# Patient Record
Sex: FEMALE | Race: WHITE | NOT HISPANIC OR LATINO | Employment: PART TIME | URBAN - METROPOLITAN AREA
[De-identification: names, ages, dates, MRNs, and addresses within clinical notes are randomized per-mention and may not be internally consistent; named-entity substitution may affect disease eponyms.]

---

## 2017-01-03 ENCOUNTER — TELEPHONE (OUTPATIENT)
Dept: INTERNAL MEDICINE | Facility: CLINIC | Age: 62
End: 2017-01-03

## 2017-01-12 DIAGNOSIS — E78.5 HYPERLIPIDEMIA: ICD-10-CM

## 2017-01-12 RX ORDER — ROSUVASTATIN CALCIUM 40 MG/1
TABLET, COATED ORAL
Qty: 30 TABLET | Refills: 3 | Status: SHIPPED | OUTPATIENT
Start: 2017-01-12 | End: 2017-02-20 | Stop reason: SDUPTHER

## 2017-01-20 ENCOUNTER — OFFICE VISIT (OUTPATIENT)
Dept: ORTHOPEDIC SURGERY | Facility: CLINIC | Age: 62
End: 2017-01-20

## 2017-01-20 VITALS — TEMPERATURE: 98 F | HEIGHT: 66 IN | BODY MASS INDEX: 24.27 KG/M2 | WEIGHT: 151 LBS

## 2017-01-20 DIAGNOSIS — M51.24 HERNIATED THORACIC DISC WITHOUT MYELOPATHY: Primary | ICD-10-CM

## 2017-01-20 PROCEDURE — 99214 OFFICE O/P EST MOD 30 MIN: CPT | Performed by: ORTHOPAEDIC SURGERY

## 2017-01-20 RX ORDER — CHOLECALCIFEROL (VITAMIN D3) 125 MCG
CAPSULE ORAL
COMMUNITY
End: 2017-02-20 | Stop reason: SDUPTHER

## 2017-01-20 RX ORDER — HYDROCODONE BITARTRATE AND ACETAMINOPHEN 5; 325 MG/1; MG/1
TABLET ORAL
Qty: 40 TABLET | Refills: 0 | Status: SHIPPED | OUTPATIENT
Start: 2017-01-20 | End: 2017-03-10 | Stop reason: HOSPADM

## 2017-01-20 NOTE — MR AVS SNAPSHOT
Ivy Chairez   1/20/2017 7:45 AM   Office Visit    Dept Phone:  [phone]   Encounter #:  [id number]    Provider:  Clyde Callejas MD   Department:  [facility]                Your Full Care Plan              Your Updated Medication List          This list is accurate as of: 1/20/17  8:50 AM.  Always use your most recent med list.                alendronate 70 MG tablet   Commonly known as:  FOSAMAX   Take 1 tablet by mouth every 30 (thirty) days.       aspirin 81 MG tablet       diphenhydrAMINE 25 MG tablet   Commonly known as:  SOMINEX       hydrochlorothiazide 12.5 MG capsule   Commonly known as:  MICROZIDE       HYDROcodone-acetaminophen 5-325 MG per tablet   Commonly known as:  NORCO   1 po q 6 hr PRN severe pain       levothyroxine 75 MCG tablet   Commonly known as:  SYNTHROID, LEVOTHROID   TAKE 1 TABLET BY MOUTH EVERY DAY       meclizine 12.5 MG tablet   Commonly known as:  ANTIVERT       NITROGLYCERIN SL       PREMARIN 0.625 MG/GM vaginal cream   Generic drug:  conjugated estrogens       RANEXA 500 MG 12 hr tablet   Generic drug:  ranolazine   TAKE 1 TABLET BY MOUTH TWICE DAILY       rosuvastatin 40 MG tablet   Commonly known as:  CRESTOR   TAKE 1 TABLET BY MOUTH EVERY DAY       venlafaxine 75 MG tablet   Commonly known as:  EFFEXOR   TAKE 1 TABLET BY MOUTH TWICE DAILY       Vitamin A & D 86376-195 UNITS capsule       vitamin B-12 1000 MCG tablet   Commonly known as:  CYANOCOBALAMIN       * Vitamin D3 2000 UNITS tablet       * Vitamin D3 2000 UNITS tablet       * Notice:  This list has 2 medication(s) that are the same as other medications prescribed for you. Read the directions carefully, and ask your doctor or other care provider to review them with you.            Instructions     None    Patient Instructions History      Upcoming Appointments     Visit Type Date Time Department    FOLLOW UP 1/20/2017  7:45 AM [facility]  "[facility]    OFFICE VISIT 3/15/2017  3:00 PM [facility]      AudioBetat Signup     Our records indicate that you have an active [facility] [email or web address] account.    You can view your After Visit Summary by going to MediaCore and logging in with your [email or web address] username and password.  If you don't have a [email or web address] username and password but a parent or guardian has access to your record, the parent or guardian should login with their own [email or web address] username and password and access your record to view the After Visit Summary.    If you have questions, you can email [email or web address] or call [phone] to talk to our [email or web address] staff.  Remember, [email or web address] is NOT to be used for urgent needs.  For medical emergencies, dial 911.               Other Info from Your Visit           Your Appointments     Mar 15, 2017  3:00 PM EDT   Office Visit with Murtaza Berrios Jr., MD   [facility] INTERNAL MEDICINE (--)    [address] [place] [id number] [phone]           Arrive 15 minutes prior to appointment.              Allergies     Metronidazole  Diarrhea, Nausea And Vomiting    Amoxicillin  Swelling      Reason for Visit     Thoracic Spine - Follow-up, Pain           Vital Signs     Temperature Height Weight Body Mass Index Smoking Status       98 °F (36.7 °C) (Temporal Artery ) 66\" (167.6 cm) 151 lb (68.5 kg) 24.37 kg/m2 Never Smoker         "

## 2017-01-20 NOTE — PROGRESS NOTES
She complains of mid thoracic pain is her primary pain complaint had some temporary relief with epidurals but nothing lasting.  Also had a recent episode of neck pain radiating into the left arm after increased [place] activity.  A balance difficulties bowel or bladder complaints.  The pain is severe and limits her ability to enjoy activities including the anticipated adverse effect on her ability to finish which she enjoys immensely.  She is asking about surgery.  She saw her cardiologist in the air was determined that the pain was not cardiac in nature, and she states he told her that she would be a reasonable candidate for surgery if that should come about.  Exam she seems to have enlarged breast although I would not say dramatically so, but I'm concerned that this could be a contributing factor to sagittal imbalance and pain.  If her dealing with more than one side of pain i.e. cervical and thoracic that could be a common factor which if treated could help both.  Otherwise I think we're dealing with 2 issues.  Her neurologic exam is normal in the upper extremities for motor sensory and reflex testing.  The neck is nontender.  She remains with tenderness in the midthoracic region to palpation percussion at but lower extremity reflexes and strength are good.  Recommended MRI scan of the cervical spine for further evaluation if.  If there is nothing dramatically contributory then we may recommend a T4 to 10 fusion with instrumentation for herniated disc and disc degeneration and the sequela of fracture.  I did refill her hydrocodone which she takes 1 per day.  Answers for HPI/ROS submitted by the patient on 1/16/2017   Back pain  Chronicity: chronic  Onset: more than 1 month ago  Frequency: daily  Progression since onset: rapidly worsening  Pain location: thoracic spine  Pain quality: aching, stabbing  Pain - numeric: 10/10  Pain is: worse during the day  Aggravated by: position, sitting, standing  Stiffness is  present: all day  abdominal pain: No  bladder incontinence: No  bowel incontinence: No  chest pain: No  dysuria: No  fever: No  headaches: No  leg pain: No  numbness: No  paresis: No  paresthesias: Yes  pelvic pain: No  perianal numbness: No  tingling: Yes  weakness: Yes  weight loss: No  Risk factors: history of osteoporosis

## 2017-01-20 NOTE — LETTER
January 20, 2017     Murtaza Berrios Jr., MD  [address]  [address] [address]    Patient: Ivy Chairez   YOB: 1955   Date of Visit: 1/20/2017       Dear Dr. Agustina MD:    Thank you for referring Ivy Chairez to me for evaluation. Below are the relevant portions of my assessment and plan of care.    If you have questions, please do not hesitate to call me. I look forward to following Ivy along with you.         Sincerely,        Clyde Callejas MD        CC: No Recipients  Clyde Callejas MD  1/23/2017  7:25 AM  Signed  She complains of mid thoracic pain is her primary pain complaint had some temporary relief with epidurals but nothing lasting.  Also had a recent episode of neck pain radiating into the left arm after increased [place] activity.  A balance difficulties bowel or bladder complaints.  The pain is severe and limits her ability to enjoy activities including the anticipated adverse effect on her ability to finish which she enjoys immensely.  She is asking about surgery.  She saw her cardiologist in the air was determined that the pain was not cardiac in nature, and she states he told her that she would be a reasonable candidate for surgery if that should come about.  Exam she seems to have enlarged breast although I would not say dramatically so, but I'm concerned that this could be a contributing factor to sagittal imbalance and pain.  If her dealing with more than one side of pain i.e. cervical and thoracic that could be a common factor which if treated could help both.  Otherwise I think we're dealing with 2 issues.  Her neurologic exam is normal in the upper extremities for motor sensory and reflex testing.  The neck is nontender.  She remains with tenderness in the midthoracic region to palpation percussion at but lower extremity reflexes and strength are good.  Recommended MRI scan of the cervical spine for further evaluation if.  If there is nothing  dramatically contributory then we may recommend a T4 to 10 fusion with instrumentation for herniated disc and disc degeneration and the sequela of fracture.  I did refill her hydrocodone which she takes 1 per day.  Answers for HPI/ROS submitted by the patient on 1/16/2017   Back pain  Chronicity: chronic  Onset: more than 1 month ago  Frequency: daily  Progression since onset: rapidly worsening  Pain location: thoracic spine  Pain quality: aching, stabbing  Pain - numeric: 10/10  Pain is: worse during the day  Aggravated by: position, sitting, standing  Stiffness is present: all day  abdominal pain: No  bladder incontinence: No  bowel incontinence: No  chest pain: No  dysuria: No  fever: No  headaches: No  leg pain: No  numbness: No  paresis: No  paresthesias: Yes  pelvic pain: No  perianal numbness: No  tingling: Yes  weakness: Yes  weight loss: No  Risk factors: history of osteoporosis

## 2017-01-23 DIAGNOSIS — M54.2 NECK PAIN: Primary | ICD-10-CM

## 2017-01-24 ENCOUNTER — OFFICE VISIT (OUTPATIENT)
Dept: ORTHOPEDIC SURGERY | Facility: CLINIC | Age: 62
End: 2017-01-24

## 2017-01-24 DIAGNOSIS — M54.2 NECK PAIN: ICD-10-CM

## 2017-01-24 PROCEDURE — 72141 MRI NECK SPINE W/O DYE: CPT | Performed by: ORTHOPAEDIC SURGERY

## 2017-01-24 NOTE — MR AVS SNAPSHOT
[facility]  [phone]                    Ivy Chairez   1/24/2017 2:00 PM   Office Visit    Dept Phone:  [phone]   Encounter #:  [id number]    Provider:  LEENA BARRETT   Department:  [facility]                Your Full Care Plan              Your Updated Medication List          This list is accurate as of: 1/24/17  2:25 PM.  Always use your most recent med list.                alendronate 70 MG tablet   Commonly known as:  FOSAMAX   Take 1 tablet by mouth every 30 (thirty) days.       aspirin 81 MG tablet       diphenhydrAMINE 25 MG tablet   Commonly known as:  SOMINEX       hydrochlorothiazide 12.5 MG capsule   Commonly known as:  MICROZIDE       HYDROcodone-acetaminophen 5-325 MG per tablet   Commonly known as:  NORCO   1 po q 6 hr PRN severe pain       levothyroxine 75 MCG tablet   Commonly known as:  SYNTHROID, LEVOTHROID   TAKE 1 TABLET BY MOUTH EVERY DAY       meclizine 12.5 MG tablet   Commonly known as:  ANTIVERT       NITROGLYCERIN SL       PREMARIN 0.625 MG/GM vaginal cream   Generic drug:  conjugated estrogens       RANEXA 500 MG 12 hr tablet   Generic drug:  ranolazine   TAKE 1 TABLET BY MOUTH TWICE DAILY       rosuvastatin 40 MG tablet   Commonly known as:  CRESTOR   TAKE 1 TABLET BY MOUTH EVERY DAY       venlafaxine 75 MG tablet   Commonly known as:  EFFEXOR   TAKE 1 TABLET BY MOUTH TWICE DAILY       Vitamin A & D 62530-055 UNITS capsule       vitamin B-12 1000 MCG tablet   Commonly known as:  CYANOCOBALAMIN       * Vitamin D3 2000 UNITS tablet       * Vitamin D3 2000 UNITS tablet       * Notice:  This list has 2 medication(s) that are the same as other medications prescribed for you. Read the directions carefully, and ask your doctor or other care provider to review them with you.            We Performed the Following     MRI Cervical Spine Without Contrast       You Were  Diagnosed With        Codes Comments    Neck pain     ICD-10-CM: M54.2  ICD-9-CM: 723.1       Instructions     None    Patient Instructions History      Upcoming Appointments     Visit Type Date Time Department    MRI 1/24/2017  2:00 PM [facility]    [facility]      Hooptap Signup     Our records indicate that you have an active [facility] Hooptap account.    You can view your After Visit Summary by going to myGreek and logging in with your Hooptap username and password.  If you don't have a Hooptap username and password but a parent or guardian has access to your record, the parent or guardian should login with their own Hooptap username and password and access your record to view the After Visit Summary.    If you have questions, you can email [email or web address] or call [phone] to talk to our Hooptap staff.  Remember, Hooptap is NOT to be used for urgent needs.  For medical emergencies, dial 911.               Other Info from Your Visit           Your Appointments     Mar 15, 2017  3:00 PM EDT   Office Visit with Murtaza Berrios Jr., MD   [facility] INTERNAL MEDICINE (--)    [address] [place] [id number] [phone]           Arrive 15 minutes prior to appointment.              Allergies     Metronidazole  Diarrhea, Nausea And Vomiting    Amoxicillin  Swelling      Vital Signs     Smoking Status                   Never Smoker           Problems and Diagnoses Noted     Neck pain

## 2017-01-27 ENCOUNTER — TELEPHONE (OUTPATIENT)
Dept: ORTHOPEDIC SURGERY | Facility: CLINIC | Age: 62
End: 2017-01-27

## 2017-02-02 ENCOUNTER — PREP FOR SURGERY (OUTPATIENT)
Dept: ORTHOPEDIC SURGERY | Facility: CLINIC | Age: 62
End: 2017-02-02

## 2017-02-02 DIAGNOSIS — M51.24 HERNIATED THORACIC DISC WITHOUT MYELOPATHY: Primary | ICD-10-CM

## 2017-02-02 RX ORDER — VANCOMYCIN HYDROCHLORIDE 1 G/200ML
15 INJECTION, SOLUTION INTRAVENOUS ONCE
Status: CANCELLED | OUTPATIENT
Start: 2017-02-02 | End: 2017-02-02

## 2017-02-20 ENCOUNTER — APPOINTMENT (OUTPATIENT)
Dept: PREADMISSION TESTING | Facility: HOSPITAL | Age: 62
End: 2017-02-20

## 2017-02-20 VITALS
OXYGEN SATURATION: 100 % | HEART RATE: 72 BPM | RESPIRATION RATE: 18 BRPM | DIASTOLIC BLOOD PRESSURE: 84 MMHG | SYSTOLIC BLOOD PRESSURE: 146 MMHG | WEIGHT: 155 LBS | HEIGHT: 66 IN | TEMPERATURE: 97.2 F | BODY MASS INDEX: 24.91 KG/M2

## 2017-02-20 LAB
ANION GAP SERPL CALCULATED.3IONS-SCNC: 13.4 MMOL/L
BUN BLD-MCNC: 11 MG/DL (ref 8–23)
BUN/CREAT SERPL: 11.7 (ref 7–25)
CALCIUM SPEC-SCNC: 9.1 MG/DL (ref 8.6–10.5)
CHLORIDE SERPL-SCNC: 101 MMOL/L (ref 98–107)
CO2 SERPL-SCNC: 24.6 MMOL/L (ref 22–29)
CREAT BLD-MCNC: 0.94 MG/DL (ref 0.57–1)
DEPRECATED RDW RBC AUTO: 43 FL (ref 37–54)
ERYTHROCYTE [DISTWIDTH] IN BLOOD BY AUTOMATED COUNT: 12.5 % (ref 11.7–13)
GFR SERPL CREATININE-BSD FRML MDRD: 60 ML/MIN/1.73
GLUCOSE BLD-MCNC: 103 MG/DL (ref 65–99)
HCT VFR BLD AUTO: 39.3 % (ref 35.6–45.5)
HGB BLD-MCNC: 13.4 G/DL (ref 11.9–15.5)
MCH RBC QN AUTO: 32.1 PG (ref 26.9–32)
MCHC RBC AUTO-ENTMCNC: 34.1 G/DL (ref 32.4–36.3)
MCV RBC AUTO: 94.2 FL (ref 80.5–98.2)
PLATELET # BLD AUTO: 290 10*3/MM3 (ref 140–500)
PMV BLD AUTO: 9.3 FL (ref 6–12)
POTASSIUM BLD-SCNC: 4.1 MMOL/L (ref 3.5–5.2)
RBC # BLD AUTO: 4.17 10*6/MM3 (ref 3.9–5.2)
SODIUM BLD-SCNC: 139 MMOL/L (ref 136–145)
WBC NRBC COR # BLD: 5.89 10*3/MM3 (ref 4.5–10.7)

## 2017-02-20 PROCEDURE — 93005 ELECTROCARDIOGRAM TRACING: CPT | Performed by: ORTHOPAEDIC SURGERY

## 2017-02-20 PROCEDURE — 36415 COLL VENOUS BLD VENIPUNCTURE: CPT

## 2017-02-20 PROCEDURE — 85027 COMPLETE CBC AUTOMATED: CPT | Performed by: ORTHOPAEDIC SURGERY

## 2017-02-20 PROCEDURE — 80048 BASIC METABOLIC PNL TOTAL CA: CPT | Performed by: ORTHOPAEDIC SURGERY

## 2017-02-20 PROCEDURE — 93010 ELECTROCARDIOGRAM REPORT: CPT | Performed by: INTERNAL MEDICINE

## 2017-02-20 RX ORDER — LEVOTHYROXINE SODIUM 0.07 MG/1
75 TABLET ORAL DAILY
COMMUNITY
End: 2017-07-11 | Stop reason: SDUPTHER

## 2017-02-20 RX ORDER — VENLAFAXINE 75 MG/1
75 TABLET ORAL
COMMUNITY
End: 2018-11-14 | Stop reason: SDUPTHER

## 2017-02-20 RX ORDER — ROSUVASTATIN CALCIUM 40 MG/1
40 TABLET, COATED ORAL NIGHTLY
COMMUNITY
End: 2020-01-07 | Stop reason: SDUPTHER

## 2017-02-20 NOTE — DISCHARGE INSTRUCTIONS
Take the following medications the morning of surgery with a small sip of water.  NONE    ARRIVE  AM      General Instructions:  • Do not eat or drink after midnight: includes water, mints, or gum. You may brush your teeth.  • Do not smoke, chew tobacco, or drink alcohol.  • Bring medications in original bottles, any inhalers and if applicable your C-PAP/ BI-PAP machine.  • Bring any papers given to you in the doctor’s office.  • Wear clean comfortable clothes and socks.  • Do not wear contact lenses or make-up.  Bring a case for your glasses if applicable.   • Bring crutches or walker if applicable.  • Leave all other valuables and jewelry at home.    If you were given a blood bank ID arm band remember to bring it with you the day of surgery.    Preventing a Surgical Site Infection:  Shower on the morning of surgery using a fresh bar of anti-bacterial soap (such as Dial) and clean washcloth.  Dry with a clean towel and dress in clean clothing.  For 2 to 3 days before surgery, avoid shaving with a razor near where you will have surgery because the razor can irritate skin and make it easier to develop an infection  Ask your surgeon if you will be receiving antibiotics prior to surgery  Make sure you, your family, and all healthcare providers clean their hands with soap and water or an alcohol based hand  before caring for you or your wound  If at all possible, quit smoking as many days before surgery as you can.    Day of surgery:  Upon arrival, a Pre-op nurse and Anesthesiologist will review your health history, obtain vital signs, and answer questions you may have.  The only belongings needed at this time will be your home medications and if applicable your C-PAP/BI-PAP machine.  If you are staying overnight your family can leave the rest of your belongings in the car and bring them to your room later.  A Pre-op nurse will start an IV and you may receive medication in preparation for surgery,  including something to help you relax.  Your family will be able to see you in the Pre-op area.  While you are in surgery your family should notify the waiting room  if they leave the waiting room area and provide a contact phone number.    Please be aware that surgery does come with discomfort.  We want to make every effort to control your discomfort so please discuss any uncontrolled symptoms with your nurse.   Your doctor will most likely have prescribed pain medications.      If you are going home after surgery you will receive individualized written care instructions before being discharged.  A responsible adult must drive you to and from the hospital on the day of your surgery and stay with you for 24 hours.    If you are staying overnight following surgery, you will be transported to your hospital room following the recovery period.  [facility] has all private rooms.    If you have any questions please call Pre-Admission Testing at [phone].  Deductibles and co-payments are collected on the day of service. Please be prepared to pay the required co-pay, deductible or deposit on the day of service as defined by your plan.

## 2017-02-22 ENCOUNTER — TELEPHONE (OUTPATIENT)
Dept: ORTHOPEDIC SURGERY | Facility: CLINIC | Age: 62
End: 2017-02-22

## 2017-02-22 NOTE — TELEPHONE ENCOUNTER
----- Message from Ivy Chairez sent at 2/21/2017  8:21 PM EST -----  Regarding: Non-Urgent Medical Question      Contact: [phone]    My chart message:    My HR Department is requesting a note that states how long of a leave I will need after surgery. Could you please supply this.

## 2017-02-24 RX ORDER — ALENDRONATE SODIUM 70 MG/1
TABLET ORAL
Qty: 4 TABLET | Refills: 2 | Status: ON HOLD | OUTPATIENT
Start: 2017-02-24 | End: 2017-03-06 | Stop reason: SDUPTHER

## 2017-03-06 ENCOUNTER — APPOINTMENT (OUTPATIENT)
Dept: GENERAL RADIOLOGY | Facility: HOSPITAL | Age: 62
End: 2017-03-06

## 2017-03-06 ENCOUNTER — ANESTHESIA EVENT (OUTPATIENT)
Dept: PERIOP | Facility: HOSPITAL | Age: 62
End: 2017-03-06

## 2017-03-06 ENCOUNTER — HOSPITAL ENCOUNTER (INPATIENT)
Facility: HOSPITAL | Age: 62
LOS: 4 days | Discharge: HOME | End: 2017-03-10
Attending: ORTHOPAEDIC SURGERY | Admitting: ORTHOPAEDIC SURGERY

## 2017-03-06 ENCOUNTER — ANESTHESIA (OUTPATIENT)
Dept: PERIOP | Facility: HOSPITAL | Age: 62
End: 2017-03-06

## 2017-03-06 DIAGNOSIS — R26.2 DIFFICULTY WALKING: Primary | ICD-10-CM

## 2017-03-06 DIAGNOSIS — J98.11 ATELECTASIS OF BOTH LUNGS: ICD-10-CM

## 2017-03-06 DIAGNOSIS — M51.24 HERNIATED THORACIC DISC WITHOUT MYELOPATHY: ICD-10-CM

## 2017-03-06 PROBLEM — R06.00 DYSPNEA: Status: ACTIVE | Noted: 2017-03-06

## 2017-03-06 PROBLEM — Z86.711 HISTORY OF PULMONARY EMBOLISM: Status: ACTIVE | Noted: 2017-03-06

## 2017-03-06 LAB
ABO GROUP BLD: NORMAL
BLD GP AB SCN SERPL QL: NEGATIVE
CK MB SERPL-CCNC: 62.97 NG/ML
GLUCOSE BLDC GLUCOMTR-MCNC: 142 MG/DL (ref 70–130)
RH BLD: NEGATIVE
TROPONIN T SERPL-MCNC: <0.01 NG/ML (ref 0–0.03)

## 2017-03-06 PROCEDURE — 25010000002 HYDROMORPHONE PER 4 MG: Performed by: NURSE ANESTHETIST, CERTIFIED REGISTERED

## 2017-03-06 PROCEDURE — 82550 ASSAY OF CK (CPK): CPT | Performed by: INTERNAL MEDICINE

## 2017-03-06 PROCEDURE — C1713 ANCHOR/SCREW BN/BN,TIS/BN: HCPCS | Performed by: ORTHOPAEDIC SURGERY

## 2017-03-06 PROCEDURE — S0260 H&P FOR SURGERY: HCPCS | Performed by: ORTHOPAEDIC SURGERY

## 2017-03-06 PROCEDURE — 82553 CREATINE MB FRACTION: CPT | Performed by: INTERNAL MEDICINE

## 2017-03-06 PROCEDURE — 25010000002 PHENYLEPHRINE PER 1 ML: Performed by: NURSE ANESTHETIST, CERTIFIED REGISTERED

## 2017-03-06 PROCEDURE — 07DR3ZZ EXTRACTION OF ILIAC BONE MARROW, PERCUTANEOUS APPROACH: ICD-10-PCS | Performed by: ORTHOPAEDIC SURGERY

## 2017-03-06 PROCEDURE — 86901 BLOOD TYPING SEROLOGIC RH(D): CPT | Performed by: ORTHOPAEDIC SURGERY

## 2017-03-06 PROCEDURE — 25010000002 VANCOMYCIN PER 500 MG: Performed by: ORTHOPAEDIC SURGERY

## 2017-03-06 PROCEDURE — 25010000002 ONDANSETRON PER 1 MG: Performed by: ORTHOPAEDIC SURGERY

## 2017-03-06 PROCEDURE — 86850 RBC ANTIBODY SCREEN: CPT | Performed by: ORTHOPAEDIC SURGERY

## 2017-03-06 PROCEDURE — 71010 HC CHEST PA OR AP: CPT

## 2017-03-06 PROCEDURE — 20930 SP BONE ALGRFT MORSEL ADD-ON: CPT | Performed by: ORTHOPAEDIC SURGERY

## 2017-03-06 PROCEDURE — 25010000002 METHOCARBAMOL 1000 MG/10ML SOLUTION 10 ML VIAL: Performed by: ORTHOPAEDIC SURGERY

## 2017-03-06 PROCEDURE — 25010000002 DEXAMETHASONE PER 1 MG: Performed by: NURSE ANESTHETIST, CERTIFIED REGISTERED

## 2017-03-06 PROCEDURE — 0RG73Z1: ICD-10-PCS | Performed by: ORTHOPAEDIC SURGERY

## 2017-03-06 PROCEDURE — 25010000002 PROPOFOL 10 MG/ML EMULSION: Performed by: NURSE ANESTHETIST, CERTIFIED REGISTERED

## 2017-03-06 PROCEDURE — 22614 ARTHRD PST TQ 1NTRSPC EA ADD: CPT | Performed by: ORTHOPAEDIC SURGERY

## 2017-03-06 PROCEDURE — 25010000002 ONDANSETRON PER 1 MG: Performed by: NURSE ANESTHETIST, CERTIFIED REGISTERED

## 2017-03-06 PROCEDURE — 86900 BLOOD TYPING SEROLOGIC ABO: CPT | Performed by: ORTHOPAEDIC SURGERY

## 2017-03-06 PROCEDURE — 25010000002 FENTANYL CITRATE (PF) 100 MCG/2ML SOLUTION: Performed by: NURSE ANESTHETIST, CERTIFIED REGISTERED

## 2017-03-06 PROCEDURE — 20936 SP BONE AGRFT LOCAL ADD-ON: CPT | Performed by: ORTHOPAEDIC SURGERY

## 2017-03-06 PROCEDURE — 76000 FLUOROSCOPY <1 HR PHYS/QHP: CPT

## 2017-03-06 PROCEDURE — 93010 ELECTROCARDIOGRAM REPORT: CPT | Performed by: INTERNAL MEDICINE

## 2017-03-06 PROCEDURE — 82962 GLUCOSE BLOOD TEST: CPT

## 2017-03-06 PROCEDURE — 25010000002 NEOSTIGMINE 10 MG/10ML SOLUTION: Performed by: NURSE ANESTHETIST, CERTIFIED REGISTERED

## 2017-03-06 PROCEDURE — 22610 ARTHRD PST TQ 1NTRSPC THRC: CPT | Performed by: ORTHOPAEDIC SURGERY

## 2017-03-06 PROCEDURE — 84484 ASSAY OF TROPONIN QUANT: CPT | Performed by: INTERNAL MEDICINE

## 2017-03-06 PROCEDURE — P9041 ALBUMIN (HUMAN),5%, 50ML: HCPCS | Performed by: NURSE ANESTHETIST, CERTIFIED REGISTERED

## 2017-03-06 PROCEDURE — 22842 INSERT SPINE FIXATION DEVICE: CPT | Performed by: ORTHOPAEDIC SURGERY

## 2017-03-06 PROCEDURE — 25010000002 MIDAZOLAM PER 1 MG: Performed by: ANESTHESIOLOGY

## 2017-03-06 PROCEDURE — 72070 X-RAY EXAM THORAC SPINE 2VWS: CPT

## 2017-03-06 PROCEDURE — 93005 ELECTROCARDIOGRAM TRACING: CPT | Performed by: HOSPITALIST

## 2017-03-06 PROCEDURE — 38220 DX BONE MARROW ASPIRATIONS: CPT | Performed by: ORTHOPAEDIC SURGERY

## 2017-03-06 PROCEDURE — 86920 COMPATIBILITY TEST SPIN: CPT

## 2017-03-06 PROCEDURE — 25010000002 ALBUMIN HUMAN 5% PER 50 ML: Performed by: NURSE ANESTHETIST, CERTIFIED REGISTERED

## 2017-03-06 PROCEDURE — 25810000003 POTASSIUM CHLORIDE PER 2 MEQ: Performed by: ORTHOPAEDIC SURGERY

## 2017-03-06 PROCEDURE — 25010000002 METHOCARBAMOL 1000 MG/10ML SOLUTION: Performed by: ORTHOPAEDIC SURGERY

## 2017-03-06 DEVICE — SCRW EXPEDIUM PA TI 6X35MM: Type: IMPLANTABLE DEVICE | Site: SPINE THORACIC | Status: FUNCTIONAL

## 2017-03-06 DEVICE — SCRW EXPEDIUM PA TI 6X40MM: Type: IMPLANTABLE DEVICE | Site: SPINE THORACIC | Status: FUNCTIONAL

## 2017-03-06 DEVICE — ROD STR SPINE EXPEDIUM HEX/END TI 5.5X480MM: Type: IMPLANTABLE DEVICE | Site: SPINE THORACIC | Status: FUNCTIONAL

## 2017-03-06 DEVICE — STRIP 7800310 MASTERGRAFT STRIP 10CM
Type: IMPLANTABLE DEVICE | Site: SPINE THORACIC | Status: FUNCTIONAL
Brand: MASTERGRAFT® STRIP

## 2017-03-06 DEVICE — SCRW INNR EXPEDIUM: Type: IMPLANTABLE DEVICE | Site: SPINE THORACIC | Status: FUNCTIONAL

## 2017-03-06 RX ORDER — METHOCARBAMOL 100 MG/ML
1000 INJECTION, SOLUTION INTRAMUSCULAR; INTRAVENOUS ONCE
Status: DISCONTINUED | OUTPATIENT
Start: 2017-03-06 | End: 2017-03-06 | Stop reason: HOSPADM

## 2017-03-06 RX ORDER — PROMETHAZINE HYDROCHLORIDE 25 MG/1
25 SUPPOSITORY RECTAL ONCE AS NEEDED
Status: DISCONTINUED | OUTPATIENT
Start: 2017-03-06 | End: 2017-03-06

## 2017-03-06 RX ORDER — DEXAMETHASONE SODIUM PHOSPHATE 10 MG/ML
INJECTION INTRAMUSCULAR; INTRAVENOUS AS NEEDED
Status: DISCONTINUED | OUTPATIENT
Start: 2017-03-06 | End: 2017-03-06 | Stop reason: SURG

## 2017-03-06 RX ORDER — SODIUM CHLORIDE 0.9 % (FLUSH) 0.9 %
1-10 SYRINGE (ML) INJECTION AS NEEDED
Status: DISCONTINUED | OUTPATIENT
Start: 2017-03-06 | End: 2017-03-06 | Stop reason: HOSPADM

## 2017-03-06 RX ORDER — TEMAZEPAM 15 MG/1
15 CAPSULE ORAL NIGHTLY PRN
Status: DISCONTINUED | OUTPATIENT
Start: 2017-03-06 | End: 2017-03-10 | Stop reason: HOSPADM

## 2017-03-06 RX ORDER — PROMETHAZINE HYDROCHLORIDE 25 MG/1
12.5 TABLET ORAL ONCE AS NEEDED
Status: DISCONTINUED | OUTPATIENT
Start: 2017-03-06 | End: 2017-03-06

## 2017-03-06 RX ORDER — IPRATROPIUM BROMIDE AND ALBUTEROL SULFATE 2.5; .5 MG/3ML; MG/3ML
3 SOLUTION RESPIRATORY (INHALATION) EVERY 6 HOURS PRN
Status: DISCONTINUED | OUTPATIENT
Start: 2017-03-06 | End: 2017-03-10 | Stop reason: HOSPADM

## 2017-03-06 RX ORDER — LABETALOL HYDROCHLORIDE 5 MG/ML
5 INJECTION, SOLUTION INTRAVENOUS
Status: DISCONTINUED | OUTPATIENT
Start: 2017-03-06 | End: 2017-03-06

## 2017-03-06 RX ORDER — PROPOFOL 10 MG/ML
VIAL (ML) INTRAVENOUS AS NEEDED
Status: DISCONTINUED | OUTPATIENT
Start: 2017-03-06 | End: 2017-03-06 | Stop reason: SURG

## 2017-03-06 RX ORDER — MIDAZOLAM HYDROCHLORIDE 1 MG/ML
2 INJECTION INTRAMUSCULAR; INTRAVENOUS
Status: DISCONTINUED | OUTPATIENT
Start: 2017-03-06 | End: 2017-03-06 | Stop reason: HOSPADM

## 2017-03-06 RX ORDER — ATORVASTATIN CALCIUM 80 MG/1
80 TABLET, FILM COATED ORAL DAILY
Status: DISCONTINUED | OUTPATIENT
Start: 2017-03-06 | End: 2017-03-10 | Stop reason: HOSPADM

## 2017-03-06 RX ORDER — GLYCOPYRROLATE 0.2 MG/ML
INJECTION INTRAMUSCULAR; INTRAVENOUS AS NEEDED
Status: DISCONTINUED | OUTPATIENT
Start: 2017-03-06 | End: 2017-03-06 | Stop reason: SURG

## 2017-03-06 RX ORDER — ONDANSETRON 4 MG/1
4 TABLET, ORALLY DISINTEGRATING ORAL EVERY 6 HOURS PRN
Status: DISCONTINUED | OUTPATIENT
Start: 2017-03-06 | End: 2017-03-07

## 2017-03-06 RX ORDER — SODIUM CHLORIDE AND POTASSIUM CHLORIDE 150; 450 MG/100ML; MG/100ML
100 INJECTION, SOLUTION INTRAVENOUS CONTINUOUS
Status: DISCONTINUED | OUTPATIENT
Start: 2017-03-06 | End: 2017-03-07

## 2017-03-06 RX ORDER — CYCLOBENZAPRINE HCL 10 MG
TABLET ORAL
Status: COMPLETED
Start: 2017-03-06 | End: 2017-03-06

## 2017-03-06 RX ORDER — HYDRALAZINE HYDROCHLORIDE 20 MG/ML
5 INJECTION INTRAMUSCULAR; INTRAVENOUS
Status: DISCONTINUED | OUTPATIENT
Start: 2017-03-06 | End: 2017-03-06

## 2017-03-06 RX ORDER — SODIUM CHLORIDE, SODIUM LACTATE, POTASSIUM CHLORIDE, CALCIUM CHLORIDE 600; 310; 30; 20 MG/100ML; MG/100ML; MG/100ML; MG/100ML
9 INJECTION, SOLUTION INTRAVENOUS CONTINUOUS
Status: DISCONTINUED | OUTPATIENT
Start: 2017-03-06 | End: 2017-03-10 | Stop reason: HOSPADM

## 2017-03-06 RX ORDER — PROMETHAZINE HYDROCHLORIDE 25 MG/ML
12.5 INJECTION, SOLUTION INTRAMUSCULAR; INTRAVENOUS ONCE AS NEEDED
Status: DISCONTINUED | OUTPATIENT
Start: 2017-03-06 | End: 2017-03-06

## 2017-03-06 RX ORDER — NEOSTIGMINE METHYLSULFATE 1 MG/ML
INJECTION, SOLUTION INTRAVENOUS AS NEEDED
Status: DISCONTINUED | OUTPATIENT
Start: 2017-03-06 | End: 2017-03-06 | Stop reason: SURG

## 2017-03-06 RX ORDER — ROCURONIUM BROMIDE 10 MG/ML
INJECTION, SOLUTION INTRAVENOUS AS NEEDED
Status: DISCONTINUED | OUTPATIENT
Start: 2017-03-06 | End: 2017-03-06 | Stop reason: SURG

## 2017-03-06 RX ORDER — PROMETHAZINE HYDROCHLORIDE 25 MG/1
25 TABLET ORAL ONCE AS NEEDED
Status: DISCONTINUED | OUTPATIENT
Start: 2017-03-06 | End: 2017-03-06

## 2017-03-06 RX ORDER — ACETAMINOPHEN 160 MG
2000 TABLET,DISINTEGRATING ORAL DAILY
Status: DISCONTINUED | OUTPATIENT
Start: 2017-03-06 | End: 2017-03-07 | Stop reason: CLARIF

## 2017-03-06 RX ORDER — FLUMAZENIL 0.1 MG/ML
0.2 INJECTION INTRAVENOUS AS NEEDED
Status: DISCONTINUED | OUTPATIENT
Start: 2017-03-06 | End: 2017-03-06

## 2017-03-06 RX ORDER — VANCOMYCIN HYDROCHLORIDE 1 G/200ML
15 INJECTION, SOLUTION INTRAVENOUS ONCE
Status: COMPLETED | OUTPATIENT
Start: 2017-03-06 | End: 2017-03-06

## 2017-03-06 RX ORDER — HYDROMORPHONE HCL IN 0.9% NACL 10 MG/50ML
PATIENT CONTROLLED ANALGESIA SYRINGE INTRAVENOUS CONTINUOUS
Status: DISCONTINUED | OUTPATIENT
Start: 2017-03-06 | End: 2017-03-08

## 2017-03-06 RX ORDER — NALOXONE HCL 0.4 MG/ML
0.2 VIAL (ML) INJECTION AS NEEDED
Status: DISCONTINUED | OUTPATIENT
Start: 2017-03-06 | End: 2017-03-06

## 2017-03-06 RX ORDER — POLYETHYLENE GLYCOL 3350 17 G/17G
17 POWDER, FOR SOLUTION ORAL DAILY PRN
Status: DISCONTINUED | OUTPATIENT
Start: 2017-03-06 | End: 2017-03-10 | Stop reason: HOSPADM

## 2017-03-06 RX ORDER — ONDANSETRON 2 MG/ML
INJECTION INTRAMUSCULAR; INTRAVENOUS AS NEEDED
Status: DISCONTINUED | OUTPATIENT
Start: 2017-03-06 | End: 2017-03-06 | Stop reason: SURG

## 2017-03-06 RX ORDER — MIDAZOLAM HYDROCHLORIDE 1 MG/ML
1 INJECTION INTRAMUSCULAR; INTRAVENOUS
Status: DISCONTINUED | OUTPATIENT
Start: 2017-03-06 | End: 2017-03-06 | Stop reason: HOSPADM

## 2017-03-06 RX ORDER — SENNA AND DOCUSATE SODIUM 50; 8.6 MG/1; MG/1
1 TABLET, FILM COATED ORAL 2 TIMES DAILY
Status: DISCONTINUED | OUTPATIENT
Start: 2017-03-06 | End: 2017-03-10 | Stop reason: HOSPADM

## 2017-03-06 RX ORDER — SODIUM CHLORIDE 9 MG/ML
INJECTION, SOLUTION INTRAVENOUS CONTINUOUS PRN
Status: DISCONTINUED | OUTPATIENT
Start: 2017-03-06 | End: 2017-03-06 | Stop reason: SURG

## 2017-03-06 RX ORDER — MECLIZINE HCL 12.5 MG/1
12.5 TABLET ORAL DAILY
Status: DISCONTINUED | OUTPATIENT
Start: 2017-03-06 | End: 2017-03-06

## 2017-03-06 RX ORDER — FENTANYL CITRATE 50 UG/ML
50 INJECTION, SOLUTION INTRAMUSCULAR; INTRAVENOUS
Status: DISCONTINUED | OUTPATIENT
Start: 2017-03-06 | End: 2017-03-06

## 2017-03-06 RX ORDER — FENTANYL CITRATE 50 UG/ML
INJECTION, SOLUTION INTRAMUSCULAR; INTRAVENOUS AS NEEDED
Status: DISCONTINUED | OUTPATIENT
Start: 2017-03-06 | End: 2017-03-06 | Stop reason: SURG

## 2017-03-06 RX ORDER — HYDROCHLOROTHIAZIDE 12.5 MG/1
12.5 CAPSULE, GELATIN COATED ORAL DAILY PRN
Status: DISCONTINUED | OUTPATIENT
Start: 2017-03-06 | End: 2017-03-06

## 2017-03-06 RX ORDER — ONDANSETRON 2 MG/ML
4 INJECTION INTRAMUSCULAR; INTRAVENOUS EVERY 6 HOURS PRN
Status: DISCONTINUED | OUTPATIENT
Start: 2017-03-06 | End: 2017-03-07

## 2017-03-06 RX ORDER — CYCLOBENZAPRINE HCL 10 MG
10 TABLET ORAL 3 TIMES DAILY PRN
Status: DISCONTINUED | OUTPATIENT
Start: 2017-03-06 | End: 2017-03-10 | Stop reason: HOSPADM

## 2017-03-06 RX ORDER — LEVOTHYROXINE SODIUM 0.07 MG/1
75 TABLET ORAL EVERY MORNING
Status: DISCONTINUED | OUTPATIENT
Start: 2017-03-07 | End: 2017-03-10 | Stop reason: HOSPADM

## 2017-03-06 RX ORDER — HYDROMORPHONE HCL 110MG/55ML
PATIENT CONTROLLED ANALGESIA SYRINGE INTRAVENOUS AS NEEDED
Status: DISCONTINUED | OUTPATIENT
Start: 2017-03-06 | End: 2017-03-06 | Stop reason: SURG

## 2017-03-06 RX ORDER — OXYCODONE HYDROCHLORIDE AND ACETAMINOPHEN 5; 325 MG/1; MG/1
2 TABLET ORAL EVERY 4 HOURS PRN
Status: DISCONTINUED | OUTPATIENT
Start: 2017-03-06 | End: 2017-03-10 | Stop reason: HOSPADM

## 2017-03-06 RX ORDER — HYDROCODONE BITARTRATE AND ACETAMINOPHEN 7.5; 325 MG/1; MG/1
1 TABLET ORAL ONCE AS NEEDED
Status: DISCONTINUED | OUTPATIENT
Start: 2017-03-06 | End: 2017-03-06

## 2017-03-06 RX ORDER — DIPHENHYDRAMINE HYDROCHLORIDE 50 MG/ML
12.5 INJECTION INTRAMUSCULAR; INTRAVENOUS
Status: DISCONTINUED | OUTPATIENT
Start: 2017-03-06 | End: 2017-03-06

## 2017-03-06 RX ORDER — LIDOCAINE HYDROCHLORIDE 20 MG/ML
INJECTION, SOLUTION INFILTRATION; PERINEURAL AS NEEDED
Status: DISCONTINUED | OUTPATIENT
Start: 2017-03-06 | End: 2017-03-06 | Stop reason: SURG

## 2017-03-06 RX ORDER — HYDROMORPHONE HYDROCHLORIDE 1 MG/ML
0.5 INJECTION, SOLUTION INTRAMUSCULAR; INTRAVENOUS; SUBCUTANEOUS
Status: DISCONTINUED | OUTPATIENT
Start: 2017-03-06 | End: 2017-03-06

## 2017-03-06 RX ORDER — ALENDRONATE SODIUM 70 MG/1
70 TABLET ORAL
COMMUNITY
End: 2018-04-27 | Stop reason: SINTOL

## 2017-03-06 RX ORDER — SODIUM CHLORIDE 0.9 % (FLUSH) 0.9 %
1-10 SYRINGE (ML) INJECTION AS NEEDED
Status: DISCONTINUED | OUTPATIENT
Start: 2017-03-06 | End: 2017-03-10 | Stop reason: HOSPADM

## 2017-03-06 RX ORDER — VENLAFAXINE 75 MG/1
75 TABLET ORAL DAILY
Status: DISCONTINUED | OUTPATIENT
Start: 2017-03-06 | End: 2017-03-10 | Stop reason: HOSPADM

## 2017-03-06 RX ORDER — VANCOMYCIN HYDROCHLORIDE 1 G/200ML
15 INJECTION, SOLUTION INTRAVENOUS EVERY 12 HOURS
Status: COMPLETED | OUTPATIENT
Start: 2017-03-06 | End: 2017-03-06

## 2017-03-06 RX ORDER — ONDANSETRON 2 MG/ML
4 INJECTION INTRAMUSCULAR; INTRAVENOUS ONCE AS NEEDED
Status: DISCONTINUED | OUTPATIENT
Start: 2017-03-06 | End: 2017-03-06

## 2017-03-06 RX ORDER — OXYCODONE AND ACETAMINOPHEN 7.5; 325 MG/1; MG/1
1 TABLET ORAL ONCE AS NEEDED
Status: DISCONTINUED | OUTPATIENT
Start: 2017-03-06 | End: 2017-03-06

## 2017-03-06 RX ORDER — SENNA AND DOCUSATE SODIUM 50; 8.6 MG/1; MG/1
2 TABLET, FILM COATED ORAL NIGHTLY
Status: DISCONTINUED | OUTPATIENT
Start: 2017-03-06 | End: 2017-03-10 | Stop reason: HOSPADM

## 2017-03-06 RX ORDER — OXYCODONE HYDROCHLORIDE AND ACETAMINOPHEN 5; 325 MG/1; MG/1
TABLET ORAL
Status: COMPLETED
Start: 2017-03-06 | End: 2017-03-06

## 2017-03-06 RX ORDER — BISACODYL 10 MG
10 SUPPOSITORY, RECTAL RECTAL DAILY PRN
Status: DISCONTINUED | OUTPATIENT
Start: 2017-03-06 | End: 2017-03-10 | Stop reason: HOSPADM

## 2017-03-06 RX ORDER — ONDANSETRON 4 MG/1
4 TABLET, FILM COATED ORAL EVERY 6 HOURS PRN
Status: DISCONTINUED | OUTPATIENT
Start: 2017-03-06 | End: 2017-03-07

## 2017-03-06 RX ORDER — ALBUMIN, HUMAN INJ 5% 5 %
SOLUTION INTRAVENOUS CONTINUOUS PRN
Status: DISCONTINUED | OUTPATIENT
Start: 2017-03-06 | End: 2017-03-06 | Stop reason: SURG

## 2017-03-06 RX ORDER — FENTANYL CITRATE 50 UG/ML
50 INJECTION, SOLUTION INTRAMUSCULAR; INTRAVENOUS
Status: DISCONTINUED | OUTPATIENT
Start: 2017-03-06 | End: 2017-03-06 | Stop reason: HOSPADM

## 2017-03-06 RX ORDER — CHOLECALCIFEROL (VITAMIN D3) 125 MCG
1000 CAPSULE ORAL DAILY
Status: DISCONTINUED | OUTPATIENT
Start: 2017-03-06 | End: 2017-03-10 | Stop reason: HOSPADM

## 2017-03-06 RX ORDER — FAMOTIDINE 10 MG/ML
20 INJECTION, SOLUTION INTRAVENOUS ONCE
Status: COMPLETED | OUTPATIENT
Start: 2017-03-06 | End: 2017-03-06

## 2017-03-06 RX ORDER — NITROGLYCERIN 0.4 MG/1
0.4 TABLET SUBLINGUAL AS NEEDED
Status: DISCONTINUED | OUTPATIENT
Start: 2017-03-06 | End: 2017-03-10 | Stop reason: HOSPADM

## 2017-03-06 RX ORDER — NALOXONE HCL 0.4 MG/ML
0.1 VIAL (ML) INJECTION
Status: DISCONTINUED | OUTPATIENT
Start: 2017-03-06 | End: 2017-03-10 | Stop reason: HOSPADM

## 2017-03-06 RX ORDER — HYDROMORPHONE HCL IN 0.9% NACL 10 MG/50ML
PATIENT CONTROLLED ANALGESIA SYRINGE INTRAVENOUS
Status: COMPLETED
Start: 2017-03-06 | End: 2017-03-06

## 2017-03-06 RX ADMIN — FENTANYL CITRATE 50 MCG: 50 INJECTION INTRAMUSCULAR; INTRAVENOUS at 11:28

## 2017-03-06 RX ADMIN — HYDROMORPHONE HYDROCHLORIDE 0.5 MG: 1 INJECTION, SOLUTION INTRAMUSCULAR; INTRAVENOUS; SUBCUTANEOUS at 12:47

## 2017-03-06 RX ADMIN — HYDROMORPHONE HYDROCHLORIDE 0.5 MG: 1 INJECTION, SOLUTION INTRAMUSCULAR; INTRAVENOUS; SUBCUTANEOUS at 11:48

## 2017-03-06 RX ADMIN — HYDROMORPHONE HYDROCHLORIDE 0.5 MG: 1 INJECTION, SOLUTION INTRAMUSCULAR; INTRAVENOUS; SUBCUTANEOUS at 12:17

## 2017-03-06 RX ADMIN — PHENYLEPHRINE HYDROCHLORIDE 100 MCG: 10 INJECTION INTRAVENOUS at 09:52

## 2017-03-06 RX ADMIN — NEOSTIGMINE METHYLSULFATE 4 MG: 1 INJECTION INTRAVENOUS at 10:14

## 2017-03-06 RX ADMIN — ROCURONIUM BROMIDE 10 MG: 10 INJECTION INTRAVENOUS at 09:03

## 2017-03-06 RX ADMIN — EPHEDRINE SULFATE 10 MG: 50 INJECTION INTRAMUSCULAR; INTRAVENOUS; SUBCUTANEOUS at 08:36

## 2017-03-06 RX ADMIN — VANCOMYCIN HYDROCHLORIDE 1000 MG: 1 INJECTION, SOLUTION INTRAVENOUS at 21:05

## 2017-03-06 RX ADMIN — MIDAZOLAM 2 MG: 1 INJECTION INTRAMUSCULAR; INTRAVENOUS at 07:09

## 2017-03-06 RX ADMIN — GLYCOPYRROLATE 0.6 MG: 0.2 INJECTION INTRAMUSCULAR; INTRAVENOUS at 10:14

## 2017-03-06 RX ADMIN — Medication 2000 UNITS: at 17:27

## 2017-03-06 RX ADMIN — ALBUMIN (HUMAN): 0.05 SOLUTION INTRAVENOUS at 09:48

## 2017-03-06 RX ADMIN — PHENYLEPHRINE HYDROCHLORIDE 100 MCG: 10 INJECTION INTRAVENOUS at 09:53

## 2017-03-06 RX ADMIN — HYDROMORPHONE HYDROCHLORIDE 0.4 MG: 2 INJECTION, SOLUTION INTRAMUSCULAR; INTRAVENOUS; SUBCUTANEOUS at 09:41

## 2017-03-06 RX ADMIN — CYCLOBENZAPRINE HYDROCHLORIDE 10 MG: 10 TABLET, FILM COATED ORAL at 12:31

## 2017-03-06 RX ADMIN — SODIUM CHLORIDE, POTASSIUM CHLORIDE, SODIUM LACTATE AND CALCIUM CHLORIDE 9 ML/HR: 600; 310; 30; 20 INJECTION, SOLUTION INTRAVENOUS at 07:08

## 2017-03-06 RX ADMIN — FAMOTIDINE 20 MG: 10 INJECTION, SOLUTION INTRAVENOUS at 07:09

## 2017-03-06 RX ADMIN — DEXAMETHASONE SODIUM PHOSPHATE 10 MG: 10 INJECTION INTRAMUSCULAR; INTRAVENOUS at 07:43

## 2017-03-06 RX ADMIN — METHOCARBAMOL 1000 MG: 100 INJECTION INTRAMUSCULAR; INTRAVENOUS at 11:41

## 2017-03-06 RX ADMIN — FENTANYL CITRATE 100 MCG: 50 INJECTION INTRAMUSCULAR; INTRAVENOUS at 07:33

## 2017-03-06 RX ADMIN — LIDOCAINE HYDROCHLORIDE 60 MG: 20 INJECTION, SOLUTION INFILTRATION; PERINEURAL at 07:36

## 2017-03-06 RX ADMIN — FENTANYL CITRATE 50 MCG: 50 INJECTION INTRAMUSCULAR; INTRAVENOUS at 10:45

## 2017-03-06 RX ADMIN — HYDROMORPHONE HYDROCHLORIDE 0.4 MG: 2 INJECTION, SOLUTION INTRAMUSCULAR; INTRAVENOUS; SUBCUTANEOUS at 10:20

## 2017-03-06 RX ADMIN — PROPOFOL 150 MG: 10 INJECTION, EMULSION INTRAVENOUS at 07:36

## 2017-03-06 RX ADMIN — MECLIZINE HCL 12.5 MG: 12.5 TABLET ORAL at 17:28

## 2017-03-06 RX ADMIN — FENTANYL CITRATE 50 MCG: 50 INJECTION INTRAMUSCULAR; INTRAVENOUS at 10:50

## 2017-03-06 RX ADMIN — SODIUM CHLORIDE, POTASSIUM CHLORIDE, SODIUM LACTATE AND CALCIUM CHLORIDE: 600; 310; 30; 20 INJECTION, SOLUTION INTRAVENOUS at 09:44

## 2017-03-06 RX ADMIN — ATORVASTATIN CALCIUM 80 MG: 80 TABLET, FILM COATED ORAL at 16:07

## 2017-03-06 RX ADMIN — FENTANYL CITRATE 50 MCG: 50 INJECTION INTRAMUSCULAR; INTRAVENOUS at 08:10

## 2017-03-06 RX ADMIN — SODIUM CHLORIDE: 9 INJECTION, SOLUTION INTRAVENOUS at 08:03

## 2017-03-06 RX ADMIN — Medication: at 10:59

## 2017-03-06 RX ADMIN — EPHEDRINE SULFATE 10 MG: 50 INJECTION INTRAMUSCULAR; INTRAVENOUS; SUBCUTANEOUS at 08:21

## 2017-03-06 RX ADMIN — VENLAFAXINE HYDROCHLORIDE 75 MG: 75 TABLET ORAL at 16:07

## 2017-03-06 RX ADMIN — PHENYLEPHRINE HYDROCHLORIDE 100 MCG: 10 INJECTION INTRAVENOUS at 09:21

## 2017-03-06 RX ADMIN — ONDANSETRON 4 MG: 2 INJECTION INTRAMUSCULAR; INTRAVENOUS at 21:21

## 2017-03-06 RX ADMIN — PHENYLEPHRINE HYDROCHLORIDE 100 MCG: 10 INJECTION INTRAVENOUS at 08:36

## 2017-03-06 RX ADMIN — OXYCODONE HYDROCHLORIDE AND ACETAMINOPHEN 2 TABLET: 5; 325 TABLET ORAL at 12:31

## 2017-03-06 RX ADMIN — FENTANYL CITRATE 50 MCG: 50 INJECTION INTRAMUSCULAR; INTRAVENOUS at 11:02

## 2017-03-06 RX ADMIN — Medication 1000 MCG: at 16:07

## 2017-03-06 RX ADMIN — POTASSIUM CHLORIDE AND SODIUM CHLORIDE 100 ML/HR: 450; 150 INJECTION, SOLUTION INTRAVENOUS at 14:47

## 2017-03-06 RX ADMIN — PHENYLEPHRINE HYDROCHLORIDE 100 MCG: 10 INJECTION INTRAVENOUS at 09:54

## 2017-03-06 RX ADMIN — ONDANSETRON 4 MG: 2 INJECTION INTRAMUSCULAR; INTRAVENOUS at 10:11

## 2017-03-06 RX ADMIN — ROCURONIUM BROMIDE 10 MG: 10 INJECTION INTRAVENOUS at 09:41

## 2017-03-06 RX ADMIN — FENTANYL CITRATE 50 MCG: 50 INJECTION INTRAMUSCULAR; INTRAVENOUS at 11:39

## 2017-03-06 RX ADMIN — ROCURONIUM BROMIDE 50 MG: 10 INJECTION INTRAVENOUS at 07:36

## 2017-03-06 RX ADMIN — HYDROMORPHONE HYDROCHLORIDE 0.5 MG: 1 INJECTION, SOLUTION INTRAMUSCULAR; INTRAVENOUS; SUBCUTANEOUS at 10:56

## 2017-03-06 RX ADMIN — VANCOMYCIN HYDROCHLORIDE 1000 MG: 1 INJECTION, SOLUTION INTRAVENOUS at 07:12

## 2017-03-06 RX ADMIN — EPHEDRINE SULFATE 10 MG: 50 INJECTION INTRAMUSCULAR; INTRAVENOUS; SUBCUTANEOUS at 08:15

## 2017-03-06 NOTE — PLAN OF CARE
Problem: Patient Care Overview (Adult)  Goal: Plan of Care Review  Outcome: Ongoing (interventions implemented as appropriate)    03/06/17 0700   Coping/Psychosocial Response Interventions   Plan Of Care Reviewed With patient   Patient Care Overview   Progress progress toward functional goals as expected       Goal: Adult Individualization and Mutuality  Outcome: Ongoing (interventions implemented as appropriate)    03/06/17 0700   Mutuality/Individual Preferences   What Anxieties, Fears or Concerns Do You Have About Your Health or Care? pt denies   Individualization   Patient Specific Preferences pt goes by Ivy       Goal: Discharge Needs Assessment  Outcome: Ongoing (interventions implemented as appropriate)    03/06/17 0700   Discharge Needs Assessment   Concerns To Be Addressed denies needs/concerns at this time   Living Environment   Transportation Available car;family or friend will provide         Problem: Perioperative Period (Adult)  Goal: Signs and Symptoms of Listed Potential Problems Will be Absent or Manageable (Perioperative Period)  Outcome: Ongoing (interventions implemented as appropriate)    03/06/17 0700   Perioperative Period   Problems Assessed (Perioperative Period) pain;infection   Problems Present (Perioperative Period) pain

## 2017-03-06 NOTE — ANESTHESIA PROCEDURE NOTES
Airway  Urgency: elective    Date/Time: 3/6/2017 7:41 AM  Airway not difficult    General Information and Staff    Patient location during procedure: OR  Anesthesiologist: EWELINA THAYER  CRNA: JESSICA RUCKER    Indications and Patient Condition  Indications for airway management: airway protection    Preoxygenated: yes  Mask difficulty assessment: 2 - vent by mask + OA or adjuvant +/- NMBA    Final Airway Details  Final airway type: endotracheal airway      Successful airway: ETT and reinforced tube  Cuffed: yes   Successful intubation technique: direct laryngoscopy  Facilitating devices/methods: Bougie and cricoid pressure  Endotracheal tube insertion site: oral  Blade: [place]  Blade size: #2  ETT size: 7.0 mm  Cormack-Lehane Classification: grade IIb - view of arytenoids or posterior of glottis only  Placement verified by: chest auscultation and capnometry   Measured from: teeth  ETT to teeth (cm): 22  Number of attempts at approach: 1    Additional Comments  Atraumatic. No dental damage noted.

## 2017-03-06 NOTE — H&P
Patient Care Team:  Murtaza Berrios Jr., MD as PCP - General  Murtaza Berrios Jr., MD as PCP - Family Medicine    Chief complaint thoracic back pain    Subjective     History of Present Illness  Orthopedic Surgery      []Hide copied text  []Hover for attribution information  She complains of mid thoracic pain is her primary pain complaint had some temporary relief with epidurals but nothing lasting. Also had a recent episode of neck pain radiating into the left arm after increased [place] activity. A balance difficulties bowel or bladder complaints. The pain is severe and limits her ability to enjoy activities including the anticipated adverse effect on her ability to finish which she enjoys immensely. She is asking about surgery. She saw her cardiologist in the air was determined that the pain was not cardiac in nature, and she states he told her that she would be a reasonable candidate for surgery if that should come about. Exam she seems to have enlarged breast although I would not say dramatically so, but I'm concerned that this could be a contributing factor to sagittal imbalance and pain. If her dealing with more than one side of pain i.e. cervical and thoracic that could be a common factor which if treated could help both. Otherwise I think we're dealing with 2 issues. Her neurologic exam is normal in the upper extremities for motor sensory and reflex testing. The neck is nontender. She remains with tenderness in the midthoracic region to palpation percussion at but lower extremity reflexes and strength are good. Recommended MRI scan of the cervical spine for further evaluation if. If there is nothing dramatically contributory then we may recommend a T4 to 10 fusion with instrumentation for herniated disc and disc degeneration and the sequela of fracture. I did refill her hydrocodone which she takes 1 per day.  Answers for HPI/ROS submitted by the patient on 1/16/2017   Back pain  Chronicity:  chronic  Onset: more than 1 month ago  Frequency: daily  Progression since onset: rapidly worsening  Pain location: thoracic spine  Pain quality: aching, stabbing  Pain - numeric: 10/10  Pain is: worse during the day  Aggravated by: position, sitting, standing  Stiffness is present: all day  abdominal pain: No  bladder incontinence: No  bowel incontinence: No  chest pain: No  dysuria: No  fever: No  headaches: No  leg pain: No  numbness: No  paresis: No  paresthesias: Yes  pelvic pain: No  perianal numbness: No  tingling: Yes  weakness: Yes  weight loss: No  Risk factors: history of osteoporosis            Review of Systems     Past Medical History   Diagnosis Date   • Broken toe 03/01/2017     right 4th toe   • Common migraine without aura      Neurologist Dr Govea   • Coronary artery disease    • History of chest pain    • History of CHF (congestive heart failure)    • History of pulmonary embolism    • History of stroke    • Migraine      eval and management by neurologist   • Orthostatic hypotension    • Pleural effusion      POSTOPERATIVE, REQUIRING THORACENTESIS.   • Polyp of sigmoid colon      REMOVED   • Thoracic degenerative disc disease      Received epidural 12/14 [facility] DR Landin   • Thoracic spine pain    • Thyroid disease    • Weakness of right side of body      ARM & FACE     Past Surgical History   Procedure Laterality Date   • Breast augmentation     • Coronary artery bypass graft  04/2009     cabg x 3: LIMA to LAD, vein grafts to diagonal and OM1   • Coronary angioplasty with stent placement       NON-GRAFTED RIGHT CORONARY ARTERY STENT.   • Hysterectomy     • Knee surgery Right    • Total thyroidectomy     • Colonoscopy  06/2004     Dr. Salas   • Augmentation mammaplasty     • Epidural block injection       Family History   Problem Relation Age of Onset   • No Known Problems Mother    • No Known Problems Father      Social History   Substance Use Topics   • Smoking status: Never  Smoker   • Smokeless tobacco: Never Used   • Alcohol use No     Prescriptions Prior to Admission   Medication Sig Dispense Refill Last Dose   • alendronate (FOSAMAX) 70 MG tablet Take 70 mg by mouth Every 7 (Seven) Days. sundays   2/26/2017   • Cholecalciferol (VITAMIN D3) 2000 UNITS tablet Take 2,000 Units by mouth Daily.   3/5/2017 at 1800   • conjugated estrogens (PREMARIN) 0.625 MG/GM vaginal cream Insert 1.5 g into the vagina 3 (Three) Times a Week.   3/3/2017   • diphenhydrAMINE (SOMINEX) 25 MG tablet Take 25 mg by mouth Every Night.   3/5/2017 at 2100   • HYDROcodone-acetaminophen (NORCO) 5-325 MG per tablet 1 po q 6 hr PRN severe pain (Patient taking differently: Take 1 tablet by mouth Every 6 (Six) Hours As Needed for moderate pain (4-6).) 40 tablet 0 3/5/2017 at 1800   • levothyroxine (SYNTHROID, LEVOTHROID) 75 MCG tablet Take 75 mcg by mouth Daily.   3/5/2017 at 0800   • meclizine (ANTIVERT) 12.5 MG tablet Take 12.5 mg by mouth daily. For 7 days as directed    3/5/2017 at 2100   • rosuvastatin (CRESTOR) 40 MG tablet Take 40 mg by mouth Daily.   3/5/2017 at 2100   • venlafaxine (EFFEXOR) 75 MG tablet Take 75 mg by mouth Daily.   3/5/2017 at 1800   • vitamin B-12 (CYANOCOBALAMIN) 1000 MCG tablet Take 1,000 mcg by mouth Daily.   3/5/2017 at 0800   • aspirin 81 MG tablet Take 81 mg by mouth Daily.   2/27/2017   • hydrochlorothiazide (MICROZIDE) 12.5 MG capsule Take 12.5 mg by mouth Daily As Needed.   More than a month at Unknown time   • NITROGLYCERIN SL Place 1 tablet under the tongue As Needed. For chest pain   More than a month at Unknown time     Allergies:  Metronidazole and Amoxicillin    Objective      Vital Signs  Temp:  [98.2 °F (36.8 °C)] 98.2 °F (36.8 °C)  Heart Rate:  [73] 73  Resp:  [18] 18  BP: (133)/(75) 133/75    Physical Exam    Results Review:   I reviewed the patient's new clinical results.      Assessment/Plan     Active Problems:    Herniated thoracic disc without  myelopathy      Assessment & Plan  Thoracic disc degeneration/  Plan:  T4 to T10 fusion with instrumentation    I discussed the patients findings and my recommendations with patient    Clyde Callejas MD  03/06/17  7:01 AM    Time:

## 2017-03-06 NOTE — ANESTHESIA PREPROCEDURE EVALUATION
Anesthesia Evaluation     Patient summary reviewed and Nursing notes reviewed   NPO Status: > 8 hours   Airway   Mallampati: III  Neck ROM: full  possible difficult intubation  Dental      Comment: Bridge upper    Pulmonary     breath sounds clear to auscultation  Cardiovascular     Rhythm: regular    (+) CAD, CABG, dysrhythmias, PVD,       Neuro/Psych  (+) CVA, headaches, syncope, numbness, psychiatric history,    GI/Hepatic/Renal/Endo    (+)  GERD, hypothyroidism,     Musculoskeletal     Abdominal    Substance History      OB/GYN          Other   (+) arthritis                                 Anesthesia Plan    ASA 3     general     intravenous induction   Anesthetic plan and risks discussed with patient.

## 2017-03-06 NOTE — PLAN OF CARE
Problem: Patient Care Overview (Adult)  Goal: Plan of Care Review  Outcome: Ongoing (interventions implemented as appropriate)    03/06/17 1501   Coping/Psychosocial Response Interventions   Plan Of Care Reviewed With patient   Patient Care Overview   Progress no change   Outcome Evaluation   Outcome Summary/Follow up Plan Pt is on a PCA pump VSS. Continue to monitor VS       Goal: Adult Individualization and Mutuality  Outcome: Ongoing (interventions implemented as appropriate)    03/06/17 1501   Individualization   Patient Specific Goals provide pain meds as needed   Patient Specific Interventions monitor VS       Goal: Discharge Needs Assessment  Outcome: Ongoing (interventions implemented as appropriate)    03/06/17 1501   Discharge Needs Assessment   Concerns To Be Addressed no discharge needs identified         Problem: Perioperative Period (Adult)  Goal: Signs and Symptoms of Listed Potential Problems Will be Absent or Manageable (Perioperative Period)  Outcome: Ongoing (interventions implemented as appropriate)    03/06/17 1501   Perioperative Period   Problems Assessed (Perioperative Period) all   Problems Present (Perioperative Period) pain         Problem: Skin Integrity Impairment, Risk/Actual (Adult)  Goal: Identify Related Risk Factors and Signs and Symptoms  Outcome: Ongoing (interventions implemented as appropriate)    03/06/17 1501   Skin Integrity Impairment, Risk/Actual   Skin Integrity Impairment, Risk/Actual: Related Risk Factors surgery/procedure       Goal: Skin Integrity/Wound Healing  Outcome: Ongoing (interventions implemented as appropriate)    Problem: Infection, Risk/Actual (Adult)  Goal: Identify Related Risk Factors and Signs and Symptoms  Outcome: Outcome(s) achieved Date Met:  03/06/17 03/06/17 1501   Infection, Risk/Actual   Infection, Risk/Actual: Related Risk Factors surgery/procedure   Signs and Symptoms (Infection, Risk/Actual) heart rate increase;pain       Goal: Infection  Prevention/Resolution  Outcome: Ongoing (interventions implemented as appropriate)    03/06/17 1501   Infection, Risk/Actual (Adult)   Infection Prevention/Resolution achieves outcome         Problem: Fall Risk (Adult)  Goal: Identify Related Risk Factors and Signs and Symptoms  Outcome: Ongoing (interventions implemented as appropriate)    03/06/17 1501   Fall Risk   Fall Risk: Related Risk Factors culprit medication(s);gait/mobility problems   Fall Risk: Signs and Symptoms presence of risk factors       Goal: Absence of Falls  Outcome: Ongoing (interventions implemented as appropriate)

## 2017-03-06 NOTE — NURSING NOTE
Spoke to Dr. Jonelle Braun about PT's Stroke and PE history. Requested an anticoagulant. She will communicate concerns to Dr. Callejas. If he wants to prescribe something, she will call back with the order.

## 2017-03-06 NOTE — ANESTHESIA POSTPROCEDURE EVALUATION
Patient: Ivy Chairez    Procedure Summary     Date Anesthesia Start Anesthesia Stop Room / Location    03/06/17 0729 [id number]  [facility] / [facility]       Procedure Diagnosis Surgeon Provider    T4-T10 Fusion with instrumentation (N/A Spine Thoracic) Herniated thoracic disc without myelopathy  (Herniated thoracic disc without myelopathy [M51.24]) MD Sai Amaya MD          Anesthesia Type: general  Last vitals  /63 (03/06/17 1155)    Temp      Pulse 97 (03/06/17 1155)   Resp 16 (03/06/17 1155)    SpO2 100 % (03/06/17 1155)      Post Anesthesia Care and Evaluation    Patient location during evaluation: PACU  Patient participation: complete - patient participated  Level of consciousness: awake and alert  Pain score: 0  Pain management: adequate  Airway patency: patent  Anesthetic complications: No anesthetic complications    Cardiovascular status: acceptable  Respiratory status: acceptable  Hydration status: acceptable

## 2017-03-06 NOTE — OP NOTE
Operative note    Pre-op diagnosis: Thoracic disc degeneration, thoracic herniated disc    Postoperative diagnosis: Same    Procedure: T 4 to T10 bilateral posterior lateral fusion with [employer] expedient segmental instrumentation with local bone graft, right iliac marrow aspiration, and Mastergraft bone graft substitute.    Surgeon: Clyde Callejas Jr, M.D.    Asst.: Omaira Chou    EBL: 200 cc    Anesthetic: Gen.    Condition: Satisfactory    Description of procedure: Patient was anesthetized and positioned prone.  Bony prominences were well padded.  The back was prepped and draped in sterile fashion.  Incision was made down to thoracodorsal fascia.  The muscle was stripped subperiosteally to the tips of transverse processes.  Curettes and rongeurs removed adherent soft tissue.  Packs were placed for hemostasis.  The graft was decorticated.  A Steffee probe was inserted at the intersection of the anatomic landmarks.  A flexible probe was inserted to check the integrity of the pedicle.  Contoured rods were later added, nuts were tightened and torqued.  Biplanar image intensification showed appropriate screw position and anatomic level and satisfactory posture.    Bone marrow was obtained from the right iliac crest.  The marrow was applied to the master graft sponges.  Laminectomy bone, and bone from decortication of the graft bed was cleaned at the back table throughout the case and available for bone graft.  The morcellized bone was placed in the decorticated lateral gutter bilaterally.  Master graft sponges were then placed.  Hemostasis was assured.  The wound was then closed with running and interrupted #1 Vicryl for the dorsal fascia, 2-0 Vicryl subcutaneously, then 4-0 Monocryl and Dermabond for the skin.  A sterile dressing was applied.  The patient is about to be recovered.

## 2017-03-07 ENCOUNTER — APPOINTMENT (OUTPATIENT)
Dept: CT IMAGING | Facility: HOSPITAL | Age: 62
End: 2017-03-07
Attending: HOSPITALIST

## 2017-03-07 LAB
ANION GAP SERPL CALCULATED.3IONS-SCNC: 10.7 MMOL/L
BUN BLD-MCNC: 9 MG/DL (ref 8–23)
BUN/CREAT SERPL: 11.4 (ref 7–25)
CALCIUM SPEC-SCNC: 8 MG/DL (ref 8.6–10.5)
CHLORIDE SERPL-SCNC: 97 MMOL/L (ref 98–107)
CK SERPL-CCNC: 2437 U/L (ref 20–180)
CO2 SERPL-SCNC: 23.3 MMOL/L (ref 22–29)
CREAT BLD-MCNC: 0.79 MG/DL (ref 0.57–1)
DEPRECATED RDW RBC AUTO: 43 FL (ref 37–54)
ERYTHROCYTE [DISTWIDTH] IN BLOOD BY AUTOMATED COUNT: 12.6 % (ref 11.7–13)
GFR SERPL CREATININE-BSD FRML MDRD: 74 ML/MIN/1.73
GLUCOSE BLD-MCNC: 141 MG/DL (ref 65–99)
HCT VFR BLD AUTO: 30.3 % (ref 35.6–45.5)
HGB BLD-MCNC: 10.2 G/DL (ref 11.9–15.5)
MCH RBC QN AUTO: 31.4 PG (ref 26.9–32)
MCHC RBC AUTO-ENTMCNC: 33.7 G/DL (ref 32.4–36.3)
MCV RBC AUTO: 93.2 FL (ref 80.5–98.2)
PLATELET # BLD AUTO: 239 10*3/MM3 (ref 140–500)
PMV BLD AUTO: 8.9 FL (ref 6–12)
POTASSIUM BLD-SCNC: 5.4 MMOL/L (ref 3.5–5.2)
RBC # BLD AUTO: 3.25 10*6/MM3 (ref 3.9–5.2)
SODIUM BLD-SCNC: 131 MMOL/L (ref 136–145)
TROPONIN T SERPL-MCNC: <0.01 NG/ML (ref 0–0.03)
WBC NRBC COR # BLD: 10.86 10*3/MM3 (ref 4.5–10.7)

## 2017-03-07 PROCEDURE — 85027 COMPLETE CBC AUTOMATED: CPT | Performed by: HOSPITALIST

## 2017-03-07 PROCEDURE — 86900 BLOOD TYPING SEROLOGIC ABO: CPT

## 2017-03-07 PROCEDURE — 25010000002 ONDANSETRON PER 1 MG: Performed by: ORTHOPAEDIC SURGERY

## 2017-03-07 PROCEDURE — 25010000002 PROMETHAZINE PER 50 MG: Performed by: ORTHOPAEDIC SURGERY

## 2017-03-07 PROCEDURE — 80048 BASIC METABOLIC PNL TOTAL CA: CPT | Performed by: HOSPITALIST

## 2017-03-07 PROCEDURE — 97161 PT EVAL LOW COMPLEX 20 MIN: CPT

## 2017-03-07 PROCEDURE — 97110 THERAPEUTIC EXERCISES: CPT

## 2017-03-07 PROCEDURE — 86901 BLOOD TYPING SEROLOGIC RH(D): CPT

## 2017-03-07 PROCEDURE — 71275 CT ANGIOGRAPHY CHEST: CPT

## 2017-03-07 PROCEDURE — 99024 POSTOP FOLLOW-UP VISIT: CPT | Performed by: ORTHOPAEDIC SURGERY

## 2017-03-07 PROCEDURE — 0 IOPAMIDOL PER 1 ML: Performed by: ORTHOPAEDIC SURGERY

## 2017-03-07 PROCEDURE — 84484 ASSAY OF TROPONIN QUANT: CPT | Performed by: INTERNAL MEDICINE

## 2017-03-07 PROCEDURE — 25810000003 POTASSIUM CHLORIDE PER 2 MEQ: Performed by: ORTHOPAEDIC SURGERY

## 2017-03-07 RX ORDER — SODIUM CHLORIDE 9 MG/ML
100 INJECTION, SOLUTION INTRAVENOUS CONTINUOUS
Status: DISCONTINUED | OUTPATIENT
Start: 2017-03-07 | End: 2017-03-08

## 2017-03-07 RX ORDER — PROMETHAZINE HYDROCHLORIDE 25 MG/ML
12.5 INJECTION, SOLUTION INTRAMUSCULAR; INTRAVENOUS EVERY 6 HOURS PRN
Status: DISCONTINUED | OUTPATIENT
Start: 2017-03-07 | End: 2017-03-08

## 2017-03-07 RX ORDER — MELATONIN
2000 DAILY
Status: DISCONTINUED | OUTPATIENT
Start: 2017-03-08 | End: 2017-03-10 | Stop reason: HOSPADM

## 2017-03-07 RX ADMIN — OXYCODONE HYDROCHLORIDE AND ACETAMINOPHEN 2 TABLET: 5; 325 TABLET ORAL at 20:57

## 2017-03-07 RX ADMIN — ONDANSETRON 4 MG: 2 INJECTION INTRAMUSCULAR; INTRAVENOUS at 02:46

## 2017-03-07 RX ADMIN — OXYCODONE HYDROCHLORIDE AND ACETAMINOPHEN 2 TABLET: 5; 325 TABLET ORAL at 09:02

## 2017-03-07 RX ADMIN — IOPAMIDOL 92 ML: 755 INJECTION, SOLUTION INTRAVENOUS at 08:07

## 2017-03-07 RX ADMIN — Medication 1000 MCG: at 09:03

## 2017-03-07 RX ADMIN — SODIUM CHLORIDE 100 ML/HR: 9 INJECTION, SOLUTION INTRAVENOUS at 21:33

## 2017-03-07 RX ADMIN — OXYCODONE HYDROCHLORIDE AND ACETAMINOPHEN 2 TABLET: 5; 325 TABLET ORAL at 14:51

## 2017-03-07 RX ADMIN — PROMETHAZINE HYDROCHLORIDE 12.5 MG: 25 INJECTION INTRAMUSCULAR; INTRAVENOUS at 21:31

## 2017-03-07 RX ADMIN — DOCUSATE SODIUM,SENNOSIDES 1 TABLET: 50; 8.6 TABLET, FILM COATED ORAL at 09:03

## 2017-03-07 RX ADMIN — LEVOTHYROXINE SODIUM 75 MCG: 75 TABLET ORAL at 09:03

## 2017-03-07 RX ADMIN — PROMETHAZINE HYDROCHLORIDE 12.5 MG: 25 INJECTION INTRAMUSCULAR; INTRAVENOUS at 08:48

## 2017-03-07 RX ADMIN — ATORVASTATIN CALCIUM 80 MG: 80 TABLET, FILM COATED ORAL at 09:03

## 2017-03-07 RX ADMIN — PROMETHAZINE HYDROCHLORIDE 12.5 MG: 25 INJECTION INTRAMUSCULAR; INTRAVENOUS at 15:14

## 2017-03-07 RX ADMIN — SODIUM CHLORIDE 100 ML/HR: 9 INJECTION, SOLUTION INTRAVENOUS at 10:34

## 2017-03-07 RX ADMIN — POTASSIUM CHLORIDE AND SODIUM CHLORIDE 100 ML/HR: 450; 150 INJECTION, SOLUTION INTRAVENOUS at 01:40

## 2017-03-07 RX ADMIN — VENLAFAXINE HYDROCHLORIDE 75 MG: 75 TABLET ORAL at 09:03

## 2017-03-07 NOTE — PLAN OF CARE
Problem: Patient Care Overview (Adult)  Goal: Plan of Care Review  Outcome: Ongoing (interventions implemented as appropriate)    03/07/17 0548   Coping/Psychosocial Response Interventions   Plan Of Care Reviewed With patient   Patient Care Overview   Progress no change   Outcome Evaluation   Outcome Summary/Follow up Plan Patient arrived on floor arround 0300 tonight. Patient anxious and RN helped with relaxation and deep breathing excercises at bedside. Subsiquently, patient slept. Continue to monitor patient closely. Patient's pain well controlled on PCA pump. Continue with current plan of care.         Problem: Perioperative Period (Adult)  Goal: Signs and Symptoms of Listed Potential Problems Will be Absent or Manageable (Perioperative Period)  Outcome: Ongoing (interventions implemented as appropriate)    Problem: Skin Integrity Impairment, Risk/Actual (Adult)  Goal: Identify Related Risk Factors and Signs and Symptoms  Outcome: Outcome(s) achieved Date Met:  03/07/17  Goal: Skin Integrity/Wound Healing  Outcome: Ongoing (interventions implemented as appropriate)    Problem: Infection, Risk/Actual (Adult)  Goal: Infection Prevention/Resolution  Outcome: Ongoing (interventions implemented as appropriate)    Problem: Fall Risk (Adult)  Goal: Identify Related Risk Factors and Signs and Symptoms  Outcome: Outcome(s) achieved Date Met:  03/07/17  Goal: Absence of Falls  Outcome: Ongoing (interventions implemented as appropriate)

## 2017-03-07 NOTE — CODE DOCUMENTATION
Pt VS stable.  Has admitted that she may have waaited too long for pain meds.  Trop pending.  Care returned to primary RN.

## 2017-03-07 NOTE — PROGRESS NOTES
Acute Care - Physical Therapy Initial Evaluation  [facility]     Patient Name: Ivy Chairez  : 1955  MRN: [id number]  Today's Date: 3/7/2017   Onset of Illness/Injury or Date of Surgery Date: 17     Referring Physician: Júnior      Admit Date: 3/6/2017     Visit Dx:    ICD-10-CM ICD-9-CM   1. Difficulty walking R26.2 719.7   2. Herniated thoracic disc without myelopathy M51.24 722.11     Patient Active Problem List   Diagnosis   • Triple vessel coronary artery disease   • Anxiety   • Gastroesophageal reflux disease   • Hyperlipidemia   • Hypothyroidism   • Osteoarthritis of knee   • Cerebrovascular accident   • Chronic venous insufficiency   • Carotid artery disease   • Migraine without status migrainosus, not intractable   • Osteoporosis   • Closed wedge compression fracture of T7 vertebra   • History of coronary artery bypass surgery   • Carter-Arreaga syncope   • Thoracic radiculopathy due to degenerative joint disease of spine   • Herniated thoracic disc without myelopathy   • History of pulmonary embolism   • Dyspnea     Past Medical History   Diagnosis Date   • Broken toe 2017     right 4th toe   • Common migraine without aura      Neurologist Dr Govea   • Coronary artery disease    • History of chest pain    • History of CHF (congestive heart failure)    • History of pulmonary embolism    • History of stroke    • Migraine      eval and management by neurologist   • Orthostatic hypotension    • Pleural effusion      POSTOPERATIVE, REQUIRING THORACENTESIS.   • Polyp of sigmoid colon      REMOVED   • Thoracic degenerative disc disease      Received epidural  [facility] DR Landin   • Thoracic spine pain    • Thyroid disease    • Weakness of right side of body      ARM & FACE     Past Surgical History   Procedure Laterality Date   • Breast augmentation     • Coronary artery bypass graft  2009     cabg x 3: LIMA to LAD, vein grafts to diagonal and OM1   • Coronary  angioplasty with stent placement       NON-GRAFTED RIGHT CORONARY ARTERY STENT.   • Hysterectomy     • Knee surgery Right    • Total thyroidectomy     • Colonoscopy  06/2004     Dr. Salas   • Augmentation mammaplasty     • Epidural block injection     • Thoracic decompression posterior fusion with instrumentation N/A 3/6/2017     Procedure: T4-T10 Fusion with instrumentation;  Surgeon: Clyde Callejas MD;  Location: [facility] OR;  Service:           PT ASSESSMENT (last 72 hours)      PT Evaluation       03/07/17 1100 03/06/17 4235    Rehab Evaluation    Document Type evaluation  -EE     Subjective Information agree to therapy;complains of;fatigue;pain;dizziness  -EE     Patient Effort, Rehab Treatment fair  -EE     Symptoms Noted During/After Treatment fatigue;dizziness;increased pain  -EE     Symptoms Noted Comment Pt became dizzy while sitting EOB; educated pt on deep breathing and seated HEP. Pt reported continued dizziness and became anxious to lie down; declined further activity.   -EE     General Information    Onset of Illness/Injury or Date of Surgery Date 03/07/17  -EE     Referring Physician Júnior  -EE     General Observations Adult female, R sidelying in bed in no acute distress.  -EE     Pertinent History Of Current Problem s/p T4-10 fusion  -EE     Precautions/Limitations fall precautions;brace on when up;spinal precautions  -EE     Prior Level of Function independent:;all household mobility;community mobility  -EE     Equipment Currently Used at Home none  -EE none  -AK    Plans/Goals Discussed With patient;agreed upon  -EE     Barriers to Rehab none identified  -EE     Living Environment    Lives With spouse  -EE     Living Arrangements house  -EE     Home Accessibility stairs to enter home  -EE     Number of Stairs to Enter Home 2  -EE     Clinical Impression    Patient/Family Goals Statement Decrease pain, get stronger  -EE     Criteria for Skilled Therapeutic Interventions Met yes;treatment  indicated  -EE     Pathology/Pathophysiology Noted (Describe Specifically for Each System) musculoskeletal  -EE     Impairments Found (describe specific impairments) gait, locomotion, and balance  -EE     Rehab Potential good, to achieve stated therapy goals  -EE     Pain Assessment    Pain Assessment 0-10  -EE     Pain Score 8  -EE     Pain Type Surgical pain  -EE     Pain Location Back  -EE     Pain Orientation Mid  -EE     Pain Intervention(s) Repositioned  -EE     Response to Interventions tolerated  -EE     Cognitive Assessment/Intervention    Current Cognitive/Communication Assessment functional  -EE     Orientation Status oriented x 4  -EE     Follows Commands/Answers Questions 100% of the time  -EE     Personal Safety WNL/WFL  -EE     Personal Safety Interventions fall prevention program maintained;gait belt;nonskid shoes/slippers when out of bed;supervised activity  -EE     ROM (Range of Motion)    General ROM no range of motion deficits identified  -EE     General ROM Detail B LEs grossly WFL; UEs not tested  -EE     MMT (Manual Muscle Testing)    General MMT Assessment other (see comments)  -EE     General MMT Assessment Detail unable to complete formal assessment due to increased pain and dizziness at EOB; B LEs grossly 3 to 3+/5  -EE     Bed Mobility, Assessment/Treatment    Bed Mobility, Assistive Device bed rails  -EE     Bed Mob, Supine to Sit, [place] minimum assist (75% patient effort);moderate assist (50% patient effort);verbal cues required  -EE     Bed Mob, Sit to Supine, [place] moderate assist (50% patient effort);verbal cues required  -EE     Bed Mobility, Impairments strength decreased;pain  -EE     Bed Mobility, Comment vc's for log roll  -EE     Transfer Assessment/Treatment    Transfers, Sit-Stand [place] unable to perform;other (see comments)  -EE     Transfer, Comment Pt reported increased pain and dizziness while sitting EOB. Instructed pt in deep breathing and ankle  pumps; however, pt reported worsening dizziness. Pt returned to bed; further activity deferred.  -EE     Motor Skills/Interventions    Additional Documentation Balance Skills Training (Group)  -EE     Balance Skills Training    Sitting-Level of Assistance Close supervision  -EE     Sitting-Balance Support Right upper extremity supported;Left upper extremity supported  -EE     Therapy Exercises    Bilateral Lower Extremities AROM:;5 reps;ankle pumps/circles  -EE     Positioning and Restraints    Pre-Treatment Position in bed  -EE     Post Treatment Position bed  -EE     In Bed side lying right;call light within reach;encouraged to call for assist;with family/caregiver;SCD pump applied;pillow between legs  -EE       03/06/17 0701 03/06/17 0700    Living Environment    Transportation Available  car;family or friend will provide  -JV    Muscle Tone Assessment    Muscle Tone Assessment --  -JV       03/06/17 0656       General Information    Equipment Currently Used at Home none  -JV     Living Environment    Lives With spouse  -JV     Living Arrangements house  -JV     Home Accessibility no concerns  -JV     Stair Railings at Home none  -JV     Type of Financial/Environmental Concern none  -JV     Transportation Available car;family or friend will provide  -JV       User Key  (r) = Recorded By, (t) = Taken By, (c) = Cosigned By    Initials Name Provider Type    EE Melissa Cullen, NATHAN Physical Therapist    JV Marion Serrano, RN Registered Nurse    LAYO Jay, RN Registered Nurse          Physical Therapy Education     Title: PT OT SLP Therapies (Active)     Topic: Physical Therapy (Active)     Point: Mobility training (Active)    Learning Progress Summary    Learner Readiness Method Response Comment Documented by Status   Patient Acceptance E,TB NR  EE 03/07/17 1120 Active               Point: Home exercise program (Active)    Learning Progress Summary    Learner Readiness Method Response Comment Documented  by Status   Patient Acceptance E,TB NR  EE 03/07/17 1120 Active               Point: Body mechanics (Active)    Learning Progress Summary    Learner Readiness Method Response Comment Documented by Status   Patient Acceptance E,TB NR  EE 03/07/17 1120 Active               Point: Precautions (Active)    Learning Progress Summary    Learner Readiness Method Response Comment Documented by Status   Patient Acceptance E,TB NR  EE 03/07/17 1120 Active                      User Key     Initials Effective Dates Name Provider Type Discipline     12/01/15 -  Melissa Cullen, PT Physical Therapist PT                PT Recommendation and Plan  Anticipated Discharge Disposition: skilled nursing facility  Planned Therapy Interventions: balance training, bed mobility training, gait training, home exercise program, patient/family education, strengthening, stretching, transfer training  PT Frequency: 2 times/day  Plan of Care Review  Plan Of Care Reviewed With: patient  Outcome Summary/Follow up Plan: Pt s/p T4-10 fusion presents with post op pain and weakness limiting mobility, and would benefit from skilled PT to address impairments and assist w/return to PLOF. Pt became dizzy while sitting EOB; unable to attempt transfers or gait due to dizziness. Pending progress with mobility, pt may benefit from sub acute rehab for further strengthening and gait training prior to DC home.           IP PT Goals       03/07/17 1121          Bed Mobility PT LTG    Bed Mobility PT LTG, Date Established 03/07/17  -EE      Bed Mobility PT LTG, Time to Achieve 1 wk  -EE      Bed Mobility PT LTG, Activity Type all bed mobility  -EE      Bed Mobility PT LTG, [place] Level contact guard assist  -EE      Transfer Training PT LTG    Transfer Training PT LTG, Date Established 03/07/17  -EE      Transfer Training PT LTG, Time to Achieve 1 wk  -EE      Transfer Training PT LTG, Activity Type all transfers  -EE      Transfer Training PT LTG, [place]  Level contact guard assist  -EE      Transfer Training PT LTG, Assist Device walker, rolling  -EE      Gait Training PT LTG    Gait Training Goal PT LTG, Date Established 03/07/17  -EE      Gait Training Goal PT LTG, Time to Achieve 1 wk  -EE      Gait Training Goal PT LTG, [place] Level contact guard assist  -EE      Gait Training Goal PT LTG, Assist Device walker, rolling  -EE      Gait Training Goal PT LTG, Distance to Achieve 100  -EE      Patient Education PT LTG    Patient Education PT LTG, Date Established 03/07/17  -EE      Patient Education PT LTG, Time to Achieve 1 wk  -EE      Patient Education PT LTG, Education Type precaution per surgeon;tripp/doff brace  -EE      Patient Education PT LTG, Education Understanding demonstrate adequately;verbalize understanding  -EE        User Key  (r) = Recorded By, (t) = Taken By, (c) = Cosigned By    Initials Name Provider Type    EE Melissa Cullen PT Physical Therapist                Outcome Measures       03/07/17 1100          How much help from another person do you currently need...    Turning from your back to your side while in flat bed without using bedrails? 3  -EE      Moving from lying on back to sitting on the side of a flat bed without bedrails? 2  -EE      Moving to and from a bed to a chair (including a wheelchair)? 2  -EE      Standing up from a chair using your arms (e.g., wheelchair, bedside chair)? 2  -EE      Climbing 3-5 steps with a railing? 2  -EE      To walk in hospital room? 2  -EE      AM-PAC 6 Clicks Score 13  -EE      Functional Assessment    Outcome Measure Options AM-PAC 6 Clicks Basic Mobility (PT)  -EE        User Key  (r) = Recorded By, (t) = Taken By, (c) = Cosigned By    Initials Name Provider Type    EE Melissa Cullen PT Physical Therapist           Time Calculation:         PT Charges       03/07/17 1123          Time Calculation    Start Time 1100  -EE      Stop Time 1115  -EE      Time Calculation (min) 15 min  -EE      PT  Received On 03/07/17  -YI      PT - Next Appointment 03/07/17  -EE      PT Goal Re-Cert Due Date 03/14/17  -EE        User Key  (r) = Recorded By, (t) = Taken By, (c) = Cosigned By    Initials Name Provider Type    YI Cullen PT Physical Therapist          Therapy Charges for Today     Code Description Service Date Service Provider Modifiers Qty    38956386819 HC PT EVAL LOW COMPLEXITY 2 3/7/2017 Melissa Cullen PT GP 1          PT G-Codes  Outcome Measure Options: AM-PAC 6 Clicks Basic Mobility (PT)      Melissa Cullen PT  3/7/2017

## 2017-03-07 NOTE — PROGRESS NOTES
Postop day 1: AVSS awake alert oriented.  Complains of back pain slight sternal pain but no radiating pain.  Moves legs well.  Having some nausea.  Hemoglobin 11.  Doing well we'll leave Awy catheter for now

## 2017-03-07 NOTE — NURSING NOTE
"Approx 2240 pt with c/o \"slight\" chest pain and \"slight\" soa. Pt AA&Ox4, communicating appropriately. Rapid Response called for interventions and assessment. See chart for details. Pt w/o further c/o.   Dr Camarena notified and advises to draw serial enzymes and transfer to tele unit.  [facility] to be [facility]. Report called to unit, will transfer per orders.   "

## 2017-03-07 NOTE — PROGRESS NOTES
Discharge Planning Assessment  [facility]     Patient Name: Ivy Chairez  MRN: [id number]  Today's Date: 3/7/2017    Admit Date: 3/6/2017          Discharge Needs Assessment       03/07/17 1449    Living Environment    Lives With spouse    Living Arrangements house    Discharge Needs Assessment    Concerns To Be Addressed no discharge needs identified;denies needs/concerns at this time    Equipment Currently Used at Home none    Transportation Available car;family or friend will provide            Discharge Plan       03/07/17 1449    Case Management/Social Work Plan    Plan Home with     Additional Comments Confirmed pt's facesheet information as correct.   Spoke with pt and her  for screening of DC plan/needs.   Pt stated that her plan is to return directly back home with assistance from her  as needed.   Pt stated that she is feeling    a lot better since she worked with PT this AM.   Pt stated that she has never used HH and  does not have a walker at home.  Pt did stated that she does have a  raised toilet at home.  Pt stated that she does not anticipate any needs upon D/C .   Informed pt that CCP will follow to assist if any needs does arise.        Final Note    Final Note d        Discharge Placement     No information found                Demographic Summary       03/07/17 1448    Referral Information    Admission Type inpatient    Arrived From home or self-care    Referral Source admission list            Functional Status       03/07/17 1448    Functional Status Current    Ambulation 3-->assistive equipment and person    Transferring 2-->assistive person    Toileting 2-->assistive person    Bathing 2-->assistive person    Dressing 2-->assistive person    Eating 0-->independent    Communication 0-->understands/communicates without difficulty    Swallowing (if score 2 or more for any item, consult Rehab Services) 0-->swallows foods/liquids without difficulty    Functional Status  Prior    Ambulation 0-->independent    Transferring 0-->independent    Toileting 0-->independent    Bathing 0-->independent    Dressing 0-->independent    Eating 0-->independent    Communication 0-->understands/communicates without difficulty    Swallowing 0-->swallows foods/liquids without difficulty            Psychosocial     None            Abuse/Neglect     None            Legal     None            Substance Abuse     None            Patient Forms     None          Vesta Stoddard, DARIN

## 2017-03-07 NOTE — CONSULTS
Name: Ivy Chairez ADMIT: 3/6/2017   : 1955  PCP: Murtaza Berrios Jr., MD    MRN: [id number] LOS: 0 days   AGE/SEX: 61 y.o. female  ROOM: [facility]/         Inpatient Consult to Internal Medicine  Consult performed by: SMITH PINEDO  Consult ordered by: NICK ZHANG  Reason for consult: ASCVD      Date of Admit: 3/6/2017  Date of Consult: 17    Subjective   History of Present Illness  Patient is a 61 y.o. female that presents to [facility] following elective T4-T10 Fusion with instrumentation.  She has been admitted to a orthopedic floor following surgery and we were asked to see and assist with her medical problems, specifically as it pertains to her heart disease.  At the time of my visit she denies any chest pain, nausea, vomiting or diarrhea.  She does complain of more than expected postoperative discomfort.  She has not yet tolerated a diet.  She has been in a normal state of health leading up to surgery.  She does have anxiety.  Takes premarin due to vaginal dryness.  She is aware of the risks a/w its use.        Past Medical History   Diagnosis Date   • Broken toe 2017     right 4th toe   • Common migraine without aura      Neurologist Dr Govea   • Coronary artery disease    • History of chest pain    • History of CHF (congestive heart failure)    • History of pulmonary embolism    • History of stroke    • Migraine      eval and management by neurologist   • Orthostatic hypotension    • Pleural effusion      POSTOPERATIVE, REQUIRING THORACENTESIS.   • Polyp of sigmoid colon      REMOVED   • Thoracic degenerative disc disease      Received epidural  [facility] DR Landin   • Thoracic spine pain    • Thyroid disease    • Weakness of right side of body      ARM & FACE     Past Surgical History   Procedure Laterality Date   • Breast augmentation     • Coronary artery bypass graft  2009     cabg x 3: LIMA to LAD, vein grafts to diagonal and OM1   •  Coronary angioplasty with stent placement       NON-GRAFTED RIGHT CORONARY ARTERY STENT.   • Hysterectomy     • Knee surgery Right    • Total thyroidectomy     • Colonoscopy  06/2004     Dr. Salas   • Augmentation mammaplasty     • Epidural block injection       Family History   Problem Relation Age of Onset   • No Known Problems Mother    • No Known Problems Father      Social History   Substance Use Topics   • Smoking status: Never Smoker   • Smokeless tobacco: Never Used   • Alcohol use No     Prescriptions Prior to Admission   Medication Sig Dispense Refill Last Dose   • alendronate (FOSAMAX) 70 MG tablet Take 70 mg by mouth Every 7 (Seven) Days. sundays   2/26/2017   • Cholecalciferol (VITAMIN D3) 2000 UNITS tablet Take 2,000 Units by mouth Daily.   3/5/2017 at 1800   • conjugated estrogens (PREMARIN) 0.625 MG/GM vaginal cream Insert 1.5 g into the vagina 3 (Three) Times a Week.   3/3/2017   • diphenhydrAMINE (SOMINEX) 25 MG tablet Take 25 mg by mouth Every Night.   3/5/2017 at 2100   • HYDROcodone-acetaminophen (NORCO) 5-325 MG per tablet 1 po q 6 hr PRN severe pain (Patient taking differently: Take 1 tablet by mouth Every 6 (Six) Hours As Needed for moderate pain (4-6).) 40 tablet 0 3/5/2017 at 1800   • levothyroxine (SYNTHROID, LEVOTHROID) 75 MCG tablet Take 75 mcg by mouth Daily.   3/5/2017 at 0800   • meclizine (ANTIVERT) 12.5 MG tablet Take 12.5 mg by mouth daily. For 7 days as directed    3/5/2017 at 2100   • rosuvastatin (CRESTOR) 40 MG tablet Take 40 mg by mouth Daily.   3/5/2017 at 2100   • venlafaxine (EFFEXOR) 75 MG tablet Take 75 mg by mouth Daily.   3/5/2017 at 1800   • vitamin B-12 (CYANOCOBALAMIN) 1000 MCG tablet Take 1,000 mcg by mouth Daily.   3/5/2017 at 0800   • aspirin 81 MG tablet Take 81 mg by mouth Daily.   2/27/2017   • hydrochlorothiazide (MICROZIDE) 12.5 MG capsule Take 12.5 mg by mouth Daily As Needed.   More than a month at Unknown time   • NITROGLYCERIN SL Place 1 tablet under the  tongue As Needed. For chest pain   More than a month at Unknown time     Allergies:  Metronidazole and Amoxicillin    Review of Systems   Constitutional: Negative.    Eyes: Negative.    Respiratory: Positive for cough, choking, shortness of breath and stridor. Negative for chest tightness and wheezing.    Cardiovascular: Negative.  Negative for chest pain, palpitations and leg swelling.   Gastrointestinal: Negative.    Endocrine: Negative.    Genitourinary: Negative.    Musculoskeletal: Positive for back pain (severe, postop).   Skin: Negative.    Neurological: Negative.  Negative for light-headedness.   Hematological: Negative.    Psychiatric/Behavioral: Negative.        Objective      Vital Signs  Temp:  [97 °F (36.1 °C)-98.2 °F (36.8 °C)] 97.8 °F (36.6 °C)  Heart Rate:  [] 113  Resp:  [12-18] 16  BP: (106-133)/(56-77) 122/77  Body mass index is 24.61 kg/(m^2).    Physical Exam   Constitutional: She is oriented to person, place, and time. She appears well-developed and well-nourished. She is cooperative. No distress.   HENT:   Head: Normocephalic and atraumatic.   Eyes: Conjunctivae and EOM are normal. No scleral icterus.   Neck: Normal range of motion. Neck supple. No tracheal deviation present.   Cardiovascular: Regular rhythm.  Tachycardia present.    No murmur heard.  Pulmonary/Chest: Effort normal. No accessory muscle usage. No tachypnea. No respiratory distress (taking very shallow gasping breaths). She has decreased breath sounds. She has no wheezes. She has no rales.   Abdominal: Soft. Bowel sounds are normal. She exhibits no distension and no mass. There is no tenderness.   Musculoskeletal: Normal range of motion. She exhibits no edema or deformity.   Neurological: She is alert and oriented to person, place, and time. No cranial nerve deficit.   Skin: Skin is warm and dry. No rash noted. No erythema.   Psychiatric:   Seems anxious   Nursing note and vitals reviewed.      Results Review:    I  reviewed the patient's new clinical results.      Assessment/Plan     Principal Problem:    Herniated thoracic disc without myelopathy  Active Problems:    Triple vessel coronary artery disease    Anxiety    Osteoporosis    History of coronary artery bypass surgery    History of pulmonary embolism    Dyspnea      Assessment & Plan  - Will obtain CXR and order a neb.  i think most of her dyspnea is due to pain and anxiety.  - Will hold HCTZ.  Anticipate fluctuations in BP due to blood loss, hypovolemia, anesthesia, narcotics etc..   - Will stop PREMARIN for the perioperative setting.  Advised that she not take this given hx of CAD/strokes/PE etc.   - Continue IVFs as ordered.    - SCDs have been ordered for DVT prophylaxis per ortho.  - Patient encouraged to use incentive spirometer as instructed.      Thank you very much for asking [facility] to be involved in this patient's care.  We will follow along with you.      Avel Lal MD  [facility]  03/06/17  7:48 PM

## 2017-03-07 NOTE — PROGRESS NOTES
"    Name: Ivy Chairez ADMIT: 3/6/2017   : 1955  PCP: Murtaza Berrios Jr., MD    MRN: [id number] LOS: 1 days   AGE/SEX: 61 y.o. female  ROOM: [facility]     Subjective   Feels cant catch breath.  Now today w/ chest pain/pressure worse w/ deep breathing.  + nausea and emesis this am.      Objective   Vital Signs  Temp:  [97 °F (36.1 °C)-98.9 °F (37.2 °C)] 98.9 °F (37.2 °C)  Heart Rate:  [] 101  Resp:  [12-22] 22  BP: (106-131)/(56-77) 117/58  SpO2:  [94 %-100 %] 97 %  on  Flow (L/min):  [2-4] 2;   O2 Device: nasal cannula  Body mass index is 24.61 kg/(m^2).    Objective:  General Appearance:  Uncomfortable and in pain.    Vital signs: (most recent): Blood pressure 117/58, pulse 101, temperature 98.9 °F (37.2 °C), temperature source Oral, resp. rate 22, height 66\" (167.6 cm), weight 152 lb 8 oz (69.2 kg), SpO2 97 %.    Lungs:  Normal effort.  Breath sounds clear to auscultation.  There are decreased breath sounds.  No wheezes, rales or rhonchi.    Heart: Tachycardia.  Regular rhythm.    Abdomen: Abdomen is soft and non-distended.  Bowel sounds are normal.   There is no abdominal tenderness.     Extremities: There is no dependent edema.    Neurological: Patient is alert and oriented to person, place and time.    Skin:  Warm and dry.        Results Review:       I reviewed the patient's new clinical results.    Results from last 7 days  Lab Units 17  0620   WBC 10*3/mm3 10.86*   HEMOGLOBIN g/dL 10.2*   PLATELETS 10*3/mm3 239     Estimated Creatinine Clearance: 58.1 mL/min (by C-G formula based on Cr of 0.94).      No results found for: HGBA1C  GLUCOSE   Date/Time Value Ref Range Status   2017 2248 142 (H) 70 - 130 mg/dL Final     PORTABLE CHEST 3/6/2017 AT 7:52 PM  IMPRESSIONS: No evidence of acute disease within the chest.      atorvastatin 80 mg Oral Daily   [START ON 3/8/2017] cholecalciferol 2,000 Units Oral Daily   levothyroxine 75 mcg Oral QAM   sennosides-docusate sodium 1 tablet Oral " BID   sennosides-docusate sodium 2 tablet Oral Nightly   venlafaxine 75 mg Oral Daily   vitamin B-12 1,000 mcg Oral Daily       HYDROmorphone HCl-NaCl     lactated ringers 9 mL/hr Last Rate: 9 mL/hr (03/06/17 0708)   sodium chloride 0.45 % with KCl 20 mEq 100 mL/hr Last Rate: 100 mL/hr (03/07/17 0140)         Assessment/Plan   Assessment:     Active Hospital Problems (** Indicates Principal Problem)    Diagnosis Date Noted   • **Herniated thoracic disc without myelopathy [M51.24] 03/06/2017   • History of pulmonary embolism [Z86.711] 03/06/2017   • Dyspnea [R06.00] 03/06/2017   • Osteoporosis [M81.0] 07/20/2016   • Triple vessel coronary artery disease [I25.10] 12/16/2015   • Anxiety [F41.9] 12/16/2015   • History of coronary artery bypass surgery [Z95.1] 10/25/2012      Resolved Hospital Problems    Diagnosis Date Noted Date Resolved   No resolved problems to display.       Plan:   - POD#1 T4-T10 Fusion with instrumentation  - CXR is negative. I think most of her dyspnea is due to pain and anxiety.  HOWEVER she continues to c/o SOA and pleuritic chest pain and remains tachycardic.  CE/EKG negative.  Given her h/o PE and continued use of Premarin I feel need to do CTA chest to definitively rule out pulmonary embolus.  - Will hold HCTZ.  Normotensive so far this am.  Monitor BP.  - Remain off PREMARIN for the perioperative setting. Advised that she not take this given hx of CAD/strokes/PE etc.   - Continue IVFs as ordered.   - Continue Zofran and Phenergan for nausea.  Could be related to Dilaudid use.  - SCDs have been ordered for DVT prophylaxis per ortho.  - Patient encouraged to use incentive spirometer as instructed.  Will also inform the nurses as currently she does not have spirometer even in her room.        Avel Lal MD  [facility]  03/07/17  7:12 AM

## 2017-03-07 NOTE — PROGRESS NOTES
Acute Care - Physical Therapy Treatment Note  [facility]     Patient Name: Ivy Chairez  : 1955  MRN: [id number]  Today's Date: 3/7/2017  Onset of Illness/Injury or Date of Surgery Date: 17     Referring Physician: Júnior    Admit Date: 3/6/2017    Visit Dx:    ICD-10-CM ICD-9-CM   1. Difficulty walking R26.2 719.7   2. Herniated thoracic disc without myelopathy M51.24 722.11     Patient Active Problem List   Diagnosis   • Triple vessel coronary artery disease   • Anxiety   • Gastroesophageal reflux disease   • Hyperlipidemia   • Hypothyroidism   • Osteoarthritis of knee   • Cerebrovascular accident   • Chronic venous insufficiency   • Carotid artery disease   • Migraine without status migrainosus, not intractable   • Osteoporosis   • Closed wedge compression fracture of T7 vertebra   • History of coronary artery bypass surgery   • Carter-Arreaga syncope   • Thoracic radiculopathy due to degenerative joint disease of spine   • Herniated thoracic disc without myelopathy   • History of pulmonary embolism   • Dyspnea               Adult Rehabilitation Note       17 1600          Rehab Assessment/Intervention    Discipline physical therapy assistant  -RW      Document Type therapy note (daily note)  -RW      Subjective Information agree to therapy;complains of;pain;dyspnea  -RW      Patient Effort, Rehab Treatment fair  -RW      Symptoms Noted During/After Treatment fatigue;shortness of breath;increased pain  -RW      Precautions/Limitations fall precautions;brace on when up;spinal precautions   HOB can't be above 30'  -RW      Recorded by [RW] Ivet Phelan PTA      Vital Signs    Intra SpO2 (%) 100  -RW      O2 Delivery Intra Treatment supplemental O2   2L  -RW      Recorded by [RW] Ivet Phelan PTA      Pain Assessment    Pain Assessment 0-10  -RW      Pain Score 7  -RW      Pain Type Acute pain;Surgical pain  -RW      Pain Location Back  -RW      Pain Orientation Mid  -RW      Pain  Intervention(s) Medication (See MAR);Repositioned;Ambulation/increased activity   PCA  -RW      Response to Interventions not tolerated  -RW      Recorded by [RW] Ivet Phelan PTA      Cognitive Assessment/Intervention    Current Cognitive/Communication Assessment functional  -RW      Orientation Status oriented x 4  -RW      Follows Commands/Answers Questions 100% of the time  -RW      Personal Safety WNL/WFL  -RW      Personal Safety Interventions fall prevention program maintained;gait belt;nonskid shoes/slippers when out of bed  -RW      Recorded by [RW] Ivet Phelan PTA      Bed Mobility, Assessment/Treatment    Bed Mobility, Assistive Device bed rails  -RW      Bed Mobility, Scoot/Bridge, [place] verbal cues required;nonverbal cues required (demo/gesture);minimum assist (75% patient effort)  -RW      Bed Mob, Supine to Sit, [place] verbal cues required;nonverbal cues required (demo/gesture);minimum assist (75% patient effort)  -RW      Bed Mob, Sit to Supine, [place] verbal cues required;nonverbal cues required (demo/gesture);minimum assist (75% patient effort);moderate assist (50% patient effort);2 person assist required  -RW      Bed Mobility, Comment cues for log roll. Attempted to apply brace in sitting. Brace did not fit in sitting. Loosened straps, but per pt she could not breath and wanted to apply in standing.  -RW      Recorded by [RW] Ivet Phelan PTA      Transfer Assessment/Treatment    Transfers, Sit-Stand [place] verbal cues required;nonverbal cues required (demo/gesture);minimum assist (75% patient effort);2 person assist required  -RW      Transfers, Stand-Sit [place] verbal cues required;nonverbal cues required (demo/gesture);minimum assist (75% patient effort);2 person assist required  -RW      Transfers, Sit-Stand-Sit, Assist Device elevated surface;rolling walker  -RW      Transfer, Comment Attempted to apply brace in standing, but per pt, could not  breath w/ brace on.  -RW      Recorded by [RW] Ivet Phelan PTA      Gait Assessment/Treatment    Gait, [place] Level verbal cues required;nonverbal cues required (demo/gesture);minimum assist (75% patient effort);2 person assist required  -RW      Gait, Assistive Device rolling walker  -RW      Gait, Distance (Feet) --   Pt took 4 sidesteps at EOB  -RW      Gait, Safety Issues step length decreased;supplemental O2   2L O2  -RW      Recorded by [RW] Ivet Phelan PTA      Positioning and Restraints    Pre-Treatment Position in bed  -RW      Post Treatment Position bed  -RW      In Bed supine;call light within reach;encouraged to call for assist;exit alarm on;with family/caregiver;SCD pump applied  -RW      Recorded by [RW] Ivet Phelan PTA        User Key  (r) = Recorded By, (t) = Taken By, (c) = Cosigned By    Initials Name Effective Dates    RW Ivet Phelan PTA 04/06/16 -                 IP PT Goals       03/07/17 1121          Bed Mobility PT LTG    Bed Mobility PT LTG, Date Established 03/07/17  -EE      Bed Mobility PT LTG, Time to Achieve 1 wk  -EE      Bed Mobility PT LTG, Activity Type all bed mobility  -EE      Bed Mobility PT LTG, [place] Level contact guard assist  -EE      Transfer Training PT LTG    Transfer Training PT LTG, Date Established 03/07/17  -EE      Transfer Training PT LTG, Time to Achieve 1 wk  -EE      Transfer Training PT LTG, Activity Type all transfers  -EE      Transfer Training PT LTG, [place] Level contact guard assist  -EE      Transfer Training PT LTG, Assist Device walker, rolling  -EE      Gait Training PT LTG    Gait Training Goal PT LTG, Date Established 03/07/17  -EE      Gait Training Goal PT LTG, Time to Achieve 1 wk  -EE      Gait Training Goal PT LTG, [place] Level contact guard assist  -EE      Gait Training Goal PT LTG, Assist Device walker, rolling  -EE      Gait Training Goal PT LTG, Distance to Achieve 100  -EE      Patient Education  PT LTG    Patient Education PT LTG, Date Established 03/07/17  -EE      Patient Education PT LTG, Time to Achieve 1 wk  -EE      Patient Education PT LTG, Education Type precaution per surgeon;tripp/doff brace  -EE      Patient Education PT LTG, Education Understanding demonstrate adequately;verbalize understanding  -EE        User Key  (r) = Recorded By, (t) = Taken By, (c) = Cosigned By    Initials Name Provider Type    EE Melissa Cullen PT Physical Therapist          Physical Therapy Education     Title: PT OT SLP Therapies (Active)     Topic: Physical Therapy (Active)     Point: Mobility training (Active)    Learning Progress Summary    Learner Readiness Method Response Comment Documented by Status   Patient Acceptance E,TB NR  EE 03/07/17 1120 Active               Point: Home exercise program (Active)    Learning Progress Summary    Learner Readiness Method Response Comment Documented by Status   Patient Acceptance E,TB NR  EE 03/07/17 1120 Active               Point: Body mechanics (Active)    Learning Progress Summary    Learner Readiness Method Response Comment Documented by Status   Patient Acceptance E,TB NR  EE 03/07/17 1120 Active               Point: Precautions (Active)    Learning Progress Summary    Learner Readiness Method Response Comment Documented by Status   Patient Acceptance E,TB NR  EE 03/07/17 1120 Active                      User Key     Initials Effective Dates Name Provider Type Discipline    EE 12/01/15 -  Melissa Cullen PT Physical Therapist PT                    PT Recommendation and Plan  Anticipated Discharge Disposition: skilled nursing facility  Planned Therapy Interventions: balance training, bed mobility training, gait training, home exercise program, patient/family education, strengthening, stretching, transfer training  PT Frequency: 2 times/day             Outcome Measures       03/07/17 1100          How much help from another person do you currently need...    Turning from your  back to your side while in flat bed without using bedrails? 3  -EE      Moving from lying on back to sitting on the side of a flat bed without bedrails? 2  -EE      Moving to and from a bed to a chair (including a wheelchair)? 2  -EE      Standing up from a chair using your arms (e.g., wheelchair, bedside chair)? 2  -EE      Climbing 3-5 steps with a railing? 2  -EE      To walk in hospital room? 2  -EE      AM-PAC 6 Clicks Score 13  -EE      Functional Assessment    Outcome Measure Options AM-PAC 6 Clicks Basic Mobility (PT)  -EE        User Key  (r) = Recorded By, (t) = Taken By, (c) = Cosigned By    Initials Name Provider Type    YI Cullen PT Physical Therapist           Time Calculation:         PT Charges       03/07/17 1637 03/07/17 1123       Time Calculation    Start Time 1610  -RW 1100  -EE     Stop Time 1637  -RW 1115  -EE     Time Calculation (min) 27 min  -RW 15 min  -EE     PT Received On 03/07/17  -RW 03/07/17  -EE     PT - Next Appointment 03/08/17  -RW 03/07/17  -EE     PT Goal Re-Cert Due Date  03/14/17  -EE       User Key  (r) = Recorded By, (t) = Taken By, (c) = Cosigned By    Initials Name Provider Type    YI Cullen, PT Physical Therapist    RW Ivet Phelan PTA Physical Therapy Assistant          Therapy Charges for Today     Code Description Service Date Service Provider Modifiers Qty    30575560756 HC PT THER PROC EA 15 MIN 3/7/2017 Ivet Phelan PTA GP 2    82830536660 HC PT THER SUPP EA 15 MIN 3/7/2017 Ivet Phelan PTA GP 2          PT G-Codes  Outcome Measure Options: AM-PAC 6 Clicks Basic Mobility (PT)    Ivet Phelan PTA  3/7/2017

## 2017-03-07 NOTE — PLAN OF CARE
Problem: Patient Care Overview (Adult)  Goal: Plan of Care Review    03/07/17 1121   Coping/Psychosocial Response Interventions   Plan Of Care Reviewed With patient   Outcome Evaluation   Outcome Summary/Follow up Plan Pt s/p T4-10 fusion presents with post op pain and weakness limiting mobility, and would benefit from skilled PT to address impairments and assist w/return to PLOF. Pt became dizzy while sitting EOB; unable to attempt transfers or gait due to dizziness. Pending progress with mobility, pt may benefit from sub acute rehab for further strengthening and gait training prior to DC home.          Problem: Inpatient Physical Therapy  Goal: Bed Mobility Goal LTG- PT    03/07/17 1121   Bed Mobility PT LTG   Bed Mobility PT LTG, Date Established 03/07/17   Bed Mobility PT LTG, Time to Achieve 1 wk   Bed Mobility PT LTG, Activity Type all bed mobility   Bed Mobility PT LTG, [place] Level contact guard assist       Goal: Transfer Training Goal 1 LTG- PT    03/07/17 1121   Transfer Training PT LTG   Transfer Training PT LTG, Date Established 03/07/17   Transfer Training PT LTG, Time to Achieve 1 wk   Transfer Training PT LTG, Activity Type all transfers   Transfer Training PT LTG, [place] Level contact guard assist   Transfer Training PT LTG, Assist Device walker, rolling       Goal: Gait Training Goal LTG- PT    03/07/17 1121   Gait Training PT LTG   Gait Training Goal PT LTG, Date Established 03/07/17   Gait Training Goal PT LTG, Time to Achieve 1 wk   Gait Training Goal PT LTG, [place] Level contact guard assist   Gait Training Goal PT LTG, Assist Device walker, rolling   Gait Training Goal PT LTG, Distance to Achieve 100       Goal: Patient Education Goal LTG- PT    03/07/17 1121   Patient Education PT LTG   Patient Education PT LTG, Date Established 03/07/17   Patient Education PT LTG, Time to Achieve 1 wk   Patient Education PT LTG, Education Type precaution per surgeon;tripp/doff brace    Patient Education PT LTG, Education Understanding demonstrate adequately;verbalize understanding

## 2017-03-07 NOTE — PLAN OF CARE
Problem: Patient Care Overview (Adult)  Goal: Plan of Care Review  Outcome: Ongoing (interventions implemented as appropriate)    03/07/17 2490   Coping/Psychosocial Response Interventions   Plan Of Care Reviewed With patient   Patient Care Overview   Progress no change   Outcome Evaluation   Outcome Summary/Follow up Plan patient c/o of pain/nausea, but seems to be better this evening. patient unable to do much with therapy due to brace not fitting well. left note for md to see what type of brace they want her fitted for        Goal: Adult Individualization and Mutuality  Outcome: Ongoing (interventions implemented as appropriate)  Goal: Discharge Needs Assessment  Outcome: Ongoing (interventions implemented as appropriate)    Problem: Perioperative Period (Adult)  Goal: Signs and Symptoms of Listed Potential Problems Will be Absent or Manageable (Perioperative Period)  Outcome: Ongoing (interventions implemented as appropriate)    Problem: Skin Integrity Impairment, Risk/Actual (Adult)  Goal: Skin Integrity/Wound Healing  Outcome: Ongoing (interventions implemented as appropriate)    Problem: Infection, Risk/Actual (Adult)  Goal: Infection Prevention/Resolution  Outcome: Ongoing (interventions implemented as appropriate)    Problem: Fall Risk (Adult)  Goal: Absence of Falls  Outcome: Ongoing (interventions implemented as appropriate)

## 2017-03-08 PROBLEM — J98.11 ATELECTASIS OF BOTH LUNGS: Status: ACTIVE | Noted: 2017-03-08

## 2017-03-08 LAB
ANION GAP SERPL CALCULATED.3IONS-SCNC: 10.5 MMOL/L
BUN BLD-MCNC: 6 MG/DL (ref 8–23)
BUN/CREAT SERPL: 8.7 (ref 7–25)
CALCIUM SPEC-SCNC: 7.9 MG/DL (ref 8.6–10.5)
CHLORIDE SERPL-SCNC: 103 MMOL/L (ref 98–107)
CO2 SERPL-SCNC: 23.5 MMOL/L (ref 22–29)
CREAT BLD-MCNC: 0.69 MG/DL (ref 0.57–1)
DEPRECATED RDW RBC AUTO: 42.7 FL (ref 37–54)
ERYTHROCYTE [DISTWIDTH] IN BLOOD BY AUTOMATED COUNT: 12.5 % (ref 11.7–13)
GFR SERPL CREATININE-BSD FRML MDRD: 86 ML/MIN/1.73
GLUCOSE BLD-MCNC: 149 MG/DL (ref 65–99)
HCT VFR BLD AUTO: 26.3 % (ref 35.6–45.5)
HGB BLD-MCNC: 8.9 G/DL (ref 11.9–15.5)
MCH RBC QN AUTO: 31.7 PG (ref 26.9–32)
MCHC RBC AUTO-ENTMCNC: 33.8 G/DL (ref 32.4–36.3)
MCV RBC AUTO: 93.6 FL (ref 80.5–98.2)
PLATELET # BLD AUTO: 203 10*3/MM3 (ref 140–500)
PMV BLD AUTO: 9.6 FL (ref 6–12)
POTASSIUM BLD-SCNC: 3.7 MMOL/L (ref 3.5–5.2)
RBC # BLD AUTO: 2.81 10*6/MM3 (ref 3.9–5.2)
SODIUM BLD-SCNC: 137 MMOL/L (ref 136–145)
WBC NRBC COR # BLD: 7.25 10*3/MM3 (ref 4.5–10.7)

## 2017-03-08 PROCEDURE — 97110 THERAPEUTIC EXERCISES: CPT

## 2017-03-08 PROCEDURE — 63710000001 PROMETHAZINE PER 25 MG: Performed by: ORTHOPAEDIC SURGERY

## 2017-03-08 PROCEDURE — 85027 COMPLETE CBC AUTOMATED: CPT | Performed by: HOSPITALIST

## 2017-03-08 PROCEDURE — 99024 POSTOP FOLLOW-UP VISIT: CPT | Performed by: ORTHOPAEDIC SURGERY

## 2017-03-08 PROCEDURE — 80048 BASIC METABOLIC PNL TOTAL CA: CPT | Performed by: HOSPITALIST

## 2017-03-08 RX ORDER — PROMETHAZINE HYDROCHLORIDE 25 MG/1
12.5 TABLET ORAL EVERY 6 HOURS PRN
Status: DISCONTINUED | OUTPATIENT
Start: 2017-03-08 | End: 2017-03-10 | Stop reason: HOSPADM

## 2017-03-08 RX ADMIN — OXYCODONE HYDROCHLORIDE AND ACETAMINOPHEN 2 TABLET: 5; 325 TABLET ORAL at 01:25

## 2017-03-08 RX ADMIN — OXYCODONE HYDROCHLORIDE AND ACETAMINOPHEN 2 TABLET: 5; 325 TABLET ORAL at 10:56

## 2017-03-08 RX ADMIN — OXYCODONE HYDROCHLORIDE AND ACETAMINOPHEN 2 TABLET: 5; 325 TABLET ORAL at 19:41

## 2017-03-08 RX ADMIN — VITAMIN D, TAB 1000IU (100/BT) 2000 UNITS: 25 TAB at 09:35

## 2017-03-08 RX ADMIN — PROMETHAZINE HYDROCHLORIDE 12.5 MG: 25 TABLET ORAL at 07:04

## 2017-03-08 RX ADMIN — CYCLOBENZAPRINE HYDROCHLORIDE 10 MG: 10 TABLET, FILM COATED ORAL at 19:41

## 2017-03-08 RX ADMIN — OXYCODONE HYDROCHLORIDE AND ACETAMINOPHEN 2 TABLET: 5; 325 TABLET ORAL at 15:22

## 2017-03-08 RX ADMIN — VENLAFAXINE HYDROCHLORIDE 75 MG: 75 TABLET ORAL at 09:35

## 2017-03-08 RX ADMIN — PROMETHAZINE HYDROCHLORIDE 12.5 MG: 25 TABLET ORAL at 22:17

## 2017-03-08 RX ADMIN — LEVOTHYROXINE SODIUM 75 MCG: 75 TABLET ORAL at 09:35

## 2017-03-08 RX ADMIN — OXYCODONE HYDROCHLORIDE AND ACETAMINOPHEN 2 TABLET: 5; 325 TABLET ORAL at 05:33

## 2017-03-08 RX ADMIN — ATORVASTATIN CALCIUM 80 MG: 80 TABLET, FILM COATED ORAL at 09:35

## 2017-03-08 RX ADMIN — CYCLOBENZAPRINE HYDROCHLORIDE 10 MG: 10 TABLET, FILM COATED ORAL at 11:04

## 2017-03-08 RX ADMIN — PROMETHAZINE HYDROCHLORIDE 12.5 MG: 25 TABLET ORAL at 15:22

## 2017-03-08 RX ADMIN — Medication 1000 MCG: at 09:35

## 2017-03-08 NOTE — PROGRESS NOTES
Acute Care - Physical Therapy Treatment Note  [facility]     Patient Name: Ivy Chairez  : 1955  MRN: [id number]  Today's Date: 3/8/2017  Onset of Illness/Injury or Date of Surgery Date: 17     Referring Physician: Júnior    Admit Date: 3/6/2017    Visit Dx:    ICD-10-CM ICD-9-CM   1. Difficulty walking R26.2 719.7   2. Herniated thoracic disc without myelopathy M51.24 722.11     Patient Active Problem List   Diagnosis   • Triple vessel coronary artery disease   • Anxiety   • Gastroesophageal reflux disease   • Hyperlipidemia   • Hypothyroidism   • Osteoarthritis of knee   • Cerebrovascular accident   • Chronic venous insufficiency   • Carotid artery disease   • Migraine without status migrainosus, not intractable   • Osteoporosis   • Closed wedge compression fracture of T7 vertebra   • History of coronary artery bypass surgery   • Carter-Arreaga syncope   • Thoracic radiculopathy due to degenerative joint disease of spine   • Herniated thoracic disc without myelopathy   • History of pulmonary embolism   • Dyspnea   • Atelectasis of both lungs               Adult Rehabilitation Note       17 1553 17 1050 17 1600    Rehab Assessment/Intervention    Discipline physical therapist  -EE physical therapist  -EW physical therapy assistant  -RW    Document Type therapy note (daily note)  -EE therapy note (daily note)  -EW therapy note (daily note)  -RW    Subjective Information agree to therapy;complains of;pain  -EE agree to therapy;complains of;pain  -EW agree to therapy;complains of;pain;dyspnea  -RW    Patient Effort, Rehab Treatment good  -EE fair  -EW fair  -RW    Symptoms Noted During/After Treatment  increased pain  -EW fatigue;shortness of breath;increased pain  -RW    Precautions/Limitations fall precautions;brace on when up;other (see comments)   HOB not above 30  -EE fall precautions;brace on when up   HOB not above 30'  -EW fall precautions;brace on when up;spinal  precautions   HOB can't be above 30'  -RW    Recorded by [EE] Melissa Cullen, PT [EW] Diane Valencia, PT [RW] Ivet Phelan PTA    Vital Signs    Intra SpO2 (%)   100  -RW    O2 Delivery Intra Treatment   supplemental O2   2L  -RW    Recorded by   [RW] Ivet Phelan PTA    Pain Assessment    Pain Assessment 0-10  -EE 0-10  -EW 0-10  -RW    Pain Score 8  -EE 8  -EW 7  -RW    Pain Type Acute pain  -EE Surgical pain;Acute pain  -EW Acute pain;Surgical pain  -RW    Pain Location Back  -EE Back  -EW Back  -RW    Pain Orientation   Mid  -RW    Pain Intervention(s) Medication (See MAR);Repositioned  -EE  Medication (See MAR);Repositioned;Ambulation/increased activity   PCA  -RW    Response to Interventions unchanged  -EE  not tolerated  -RW    Recorded by [EE] Melissa Cullen, PT [EW] Diane Valencia, PT [RW] Ivet Phelan PTA    Cognitive Assessment/Intervention    Current Cognitive/Communication Assessment functional  -EE  functional  -RW    Orientation Status oriented x 4  -EE  oriented x 4  -RW    Follows Commands/Answers Questions 100% of the time  -EE  100% of the time  -RW    Personal Safety WNL/WFL  -EE  WNL/WFL  -RW    Personal Safety Interventions fall prevention program maintained;gait belt;nonskid shoes/slippers when out of bed;supervised activity  -EE  fall prevention program maintained;gait belt;nonskid shoes/slippers when out of bed  -RW    Recorded by [EE] Melissa Cullen, PT  [RW] Ivet Phelan PTA    Bed Mobility, Assessment/Treatment    Bed Mobility, Assistive Device bed rails  -EE  bed rails  -RW    Bed Mobility, Scoot/Bridge, [place]   verbal cues required;nonverbal cues required (demo/gesture);minimum assist (75% patient effort)  -RW    Bed Mob, Supine to Sit, [place] contact guard assist;verbal cues required  -EE verbal cues required;minimum assist (75% patient effort);2 person assist required  -EW verbal cues required;nonverbal cues required (demo/gesture);minimum assist (75% patient  effort)  -RW    Bed Mob, Sit to Supine, [place] minimum assist (75% patient effort);verbal cues required  -EE verbal cues required;minimum assist (75% patient effort)  -EW verbal cues required;nonverbal cues required (demo/gesture);minimum assist (75% patient effort);moderate assist (50% patient effort);2 person assist required  -RW    Bed Mobility, Comment hob flat; log roll  -EE  cues for log roll. Attempted to apply brace in sitting. Brace did not fit in sitting. Loosened straps, but per pt she could not breath and wanted to apply in standing.  -RW    Recorded by [EE] Melissa Cullen, PT [EW] Diane Valencia, PT [RW] Ivet Phelan, BHARAT    Transfer Assessment/Treatment    Transfers, Sit-Stand [place] contact guard assist;2 person assist required;verbal cues required  -EE verbal cues required;minimum assist (75% patient effort);2 person assist required  -EW verbal cues required;nonverbal cues required (demo/gesture);minimum assist (75% patient effort);2 person assist required  -RW    Transfers, Stand-Sit [place] contact guard assist;verbal cues required  -EE minimum assist (75% patient effort);2 person assist required  -EW verbal cues required;nonverbal cues required (demo/gesture);minimum assist (75% patient effort);2 person assist required  -RW    Transfers, Sit-Stand-Sit, Assist Device rolling walker  -EE rolling walker  -EW elevated surface;rolling walker  -RW    Transfer, Comment  performed sit <> stand x 3, pt wanted to lay down to take pain meds. brace donned in standing. brace fitting appropriately.   -EW Attempted to apply brace in standing, but per pt, could not breath w/ brace on.  -RW    Recorded by [EE] Melissa Cullen, PT [EW] Diane Valencia, PT [RW] Ivet Phelan, BHARAT    Gait Assessment/Treatment    Gait, [place] Level contact guard assist;verbal cues required  -EE verbal cues required;nonverbal cues required (demo/gesture);minimum assist (75% patient effort);2 person assist  required  -EW verbal cues required;nonverbal cues required (demo/gesture);minimum assist (75% patient effort);2 person assist required  -RW    Gait, Assistive Device rolling walker  -EE rolling walker  -EW rolling walker  -RW    Gait, Distance (Feet) 30  -EE 12  -EW --   Pt took 4 sidesteps at EOB  -RW    Gait, Gait Deviations meryl decreased;decreased heel strike;step length decreased  -EE antalgic;meryl decreased;forward flexed posture;step length decreased;narrow base  -EW     Gait, Safety Issues step length decreased  -EE step length decreased  -EW step length decreased;supplemental O2   2L O2  -RW    Gait, Impairments pain  -EE pain;strength decreased  -EW     Gait, Comment  encouraged further ambulation distance, but refusing to walk any farther 2' pain.  -EW     Recorded by [EE] Melissa Cullen, PT [EW] Diane Valencia, PT [RW] Ivet Phelan PTA    Positioning and Restraints    Pre-Treatment Position in bed  -EE in bed  -EW in bed  -RW    Post Treatment Position bed  -EE bed  -EW bed  -RW    In Bed side lying right;call light within reach;encouraged to call for assist;SCD pump applied;pillow between legs  -EE notified nsg;call light within reach;encouraged to call for assist;exit alarm on;side lying left  -EW supine;call light within reach;encouraged to call for assist;exit alarm on;with family/caregiver;SCD pump applied  -RW    Recorded by [EE] Melissa Cullen, PT [EW] Diane Valencia, PT [RW] Ivet Phelan PTA      User Key  (r) = Recorded By, (t) = Taken By, (c) = Cosigned By    Initials Name Effective Dates    EE Melissa Cullen, PT 12/01/15 -     EW Diane Valencia, PT 12/01/15 -     RW Ivet Phelan PTA 04/06/16 -                 IP PT Goals       03/07/17 1121          Bed Mobility PT LTG    Bed Mobility PT LTG, Date Established 03/07/17  -EE      Bed Mobility PT LTG, Time to Achieve 1 wk  -EE      Bed Mobility PT LTG, Activity Type all bed mobility  -EE      Bed Mobility PT LTG, [place] Level  contact guard assist  -EE      Transfer Training PT LTG    Transfer Training PT LTG, Date Established 03/07/17  -EE      Transfer Training PT LTG, Time to Achieve 1 wk  -EE      Transfer Training PT LTG, Activity Type all transfers  -EE      Transfer Training PT LTG, [place] Level contact guard assist  -EE      Transfer Training PT LTG, Assist Device walker, rolling  -EE      Gait Training PT LTG    Gait Training Goal PT LTG, Date Established 03/07/17  -EE      Gait Training Goal PT LTG, Time to Achieve 1 wk  -EE      Gait Training Goal PT LTG, [place] Level contact guard assist  -EE      Gait Training Goal PT LTG, Assist Device walker, rolling  -EE      Gait Training Goal PT LTG, Distance to Achieve 100  -EE      Patient Education PT LTG    Patient Education PT LTG, Date Established 03/07/17  -EE      Patient Education PT LTG, Time to Achieve 1 wk  -EE      Patient Education PT LTG, Education Type precaution per surgeon;tripp/doff brace  -EE      Patient Education PT LTG, Education Understanding demonstrate adequately;verbalize understanding  -EE        User Key  (r) = Recorded By, (t) = Taken By, (c) = Cosigned By    Initials Name Provider Type    EE eMlissa Cullen, PT Physical Therapist          Physical Therapy Education     Title: PT OT SLP Therapies (Done)     Topic: Physical Therapy (Done)     Point: Mobility training (Done)    Learning Progress Summary    Learner Readiness Method Response Comment Documented by Status   Patient Acceptance DOMINIC POLLOCK,NR  EW 03/08/17 1117 Done    Acceptance E,TB NR  EE 03/07/17 1120 Active               Point: Home exercise program (Done)    Learning Progress Summary    Learner Readiness Method Response Comment Documented by Status   Patient Acceptance DOMINIC POLLOCKNR  EW 03/08/17 1117 Done    Acceptance E,TB NR  EE 03/07/17 1120 Active               Point: Body mechanics (Done)    Learning Progress Summary    Learner Readiness Method Response Comment Documented by Status   Patient  Acceptance EDOMINICNR  EW 03/08/17 1117 Done    Acceptance E,TB NR  EE 03/07/17 1120 Active               Point: Precautions (Done)    Learning Progress Summary    Learner Readiness Method Response Comment Documented by Status   Patient Acceptance DOMINIC POLLOCKNR  EW 03/08/17 1117 Done    Acceptance CHERYL POLLOCK NR  EE 03/07/17 1120 Active                      User Key     Initials Effective Dates Name Provider Type Discipline    EE 12/01/15 -  Melissa Cullen, PT Physical Therapist PT    EW 12/01/15 -  Diane Valencia, PT Physical Therapist PT                    PT Recommendation and Plan  Anticipated Discharge Disposition: skilled nursing facility  Planned Therapy Interventions: balance training, bed mobility training, gait training, home exercise program, patient/family education, strengthening, stretching, transfer training  PT Frequency: 2 times/day  Plan of Care Review  Plan Of Care Reviewed With: patient  Progress: improving  Outcome Summary/Follow up Plan: Pt able to tolerate increased gait distance this afternoon; also demonstrating increased independence with all mobility. Will continue to advance activity as tolerated.          Outcome Measures       03/08/17 1100 03/07/17 1100       How much help from another person do you currently need...    Turning from your back to your side while in flat bed without using bedrails? 3  -EW 3  -EE     Moving from lying on back to sitting on the side of a flat bed without bedrails? 3  -EW 2  -EE     Moving to and from a bed to a chair (including a wheelchair)? 3  -EW 2  -EE     Standing up from a chair using your arms (e.g., wheelchair, bedside chair)? 3  -EW 2  -EE     Climbing 3-5 steps with a railing? 2  -EW 2  -EE     To walk in hospital room? 3  -EW 2  -EE     AM-PAC 6 Clicks Score 17  -EW 13  -EE     Functional Assessment    Outcome Measure Options AM-PAC 6 Clicks Basic Mobility (PT)  -EW AM-PAC 6 Clicks Basic Mobility (PT)  -EE       User Key  (r) = Recorded By, (t) = Taken By,  (c) = Cosigned By    Initials Name Provider Type    EE Melissa Cullen, PT Physical Therapist    EW Diane Valencia, PT Physical Therapist           Time Calculation:         PT Charges       03/08/17 1621 03/08/17 1119       Time Calculation    Start Time 1553  -EE 1050  -EW     Stop Time 1608  -EE 1113  -EW     Time Calculation (min) 15 min  -EE 23 min  -EW     PT Received On 03/08/17  -EE 03/08/17  -EW     PT - Next Appointment 03/09/17  -EE 03/09/17  -EW       User Key  (r) = Recorded By, (t) = Taken By, (c) = Cosigned By    Initials Name Provider Type    EE Melissa Cullen, PT Physical Therapist    EW Diane Valencia, PT Physical Therapist          Therapy Charges for Today     Code Description Service Date Service Provider Modifiers Qty    48560708471 HC PT EVAL LOW COMPLEXITY 2 3/7/2017 Melissa Cullen, PT GP 1    95984958488 HC PT THER PROC EA 15 MIN 3/8/2017 Melissa Cullen, PT GP 1    91944035741 HC PT THER SUPP EA 15 MIN 3/8/2017 Melissa Cullen, PT GP 1          PT G-Codes  Outcome Measure Options: AM-PAC 6 Clicks Basic Mobility (PT)    Melissa Cullen PT  3/8/2017

## 2017-03-08 NOTE — PLAN OF CARE
Problem: Patient Care Overview (Adult)  Goal: Plan of Care Review    03/08/17 1620   Coping/Psychosocial Response Interventions   Plan Of Care Reviewed With patient   Patient Care Overview   Progress improving   Outcome Evaluation   Outcome Summary/Follow up Plan Pt able to tolerate increased gait distance this afternoon; also demonstrating increased independence with all mobility. Will continue to advance activity as tolerated.

## 2017-03-08 NOTE — PLAN OF CARE
Problem: Patient Care Overview (Adult)  Goal: Plan of Care Review  Outcome: Ongoing (interventions implemented as appropriate)    03/08/17 1117   Coping/Psychosocial Response Interventions   Plan Of Care Reviewed With patient   Patient Care Overview   Progress improving   Outcome Evaluation   Outcome Summary/Follow up Plan Pt agreeable to therapy, although requires max encouragement to stand and ambulate. Pt only tolerating short ambulation distance, despite encouragement to walk farther. Pt's brace fitting appropriately today. Kian continue to encourage and progress mobility as able.

## 2017-03-08 NOTE — PROGRESS NOTES
Continued Stay Note  [facility]     Patient Name: Ivy Chairez  MRN: [id number]  Today's Date: 3/8/2017    Admit Date: 3/6/2017          Discharge Plan       03/08/17 1223    Case Management/Social Work Plan    Plan Home with spouse    Additional Comments Met with patient and plan continues to be home with .  She called her  while CCP in the room and he confirmed plan is home.  Patient needs a rolling walker and will order from [facility] for home.              Discharge Codes     None            Diamond Rachel RN

## 2017-03-08 NOTE — PLAN OF CARE
Problem: Patient Care Overview (Adult)  Goal: Plan of Care Review  Outcome: Ongoing (interventions implemented as appropriate)    03/08/17 0323   Coping/Psychosocial Response Interventions   Plan Of Care Reviewed With patient   Patient Care Overview   Progress no change   Outcome Evaluation   Outcome Summary/Follow up Plan Patient slept well. Patient complained of nausia once during the night but it was well controlled with current medication. Continue to promote activity as tollerated. Patient may need new brace to enable activity, please see note placed on chart for MD. Continue to monitor         Problem: Perioperative Period (Adult)  Goal: Signs and Symptoms of Listed Potential Problems Will be Absent or Manageable (Perioperative Period)  Outcome: Ongoing (interventions implemented as appropriate)    Problem: Skin Integrity Impairment, Risk/Actual (Adult)  Goal: Skin Integrity/Wound Healing  Outcome: Ongoing (interventions implemented as appropriate)    Problem: Infection, Risk/Actual (Adult)  Goal: Infection Prevention/Resolution  Outcome: Ongoing (interventions implemented as appropriate)    Problem: Fall Risk (Adult)  Goal: Absence of Falls  Outcome: Ongoing (interventions implemented as appropriate)

## 2017-03-08 NOTE — PROGRESS NOTES
Postop day 2: AVSS awake alert oriented.  Not moving fast and physical therapy.  Moves legs well no leg pain.  Needs to get moving and physical therapy.  Hemoglobin pending.  Plan is discharge home tomorrow or rehabilitation.

## 2017-03-08 NOTE — PROGRESS NOTES
Acute Care - Physical Therapy Treatment Note  [facility]     Patient Name: Ivy Chairez  : 1955  MRN: [id number]  Today's Date: 3/8/2017  Onset of Illness/Injury or Date of Surgery Date: 17     Referring Physician: Júnior    Admit Date: 3/6/2017    Visit Dx:    ICD-10-CM ICD-9-CM   1. Difficulty walking R26.2 719.7   2. Herniated thoracic disc without myelopathy M51.24 722.11     Patient Active Problem List   Diagnosis   • Triple vessel coronary artery disease   • Anxiety   • Gastroesophageal reflux disease   • Hyperlipidemia   • Hypothyroidism   • Osteoarthritis of knee   • Cerebrovascular accident   • Chronic venous insufficiency   • Carotid artery disease   • Migraine without status migrainosus, not intractable   • Osteoporosis   • Closed wedge compression fracture of T7 vertebra   • History of coronary artery bypass surgery   • Carter-Arreaga syncope   • Thoracic radiculopathy due to degenerative joint disease of spine   • Herniated thoracic disc without myelopathy   • History of pulmonary embolism   • Dyspnea   • Atelectasis of both lungs               Adult Rehabilitation Note       17 1050 17 1600       Rehab Assessment/Intervention    Discipline physical therapist  -EW physical therapy assistant  -RW     Document Type therapy note (daily note)  -EW therapy note (daily note)  -RW     Subjective Information agree to therapy;complains of;pain  -EW agree to therapy;complains of;pain;dyspnea  -RW     Patient Effort, Rehab Treatment fair  -EW fair  -RW     Symptoms Noted During/After Treatment increased pain  -EW fatigue;shortness of breath;increased pain  -RW     Precautions/Limitations fall precautions;brace on when up   HOB not above 30'  -EW fall precautions;brace on when up;spinal precautions   HOB can't be above 30'  -RW     Recorded by [EW] Diane Valencia, PT [RW] Ivet Phelan PTA     Vital Signs    Intra SpO2 (%)  100  -RW     O2 Delivery Intra Treatment  supplemental  O2   2L  -RW     Recorded by  [RW] Ivet Phelan PTA     Pain Assessment    Pain Assessment 0-10  -EW 0-10  -RW     Pain Score 8  -EW 7  -RW     Pain Type Surgical pain;Acute pain  -EW Acute pain;Surgical pain  -RW     Pain Location Back  -EW Back  -RW     Pain Orientation  Mid  -RW     Pain Intervention(s)  Medication (See MAR);Repositioned;Ambulation/increased activity   PCA  -RW     Response to Interventions  not tolerated  -RW     Recorded by [EW] Diane Valencia, PT [RW] Ivet Phelan PTA     Cognitive Assessment/Intervention    Current Cognitive/Communication Assessment  functional  -RW     Orientation Status  oriented x 4  -RW     Follows Commands/Answers Questions  100% of the time  -RW     Personal Safety  WNL/WFL  -RW     Personal Safety Interventions  fall prevention program maintained;gait belt;nonskid shoes/slippers when out of bed  -RW     Recorded by  [RW] Ivet Phelan PTA     Bed Mobility, Assessment/Treatment    Bed Mobility, Assistive Device  bed rails  -RW     Bed Mobility, Scoot/Bridge, [place]  verbal cues required;nonverbal cues required (demo/gesture);minimum assist (75% patient effort)  -RW     Bed Mob, Supine to Sit, [place] verbal cues required;minimum assist (75% patient effort);2 person assist required  -EW verbal cues required;nonverbal cues required (demo/gesture);minimum assist (75% patient effort)  -RW     Bed Mob, Sit to Supine, [place] verbal cues required;minimum assist (75% patient effort)  -EW verbal cues required;nonverbal cues required (demo/gesture);minimum assist (75% patient effort);moderate assist (50% patient effort);2 person assist required  -RW     Bed Mobility, Comment  cues for log roll. Attempted to apply brace in sitting. Brace did not fit in sitting. Loosened straps, but per pt she could not breath and wanted to apply in standing.  -RW     Recorded by [EW] Diane Valencia, PT [RW] Ivet Phelan PTA     Transfer Assessment/Treatment     Transfers, Sit-Stand [place] verbal cues required;minimum assist (75% patient effort);2 person assist required  -EW verbal cues required;nonverbal cues required (demo/gesture);minimum assist (75% patient effort);2 person assist required  -RW     Transfers, Stand-Sit [place] minimum assist (75% patient effort);2 person assist required  -EW verbal cues required;nonverbal cues required (demo/gesture);minimum assist (75% patient effort);2 person assist required  -RW     Transfers, Sit-Stand-Sit, Assist Device rolling walker  -EW elevated surface;rolling walker  -RW     Transfer, Comment performed sit <> stand x 3, pt wanted to lay down to take pain meds. brace donned in standing. brace fitting appropriately.   -EW Attempted to apply brace in standing, but per pt, could not breath w/ brace on.  -RW     Recorded by [EW] Diane Valencia, PT [RW] Ivet hPelan, BHARAT     Gait Assessment/Treatment    Gait, [place] Level verbal cues required;nonverbal cues required (demo/gesture);minimum assist (75% patient effort);2 person assist required  -EW verbal cues required;nonverbal cues required (demo/gesture);minimum assist (75% patient effort);2 person assist required  -RW     Gait, Assistive Device rolling walker  -EW rolling walker  -RW     Gait, Distance (Feet) 12  -EW --   Pt took 4 sidesteps at EOB  -RW     Gait, Gait Deviations antalgic;meryl decreased;forward flexed posture;step length decreased;narrow base  -EW      Gait, Safety Issues step length decreased  -EW step length decreased;supplemental O2   2L O2  -RW     Gait, Impairments pain;strength decreased  -EW      Gait, Comment encouraged further ambulation distance, but refusing to walk any farther 2' pain.  -EW      Recorded by [EW] Diane Valencia, PT [RW] Ivet Phelan, BHARAT     Positioning and Restraints    Pre-Treatment Position in bed  -EW in bed  -RW     Post Treatment Position bed  -EW bed  -RW     In Bed notified nsg;call light within  reach;encouraged to call for assist;exit alarm on;side lying left  -EW supine;call light within reach;encouraged to call for assist;exit alarm on;with family/caregiver;SCD pump applied  -RW     Recorded by [EW] Diane Valencia, PT [RW] Ivet Phelan, BHARAT       User Key  (r) = Recorded By, (t) = Taken By, (c) = Cosigned By    Initials Name Effective Dates    EW Diane Valencia, PT 12/01/15 -     RW Ivet Phelan PTA 04/06/16 -                 IP PT Goals       03/07/17 1121          Bed Mobility PT LTG    Bed Mobility PT LTG, Date Established 03/07/17  -EE      Bed Mobility PT LTG, Time to Achieve 1 wk  -EE      Bed Mobility PT LTG, Activity Type all bed mobility  -EE      Bed Mobility PT LTG, [place] Level contact guard assist  -EE      Transfer Training PT LTG    Transfer Training PT LTG, Date Established 03/07/17  -EE      Transfer Training PT LTG, Time to Achieve 1 wk  -EE      Transfer Training PT LTG, Activity Type all transfers  -EE      Transfer Training PT LTG, [place] Level contact guard assist  -EE      Transfer Training PT LTG, Assist Device walker, rolling  -EE      Gait Training PT LTG    Gait Training Goal PT LTG, Date Established 03/07/17  -EE      Gait Training Goal PT LTG, Time to Achieve 1 wk  -EE      Gait Training Goal PT LTG, [place] Level contact guard assist  -EE      Gait Training Goal PT LTG, Assist Device walker, rolling  -EE      Gait Training Goal PT LTG, Distance to Achieve 100  -EE      Patient Education PT LTG    Patient Education PT LTG, Date Established 03/07/17  -EE      Patient Education PT LTG, Time to Achieve 1 wk  -EE      Patient Education PT LTG, Education Type precaution per surgeon;tripp/doff brace  -EE      Patient Education PT LTG, Education Understanding demonstrate adequately;verbalize understanding  -EE        User Key  (r) = Recorded By, (t) = Taken By, (c) = Cosigned By    Initials Name Provider Type    EE Melissa Cullen, PT Physical Therapist           Physical Therapy Education     Title: PT OT SLP Therapies (Done)     Topic: Physical Therapy (Done)     Point: Mobility training (Done)    Learning Progress Summary    Learner Readiness Method Response Comment Documented by Status   Patient Acceptance E,D VU,NR  EW 03/08/17 1117 Done    Acceptance E,TB NR  EE 03/07/17 1120 Active               Point: Home exercise program (Done)    Learning Progress Summary    Learner Readiness Method Response Comment Documented by Status   Patient Acceptance E,D VU,NR  EW 03/08/17 1117 Done    Acceptance E,TB NR  EE 03/07/17 1120 Active               Point: Body mechanics (Done)    Learning Progress Summary    Learner Readiness Method Response Comment Documented by Status   Patient Acceptance E,D VU,NR  EW 03/08/17 1117 Done    Acceptance E,TB NR  EE 03/07/17 1120 Active               Point: Precautions (Done)    Learning Progress Summary    Learner Readiness Method Response Comment Documented by Status   Patient Acceptance E,D VU,NR  EW 03/08/17 1117 Done    Acceptance E,TB NR  EE 03/07/17 1120 Active                      User Key     Initials Effective Dates Name Provider Type Discipline    EE 12/01/15 -  Melissa Cullen, PT Physical Therapist PT    EW 12/01/15 -  Diane Valencia, PT Physical Therapist PT                    PT Recommendation and Plan  Anticipated Discharge Disposition: skilled nursing facility  Planned Therapy Interventions: balance training, bed mobility training, gait training, home exercise program, patient/family education, strengthening, stretching, transfer training  PT Frequency: 2 times/day  Plan of Care Review  Plan Of Care Reviewed With: patient  Progress: improving  Outcome Summary/Follow up Plan: Pt agreeable to therapy, although requires max encouragement to stand and ambulate. Pt only tolerating short ambulation distance, despite encouragement to walk farther. Pt's brace fitting appropriately today. Kian continue to encourage and progress mobility  as able.          Outcome Measures       03/08/17 1100 03/07/17 1100       How much help from another person do you currently need...    Turning from your back to your side while in flat bed without using bedrails? 3  -EW 3  -EE     Moving from lying on back to sitting on the side of a flat bed without bedrails? 3  -EW 2  -EE     Moving to and from a bed to a chair (including a wheelchair)? 3  -EW 2  -EE     Standing up from a chair using your arms (e.g., wheelchair, bedside chair)? 3  -EW 2  -EE     Climbing 3-5 steps with a railing? 2  -EW 2  -EE     To walk in hospital room? 3  -EW 2  -EE     AM-PAC 6 Clicks Score 17  -EW 13  -EE     Functional Assessment    Outcome Measure Options AM-PAC 6 Clicks Basic Mobility (PT)  -EW AM-PAC 6 Clicks Basic Mobility (PT)  -EE       User Key  (r) = Recorded By, (t) = Taken By, (c) = Cosigned By    Initials Name Provider Type    EE Melissa Cullen, PT Physical Therapist    EW Diane Valencia, NATHAN Physical Therapist           Time Calculation:         PT Charges       03/08/17 1119          Time Calculation    Start Time 1050  -EW      Stop Time 1113  -EW      Time Calculation (min) 23 min  -EW      PT Received On 03/08/17  -EW      PT - Next Appointment 03/09/17  -EW        User Key  (r) = Recorded By, (t) = Taken By, (c) = Cosigned By    Initials Name Provider Type    EW Diane Valencia, NATHAN Physical Therapist          Therapy Charges for Today     Code Description Service Date Service Provider Modifiers Qty    84407730948 HC PT THER PROC EA 15 MIN 3/8/2017 Diane Valencia, PT GP 2    42048591680 HC PT THER SUPP EA 15 MIN 3/8/2017 Diane Valencia, PT GP 1          PT G-Codes  Outcome Measure Options: AM-PAC 6 Clicks Basic Mobility (PT)    Diane Valencia PT  3/8/2017

## 2017-03-09 ENCOUNTER — APPOINTMENT (OUTPATIENT)
Dept: GENERAL RADIOLOGY | Facility: HOSPITAL | Age: 62
End: 2017-03-09
Attending: HOSPITALIST

## 2017-03-09 PROCEDURE — 63710000001 PROMETHAZINE PER 25 MG: Performed by: ORTHOPAEDIC SURGERY

## 2017-03-09 PROCEDURE — 99024 POSTOP FOLLOW-UP VISIT: CPT | Performed by: ORTHOPAEDIC SURGERY

## 2017-03-09 PROCEDURE — 74000 HC ABDOMEN KUB: CPT

## 2017-03-09 RX ORDER — OXYCODONE HYDROCHLORIDE AND ACETAMINOPHEN 5; 325 MG/1; MG/1
2 TABLET ORAL EVERY 4 HOURS PRN
Qty: 60 TABLET | Refills: 0
Start: 2017-03-09 | End: 2017-03-16

## 2017-03-09 RX ORDER — PROMETHAZINE HYDROCHLORIDE 12.5 MG/1
12.5 TABLET ORAL EVERY 6 HOURS PRN
Qty: 35 TABLET | Refills: 1 | Status: SHIPPED | OUTPATIENT
Start: 2017-03-09 | End: 2017-03-09

## 2017-03-09 RX ORDER — OXYCODONE HYDROCHLORIDE AND ACETAMINOPHEN 5; 325 MG/1; MG/1
2 TABLET ORAL EVERY 4 HOURS PRN
Qty: 50 TABLET | Refills: 0 | Status: SHIPPED | OUTPATIENT
Start: 2017-03-09 | End: 2017-03-09

## 2017-03-09 RX ORDER — PROMETHAZINE HYDROCHLORIDE 12.5 MG/1
12.5 TABLET ORAL EVERY 6 HOURS PRN
Qty: 35 TABLET | Refills: 1 | Status: SHIPPED | OUTPATIENT
Start: 2017-03-09 | End: 2017-03-24

## 2017-03-09 RX ORDER — CYCLOBENZAPRINE HCL 10 MG
10 TABLET ORAL 3 TIMES DAILY PRN
Qty: 15 TABLET | Refills: 0 | Status: SHIPPED | OUTPATIENT
Start: 2017-03-09 | End: 2017-03-24

## 2017-03-09 RX ORDER — SENNA AND DOCUSATE SODIUM 50; 8.6 MG/1; MG/1
1 TABLET, FILM COATED ORAL 2 TIMES DAILY
Qty: 35 TABLET | Refills: 1 | Status: SHIPPED | OUTPATIENT
Start: 2017-03-09 | End: 2017-04-21

## 2017-03-09 RX ADMIN — OXYCODONE HYDROCHLORIDE AND ACETAMINOPHEN 2 TABLET: 5; 325 TABLET ORAL at 00:36

## 2017-03-09 RX ADMIN — CYCLOBENZAPRINE HYDROCHLORIDE 10 MG: 10 TABLET, FILM COATED ORAL at 18:03

## 2017-03-09 RX ADMIN — PROMETHAZINE HYDROCHLORIDE 12.5 MG: 25 TABLET ORAL at 21:45

## 2017-03-09 RX ADMIN — PROMETHAZINE HYDROCHLORIDE 12.5 MG: 25 TABLET ORAL at 12:24

## 2017-03-09 RX ADMIN — DOCUSATE SODIUM,SENNOSIDES 1 TABLET: 50; 8.6 TABLET, FILM COATED ORAL at 10:06

## 2017-03-09 RX ADMIN — PROMETHAZINE HYDROCHLORIDE 12.5 MG: 25 TABLET ORAL at 06:37

## 2017-03-09 RX ADMIN — VITAMIN D, TAB 1000IU (100/BT) 2000 UNITS: 25 TAB at 10:06

## 2017-03-09 RX ADMIN — DOCUSATE SODIUM -SENNOSIDES 2 TABLET: 50; 8.6 TABLET, COATED ORAL at 20:54

## 2017-03-09 RX ADMIN — OXYCODONE HYDROCHLORIDE AND ACETAMINOPHEN 2 TABLET: 5; 325 TABLET ORAL at 05:59

## 2017-03-09 RX ADMIN — OXYCODONE HYDROCHLORIDE AND ACETAMINOPHEN 2 TABLET: 5; 325 TABLET ORAL at 10:06

## 2017-03-09 RX ADMIN — OXYCODONE HYDROCHLORIDE AND ACETAMINOPHEN 2 TABLET: 5; 325 TABLET ORAL at 15:46

## 2017-03-09 RX ADMIN — OXYCODONE HYDROCHLORIDE AND ACETAMINOPHEN 2 TABLET: 5; 325 TABLET ORAL at 20:54

## 2017-03-09 RX ADMIN — LEVOTHYROXINE SODIUM 75 MCG: 75 TABLET ORAL at 10:05

## 2017-03-09 RX ADMIN — Medication 1000 MCG: at 10:06

## 2017-03-09 RX ADMIN — ATORVASTATIN CALCIUM 80 MG: 80 TABLET, FILM COATED ORAL at 10:06

## 2017-03-09 RX ADMIN — CYCLOBENZAPRINE HYDROCHLORIDE 10 MG: 10 TABLET, FILM COATED ORAL at 03:28

## 2017-03-09 RX ADMIN — VENLAFAXINE HYDROCHLORIDE 75 MG: 75 TABLET ORAL at 10:06

## 2017-03-09 NOTE — SIGNIFICANT NOTE
03/09/17 1102   Rehab Treatment   Discipline physical therapy assistant   Treatment Not Performed patient/family declined treatment  (Pt just back to bed from getting up to bsc. Pt does not want to get up w/ PT prior to d/c. Pt and  do not have questions for PT at this time. RN present.)

## 2017-03-09 NOTE — PROGRESS NOTES
Continued Stay Note  [facility]     Patient Name: Ivy Chairez  MRN: [id number]  Today's Date: 3/9/2017    Admit Date: 3/6/2017          Discharge Plan       03/09/17 1046    Case Management/Social Work Plan    Plan Home with spouse    Additional Comments Met with patient and spouse.  Plan is home today.  Will also need home oxygen.  Called  with [employer] who will see patient and set up oxygen for home.   available to assist patient as needed.     Final Note    Final Note Home with spouse              Discharge Codes       03/09/17 1047    Discharge Codes    Discharge Codes 01  Discharge to home        Expected Discharge Date and Time     Expected Discharge Date Expected Discharge Time    Mar 9, 2017             Diamond Rachel RN

## 2017-03-09 NOTE — PLAN OF CARE
Problem: Patient Care Overview (Adult)  Goal: Plan of Care Review  Outcome: Ongoing (interventions implemented as appropriate)    03/09/17 0505   Coping/Psychosocial Response Interventions   Plan Of Care Reviewed With patient   Patient Care Overview   Progress progress toward functional goals as expected       Goal: Discharge Needs Assessment    03/09/17 0505   Discharge Needs Assessment   Concerns To Be Addressed denies needs/concerns at this time   Readmission Within The Last 30 Days no previous admission in last 30 days   Discharge Disposition still a patient   Living Environment   Transportation Available car;family or friend will provide   Self-Care   Equipment Currently Used at Home none         Problem: Laminectomy/Foraminotomy/Discectomy (Adult)  Intervention: Monitor/Assist with Self Care    03/09/17 0036 03/09/17 0300 03/09/17 0505   Musculoskeletal Interventions   Self-Care Promotion --  --  independence encouraged   Monitor/Assist with Self Care   Ambulation 3-->assistive equipment and person --  --    Transferring 3-->assistive equipment and person --  --    Toileting 3-->assistive equipment and person --  --    Bathing 2-->assistive person --  --    Dressing 2-->assistive person --  --    Eating 0-->independent --  --    Communication 0-->understands/communicates without difficulty --  --    Swallowing (if score 2 or more for any item, consult Rehab Services) 0-->swallows foods/liquids without difficulty --  --    Activity   Activity Type --  activity adjusted per tolerance --    Activity Level of Assistance --  assistance, 1 person --        Intervention: Promote Functional Ability/Mobility    03/09/17 0300 03/09/17 0402   Activity   Activity Type activity adjusted per tolerance --    Safety Interventions   Environmental Safety Modification --  assistive device/personal items within reach;clutter free environment maintained;room organization consistent       Intervention: Promote Pulmonary Hygiene and  Secretion Clearance    03/08/17 1941 03/09/17 0300 03/09/17 0330   Activity   Activity Type --  activity adjusted per tolerance --    Promote Aggressive Pulmonary Hygiene/Secretion Management   Cough And Deep Breathing done independently per patient --  --    Positioning   Head Of Bed (HOB) Position --  --  HOB at 20-30 degrees       Intervention: Monitor/Manage Pain    03/08/17 1941 03/09/17 0330   Safety Interventions   Medication Review/Management medications reviewed --    Manage Acute Burn Pain   Pain Management Interventions --  medicated       Intervention: Prevent/Manage Post-surgical Infection    03/07/17 0305 03/09/17 0505   Safety Interventions   Infection Prevention --  rest/sleep promoted   Prevent/Manage Colorectal Surgical Infection   Fever Reduction/Comfort Measures cool cloth applied --        Intervention: Promote Surgical Site/Incision Healing    03/09/17 0505   Safety Interventions   Bleeding Management dressing monitored       Intervention: Maintain Spinal Alignment    03/09/17 0330   Positioning   Positioning left tilt   Positioning/Transfer Devices pillows;in use       Intervention: Prevent/Manage DVT/VTE Risk    03/09/17 0300   Support Surgical/Anesthesia Recovery   Venous Thromboembolism Prevent/Manage bilateral;sequential compression devices on;compression stockings on       Intervention: Monitor/Manage Swallowing Impairment    03/09/17 0505   Safety Interventions   Aspiration Precautions awake/alert before oral intake   Nutrition Interventions   Swallowing Techniques: Dysphagia appropriate positioning encouraged         Goal: Signs and Symptoms of Listed Potential Problems Will be Absent or Manageable (Laminectomy/Foraminotomy/Discectomy)  Outcome: Ongoing (interventions implemented as appropriate)    03/09/17 0505   Laminectomy/Foraminotomy/Discectomy   Problems Assessed (Laminectomy/Laminotomy/Discectomy) all   Problems Present (Laminectomy/Laminotomy/Discectomy) pain;situational  response

## 2017-03-09 NOTE — PROGRESS NOTES
"    Name: Ivy Chairez ADMIT: 3/6/2017   : 1955  PCP: Murtaza Berrios Jr., MD    MRN: [id number] LOS: 3 days   AGE/SEX: 61 y.o. female  ROOM: [facility]     Subjective   Overall feels much better.  Does complain of shortness of breath without oxygen.    Objective   Vital Signs  Temp:  [97.8 °F (36.6 °C)] 97.8 °F (36.6 °C)  Heart Rate:  [80-99] 80  Resp:  [16-17] 16  BP: ()/(49-79) 97/51  SpO2:  [97 %] 97 %  on  Flow (L/min):  [2] 2;   O2 Device: nasal cannula  Body mass index is 24.61 kg/(m^2).    Objective:  General Appearance:  Not in pain and comfortable.    Vital signs: (most recent): Blood pressure 97/51, pulse 80, temperature 97.8 °F (36.6 °C), temperature source Oral, resp. rate 16, height 66\" (167.6 cm), weight 152 lb 8 oz (69.2 kg), SpO2 97 %.    Lungs:  Normal effort.  Breath sounds clear to auscultation.  There are decreased breath sounds.  No wheezes, rales (bases) or rhonchi.    Heart: Normal rate.  Regular rhythm.    Abdomen: Abdomen is soft and non-distended.  Bowel sounds are normal.   There is no abdominal tenderness.     Extremities: There is no dependent edema.    Neurological: Patient is alert and oriented to person, place and time.    Skin:  Warm and dry.        Results Review:       I reviewed the patient's new clinical results.    Results from last 7 days  Lab Units 17  0607 17  0620   WBC 10*3/mm3 7.25 10.86*   HEMOGLOBIN g/dL 8.9* 10.2*   PLATELETS 10*3/mm3 203 239       Results from last 7 days  Lab Units 17  0607 17  0620   SODIUM mmol/L 137 131*   POTASSIUM mmol/L 3.7 5.4*   CHLORIDE mmol/L 103 97*   TOTAL CO2 mmol/L 23.5 23.3   BUN mg/dL 6* 9   CREATININE mg/dL 0.69 0.79   GLUCOSE mg/dL 149* 141*   Estimated Creatinine Clearance: 79.1 mL/min (by C-G formula based on Cr of 0.69).    Results from last 7 days  Lab Units 17  0607 17  0620   CALCIUM mg/dL 7.9* 8.0*       No results found for: HGBA1C  GLUCOSE   Date/Time Value Ref Range " Status   03/06/2017 2248 142 (H) 70 - 130 mg/dL Final       CT ANGIOGRAM CHEST WITH AND WITHOUT CONTRAST  CONCLUSION:  1. No pulmonary embolus demonstrated.  2. Small pleural effusions with bibasilar airspace disease as discussed  above.  3. Hiatal hernia.        atorvastatin 80 mg Oral Daily   cholecalciferol 2,000 Units Oral Daily   levothyroxine 75 mcg Oral QAM   sennosides-docusate sodium 1 tablet Oral BID   sennosides-docusate sodium 2 tablet Oral Nightly   venlafaxine 75 mg Oral Daily   vitamin B-12 1,000 mcg Oral Daily       lactated ringers 9 mL/hr Last Rate: 9 mL/hr (03/06/17 0708)         Assessment/Plan   Assessment:     Active Hospital Problems (** Indicates Principal Problem)    Diagnosis Date Noted   • **Herniated thoracic disc without myelopathy [M51.24] 03/06/2017   • Atelectasis of both lungs [J98.11] 03/08/2017   • History of pulmonary embolism [Z86.711] 03/06/2017   • Dyspnea [R06.00] 03/06/2017   • Osteoporosis [M81.0] 07/20/2016   • Triple vessel coronary artery disease [I25.10] 12/16/2015   • Anxiety [F41.9] 12/16/2015   • History of coronary artery bypass surgery [Z95.1] 10/25/2012      Resolved Hospital Problems    Diagnosis Date Noted Date Resolved   No resolved problems to display.       Plan:   - POD#3 T4-T10 Fusion with instrumentation  - CTA chest is negative for PE.  Probable atelectasis lower lobes.  Reminded on importance of IS use even after discharge.  - Continue to hold HCTZ post discharge.  Instructed her to resume taking if systolic blood pressure consistently greater than 1:30.  - Remain off PREMARIN for the perioperative setting. Advised that she not take this given hx of CAD/strokes/PE etc.   - Continue Zofran and Phenergan for nausea.    - Patient hypoxic on room air.  Will arrange for home oxygen therapy at night and with exertion.  - Okay with me for discharge.       Avel Lal MD  [facility]  03/09/17  7:32 AM

## 2017-03-09 NOTE — THERAPY DISCHARGE NOTE
Acute Care - Physical Therapy Treatment Note/Discharge  [facility]     Patient Name: Ivy Chairez  : 1955  MRN: [id number]  Today's Date: 3/9/2017  Onset of Illness/Injury or Date of Surgery Date: 17     Referring Physician: Júnior    Admit Date: 3/6/2017    Visit Dx:    ICD-10-CM ICD-9-CM   1. Difficulty walking R26.2 719.7   2. Herniated thoracic disc without myelopathy M51.24 722.11   3. Atelectasis of both lungs J98.11 518.0     Patient Active Problem List   Diagnosis   • Triple vessel coronary artery disease   • Anxiety   • Gastroesophageal reflux disease   • Hyperlipidemia   • Hypothyroidism   • Osteoarthritis of knee   • Cerebrovascular accident   • Chronic venous insufficiency   • Carotid artery disease   • Migraine without status migrainosus, not intractable   • Osteoporosis   • Closed wedge compression fracture of T7 vertebra   • History of coronary artery bypass surgery   • Carter-Arreaga syncope   • Thoracic radiculopathy due to degenerative joint disease of spine   • Herniated thoracic disc without myelopathy   • History of pulmonary embolism   • Dyspnea   • Atelectasis of both lungs       Physical Therapy Education     Title: PT OT SLP Therapies (Resolved)     Topic: Physical Therapy (Resolved)     Point: Mobility training (Resolved)    Learning Progress Summary    Learner Readiness Method Response Comment Documented by Status   Patient Acceptance DOMINIC POLLOCK,NR  EW 17 1117 Done    Acceptance E,TB NR  EE 17 1120 Active               Point: Home exercise program (Resolved)    Learning Progress Summary    Learner Readiness Method Response Comment Documented by Status   Patient Acceptance DOMINIC POLLOCK,NR  EW 17 1117 Done    Acceptance ETB NR  EE 17 1120 Active               Point: Body mechanics (Resolved)    Learning Progress Summary    Learner Readiness Method Response Comment Documented by Status   Patient Acceptance EDOMINIC,NR  EW 17 1117 Done    Acceptance E,TB  NR  EE 03/07/17 1120 Active               Point: Precautions (Resolved)    Learning Progress Summary    Learner Readiness Method Response Comment Documented by Status   Patient Acceptance E,D VU,NR  EW 03/08/17 1117 Done    Acceptance E,TB NR  EE 03/07/17 1120 Active                      User Key     Initials Effective Dates Name Provider Type Discipline    EE 12/01/15 -  Melissa Cullen, PT Physical Therapist PT    EW 12/01/15 -  Diane Valencia, PT Physical Therapist PT                    IP PT Goals       03/09/17 1103 03/07/17 1121       Bed Mobility PT LTG    Bed Mobility PT LTG, Date Established  03/07/17  -EE     Bed Mobility PT LTG, Time to Achieve  1 wk  -EE     Bed Mobility PT LTG, Activity Type all bed mobility  -RW all bed mobility  -EE     Bed Mobility PT LTG, [place] Level contact guard assist;minimum assist (75% patient effort)  -RW contact guard assist  -EE     Bed Mobility PT LTG, Outcome goal not met  -RW      Bed Mobility PT LTG, Reason Goal Not Met discharged from facility  -RW      Transfer Training PT LTG    Transfer Training PT LTG, Date Established  03/07/17  -EE     Transfer Training PT LTG, Time to Achieve  1 wk  -EE     Transfer Training PT LTG, Activity Type all transfers  -RW all transfers  -EE     Transfer Training PT LTG, [place] Level contact guard assist;2 person assist required  -RW contact guard assist  -EE     Transfer Training PT LTG, Assist Device walker, rolling  -RW walker, rolling  -EE     Transfer Training PT LTG, Outcome goal not met  -RW      Transfer Training PT LTG, Reason Goal Not Met discharged from facility  -RW      Gait Training PT LTG    Gait Training Goal PT LTG, Date Established  03/07/17  -EE     Gait Training Goal PT LTG, Time to Achieve  1 wk  -EE     Gait Training Goal PT LTG, [place] Level contact guard assist  -RW contact guard assist  -EE     Gait Training Goal PT LTG, Assist Device walker, rolling  -RW walker, rolling  -EE     Gait Training  Goal PT LTG, Distance to Achieve 30  -  -EE     Gait Training Goal PT LTG, Outcome goal partially met  -RW      Gait Training Goal PT LTG, Reason Goal Not Met discharged from facility  -RW      Patient Education PT LTG    Patient Education PT LTG, Date Established  03/07/17  -EE     Patient Education PT LTG, Time to Achieve  1 wk  -EE     Patient Education PT LTG, Education Type precaution per surgeon;tripp/doff brace  -RW precaution per surgeon;tripp/doff brace  -EE     Patient Education PT LTG, Education Understanding  demonstrate adequately;verbalize understanding  -EE     Patient Education PT LTG Outcome goal met  -RW        User Key  (r) = Recorded By, (t) = Taken By, (c) = Cosigned By    Initials Name Provider Type    EE Melissa Cullen, PT Physical Therapist    RW Ivet Phelan PTA Physical Therapy Assistant              Adult Rehabilitation Note       03/08/17 1553 03/08/17 1050 03/07/17 1600    Rehab Assessment/Intervention    Discipline physical therapist  -EE physical therapist  -EW physical therapy assistant  -RW    Document Type therapy note (daily note)  -EE therapy note (daily note)  -EW therapy note (daily note)  -RW    Subjective Information agree to therapy;complains of;pain  -EE agree to therapy;complains of;pain  -EW agree to therapy;complains of;pain;dyspnea  -RW    Patient Effort, Rehab Treatment good  -EE fair  -EW fair  -RW    Symptoms Noted During/After Treatment  increased pain  -EW fatigue;shortness of breath;increased pain  -RW    Precautions/Limitations fall precautions;brace on when up;other (see comments)   HOB not above 30  -EE fall precautions;brace on when up   HOB not above 30'  -EW fall precautions;brace on when up;spinal precautions   HOB can't be above 30'  -RW    Recorded by [EE] Mleissa Cullen, PT [EW] Diane Valencia, PT [RW] Ivet Phelan, BHARAT    Vital Signs    Intra SpO2 (%)   100  -RW    O2 Delivery Intra Treatment   supplemental O2   2L  -RW    Recorded by   [RW]  Ivet Phelan PTA    Pain Assessment    Pain Assessment 0-10  -EE 0-10  -EW 0-10  -RW    Pain Score 8  -EE 8  -EW 7  -RW    Pain Type Acute pain  -EE Surgical pain;Acute pain  -EW Acute pain;Surgical pain  -RW    Pain Location Back  -EE Back  -EW Back  -RW    Pain Orientation   Mid  -RW    Pain Intervention(s) Medication (See MAR);Repositioned  -EE  Medication (See MAR);Repositioned;Ambulation/increased activity   PCA  -RW    Response to Interventions unchanged  -EE  not tolerated  -RW    Recorded by [EE] Melissa Cullen, PT [EW] Diane Valencia, PT [RW] Ivet Phelan PTA    Cognitive Assessment/Intervention    Current Cognitive/Communication Assessment functional  -EE  functional  -RW    Orientation Status oriented x 4  -EE  oriented x 4  -RW    Follows Commands/Answers Questions 100% of the time  -EE  100% of the time  -RW    Personal Safety WNL/WFL  -EE  WNL/WFL  -RW    Personal Safety Interventions fall prevention program maintained;gait belt;nonskid shoes/slippers when out of bed;supervised activity  -EE  fall prevention program maintained;gait belt;nonskid shoes/slippers when out of bed  -RW    Recorded by [EE] Melissa Cullen, PT  [RW] Ivet Phelan PTA    Bed Mobility, Assessment/Treatment    Bed Mobility, Assistive Device bed rails  -EE  bed rails  -RW    Bed Mobility, Scoot/Bridge, [place]   verbal cues required;nonverbal cues required (demo/gesture);minimum assist (75% patient effort)  -RW    Bed Mob, Supine to Sit, [place] contact guard assist;verbal cues required  -EE verbal cues required;minimum assist (75% patient effort);2 person assist required  -EW verbal cues required;nonverbal cues required (demo/gesture);minimum assist (75% patient effort)  -RW    Bed Mob, Sit to Supine, [place] minimum assist (75% patient effort);verbal cues required  -EE verbal cues required;minimum assist (75% patient effort)  -EW verbal cues required;nonverbal cues required (demo/gesture);minimum assist (75%  patient effort);moderate assist (50% patient effort);2 person assist required  -RW    Bed Mobility, Comment hob flat; log roll  -EE  cues for log roll. Attempted to apply brace in sitting. Brace did not fit in sitting. Loosened straps, but per pt she could not breath and wanted to apply in standing.  -RW    Recorded by [EE] Melissa Cullen, PT [EW] Diane Valencia, PT [RW] Ivet Phelan, PTA    Transfer Assessment/Treatment    Transfers, Sit-Stand [place] contact guard assist;2 person assist required;verbal cues required  -EE verbal cues required;minimum assist (75% patient effort);2 person assist required  -EW verbal cues required;nonverbal cues required (demo/gesture);minimum assist (75% patient effort);2 person assist required  -RW    Transfers, Stand-Sit [place] contact guard assist;verbal cues required  -EE minimum assist (75% patient effort);2 person assist required  -EW verbal cues required;nonverbal cues required (demo/gesture);minimum assist (75% patient effort);2 person assist required  -RW    Transfers, Sit-Stand-Sit, Assist Device rolling walker  -EE rolling walker  -EW elevated surface;rolling walker  -RW    Transfer, Comment  performed sit <> stand x 3, pt wanted to lay down to take pain meds. brace donned in standing. brace fitting appropriately.   -EW Attempted to apply brace in standing, but per pt, could not breath w/ brace on.  -RW    Recorded by [EE] Melissa Cullen, PT [EW] Diane Valencia, PT [RW] Ivet Phelan, PTA    Gait Assessment/Treatment    Gait, [place] Level contact guard assist;verbal cues required  -EE verbal cues required;nonverbal cues required (demo/gesture);minimum assist (75% patient effort);2 person assist required  -EW verbal cues required;nonverbal cues required (demo/gesture);minimum assist (75% patient effort);2 person assist required  -RW    Gait, Assistive Device rolling walker  -EE rolling walker  -EW rolling walker  -RW    Gait, Distance (Feet) 30  -EE 12   -EW --   Pt took 4 sidesteps at EOB  -RW    Gait, Gait Deviations meryl decreased;decreased heel strike;step length decreased  -EE antalgic;meryl decreased;forward flexed posture;step length decreased;narrow base  -EW     Gait, Safety Issues step length decreased  -EE step length decreased  -EW step length decreased;supplemental O2   2L O2  -RW    Gait, Impairments pain  -EE pain;strength decreased  -EW     Gait, Comment  encouraged further ambulation distance, but refusing to walk any farther 2' pain.  -EW     Recorded by [EE] Melissa Cullen, PT [EW] Diane Valencia, PT [RW] vIet Phelan PTA    Positioning and Restraints    Pre-Treatment Position in bed  -EE in bed  -EW in bed  -RW    Post Treatment Position bed  -EE bed  -EW bed  -RW    In Bed side lying right;call light within reach;encouraged to call for assist;SCD pump applied;pillow between legs  -EE notified nsg;call light within reach;encouraged to call for assist;exit alarm on;side lying left  -EW supine;call light within reach;encouraged to call for assist;exit alarm on;with family/caregiver;SCD pump applied  -RW    Recorded by [EE] Melissa Cullen, PT [EW] Diane Valencia, PT [RW] Ivet Phelan PTA      User Key  (r) = Recorded By, (t) = Taken By, (c) = Cosigned By    Initials Name Effective Dates    EE Melissa Cullen, PT 12/01/15 -     EW Diane Valencia, PT 12/01/15 -     RW Ivet Phelan PTA 04/06/16 -           PT Recommendation and Plan  Anticipated Discharge Disposition: home with assist, home with home health  Planned Therapy Interventions: balance training, bed mobility training, gait training, home exercise program, patient/family education, strengthening, stretching, transfer training  PT Frequency: 2 times/day             Outcome Measures       03/08/17 1100 03/07/17 1100       How much help from another person do you currently need...    Turning from your back to your side while in flat bed without using bedrails? 3  -EW 3  -EE      Moving from lying on back to sitting on the side of a flat bed without bedrails? 3  -EW 2  -EE     Moving to and from a bed to a chair (including a wheelchair)? 3  -EW 2  -EE     Standing up from a chair using your arms (e.g., wheelchair, bedside chair)? 3  -EW 2  -EE     Climbing 3-5 steps with a railing? 2  -EW 2  -EE     To walk in hospital room? 3  -EW 2  -EE     AM-PAC 6 Clicks Score 17  -EW 13  -EE     Functional Assessment    Outcome Measure Options AM-PAC 6 Clicks Basic Mobility (PT)  -EW AM-PAC 6 Clicks Basic Mobility (PT)  -EE       User Key  (r) = Recorded By, (t) = Taken By, (c) = Cosigned By    Initials Name Provider Type    EE Melissa Cullen, PT Physical Therapist    EW Diane Valencia, PT Physical Therapist           Time Calculation:           PT G-Codes  Outcome Measure Options: AM-PAC 6 Clicks Basic Mobility (PT)    PT Discharge Summary  Anticipated Discharge Disposition: home with assist, home with home health  Reason for Discharge: Discharge from facility  Outcomes Achieved: Refer to plan of care for updates on goals achieved  Discharge Destination: Home with assist, Home with home health    Ivet Phelan, PTA  3/9/2017

## 2017-03-09 NOTE — PLAN OF CARE
Problem: Inpatient Physical Therapy  Goal: Bed Mobility Goal LTG- PT  Outcome: Unable to achieve outcome(s) by discharge Date Met:  03/09/17 03/09/17 1103   Bed Mobility PT LTG   Bed Mobility PT LTG, Activity Type all bed mobility   Bed Mobility PT LTG, [place] Level contact guard assist;minimum assist (75% patient effort)   Bed Mobility PT LTG, Outcome goal not met   Bed Mobility PT LTG, Reason Goal Not Met discharged from facility       Goal: Transfer Training Goal 1 LTG- PT  Outcome: Unable to achieve outcome(s) by discharge Date Met:  03/09/17 03/09/17 1103   Transfer Training PT LTG   Transfer Training PT LTG, Activity Type all transfers   Transfer Training PT LTG, [place] Level contact guard assist;2 person assist required   Transfer Training PT LTG, Assist Device walker, rolling   Transfer Training PT LTG, Outcome goal not met   Transfer Training PT LTG, Reason Goal Not Met discharged from facility       Goal: Gait Training Goal LTG- PT  Outcome: Unable to achieve outcome(s) by discharge Date Met:  03/09/17 03/09/17 1103   Gait Training PT LTG   Gait Training Goal PT LTG, [place] Level contact guard assist   Gait Training Goal PT LTG, Assist Device walker, rolling   Gait Training Goal PT LTG, Distance to Achieve 30   Gait Training Goal PT LTG, Outcome goal partially met   Gait Training Goal PT LTG, Reason Goal Not Met discharged from facility       Goal: Patient Education Goal LTG- PT  Outcome: Outcome(s) achieved Date Met:  03/09/17 03/09/17 1103   Patient Education PT LTG   Patient Education PT LTG, Education Type precaution per surgeon;tripp/doff brace   Patient Education PT LTG Outcome goal met

## 2017-03-09 NOTE — DISCHARGE SUMMARY
Orthopedic Discharge Summary      Patient: Ivy Chairez  YOB: 1955  Medical Record Number: [id number]    Attending Physician: Clyde Callejas MD  Consulting Physician(s):     Date of Admission: 3/6/2017  5:47 AM  Date of Discharge:     Discharge Diagnosis: T4-T10 Fusion with instrumentation,   Acute Blood Loss Anemia      Presenting Problem/History of Present Illness: Herniated thoracic disc without myelopathy [M51.24]  Herniated thoracic disc without myelopathy [M51.24]        Allergies:   Allergies   Allergen Reactions   • Metronidazole Diarrhea and Nausea And Vomiting   • Amoxicillin Swelling       Discharge Medications   Ivy Chairez   Home Medication Instructions EMILY:[id number]    Printed on:03/09/17 0701   Medication Information                      alendronate (FOSAMAX) 70 MG tablet  Take 70 mg by mouth Every 7 (Seven) Days. sundays             aspirin 81 MG tablet  Take 81 mg by mouth Daily.             Cholecalciferol (VITAMIN D3) 2000 UNITS tablet  Take 2,000 Units by mouth Daily.             conjugated estrogens (PREMARIN) 0.625 MG/GM vaginal cream  Insert 1.5 g into the vagina 3 (Three) Times a Week.             diphenhydrAMINE (SOMINEX) 25 MG tablet  Take 25 mg by mouth Every Night.             hydrochlorothiazide (MICROZIDE) 12.5 MG capsule  Take 12.5 mg by mouth Daily As Needed.             levothyroxine (SYNTHROID, LEVOTHROID) 75 MCG tablet  Take 75 mcg by mouth Daily.             meclizine (ANTIVERT) 12.5 MG tablet  Take 12.5 mg by mouth daily. For 7 days as directed              NITROGLYCERIN SL  Place 1 tablet under the tongue As Needed. For chest pain             oxyCODONE-acetaminophen (PERCOCET) 5-325 MG per tablet  Take 2 tablets by mouth Every 4 (Four) Hours As Needed for moderate pain (4-6) for up to 7 days.             promethazine (PHENERGAN) 12.5 MG tablet  Take 1 tablet by mouth Every 6 (Six) Hours As Needed for Nausea or Vomiting.             rosuvastatin  (CRESTOR) 40 MG tablet  Take 40 mg by mouth Daily.             sennosides-docusate sodium (SENOKOT-S) 8.6-50 MG tablet  Take 1 tablet by mouth 2 (Two) Times a Day.             venlafaxine (EFFEXOR) 75 MG tablet  Take 75 mg by mouth Daily.             vitamin B-12 (CYANOCOBALAMIN) 1000 MCG tablet  Take 1,000 mcg by mouth Daily.                   Past Medical History   Diagnosis Date   • Broken toe 03/01/2017     right 4th toe   • Common migraine without aura      Neurologist Dr Govea   • Coronary artery disease    • History of chest pain    • History of CHF (congestive heart failure)    • History of pulmonary embolism    • History of stroke    • Migraine      eval and management by neurologist   • Orthostatic hypotension    • Pleural effusion      POSTOPERATIVE, REQUIRING THORACENTESIS.   • Polyp of sigmoid colon      REMOVED   • Thoracic degenerative disc disease      Received epidural 12/14 [facility] DR Landin   • Thoracic spine pain    • Thyroid disease    • Weakness of right side of body      ARM & FACE        Past Surgical History   Procedure Laterality Date   • Breast augmentation     • Coronary artery bypass graft  04/2009     cabg x 3: LIMA to LAD, vein grafts to diagonal and OM1   • Coronary angioplasty with stent placement       NON-GRAFTED RIGHT CORONARY ARTERY STENT.   • Hysterectomy     • Knee surgery Right    • Total thyroidectomy     • Colonoscopy  06/2004     Dr. Salas   • Augmentation mammaplasty     • Epidural block injection     • Thoracic decompression posterior fusion with instrumentation N/A 3/6/2017     Procedure: T4-T10 Fusion with instrumentation;  Surgeon: Clyde Callejas MD;  Location: [facility] OR;  Service:         Social History     Occupational History   • Not on file.     Social History Main Topics   • Smoking status: Never Smoker   • Smokeless tobacco: Never Used   • Alcohol use No   • Drug use: No   • Sexual activity: Defer    Social History     Social History  Narrative        Family History   Problem Relation Age of Onset   • No Known Problems Mother    • No Known Problems Father          Physical Exam: 61 y.o. female  General Appearance:    Alert, cooperative, in no acute distress                    Vitals:    03/08/17 1400 03/08/17 1700 03/08/17 2206 03/09/17 0535   BP: 104/50 101/79 (!) 88/62 97/51   BP Location: Left arm Left arm Left arm Right arm   Patient Position: Lying Lying Lying Lying   Pulse: 99  87 80   Resp: 17 17 16   Temp:  97.8 °F (36.6 °C) 97.8 °F (36.6 °C) 97.8 °F (36.6 °C)   TempSrc:  Oral Oral Oral   SpO2: 97%  97% 97%   Weight:       Height:            DIAGNOSTIC TESTS:   Admission on 03/06/2017   Component Date Value Ref Range Status   • Product Code 03/06/2017 B2280N42   Final   • Unit Number 03/06/2017 [id number]   Final   • UNIT  ABO 03/06/2017 O   Final   • UNIT  RH 03/06/2017 NEG   Final   • CROSSMATCH 1 INTERPRETATION 03/06/2017 Compatible   Final   • Dispense Status 03/06/2017 XM   Final   • Blood Type 03/06/2017 ONEG   Final   • Blood Expiration Date 03/06/2017 201703162359   Final   • Blood Type Barcode 03/06/2017 9500   Final   • Product Code 03/06/2017 [id number]   Final   • Unit Number 03/06/2017 [id number]   Final   • UNIT  ABO 03/06/2017 O   Final   • UNIT  RH 03/06/2017 NEG   Final   • CROSSMATCH 1 INTERPRETATION 03/06/2017 Compatible   Final   • Dispense Status 03/06/2017 XM   Final   • Blood Type 03/06/2017 ONEG   Final   • Blood Expiration Date 03/06/2017 201703162359   Final   • Blood Type Barcode 03/06/2017 9500   Final   • ABO Type 03/06/2017 A   Final   • RH type 03/06/2017 Negative   Final   • Antibody Screen 03/06/2017 Negative   Final   • Glucose 03/06/2017 142* 70 - 130 mg/dL Final   • Troponin T 03/06/2017 <0.010  0.000 - 0.030 ng/mL Final   • Troponin T 03/07/2017 <0.010  0.000 - 0.030 ng/mL Final   • Creatine Kinase 03/06/2017 2437* 20 - 180 U/L Final   • CKMB 03/06/2017 62.97* <=5.30 ng/mL Final   • WBC  03/07/2017 10.86* 4.50 - 10.70 10*3/mm3 Final   • RBC 03/07/2017 3.25* 3.90 - 5.20 10*6/mm3 Final   • Hemoglobin 03/07/2017 10.2* 11.9 - 15.5 g/dL Final   • Hematocrit 03/07/2017 30.3* 35.6 - 45.5 % Final   • MCV 03/07/2017 93.2  80.5 - 98.2 fL Final   • MCH 03/07/2017 31.4  26.9 - 32.0 pg Final   • MCHC 03/07/2017 33.7  32.4 - 36.3 g/dL Final   • RDW 03/07/2017 12.6  11.7 - 13.0 % Final   • RDW-SD 03/07/2017 43.0  37.0 - 54.0 fl Final   • MPV 03/07/2017 8.9  6.0 - 12.0 fL Final   • Platelets 03/07/2017 239  140 - 500 10*3/mm3 Final   • Glucose 03/07/2017 141* 65 - 99 mg/dL Final   • BUN 03/07/2017 9  8 - 23 mg/dL Final   • Creatinine 03/07/2017 0.79  0.57 - 1.00 mg/dL Final   • Sodium 03/07/2017 131* 136 - 145 mmol/L Final   • Potassium 03/07/2017 5.4* 3.5 - 5.2 mmol/L Final   • Chloride 03/07/2017 97* 98 - 107 mmol/L Final   • CO2 03/07/2017 23.3  22.0 - 29.0 mmol/L Final   • Calcium 03/07/2017 8.0* 8.6 - 10.5 mg/dL Final   • eGFR Non African Amer 03/07/2017 74  >60 mL/min/1.73 Final   • BUN/Creatinine Ratio 03/07/2017 11.4  7.0 - 25.0 Final   • Anion Gap 03/07/2017 10.7  mmol/L Final   • Glucose 03/08/2017 149* 65 - 99 mg/dL Final   • BUN 03/08/2017 6* 8 - 23 mg/dL Final   • Creatinine 03/08/2017 0.69  0.57 - 1.00 mg/dL Final   • Sodium 03/08/2017 137  136 - 145 mmol/L Final   • Potassium 03/08/2017 3.7  3.5 - 5.2 mmol/L Final   • Chloride 03/08/2017 103  98 - 107 mmol/L Final   • CO2 03/08/2017 23.5  22.0 - 29.0 mmol/L Final   • Calcium 03/08/2017 7.9* 8.6 - 10.5 mg/dL Final   • eGFR Non African Amer 03/08/2017 86  >60 mL/min/1.73 Final   • BUN/Creatinine Ratio 03/08/2017 8.7  7.0 - 25.0 Final   • Anion Gap 03/08/2017 10.5  mmol/L Final   • WBC 03/08/2017 7.25  4.50 - 10.70 10*3/mm3 Final   • RBC 03/08/2017 2.81* 3.90 - 5.20 10*6/mm3 Final   • Hemoglobin 03/08/2017 8.9* 11.9 - 15.5 g/dL Final   • Hematocrit 03/08/2017 26.3* 35.6 - 45.5 % Final   • MCV 03/08/2017 93.6  80.5 - 98.2 fL Final   • MCH 03/08/2017 31.7   26.9 - 32.0 pg Final   • MCHC 03/08/2017 33.8  32.4 - 36.3 g/dL Final   • RDW 03/08/2017 12.5  11.7 - 13.0 % Final   • RDW-SD 03/08/2017 42.7  37.0 - 54.0 fl Final   • MPV 03/08/2017 9.6  6.0 - 12.0 fL Final   • Platelets 03/08/2017 203  140 - 500 10*3/mm3 Final       Xr Spine Thoracic 2 View    Result Date: 3/8/2017  Narrative: THORACIC SPINE 2 VIEWS  HISTORY: Surgery, back pain.  FINDINGS: 22 seconds of fluoroscopy time was utilized. 5 intraoperative spot films were obtained. Multiple transpedicular screws have been placed which, given the patient obliquity, appear to be in satisfactory position. The posterior rods have not yet been placed. Alignment appears to be satisfactory.  This report was finalized on 3/8/2017 7:22 AM by Dr. Figueroa Erickson MD.      Ct Angiogram Chest With & Without Contrast    Result Date: 3/7/2017  Narrative: CT ANGIOGRAM CHEST WITH AND WITHOUT CONTRAST-  CLINICAL: Postop spine surgery, shortness of breath and chest pain, evaluate for pulmonary embolus.  CT scan of the chest with intravenous contrast, pulmonary embolus protocol to include CT angio and 3-dimensional reconstructed images.  COMPARISON: 08/04/2011.  FINDINGS: Thin axial and CT angio reconstructed images fail to demonstrate pulmonary embolus. Caliber of the aorta is within normal limits, no dissection.  Bilateral small pleural effusions. There is consolidation/atelectasis involving a portion of the right lower lobe. There is a much smaller area of consolidation/atelectasis along the left lower lobe costophrenic sulcus. There are blebs demonstrated along the periphery of the right lung. The esophagus is satisfactory in course and caliber. There is a hiatal hernia, it is small to moderate in size. There are multiple pedicle screws and vertical fixation rods extending from T3 to T10. Typical appearing postoperative change involving the surrounding tissues.  Cardiac size within normal limits. No pericardial abnormality. There are  bilateral breast implants in position. No mediastinal or hilar mass/adenopathy. Limited images through the upper abdomen demonstrate a 2 cm left adrenal nodule similar to the previous examination compatible with adenoma.  CONCLUSION: 1. No pulmonary embolus demonstrated. 2. Small pleural effusions with bibasilar airspace disease as discussed above. 3. Hiatal hernia.  The findings of this report called to the patient's nurse at the time of completion, 8:35 AM.  This report was finalized on 3/7/2017 9:39 AM by Dr. Mike Quesada MD.      Xr Chest 1 View    Result Date: 3/6/2017  Narrative: PORTABLE CHEST 3/6/2017 AT 7:52 PM.  CLINICAL HISTORY: Dyspnea. Postop thoracic spine fusion surgery earlier today.  The lungs are somewhat poorly inflated. There is mild atelectasis at the left lung base. No focal infiltrates are identified. The heart is normal in size. There are stigmata of previous coronary artery bypass procedure. Numerous pedicle screws and rods are in place in the thoracic spine.  IMPRESSIONS: No evidence of acute disease within the chest.  This report was finalized on 3/6/2017 8:16 PM by Dr. Abhinav Sands MD.      Fl C Arm During Surgery    Result Date: 3/6/2017  Narrative: This procedure was auto-finalized with no dictation required.      Hospital Course:  61 y.o. female admitted to [facility] to services of Clyde Callejas MD with Herniated thoracic disc without myelopathy [M51.24]  Herniated thoracic disc without myelopathy [M51.24] on 3/6/2017 and underwent T4-T10 Fusion with instrumentation  Per Clyde Callejas MD. Antibiotic and VTE prophylaxis were per [facility]IP protocols.  The patient was admitted to the floor where IV and/or oral pain medications were administered for postoperative pain.  At discharge the incisional pain was tolerable and preop neurologic function was intact.  The dressing was dry and the wound was clean.    Condition on Discharge:  Stable    Discharge Instructions: . Patient may  weight bear as tolerated unless otherwise specified. Continue DANI hose daily (for two weeks) and ice regularly. Patient also instructed on incentive spirometer during hospitalization and encouraged to continue to use at home regularly. Patient may shower on POD #3 if and when all wound drainage has stopped.  Where applicable, the brace should be worn when up and about.  It need not be worn to the bathroom and certainly not in the shower.  A detailed list of instructions specific to the operation was given to the patient at the time of discharge.    Follow up Instructions:  Follow up in the office with Dr. Clyde Callejas Jr. in 2-3 weeks - patient to call the office at [phone] to schedule. Prescriptions were given for pain medication.    Follow-up Appointments  Future Appointments  Date Time Provider Department Center   3/15/2017 3:00 PM MD [facility] [place] None         Discharge Disposition Plan:today to home    Date: 3/9/2017    Clyde Callejas MD       Orthopedic Discharge Summary      Patient: Ivy Chairez  YOB: 1955  Medical Record Number: [id number]    Attending Physician: Clyde Callejas MD  Consulting Physician(s):     Date of Admission: 3/6/2017  5:47 AM  Date of Discharge:     Discharge Diagnosis: T4-T10 Fusion with instrumentation,   Acute Blood Loss Anemia      Presenting Problem/History of Present Illness: Herniated thoracic disc without myelopathy [M51.24]  Herniated thoracic disc without myelopathy [M51.24]        Allergies:   Allergies   Allergen Reactions   • Metronidazole Diarrhea and Nausea And Vomiting   • Amoxicillin Swelling       Discharge Medications   Ivy Chairez   Home Medication Instructions EMILY:[id number]    Printed on:03/09/17 0701   Medication Information                      alendronate (FOSAMAX) 70 MG tablet  Take 70 mg by mouth Every 7 (Seven) Days. sundays             aspirin 81 MG tablet  Take 81 mg by mouth Daily.              Cholecalciferol (VITAMIN D3) 2000 UNITS tablet  Take 2,000 Units by mouth Daily.             conjugated estrogens (PREMARIN) 0.625 MG/GM vaginal cream  Insert 1.5 g into the vagina 3 (Three) Times a Week.             diphenhydrAMINE (SOMINEX) 25 MG tablet  Take 25 mg by mouth Every Night.             hydrochlorothiazide (MICROZIDE) 12.5 MG capsule  Take 12.5 mg by mouth Daily As Needed.             levothyroxine (SYNTHROID, LEVOTHROID) 75 MCG tablet  Take 75 mcg by mouth Daily.             meclizine (ANTIVERT) 12.5 MG tablet  Take 12.5 mg by mouth daily. For 7 days as directed              NITROGLYCERIN SL  Place 1 tablet under the tongue As Needed. For chest pain             oxyCODONE-acetaminophen (PERCOCET) 5-325 MG per tablet  Take 2 tablets by mouth Every 4 (Four) Hours As Needed for moderate pain (4-6) for up to 7 days.             promethazine (PHENERGAN) 12.5 MG tablet  Take 1 tablet by mouth Every 6 (Six) Hours As Needed for Nausea or Vomiting.             rosuvastatin (CRESTOR) 40 MG tablet  Take 40 mg by mouth Daily.             sennosides-docusate sodium (SENOKOT-S) 8.6-50 MG tablet  Take 1 tablet by mouth 2 (Two) Times a Day.             venlafaxine (EFFEXOR) 75 MG tablet  Take 75 mg by mouth Daily.             vitamin B-12 (CYANOCOBALAMIN) 1000 MCG tablet  Take 1,000 mcg by mouth Daily.                   Past Medical History   Diagnosis Date   • Broken toe 03/01/2017     right 4th toe   • Common migraine without aura      Neurologist Dr Govea   • Coronary artery disease    • History of chest pain    • History of CHF (congestive heart failure)    • History of pulmonary embolism    • History of stroke    • Migraine      eval and management by neurologist   • Orthostatic hypotension    • Pleural effusion      POSTOPERATIVE, REQUIRING THORACENTESIS.   • Polyp of sigmoid colon      REMOVED   • Thoracic degenerative disc disease      Received epidural 12/14 [facility] DR Landin   • Thoracic  spine pain    • Thyroid disease    • Weakness of right side of body      ARM & FACE        Past Surgical History   Procedure Laterality Date   • Breast augmentation     • Coronary artery bypass graft  04/2009     cabg x 3: LIMA to LAD, vein grafts to diagonal and OM1   • Coronary angioplasty with stent placement       NON-GRAFTED RIGHT CORONARY ARTERY STENT.   • Hysterectomy     • Knee surgery Right    • Total thyroidectomy     • Colonoscopy  06/2004     Dr. Salas   • Augmentation mammaplasty     • Epidural block injection     • Thoracic decompression posterior fusion with instrumentation N/A 3/6/2017     Procedure: T4-T10 Fusion with instrumentation;  Surgeon: Clyde Callejas MD;  Location: [facility] OR;  Service:         Social History     Occupational History   • Not on file.     Social History Main Topics   • Smoking status: Never Smoker   • Smokeless tobacco: Never Used   • Alcohol use No   • Drug use: No   • Sexual activity: Defer    Social History     Social History Narrative        Family History   Problem Relation Age of Onset   • No Known Problems Mother    • No Known Problems Father          Physical Exam: 61 y.o. female  General Appearance:    Alert, cooperative, in no acute distress                    Vitals:    03/08/17 1400 03/08/17 1700 03/08/17 2206 03/09/17 0535   BP: 104/50 101/79 (!) 88/62 97/51   BP Location: Left arm Left arm Left arm Right arm   Patient Position: Lying Lying Lying Lying   Pulse: 99  87 80   Resp: 17 17  16   Temp:  97.8 °F (36.6 °C) 97.8 °F (36.6 °C) 97.8 °F (36.6 °C)   TempSrc:  Oral Oral Oral   SpO2: 97%  97% 97%   Weight:       Height:            DIAGNOSTIC TESTS:   Admission on 03/06/2017   Component Date Value Ref Range Status   • Product Code 03/06/2017 Q3943U06   Final   • Unit Number 03/06/2017 [id number]   Final   • UNIT  ABO 03/06/2017 O   Final   • UNIT  RH 03/06/2017 NEG   Final   • CROSSMATCH 1 INTERPRETATION 03/06/2017 Compatible   Final   • Dispense  Status 03/06/2017 XM   Final   • Blood Type 03/06/2017 ONEG   Final   • Blood Expiration Date 03/06/2017 201703162359   Final   • Blood Type Barcode 03/06/2017 9500   Final   • Product Code 03/06/2017 [id number]   Final   • Unit Number 03/06/2017 [id number]   Final   • UNIT  ABO 03/06/2017 O   Final   • UNIT  RH 03/06/2017 NEG   Final   • CROSSMATCH 1 INTERPRETATION 03/06/2017 Compatible   Final   • Dispense Status 03/06/2017 XM   Final   • Blood Type 03/06/2017 ONEG   Final   • Blood Expiration Date 03/06/2017 201703162359   Final   • Blood Type Barcode 03/06/2017 9500   Final   • ABO Type 03/06/2017 A   Final   • RH type 03/06/2017 Negative   Final   • Antibody Screen 03/06/2017 Negative   Final   • Glucose 03/06/2017 142* 70 - 130 mg/dL Final   • Troponin T 03/06/2017 <0.010  0.000 - 0.030 ng/mL Final   • Troponin T 03/07/2017 <0.010  0.000 - 0.030 ng/mL Final   • Creatine Kinase 03/06/2017 2437* 20 - 180 U/L Final   • CKMB 03/06/2017 62.97* <=5.30 ng/mL Final   • WBC 03/07/2017 10.86* 4.50 - 10.70 10*3/mm3 Final   • RBC 03/07/2017 3.25* 3.90 - 5.20 10*6/mm3 Final   • Hemoglobin 03/07/2017 10.2* 11.9 - 15.5 g/dL Final   • Hematocrit 03/07/2017 30.3* 35.6 - 45.5 % Final   • MCV 03/07/2017 93.2  80.5 - 98.2 fL Final   • MCH 03/07/2017 31.4  26.9 - 32.0 pg Final   • MCHC 03/07/2017 33.7  32.4 - 36.3 g/dL Final   • RDW 03/07/2017 12.6  11.7 - 13.0 % Final   • RDW-SD 03/07/2017 43.0  37.0 - 54.0 fl Final   • MPV 03/07/2017 8.9  6.0 - 12.0 fL Final   • Platelets 03/07/2017 239  140 - 500 10*3/mm3 Final   • Glucose 03/07/2017 141* 65 - 99 mg/dL Final   • BUN 03/07/2017 9  8 - 23 mg/dL Final   • Creatinine 03/07/2017 0.79  0.57 - 1.00 mg/dL Final   • Sodium 03/07/2017 131* 136 - 145 mmol/L Final   • Potassium 03/07/2017 5.4* 3.5 - 5.2 mmol/L Final   • Chloride 03/07/2017 97* 98 - 107 mmol/L Final   • CO2 03/07/2017 23.3  22.0 - 29.0 mmol/L Final   • Calcium 03/07/2017 8.0* 8.6 - 10.5 mg/dL Final   • eGFR Non African  Amer 03/07/2017 74  >60 mL/min/1.73 Final   • BUN/Creatinine Ratio 03/07/2017 11.4  7.0 - 25.0 Final   • Anion Gap 03/07/2017 10.7  mmol/L Final   • Glucose 03/08/2017 149* 65 - 99 mg/dL Final   • BUN 03/08/2017 6* 8 - 23 mg/dL Final   • Creatinine 03/08/2017 0.69  0.57 - 1.00 mg/dL Final   • Sodium 03/08/2017 137  136 - 145 mmol/L Final   • Potassium 03/08/2017 3.7  3.5 - 5.2 mmol/L Final   • Chloride 03/08/2017 103  98 - 107 mmol/L Final   • CO2 03/08/2017 23.5  22.0 - 29.0 mmol/L Final   • Calcium 03/08/2017 7.9* 8.6 - 10.5 mg/dL Final   • eGFR Non African Amer 03/08/2017 86  >60 mL/min/1.73 Final   • BUN/Creatinine Ratio 03/08/2017 8.7  7.0 - 25.0 Final   • Anion Gap 03/08/2017 10.5  mmol/L Final   • WBC 03/08/2017 7.25  4.50 - 10.70 10*3/mm3 Final   • RBC 03/08/2017 2.81* 3.90 - 5.20 10*6/mm3 Final   • Hemoglobin 03/08/2017 8.9* 11.9 - 15.5 g/dL Final   • Hematocrit 03/08/2017 26.3* 35.6 - 45.5 % Final   • MCV 03/08/2017 93.6  80.5 - 98.2 fL Final   • MCH 03/08/2017 31.7  26.9 - 32.0 pg Final   • MCHC 03/08/2017 33.8  32.4 - 36.3 g/dL Final   • RDW 03/08/2017 12.5  11.7 - 13.0 % Final   • RDW-SD 03/08/2017 42.7  37.0 - 54.0 fl Final   • MPV 03/08/2017 9.6  6.0 - 12.0 fL Final   • Platelets 03/08/2017 203  140 - 500 10*3/mm3 Final       Xr Spine Thoracic 2 View    Result Date: 3/8/2017  Narrative: THORACIC SPINE 2 VIEWS  HISTORY: Surgery, back pain.  FINDINGS: 22 seconds of fluoroscopy time was utilized. 5 intraoperative spot films were obtained. Multiple transpedicular screws have been placed which, given the patient obliquity, appear to be in satisfactory position. The posterior rods have not yet been placed. Alignment appears to be satisfactory.  This report was finalized on 3/8/2017 7:22 AM by Dr. Figueroa Erickson MD.      Ct Angiogram Chest With & Without Contrast    Result Date: 3/7/2017  Narrative: CT ANGIOGRAM CHEST WITH AND WITHOUT CONTRAST-  CLINICAL: Postop spine surgery, shortness of breath and chest  pain, evaluate for pulmonary embolus.  CT scan of the chest with intravenous contrast, pulmonary embolus protocol to include CT angio and 3-dimensional reconstructed images.  COMPARISON: 08/04/2011.  FINDINGS: Thin axial and CT angio reconstructed images fail to demonstrate pulmonary embolus. Caliber of the aorta is within normal limits, no dissection.  Bilateral small pleural effusions. There is consolidation/atelectasis involving a portion of the right lower lobe. There is a much smaller area of consolidation/atelectasis along the left lower lobe costophrenic sulcus. There are blebs demonstrated along the periphery of the right lung. The esophagus is satisfactory in course and caliber. There is a hiatal hernia, it is small to moderate in size. There are multiple pedicle screws and vertical fixation rods extending from T3 to T10. Typical appearing postoperative change involving the surrounding tissues.  Cardiac size within normal limits. No pericardial abnormality. There are bilateral breast implants in position. No mediastinal or hilar mass/adenopathy. Limited images through the upper abdomen demonstrate a 2 cm left adrenal nodule similar to the previous examination compatible with adenoma.  CONCLUSION: 1. No pulmonary embolus demonstrated. 2. Small pleural effusions with bibasilar airspace disease as discussed above. 3. Hiatal hernia.  The findings of this report called to the patient's nurse at the time of completion, 8:35 AM.  This report was finalized on 3/7/2017 9:39 AM by Dr. Mike Quesada MD.      Xr Chest 1 View    Result Date: 3/6/2017  Narrative: PORTABLE CHEST 3/6/2017 AT 7:52 PM.  CLINICAL HISTORY: Dyspnea. Postop thoracic spine fusion surgery earlier today.  The lungs are somewhat poorly inflated. There is mild atelectasis at the left lung base. No focal infiltrates are identified. The heart is normal in size. There are stigmata of previous coronary artery bypass procedure. Numerous pedicle screws  and rods are in place in the thoracic spine.  IMPRESSIONS: No evidence of acute disease within the chest.  This report was finalized on 3/6/2017 8:16 PM by Dr. Abhinav Sands MD.      Fl C Arm During Surgery    Result Date: 3/6/2017  Narrative: This procedure was auto-finalized with no dictation required.      Hospital Course:  61 y.o. female admitted to [facility] to services of Clyde Callejas MD with Herniated thoracic disc without myelopathy [M51.24]  Herniated thoracic disc without myelopathy [M51.24] on 3/6/2017 and underwent T4-T10 Fusion with instrumentation  Per Clyde Callejas MD. Antibiotic and VTE prophylaxis were per [facility] protocols.  The patient was admitted to the floor where IV and/or oral pain medications were administered for postoperative pain.  At discharge the incisional pain was tolerable and preop neurologic function was intact.  The dressing was dry and the wound was clean.    Condition on Discharge:  Stable    Discharge Instructions: . Patient may weight bear as tolerated unless otherwise specified. Continue DANI hose daily (for two weeks) and ice regularly. Patient also instructed on incentive spirometer during hospitalization and encouraged to continue to use at home regularly. Patient may shower on POD #3 if and when all wound drainage has stopped.  Where applicable, the brace should be worn when up and about.  It need not be worn to the bathroom and certainly not in the shower.  A detailed list of instructions specific to the operation was given to the patient at the time of discharge.    Follow up Instructions:  Follow up in the office with Dr. Clyde Callejas Jr. in 2-3 weeks - patient to call the office at [phone] to schedule. Prescriptions were given for pain medication.    Follow-up Appointments  Future Appointments  Date Time Provider Department Center   3/15/2017 3:00 PM MD MICHEL Nelson Jr., [facility] None         Discharge Disposition Plan:today to home    Date:  3/9/2017    Clyde Callejas MD

## 2017-03-09 NOTE — PROGRESS NOTES
Postoperative day 3: AVSS awake alert oriented no new complaints.  Walk yesterday.  No leg pain and moves legs well.  Hemoglobin was 8.9 yesterday.  Doing well plan discharge home today after physical therapy.

## 2017-03-10 VITALS
HEART RATE: 86 BPM | OXYGEN SATURATION: 99 % | SYSTOLIC BLOOD PRESSURE: 105 MMHG | DIASTOLIC BLOOD PRESSURE: 53 MMHG | WEIGHT: 152.5 LBS | RESPIRATION RATE: 18 BRPM | BODY MASS INDEX: 24.51 KG/M2 | HEIGHT: 66 IN | TEMPERATURE: 97.7 F

## 2017-03-10 PROBLEM — K91.89 POSTOPERATIVE ILEUS (HCC): Status: ACTIVE | Noted: 2017-03-10

## 2017-03-10 PROBLEM — K56.7 POSTOPERATIVE ILEUS (HCC): Status: ACTIVE | Noted: 2017-03-10

## 2017-03-10 LAB
ABO + RH BLD: NORMAL
ABO + RH BLD: NORMAL
BH BB BLOOD EXPIRATION DATE: NORMAL
BH BB BLOOD EXPIRATION DATE: NORMAL
BH BB BLOOD TYPE BARCODE: 9500
BH BB BLOOD TYPE BARCODE: 9500
BH BB DISPENSE STATUS: NORMAL
BH BB DISPENSE STATUS: NORMAL
BH BB PRODUCT CODE: NORMAL
BH BB PRODUCT CODE: NORMAL
BH BB UNIT NUMBER: NORMAL
BH BB UNIT NUMBER: NORMAL
CROSSMATCH INTERPRETATION: NORMAL
CROSSMATCH INTERPRETATION: NORMAL
UNIT  ABO: NORMAL
UNIT  ABO: NORMAL
UNIT  RH: NORMAL
UNIT  RH: NORMAL

## 2017-03-10 PROCEDURE — 63710000001 PROMETHAZINE PER 25 MG: Performed by: ORTHOPAEDIC SURGERY

## 2017-03-10 PROCEDURE — 94799 UNLISTED PULMONARY SVC/PX: CPT

## 2017-03-10 RX ORDER — HYDROCHLOROTHIAZIDE 12.5 MG/1
12.5 CAPSULE, GELATIN COATED ORAL DAILY PRN
Start: 2017-03-10 | End: 2017-03-24

## 2017-03-10 RX ADMIN — Medication 1000 MCG: at 09:13

## 2017-03-10 RX ADMIN — CYCLOBENZAPRINE HYDROCHLORIDE 10 MG: 10 TABLET, FILM COATED ORAL at 01:39

## 2017-03-10 RX ADMIN — ATORVASTATIN CALCIUM 80 MG: 80 TABLET, FILM COATED ORAL at 09:14

## 2017-03-10 RX ADMIN — PROMETHAZINE HYDROCHLORIDE 12.5 MG: 25 TABLET ORAL at 09:24

## 2017-03-10 RX ADMIN — DOCUSATE SODIUM,SENNOSIDES 1 TABLET: 50; 8.6 TABLET, FILM COATED ORAL at 09:14

## 2017-03-10 RX ADMIN — CYCLOBENZAPRINE HYDROCHLORIDE 10 MG: 10 TABLET, FILM COATED ORAL at 15:07

## 2017-03-10 RX ADMIN — VENLAFAXINE HYDROCHLORIDE 75 MG: 75 TABLET ORAL at 09:13

## 2017-03-10 RX ADMIN — LEVOTHYROXINE SODIUM 75 MCG: 75 TABLET ORAL at 09:14

## 2017-03-10 RX ADMIN — OXYCODONE HYDROCHLORIDE AND ACETAMINOPHEN 2 TABLET: 5; 325 TABLET ORAL at 01:39

## 2017-03-10 RX ADMIN — VITAMIN D, TAB 1000IU (100/BT) 2000 UNITS: 25 TAB at 09:13

## 2017-03-10 RX ADMIN — OXYCODONE HYDROCHLORIDE AND ACETAMINOPHEN 2 TABLET: 5; 325 TABLET ORAL at 15:07

## 2017-03-10 RX ADMIN — PROMETHAZINE HYDROCHLORIDE 12.5 MG: 25 TABLET ORAL at 15:07

## 2017-03-10 RX ADMIN — OXYCODONE HYDROCHLORIDE AND ACETAMINOPHEN 2 TABLET: 5; 325 TABLET ORAL at 09:23

## 2017-03-10 RX ADMIN — CYCLOBENZAPRINE HYDROCHLORIDE 10 MG: 10 TABLET, FILM COATED ORAL at 09:24

## 2017-03-10 NOTE — PROGRESS NOTES
"    Name: Ivy Chairez ADMIT: 3/6/2017   : 1955  PCP: Murtaza Berrios Jr., MD    MRN: [id number] LOS: 4 days   AGE/SEX: 61 y.o. female  [facility]     Subjective   Nauseated yesterday +/- vomiting.  Feels better this morning.  Had several liquid stools yesterday.    Objective   Vital Signs  Temp:  [98.6 °F (37 °C)-99.4 °F (37.4 °C)] 98.6 °F (37 °C)  Heart Rate:  [88-91] 91  Resp:  [16] 16  BP: (103-115)/(57-66) 103/57  SpO2:  [96 %-97 %] 96 %  on  Flow (L/min):  [2] 2;   O2 Device: nasal cannula  Body mass index is 24.61 kg/(m^2).    Objective:  General Appearance:  Not in pain and comfortable.    Vital signs: (most recent): Blood pressure 103/57, pulse 91, temperature 98.6 °F (37 °C), temperature source Oral, resp. rate 16, height 66\" (167.6 cm), weight 152 lb 8 oz (69.2 kg), SpO2 96 %.    Lungs:  Normal effort.  Breath sounds clear to auscultation.  There are decreased breath sounds.  No wheezes, rales (bases) or rhonchi.    Heart: Normal rate.  Regular rhythm.    Abdomen: Abdomen is soft and non-distended.  Hypoactive bowel sounds.   There is no abdominal tenderness.     Extremities: There is no dependent edema.    Neurological: Patient is alert and oriented to person, place and time.    Skin:  Warm and dry.        Results Review:       I reviewed the patient's new clinical results.    Results from last 7 days  Lab Units 17  0607 17  0620   WBC 10*3/mm3 7.25 10.86*   HEMOGLOBIN g/dL 8.9* 10.2*   PLATELETS 10*3/mm3 203 239       Results from last 7 days  Lab Units 17  0607 17  0620   SODIUM mmol/L 137 131*   POTASSIUM mmol/L 3.7 5.4*   CHLORIDE mmol/L 103 97*   TOTAL CO2 mmol/L 23.5 23.3   BUN mg/dL 6* 9   CREATININE mg/dL 0.69 0.79   GLUCOSE mg/dL 149* 141*   Estimated Creatinine Clearance: 79.1 mL/min (by C-G formula based on Cr of 0.69).    Results from last 7 days  Lab Units 17  0607 17  0620   CALCIUM mg/dL 7.9* 8.0*       KUB  FINDINGS:Portable supine " radiographs of the abdomen were obtained. There  is moderate gaseous distention of colon and small bowel loops. There is  a fairly large amount of retained stool, particularly at the right  colon. I suspect there is some element of postoperative ileus. No supine  radiographic evidence for visceromegaly or pneumoperitoneum is present.    CT ANGIOGRAM CHEST WITH AND WITHOUT CONTRAST  CONCLUSION:  1. No pulmonary embolus demonstrated.  2. Small pleural effusions with bibasilar airspace disease as discussed  above.  3. Hiatal hernia.        atorvastatin 80 mg Oral Daily   cholecalciferol 2,000 Units Oral Daily   levothyroxine 75 mcg Oral QAM   sennosides-docusate sodium 1 tablet Oral BID   sennosides-docusate sodium 2 tablet Oral Nightly   venlafaxine 75 mg Oral Daily   vitamin B-12 1,000 mcg Oral Daily       lactated ringers 9 mL/hr Last Rate: 9 mL/hr (03/06/17 0708)         Assessment/Plan   Assessment:     Active Hospital Problems (** Indicates Principal Problem)    Diagnosis Date Noted   • **Herniated thoracic disc without myelopathy [M51.24] 03/06/2017   • Postoperative ileus [K91.3] 03/10/2017   • Atelectasis of both lungs [J98.11] 03/08/2017   • History of pulmonary embolism [Z86.711] 03/06/2017   • Dyspnea [R06.00] 03/06/2017   • Osteoporosis [M81.0] 07/20/2016   • Triple vessel coronary artery disease [I25.10] 12/16/2015   • Anxiety [F41.9] 12/16/2015   • History of coronary artery bypass surgery [Z95.1] 10/25/2012      Resolved Hospital Problems    Diagnosis Date Noted Date Resolved   No resolved problems to display.       Plan:   - POD#4 T4-T10 Fusion with instrumentation  - Continue to hold HCTZ post discharge.  Instructed her to resume taking if systolic blood pressure consistently greater than 1:30.  - We will see how she does with breakfast and lunch.  If tolerates without difficulty would be okay with discharge.  Otherwise consider GI consult.  - Patient hypoxic on room air.  Have arranged for home  oxygen therapy at night and with exertion.      Avel Lal MD  [facility]  03/10/17  7:26 AM

## 2017-03-10 NOTE — PROGRESS NOTES
Continued Stay Note  [facility]     Patient Name: Ivy Chairez  MRN: [id number]  Today's Date: 3/10/2017    Admit Date: 3/6/2017          Discharge Plan       03/10/17 1415    Case Management/Social Work Plan    Additional Comments Called Janell Stephens with [facility] to notify of discharge.   They will follow for PT and nursing.    Final Note    Final Note Home with [facility].      03/10/17 1413    Case Management/Social Work Plan    Plan Home with spouse              Discharge Codes       03/10/17 1415    Discharge Codes    Discharge Codes CC  Home with [facility] with [facility]        Expected Discharge Date and Time     Expected Discharge Date Expected Discharge Time    Mar 10, 2017             Diamond Rachel RN

## 2017-03-10 NOTE — SIGNIFICANT NOTE
03/10/17 1146   Rehab Treatment   Discipline physical therapist   Treatment Not Performed patient/family declined treatment  (Pt states she is going home later today, wants to rest before DC. )

## 2017-03-10 NOTE — PAYOR COMM NOTE
"Ivy Chairez (61 y.o. Female)     Date of Birth Social Security Number Address Home Phone MRN    1955  [id number] [phone] [id number]    Buddhism Marital Status          Restorationist        Admission Date Admission Type Admitting Provider Attending Provider Department, Room/Bed    3/6/17 Elective Clyde Callejas MD  [facility] [address] [place], P592/1    Discharge Date Discharge Disposition Discharge Destination        3/10/2017 Home or Self Care             Attending Provider: (none)    Allergies:  Metronidazole, Amoxicillin    Isolation:  None   Infection:  None   Code Status:  FULL    Ht:  66\" (167.6 cm)   Wt:  152 lb 8 oz (69.2 kg)    Admission Cmt:  None   Principal Problem:  Herniated thoracic disc without myelopathy [M51.24]                 Active Insurance as of 3/6/2017     Primary Coverage     Payor Plan Insurance Group Employer/Plan Group    ANTHEM BLUE CROSS ANTHEM BLUE CROSS BLUE SHIELD PPO [id number]     Payor Plan Address Payor Plan Phone Number Effective From Effective To    [address] [phone] 1/1/2017     [place] [address]       Subscriber Name Subscriber Birth Date Member ID       IVY CHAIREZ 1955 [id number]                 Emergency Contacts      (Rel.) Home Phone Work Phone Mobile Phone    Jet Chairez (Spouse) [phone] -- --               Discharge Summary      Clyde Callejas MD at 3/9/2017  7:00 AM           Orthopedic Discharge Summary      Patient: Ivy Chairez  YOB: 1955  Medical Record Number: [id number]    Attending Physician: Clyde Callejas MD  Consulting Physician(s):     Date of Admission: 3/6/2017  5:47 AM  Date of Discharge:     Discharge Diagnosis: T4-T10 Fusion with instrumentation,   Acute Blood Loss Anemia      Presenting Problem/History of Present Illness: Herniated thoracic disc without myelopathy [M51.24]  Herniated thoracic disc without " myelopathy [M51.24]        Allergies:   Allergies   Allergen Reactions   • Metronidazole Diarrhea and Nausea And Vomiting   • Amoxicillin Swelling       Discharge Medications   Ivy Chairez   Home Medication Instructions EMILY:[id number]    Printed on:03/09/17 0701   Medication Information                      alendronate (FOSAMAX) 70 MG tablet  Take 70 mg by mouth Every 7 (Seven) Days. sundays             aspirin 81 MG tablet  Take 81 mg by mouth Daily.             Cholecalciferol (VITAMIN D3) 2000 UNITS tablet  Take 2,000 Units by mouth Daily.             conjugated estrogens (PREMARIN) 0.625 MG/GM vaginal cream  Insert 1.5 g into the vagina 3 (Three) Times a Week.             diphenhydrAMINE (SOMINEX) 25 MG tablet  Take 25 mg by mouth Every Night.             hydrochlorothiazide (MICROZIDE) 12.5 MG capsule  Take 12.5 mg by mouth Daily As Needed.             levothyroxine (SYNTHROID, LEVOTHROID) 75 MCG tablet  Take 75 mcg by mouth Daily.             meclizine (ANTIVERT) 12.5 MG tablet  Take 12.5 mg by mouth daily. For 7 days as directed              NITROGLYCERIN SL  Place 1 tablet under the tongue As Needed. For chest pain             oxyCODONE-acetaminophen (PERCOCET) 5-325 MG per tablet  Take 2 tablets by mouth Every 4 (Four) Hours As Needed for moderate pain (4-6) for up to 7 days.             promethazine (PHENERGAN) 12.5 MG tablet  Take 1 tablet by mouth Every 6 (Six) Hours As Needed for Nausea or Vomiting.             rosuvastatin (CRESTOR) 40 MG tablet  Take 40 mg by mouth Daily.             sennosides-docusate sodium (SENOKOT-S) 8.6-50 MG tablet  Take 1 tablet by mouth 2 (Two) Times a Day.             venlafaxine (EFFEXOR) 75 MG tablet  Take 75 mg by mouth Daily.             vitamin B-12 (CYANOCOBALAMIN) 1000 MCG tablet  Take 1,000 mcg by mouth Daily.                   Past Medical History   Diagnosis Date   • Broken toe 03/01/2017     right 4th toe   • Common migraine without aura      Neurologist  Dr Govea   • Coronary artery disease    • History of chest pain    • History of CHF (congestive heart failure)    • History of pulmonary embolism    • History of stroke    • Migraine      eval and management by neurologist   • Orthostatic hypotension    • Pleural effusion      POSTOPERATIVE, REQUIRING THORACENTESIS.   • Polyp of sigmoid colon      REMOVED   • Thoracic degenerative disc disease      Received epidural 12/14 [facility] DR Landin   • Thoracic spine pain    • Thyroid disease    • Weakness of right side of body      ARM & FACE        Past Surgical History   Procedure Laterality Date   • Breast augmentation     • Coronary artery bypass graft  04/2009     cabg x 3: LIMA to LAD, vein grafts to diagonal and OM1   • Coronary angioplasty with stent placement       NON-GRAFTED RIGHT CORONARY ARTERY STENT.   • Hysterectomy     • Knee surgery Right    • Total thyroidectomy     • Colonoscopy  06/2004     Dr. Salas   • Augmentation mammaplasty     • Epidural block injection     • Thoracic decompression posterior fusion with instrumentation N/A 3/6/2017     Procedure: T4-T10 Fusion with instrumentation;  Surgeon: Clyde Callejas MD;  Location: [facility];  Service:         Social History     Occupational History   • Not on file.     Social History Main Topics   • Smoking status: Never Smoker   • Smokeless tobacco: Never Used   • Alcohol use No   • Drug use: No   • Sexual activity: Defer    Social History     Social History Narrative        Family History   Problem Relation Age of Onset   • No Known Problems Mother    • No Known Problems Father          Physical Exam: 61 y.o. female  General Appearance:    Alert, cooperative, in no acute distress                    Vitals:    03/08/17 1400 03/08/17 1700 03/08/17 2206 03/09/17 0535   BP: 104/50 101/79 (!) 88/62 97/51   BP Location: Left arm Left arm Left arm Right arm   Patient Position: Lying Lying Lying Lying   Pulse: 99  87 80   Resp: 17 17 16    Temp:  97.8 °F (36.6 °C) 97.8 °F (36.6 °C) 97.8 °F (36.6 °C)   TempSrc:  Oral Oral Oral   SpO2: 97%  97% 97%   Weight:       Height:            DIAGNOSTIC TESTS:   Admission on 03/06/2017   Component Date Value Ref Range Status   • Product Code 03/06/2017 T3281N39   Final   • Unit Number 03/06/2017 [id number]   Final   • UNIT  ABO 03/06/2017 O   Final   • UNIT  RH 03/06/2017 NEG   Final   • CROSSMATCH 1 INTERPRETATION 03/06/2017 Compatible   Final   • Dispense Status 03/06/2017 XM   Final   • Blood Type 03/06/2017 ONEG   Final   • Blood Expiration Date 03/06/2017 201703162359   Final   • Blood Type Barcode 03/06/2017 9500   Final   • Product Code 03/06/2017 [id number]   Final   • Unit Number 03/06/2017 [id number]   Final   • UNIT  ABO 03/06/2017 O   Final   • UNIT  RH 03/06/2017 NEG   Final   • CROSSMATCH 1 INTERPRETATION 03/06/2017 Compatible   Final   • Dispense Status 03/06/2017 XM   Final   • Blood Type 03/06/2017 ONEG   Final   • Blood Expiration Date 03/06/2017 201703162359   Final   • Blood Type Barcode 03/06/2017 9500   Final   • ABO Type 03/06/2017 A   Final   • RH type 03/06/2017 Negative   Final   • Antibody Screen 03/06/2017 Negative   Final   • Glucose 03/06/2017 142* 70 - 130 mg/dL Final   • Troponin T 03/06/2017 <0.010  0.000 - 0.030 ng/mL Final   • Troponin T 03/07/2017 <0.010  0.000 - 0.030 ng/mL Final   • Creatine Kinase 03/06/2017 2437* 20 - 180 U/L Final   • CKMB 03/06/2017 62.97* <=5.30 ng/mL Final   • WBC 03/07/2017 10.86* 4.50 - 10.70 10*3/mm3 Final   • RBC 03/07/2017 3.25* 3.90 - 5.20 10*6/mm3 Final   • Hemoglobin 03/07/2017 10.2* 11.9 - 15.5 g/dL Final   • Hematocrit 03/07/2017 30.3* 35.6 - 45.5 % Final   • MCV 03/07/2017 93.2  80.5 - 98.2 fL Final   • MCH 03/07/2017 31.4  26.9 - 32.0 pg Final   • MCHC 03/07/2017 33.7  32.4 - 36.3 g/dL Final   • RDW 03/07/2017 12.6  11.7 - 13.0 % Final   • RDW-SD 03/07/2017 43.0  37.0 - 54.0 fl Final   • MPV 03/07/2017 8.9  6.0 - 12.0 fL Final   •  Platelets 03/07/2017 239  140 - 500 10*3/mm3 Final   • Glucose 03/07/2017 141* 65 - 99 mg/dL Final   • BUN 03/07/2017 9  8 - 23 mg/dL Final   • Creatinine 03/07/2017 0.79  0.57 - 1.00 mg/dL Final   • Sodium 03/07/2017 131* 136 - 145 mmol/L Final   • Potassium 03/07/2017 5.4* 3.5 - 5.2 mmol/L Final   • Chloride 03/07/2017 97* 98 - 107 mmol/L Final   • CO2 03/07/2017 23.3  22.0 - 29.0 mmol/L Final   • Calcium 03/07/2017 8.0* 8.6 - 10.5 mg/dL Final   • eGFR Non African Amer 03/07/2017 74  >60 mL/min/1.73 Final   • BUN/Creatinine Ratio 03/07/2017 11.4  7.0 - 25.0 Final   • Anion Gap 03/07/2017 10.7  mmol/L Final   • Glucose 03/08/2017 149* 65 - 99 mg/dL Final   • BUN 03/08/2017 6* 8 - 23 mg/dL Final   • Creatinine 03/08/2017 0.69  0.57 - 1.00 mg/dL Final   • Sodium 03/08/2017 137  136 - 145 mmol/L Final   • Potassium 03/08/2017 3.7  3.5 - 5.2 mmol/L Final   • Chloride 03/08/2017 103  98 - 107 mmol/L Final   • CO2 03/08/2017 23.5  22.0 - 29.0 mmol/L Final   • Calcium 03/08/2017 7.9* 8.6 - 10.5 mg/dL Final   • eGFR Non African Amer 03/08/2017 86  >60 mL/min/1.73 Final   • BUN/Creatinine Ratio 03/08/2017 8.7  7.0 - 25.0 Final   • Anion Gap 03/08/2017 10.5  mmol/L Final   • WBC 03/08/2017 7.25  4.50 - 10.70 10*3/mm3 Final   • RBC 03/08/2017 2.81* 3.90 - 5.20 10*6/mm3 Final   • Hemoglobin 03/08/2017 8.9* 11.9 - 15.5 g/dL Final   • Hematocrit 03/08/2017 26.3* 35.6 - 45.5 % Final   • MCV 03/08/2017 93.6  80.5 - 98.2 fL Final   • MCH 03/08/2017 31.7  26.9 - 32.0 pg Final   • MCHC 03/08/2017 33.8  32.4 - 36.3 g/dL Final   • RDW 03/08/2017 12.5  11.7 - 13.0 % Final   • RDW-SD 03/08/2017 42.7  37.0 - 54.0 fl Final   • MPV 03/08/2017 9.6  6.0 - 12.0 fL Final   • Platelets 03/08/2017 203  140 - 500 10*3/mm3 Final       Xr Spine Thoracic 2 View    Result Date: 3/8/2017  Narrative: THORACIC SPINE 2 VIEWS  HISTORY: Surgery, back pain.  FINDINGS: 22 seconds of fluoroscopy time was utilized. 5 intraoperative spot films were obtained.  Multiple transpedicular screws have been placed which, given the patient obliquity, appear to be in satisfactory position. The posterior rods have not yet been placed. Alignment appears to be satisfactory.  This report was finalized on 3/8/2017 7:22 AM by Dr. Figueroa Erickson MD.      Ct Angiogram Chest With & Without Contrast    Result Date: 3/7/2017  Narrative: CT ANGIOGRAM CHEST WITH AND WITHOUT CONTRAST-  CLINICAL: Postop spine surgery, shortness of breath and chest pain, evaluate for pulmonary embolus.  CT scan of the chest with intravenous contrast, pulmonary embolus protocol to include CT angio and 3-dimensional reconstructed images.  COMPARISON: 08/04/2011.  FINDINGS: Thin axial and CT angio reconstructed images fail to demonstrate pulmonary embolus. Caliber of the aorta is within normal limits, no dissection.  Bilateral small pleural effusions. There is consolidation/atelectasis involving a portion of the right lower lobe. There is a much smaller area of consolidation/atelectasis along the left lower lobe costophrenic sulcus. There are blebs demonstrated along the periphery of the right lung. The esophagus is satisfactory in course and caliber. There is a hiatal hernia, it is small to moderate in size. There are multiple pedicle screws and vertical fixation rods extending from T3 to T10. Typical appearing postoperative change involving the surrounding tissues.  Cardiac size within normal limits. No pericardial abnormality. There are bilateral breast implants in position. No mediastinal or hilar mass/adenopathy. Limited images through the upper abdomen demonstrate a 2 cm left adrenal nodule similar to the previous examination compatible with adenoma.  CONCLUSION: 1. No pulmonary embolus demonstrated. 2. Small pleural effusions with bibasilar airspace disease as discussed above. 3. Hiatal hernia.  The findings of this report called to the patient's nurse at the time of completion, 8:35 AM.  This report was  finalized on 3/7/2017 9:39 AM by Dr. Mike Quesada MD.      Xr Chest 1 View    Result Date: 3/6/2017  Narrative: PORTABLE CHEST 3/6/2017 AT 7:52 PM.  CLINICAL HISTORY: Dyspnea. Postop thoracic spine fusion surgery earlier today.  The lungs are somewhat poorly inflated. There is mild atelectasis at the left lung base. No focal infiltrates are identified. The heart is normal in size. There are stigmata of previous coronary artery bypass procedure. Numerous pedicle screws and rods are in place in the thoracic spine.  IMPRESSIONS: No evidence of acute disease within the chest.  This report was finalized on 3/6/2017 8:16 PM by Dr. Abhinav Sands MD.      Fl C Arm During Surgery    Result Date: 3/6/2017  Narrative: This procedure was auto-finalized with no dictation required.      Hospital Course:  61 y.o. female admitted to [facility] to services of Clyde Callejas MD with Herniated thoracic disc without myelopathy [M51.24]  Herniated thoracic disc without myelopathy [M51.24] on 3/6/2017 and underwent T4-T10 Fusion with instrumentation  Per Clyde Callejas MD. Antibiotic and VTE prophylaxis were per SCIP protocols.  The patient was admitted to the floor where IV and/or oral pain medications were administered for postoperative pain.  At discharge the incisional pain was tolerable and preop neurologic function was intact.  The dressing was dry and the wound was clean.    Condition on Discharge:  Stable    Discharge Instructions: . Patient may weight bear as tolerated unless otherwise specified. Continue DANI hose daily (for two weeks) and ice regularly. Patient also instructed on incentive spirometer during hospitalization and encouraged to continue to use at home regularly. Patient may shower on POD #3 if and when all wound drainage has stopped.  Where applicable, the brace should be worn when up and about.  It need not be worn to the bathroom and certainly not in the shower.  A detailed list of instructions  specific to the operation was given to the patient at the time of discharge.    Follow up Instructions:  Follow up in the office with Dr. Clyde Callejas Jr. in 2-3 weeks - patient to call the office at [phone] to schedule. Prescriptions were given for pain medication.    Follow-up Appointments  Future Appointments  Date Time Provider Department Center   3/15/2017 3:00 PM Murtaza Berrios Jr., MD [facility] None         Discharge Disposition Plan:today to home    Date: 3/9/2017    Clyde Callejas MD       Orthopedic Discharge Summary      Patient: Ivy Chairez  YOB: 1955  Medical Record Number: [id number]    Attending Physician: Clyde Callejas MD  Consulting Physician(s):     Date of Admission: 3/6/2017  5:47 AM  Date of Discharge:     Discharge Diagnosis: T4-T10 Fusion with instrumentation,   Acute Blood Loss Anemia      Presenting Problem/History of Present Illness: Herniated thoracic disc without myelopathy [M51.24]  Herniated thoracic disc without myelopathy [M51.24]        Allergies:   Allergies   Allergen Reactions   • Metronidazole Diarrhea and Nausea And Vomiting   • Amoxicillin Swelling       Discharge Medications   Ivy Chairez   Home Medication Instructions EMILY:[id number]    Printed on:03/09/17 0701   Medication Information                      alendronate (FOSAMAX) 70 MG tablet  Take 70 mg by mouth Every 7 (Seven) Days. sundays             aspirin 81 MG tablet  Take 81 mg by mouth Daily.             Cholecalciferol (VITAMIN D3) 2000 UNITS tablet  Take 2,000 Units by mouth Daily.             conjugated estrogens (PREMARIN) 0.625 MG/GM vaginal cream  Insert 1.5 g into the vagina 3 (Three) Times a Week.             diphenhydrAMINE (SOMINEX) 25 MG tablet  Take 25 mg by mouth Every Night.             hydrochlorothiazide (MICROZIDE) 12.5 MG capsule  Take 12.5 mg by mouth Daily As Needed.             levothyroxine (SYNTHROID, LEVOTHROID) 75 MCG tablet  Take 75 mcg by mouth  Daily.             meclizine (ANTIVERT) 12.5 MG tablet  Take 12.5 mg by mouth daily. For 7 days as directed              NITROGLYCERIN SL  Place 1 tablet under the tongue As Needed. For chest pain             oxyCODONE-acetaminophen (PERCOCET) 5-325 MG per tablet  Take 2 tablets by mouth Every 4 (Four) Hours As Needed for moderate pain (4-6) for up to 7 days.             promethazine (PHENERGAN) 12.5 MG tablet  Take 1 tablet by mouth Every 6 (Six) Hours As Needed for Nausea or Vomiting.             rosuvastatin (CRESTOR) 40 MG tablet  Take 40 mg by mouth Daily.             sennosides-docusate sodium (SENOKOT-S) 8.6-50 MG tablet  Take 1 tablet by mouth 2 (Two) Times a Day.             venlafaxine (EFFEXOR) 75 MG tablet  Take 75 mg by mouth Daily.             vitamin B-12 (CYANOCOBALAMIN) 1000 MCG tablet  Take 1,000 mcg by mouth Daily.                   Past Medical History   Diagnosis Date   • Broken toe 03/01/2017     right 4th toe   • Common migraine without aura      Neurologist Dr Govea   • Coronary artery disease    • History of chest pain    • History of CHF (congestive heart failure)    • History of pulmonary embolism    • History of stroke    • Migraine      eval and management by neurologist   • Orthostatic hypotension    • Pleural effusion      POSTOPERATIVE, REQUIRING THORACENTESIS.   • Polyp of sigmoid colon      REMOVED   • Thoracic degenerative disc disease      Received epidural 12/14 [facility] DR Landin   • Thoracic spine pain    • Thyroid disease    • Weakness of right side of body      ARM & FACE        Past Surgical History   Procedure Laterality Date   • Breast augmentation     • Coronary artery bypass graft  04/2009     cabg x 3: LIMA to LAD, vein grafts to diagonal and OM1   • Coronary angioplasty with stent placement       NON-GRAFTED RIGHT CORONARY ARTERY STENT.   • Hysterectomy     • Knee surgery Right    • Total thyroidectomy     • Colonoscopy  06/2004     Dr. Salas   •  Augmentation mammaplasty     • Epidural block injection     • Thoracic decompression posterior fusion with instrumentation N/A 3/6/2017     Procedure: T4-T10 Fusion with instrumentation;  Surgeon: Clyde Callejas MD;  Location: [facility] OR;  Service:         Social History     Occupational History   • Not on file.     Social History Main Topics   • Smoking status: Never Smoker   • Smokeless tobacco: Never Used   • Alcohol use No   • Drug use: No   • Sexual activity: Defer    Social History     Social History Narrative        Family History   Problem Relation Age of Onset   • No Known Problems Mother    • No Known Problems Father          Physical Exam: 61 y.o. female  General Appearance:    Alert, cooperative, in no acute distress                    Vitals:    03/08/17 1400 03/08/17 1700 03/08/17 2206 03/09/17 0535   BP: 104/50 101/79 (!) 88/62 97/51   BP Location: Left arm Left arm Left arm Right arm   Patient Position: Lying Lying Lying Lying   Pulse: 99  87 80   Resp: 17 17  16   Temp:  97.8 °F (36.6 °C) 97.8 °F (36.6 °C) 97.8 °F (36.6 °C)   TempSrc:  Oral Oral Oral   SpO2: 97%  97% 97%   Weight:       Height:            DIAGNOSTIC TESTS:   Admission on 03/06/2017   Component Date Value Ref Range Status   • Product Code 03/06/2017 [id number]   Final   • Unit Number 03/06/2017 [id number]   Final   • UNIT  ABO 03/06/2017 O   Final   • UNIT  RH 03/06/2017 NEG   Final   • CROSSMATCH 1 INTERPRETATION 03/06/2017 Compatible   Final   • Dispense Status 03/06/2017 XM   Final   • Blood Type 03/06/2017 ONEG   Final   • Blood Expiration Date 03/06/2017 201703162359   Final   • Blood Type Barcode 03/06/2017 9500   Final   • Product Code 03/06/2017 [id number]   Final   • Unit Number 03/06/2017 [id number]   Final   • UNIT  ABO 03/06/2017 O   Final   • UNIT  RH 03/06/2017 NEG   Final   • CROSSMATCH 1 INTERPRETATION 03/06/2017 Compatible   Final   • Dispense Status 03/06/2017 XM   Final   • Blood Type 03/06/2017 ONEG    Final   • Blood Expiration Date 03/06/2017 935408132478   Final   • Blood Type Barcode 03/06/2017 9500   Final   • ABO Type 03/06/2017 A   Final   • RH type 03/06/2017 Negative   Final   • Antibody Screen 03/06/2017 Negative   Final   • Glucose 03/06/2017 142* 70 - 130 mg/dL Final   • Troponin T 03/06/2017 <0.010  0.000 - 0.030 ng/mL Final   • Troponin T 03/07/2017 <0.010  0.000 - 0.030 ng/mL Final   • Creatine Kinase 03/06/2017 2437* 20 - 180 U/L Final   • CKMB 03/06/2017 62.97* <=5.30 ng/mL Final   • WBC 03/07/2017 10.86* 4.50 - 10.70 10*3/mm3 Final   • RBC 03/07/2017 3.25* 3.90 - 5.20 10*6/mm3 Final   • Hemoglobin 03/07/2017 10.2* 11.9 - 15.5 g/dL Final   • Hematocrit 03/07/2017 30.3* 35.6 - 45.5 % Final   • MCV 03/07/2017 93.2  80.5 - 98.2 fL Final   • MCH 03/07/2017 31.4  26.9 - 32.0 pg Final   • MCHC 03/07/2017 33.7  32.4 - 36.3 g/dL Final   • RDW 03/07/2017 12.6  11.7 - 13.0 % Final   • RDW-SD 03/07/2017 43.0  37.0 - 54.0 fl Final   • MPV 03/07/2017 8.9  6.0 - 12.0 fL Final   • Platelets 03/07/2017 239  140 - 500 10*3/mm3 Final   • Glucose 03/07/2017 141* 65 - 99 mg/dL Final   • BUN 03/07/2017 9  8 - 23 mg/dL Final   • Creatinine 03/07/2017 0.79  0.57 - 1.00 mg/dL Final   • Sodium 03/07/2017 131* 136 - 145 mmol/L Final   • Potassium 03/07/2017 5.4* 3.5 - 5.2 mmol/L Final   • Chloride 03/07/2017 97* 98 - 107 mmol/L Final   • CO2 03/07/2017 23.3  22.0 - 29.0 mmol/L Final   • Calcium 03/07/2017 8.0* 8.6 - 10.5 mg/dL Final   • eGFR Non African Amer 03/07/2017 74  >60 mL/min/1.73 Final   • BUN/Creatinine Ratio 03/07/2017 11.4  7.0 - 25.0 Final   • Anion Gap 03/07/2017 10.7  mmol/L Final   • Glucose 03/08/2017 149* 65 - 99 mg/dL Final   • BUN 03/08/2017 6* 8 - 23 mg/dL Final   • Creatinine 03/08/2017 0.69  0.57 - 1.00 mg/dL Final   • Sodium 03/08/2017 137  136 - 145 mmol/L Final   • Potassium 03/08/2017 3.7  3.5 - 5.2 mmol/L Final   • Chloride 03/08/2017 103  98 - 107 mmol/L Final   • CO2 03/08/2017 23.5  22.0 -  29.0 mmol/L Final   • Calcium 03/08/2017 7.9* 8.6 - 10.5 mg/dL Final   • eGFR Non African Amer 03/08/2017 86  >60 mL/min/1.73 Final   • BUN/Creatinine Ratio 03/08/2017 8.7  7.0 - 25.0 Final   • Anion Gap 03/08/2017 10.5  mmol/L Final   • WBC 03/08/2017 7.25  4.50 - 10.70 10*3/mm3 Final   • RBC 03/08/2017 2.81* 3.90 - 5.20 10*6/mm3 Final   • Hemoglobin 03/08/2017 8.9* 11.9 - 15.5 g/dL Final   • Hematocrit 03/08/2017 26.3* 35.6 - 45.5 % Final   • MCV 03/08/2017 93.6  80.5 - 98.2 fL Final   • MCH 03/08/2017 31.7  26.9 - 32.0 pg Final   • MCHC 03/08/2017 33.8  32.4 - 36.3 g/dL Final   • RDW 03/08/2017 12.5  11.7 - 13.0 % Final   • RDW-SD 03/08/2017 42.7  37.0 - 54.0 fl Final   • MPV 03/08/2017 9.6  6.0 - 12.0 fL Final   • Platelets 03/08/2017 203  140 - 500 10*3/mm3 Final       Xr Spine Thoracic 2 View    Result Date: 3/8/2017  Narrative: THORACIC SPINE 2 VIEWS  HISTORY: Surgery, back pain.  FINDINGS: 22 seconds of fluoroscopy time was utilized. 5 intraoperative spot films were obtained. Multiple transpedicular screws have been placed which, given the patient obliquity, appear to be in satisfactory position. The posterior rods have not yet been placed. Alignment appears to be satisfactory.  This report was finalized on 3/8/2017 7:22 AM by Dr. Figueroa Erickson MD.      Ct Angiogram Chest With & Without Contrast    Result Date: 3/7/2017  Narrative: CT ANGIOGRAM CHEST WITH AND WITHOUT CONTRAST-  CLINICAL: Postop spine surgery, shortness of breath and chest pain, evaluate for pulmonary embolus.  CT scan of the chest with intravenous contrast, pulmonary embolus protocol to include CT angio and 3-dimensional reconstructed images.  COMPARISON: 08/04/2011.  FINDINGS: Thin axial and CT angio reconstructed images fail to demonstrate pulmonary embolus. Caliber of the aorta is within normal limits, no dissection.  Bilateral small pleural effusions. There is consolidation/atelectasis involving a portion of the right lower lobe. There  is a much smaller area of consolidation/atelectasis along the left lower lobe costophrenic sulcus. There are blebs demonstrated along the periphery of the right lung. The esophagus is satisfactory in course and caliber. There is a hiatal hernia, it is small to moderate in size. There are multiple pedicle screws and vertical fixation rods extending from T3 to T10. Typical appearing postoperative change involving the surrounding tissues.  Cardiac size within normal limits. No pericardial abnormality. There are bilateral breast implants in position. No mediastinal or hilar mass/adenopathy. Limited images through the upper abdomen demonstrate a 2 cm left adrenal nodule similar to the previous examination compatible with adenoma.  CONCLUSION: 1. No pulmonary embolus demonstrated. 2. Small pleural effusions with bibasilar airspace disease as discussed above. 3. Hiatal hernia.  The findings of this report called to the patient's nurse at the time of completion, 8:35 AM.  This report was finalized on 3/7/2017 9:39 AM by Dr. Mike Quesada MD.      Xr Chest 1 View    Result Date: 3/6/2017  Narrative: PORTABLE CHEST 3/6/2017 AT 7:52 PM.  CLINICAL HISTORY: Dyspnea. Postop thoracic spine fusion surgery earlier today.  The lungs are somewhat poorly inflated. There is mild atelectasis at the left lung base. No focal infiltrates are identified. The heart is normal in size. There are stigmata of previous coronary artery bypass procedure. Numerous pedicle screws and rods are in place in the thoracic spine.  IMPRESSIONS: No evidence of acute disease within the chest.  This report was finalized on 3/6/2017 8:16 PM by Dr. Abhinav Sands MD.      Fl C Arm During Surgery    Result Date: 3/6/2017  Narrative: This procedure was auto-finalized with no dictation required.      Hospital Course:  61 y.o. female admitted to [facility] to services of Clyde Callejas MD with Herniated thoracic disc without myelopathy [M51.24]  Herniated  thoracic disc without myelopathy [M51.24] on 3/6/2017 and underwent T4-T10 Fusion with instrumentation  Per Clyde Callejas MD. Antibiotic and VTE prophylaxis were per SCIP protocols.  The patient was admitted to the floor where IV and/or oral pain medications were administered for postoperative pain.  At discharge the incisional pain was tolerable and preop neurologic function was intact.  The dressing was dry and the wound was clean.    Condition on Discharge:  Stable    Discharge Instructions: . Patient may weight bear as tolerated unless otherwise specified. Continue DANI hose daily (for two weeks) and ice regularly. Patient also instructed on incentive spirometer during hospitalization and encouraged to continue to use at home regularly. Patient may shower on POD #3 if and when all wound drainage has stopped.  Where applicable, the brace should be worn when up and about.  It need not be worn to the bathroom and certainly not in the shower.  A detailed list of instructions specific to the operation was given to the patient at the time of discharge.    Follow up Instructions:  Follow up in the office with Dr. Clyde Callejas Jr. in 2-3 weeks - patient to call the office at [phone] to schedule. Prescriptions were given for pain medication.    Follow-up Appointments  Future Appointments  Date Time Provider Department Center   3/15/2017 3:00 PM MD MICHEL Nelson Jr., [facility] None         Discharge Disposition Plan:today to home    Date: 3/9/2017    Clyde Callejas MD           Electronically signed by Clyde Callejas MD at 3/9/2017  7:01 AM

## 2017-03-14 ENCOUNTER — TELEPHONE (OUTPATIENT)
Dept: INTERNAL MEDICINE | Facility: CLINIC | Age: 62
End: 2017-03-14

## 2017-03-14 PROBLEM — R06.00 DYSPNEA: Status: RESOLVED | Noted: 2017-03-06 | Resolved: 2017-03-14

## 2017-03-14 PROBLEM — J98.11 ATELECTASIS OF BOTH LUNGS: Status: RESOLVED | Noted: 2017-03-08 | Resolved: 2017-03-14

## 2017-03-14 NOTE — TELEPHONE ENCOUNTER
Called Anahi back and told her that Ms. Chairez needs an appointment.  She stated that the patient will call back and schedule herself an appointment.

## 2017-03-14 NOTE — TELEPHONE ENCOUNTER
----- Message from Gina Mendoza MA sent at 3/14/2017  1:33 PM EDT -----  Anahi from [facility] called and wanted to see if she should wean Ms. Chairez off of the oxygen she was sent home with.      She was on 2 1/2 liters and Anahi weaned her to 1 1/2 Liters with a oxygen level of 99%.    Please advise

## 2017-03-16 ENCOUNTER — TELEPHONE (OUTPATIENT)
Dept: INTERNAL MEDICINE | Facility: CLINIC | Age: 62
End: 2017-03-16

## 2017-03-16 NOTE — TELEPHONE ENCOUNTER
MiraLAX 1 capful by mouth daily Senokot-S 1 by mouth daily and by the way she on narcotics for pain ?

## 2017-03-16 NOTE — TELEPHONE ENCOUNTER
----- Message from Gina Mendoza MA sent at 3/16/2017 12:53 PM EDT -----  Contact: Diamond Christian - Dr Berrios's pt - RE: order / Rx      Please advise    ----- Message -----     From: Vianney Nava     Sent: 3/16/2017  12:07 PM       To: NIKKI [facility] calling and would like a return call w/ verbal orders for B/M. Anahi informs pt has not had B/M since last Friday. Anahi would like to know if there is any orders that can be written to help pt w/ B/M. Could you please call Anahi bowens/ further instructions or orders? Please advise. Thanks      Diamond Christian  # [phone]

## 2017-03-16 NOTE — TELEPHONE ENCOUNTER
Called Anahi with [facility].    Patient is taking Oxycodone 5-325.  She was taking 2 every 4 hours.  Anahi at [facility] stated that the patient was going to go to taking them 2 every 6-7 hours.      The patient wanted to know if it would be ok to do an enema and or take a suppository.  As well as the Senokot?      Please advise

## 2017-03-22 ENCOUNTER — OFFICE VISIT (OUTPATIENT)
Dept: ORTHOPEDIC SURGERY | Facility: CLINIC | Age: 62
End: 2017-03-22

## 2017-03-22 DIAGNOSIS — Z98.1 S/P LUMBAR FUSION: Primary | ICD-10-CM

## 2017-03-22 PROCEDURE — 99024 POSTOP FOLLOW-UP VISIT: CPT | Performed by: ORTHOPAEDIC SURGERY

## 2017-03-22 PROCEDURE — 72072 X-RAY EXAM THORAC SPINE 3VWS: CPT | Performed by: ORTHOPAEDIC SURGERY

## 2017-03-22 RX ORDER — OXYCODONE HYDROCHLORIDE AND ACETAMINOPHEN 5; 325 MG/1; MG/1
TABLET ORAL
Qty: 50 TABLET | Refills: 0 | Status: SHIPPED | OUTPATIENT
Start: 2017-03-22 | End: 2017-03-24

## 2017-03-22 NOTE — PROGRESS NOTES
Postoperatively the patient expresses normal incisional pain.  There is little or no radiating pain.  Good strength on exam.  The wound is intact.  2 view x-rays of the lumbar spine obtained to evaluate hardware position and fusion bone show good position thereof and are unchanged from intraoperative images.  Instructions given.  We'll need lumbar x-rays on return visit.  Answers for HPI/ROS submitted by the patient on 3/21/2017   Back pain  Chronicity: chronic  Onset: more than 1 year ago  Frequency: constantly  Progression since onset: waxing and waning  Pain location: thoracic spine  Pain quality: aching, stabbing  Radiates to: does not radiate  Pain - numeric: 6/10  Pain is: worse during the day  Aggravated by: sitting, standing  Stiffness is present: in the morning  abdominal pain: No  bladder incontinence: No  bowel incontinence: No  chest pain: No  dysuria: No  fever: No  headaches: No  leg pain: No  numbness: No  paresis: No  paresthesias: No  pelvic pain: No  perianal numbness: No  tingling: No  weakness: No  weight loss: No  Risk factors: recent trauma

## 2017-03-23 ENCOUNTER — OFFICE VISIT (OUTPATIENT)
Dept: INTERNAL MEDICINE | Facility: CLINIC | Age: 62
End: 2017-03-23

## 2017-03-23 VITALS
DIASTOLIC BLOOD PRESSURE: 78 MMHG | BODY MASS INDEX: 23.63 KG/M2 | HEIGHT: 66 IN | RESPIRATION RATE: 14 BRPM | WEIGHT: 147 LBS | SYSTOLIC BLOOD PRESSURE: 142 MMHG

## 2017-03-23 DIAGNOSIS — M51.24 HERNIATED THORACIC DISC WITHOUT MYELOPATHY: ICD-10-CM

## 2017-03-23 DIAGNOSIS — D62 ANEMIA DUE TO BLOOD LOSS, ACUTE: Primary | ICD-10-CM

## 2017-03-23 LAB
BASOPHILS # BLD AUTO: 0.09 10*3/MM3 (ref 0–0.2)
BASOPHILS NFR BLD AUTO: 1.2 % (ref 0–1.5)
EOSINOPHIL # BLD AUTO: 0.09 10*3/MM3 (ref 0–0.7)
EOSINOPHIL # BLD AUTO: 1.2 % (ref 0.3–6.2)
ERYTHROCYTE [DISTWIDTH] IN BLOOD BY AUTOMATED COUNT: 13.5 % (ref 11.7–13)
HCT VFR BLD AUTO: 37.4 % (ref 35.6–45.5)
HGB BLD-MCNC: 11.8 G/DL (ref 11.9–15.5)
IMM GRANULOCYTES # BLD: 0.03 10*3/MM3 (ref 0–0.03)
IMM GRANULOCYTES NFR BLD: 0.4 % (ref 0–0.5)
LYMPHOCYTES # BLD AUTO: 1.91 10*3/MM3 (ref 0.9–4.8)
LYMPHOCYTES NFR BLD AUTO: 25.6 % (ref 19.6–45.3)
MCH RBC QN AUTO: 30.7 PG (ref 26.9–32)
MCHC RBC AUTO-ENTMCNC: 31.6 G/DL (ref 32.4–36.3)
MCV RBC AUTO: 97.4 FL (ref 80.5–98.2)
MONOCYTES # BLD AUTO: 0.7 10*3/MM3 (ref 0.2–1.2)
MONOCYTES NFR BLD AUTO: 9.4 % (ref 5–12)
NEUTROPHILS # BLD AUTO: 4.63 10*3/MM3 (ref 1.9–8.1)
NEUTROPHILS NFR BLD AUTO: 62.2 % (ref 42.7–76)
PLATELET # BLD AUTO: 916 10*3/MM3 (ref 140–500)
RBC # BLD AUTO: 3.84 10*6/MM3 (ref 3.9–5.2)
WBC # BLD AUTO: 7.45 10*3/MM3 (ref 4.5–10.7)

## 2017-03-23 PROCEDURE — 99213 OFFICE O/P EST LOW 20 MIN: CPT | Performed by: INTERNAL MEDICINE

## 2017-03-24 ENCOUNTER — HOSPITAL ENCOUNTER (OUTPATIENT)
Facility: HOSPITAL | Age: 62
Setting detail: OBSERVATION
Discharge: HOME | End: 2017-03-26
Attending: INTERNAL MEDICINE | Admitting: INTERNAL MEDICINE

## 2017-03-24 ENCOUNTER — OFFICE VISIT (OUTPATIENT)
Dept: INTERNAL MEDICINE | Facility: CLINIC | Age: 62
End: 2017-03-24

## 2017-03-24 VITALS
HEART RATE: 81 BPM | OXYGEN SATURATION: 97 % | DIASTOLIC BLOOD PRESSURE: 80 MMHG | HEIGHT: 66 IN | SYSTOLIC BLOOD PRESSURE: 140 MMHG

## 2017-03-24 DIAGNOSIS — I77.9 BILATERAL CAROTID ARTERY DISEASE (HCC): ICD-10-CM

## 2017-03-24 DIAGNOSIS — I95.1 ORTHOSTATIC HYPOTENSION: Primary | ICD-10-CM

## 2017-03-24 DIAGNOSIS — I25.10 TRIPLE VESSEL CORONARY ARTERY DISEASE: ICD-10-CM

## 2017-03-24 DIAGNOSIS — D75.839 THROMBOCYTOSIS: ICD-10-CM

## 2017-03-24 PROBLEM — K56.7 POSTOPERATIVE ILEUS (HCC): Status: RESOLVED | Noted: 2017-03-10 | Resolved: 2017-03-24

## 2017-03-24 PROBLEM — K91.89 POSTOPERATIVE ILEUS (HCC): Status: RESOLVED | Noted: 2017-03-10 | Resolved: 2017-03-24

## 2017-03-24 PROBLEM — M51.24 HERNIATED THORACIC DISC WITHOUT MYELOPATHY: Status: RESOLVED | Noted: 2017-03-06 | Resolved: 2017-03-24

## 2017-03-24 LAB
ALBUMIN SERPL-MCNC: 3.6 G/DL (ref 3.5–5.2)
ALBUMIN/GLOB SERPL: 1.3 G/DL
ALP SERPL-CCNC: 103 U/L (ref 39–117)
ALT SERPL W P-5'-P-CCNC: 11 U/L (ref 1–33)
ANION GAP SERPL CALCULATED.3IONS-SCNC: 10.4 MMOL/L
AST SERPL-CCNC: 14 U/L (ref 1–32)
BILIRUB SERPL-MCNC: 0.6 MG/DL (ref 0.1–1.2)
BUN BLD-MCNC: 7 MG/DL (ref 8–23)
BUN/CREAT SERPL: 10.1 (ref 7–25)
CALCIUM SPEC-SCNC: 9.2 MG/DL (ref 8.6–10.5)
CHLORIDE SERPL-SCNC: 100 MMOL/L (ref 98–107)
CO2 SERPL-SCNC: 26.6 MMOL/L (ref 22–29)
CREAT BLD-MCNC: 0.69 MG/DL (ref 0.57–1)
GFR SERPL CREATININE-BSD FRML MDRD: 86 ML/MIN/1.73
GLOBULIN UR ELPH-MCNC: 2.8 GM/DL
GLUCOSE BLD-MCNC: 106 MG/DL (ref 65–99)
POTASSIUM BLD-SCNC: 4.6 MMOL/L (ref 3.5–5.2)
PROT SERPL-MCNC: 6.4 G/DL (ref 6–8.5)
SODIUM BLD-SCNC: 137 MMOL/L (ref 136–145)

## 2017-03-24 PROCEDURE — 96361 HYDRATE IV INFUSION ADD-ON: CPT

## 2017-03-24 PROCEDURE — 99220 PR INITIAL OBSERVATION CARE/DAY 70 MINUTES: CPT | Performed by: INTERNAL MEDICINE

## 2017-03-24 PROCEDURE — G0378 HOSPITAL OBSERVATION PER HR: HCPCS

## 2017-03-24 PROCEDURE — 96360 HYDRATION IV INFUSION INIT: CPT

## 2017-03-24 PROCEDURE — 99213 OFFICE O/P EST LOW 20 MIN: CPT | Performed by: INTERNAL MEDICINE

## 2017-03-24 PROCEDURE — 93000 ELECTROCARDIOGRAM COMPLETE: CPT | Performed by: INTERNAL MEDICINE

## 2017-03-24 PROCEDURE — 80053 COMPREHEN METABOLIC PANEL: CPT | Performed by: INTERNAL MEDICINE

## 2017-03-24 PROCEDURE — 63710000001 DIPHENHYDRAMINE PER 50 MG: Performed by: INTERNAL MEDICINE

## 2017-03-24 RX ORDER — OXYCODONE HYDROCHLORIDE AND ACETAMINOPHEN 5; 325 MG/1; MG/1
1 TABLET ORAL EVERY 4 HOURS PRN
COMMUNITY
End: 2017-05-10

## 2017-03-24 RX ORDER — ASPIRIN 325 MG
162 TABLET ORAL DAILY
Status: DISCONTINUED | OUTPATIENT
Start: 2017-03-24 | End: 2017-03-26 | Stop reason: HOSPADM

## 2017-03-24 RX ORDER — HYDROCODONE BITARTRATE AND ACETAMINOPHEN 5; 325 MG/1; MG/1
1 TABLET ORAL EVERY 6 HOURS PRN
Status: DISCONTINUED | OUTPATIENT
Start: 2017-03-24 | End: 2017-03-26

## 2017-03-24 RX ORDER — ACETAMINOPHEN 500 MG
1000 TABLET ORAL EVERY 6 HOURS PRN
Status: DISCONTINUED | OUTPATIENT
Start: 2017-03-24 | End: 2017-03-26 | Stop reason: HOSPADM

## 2017-03-24 RX ORDER — ATORVASTATIN CALCIUM 80 MG/1
80 TABLET, FILM COATED ORAL NIGHTLY
Status: DISCONTINUED | OUTPATIENT
Start: 2017-03-24 | End: 2017-03-26 | Stop reason: HOSPADM

## 2017-03-24 RX ORDER — ACETAMINOPHEN 325 MG/1
650 TABLET ORAL EVERY 6 HOURS PRN
Status: DISCONTINUED | OUTPATIENT
Start: 2017-03-24 | End: 2017-03-24

## 2017-03-24 RX ORDER — LEVOTHYROXINE SODIUM 0.07 MG/1
75 TABLET ORAL
Status: DISCONTINUED | OUTPATIENT
Start: 2017-03-25 | End: 2017-03-26 | Stop reason: HOSPADM

## 2017-03-24 RX ORDER — SODIUM CHLORIDE 9 MG/ML
100 INJECTION, SOLUTION INTRAVENOUS CONTINUOUS
Status: DISCONTINUED | OUTPATIENT
Start: 2017-03-24 | End: 2017-03-26

## 2017-03-24 RX ORDER — ROSUVASTATIN CALCIUM 20 MG/1
40 TABLET, COATED ORAL NIGHTLY
Status: DISCONTINUED | OUTPATIENT
Start: 2017-03-24 | End: 2017-03-24 | Stop reason: CLARIF

## 2017-03-24 RX ORDER — NITROGLYCERIN 0.4 MG/1
0.4 TABLET SUBLINGUAL
Status: DISCONTINUED | OUTPATIENT
Start: 2017-03-24 | End: 2017-03-26 | Stop reason: HOSPADM

## 2017-03-24 RX ORDER — VENLAFAXINE HYDROCHLORIDE 75 MG/1
75 CAPSULE, EXTENDED RELEASE ORAL
Status: DISCONTINUED | OUTPATIENT
Start: 2017-03-25 | End: 2017-03-26 | Stop reason: HOSPADM

## 2017-03-24 RX ORDER — DIPHENHYDRAMINE HCL 25 MG
25 CAPSULE ORAL NIGHTLY PRN
Status: DISCONTINUED | OUTPATIENT
Start: 2017-03-24 | End: 2017-03-26

## 2017-03-24 RX ADMIN — DIPHENHYDRAMINE HYDROCHLORIDE 25 MG: 25 CAPSULE ORAL at 23:21

## 2017-03-24 RX ADMIN — HYDROCODONE BITARTRATE AND ACETAMINOPHEN 1 TABLET: 5; 325 TABLET ORAL at 23:01

## 2017-03-24 RX ADMIN — ASPIRIN 162 MG: 325 TABLET ORAL at 23:21

## 2017-03-24 RX ADMIN — ATORVASTATIN CALCIUM 80 MG: 80 TABLET, FILM COATED ORAL at 23:21

## 2017-03-24 RX ADMIN — SODIUM CHLORIDE 125 ML/HR: 9 INJECTION, SOLUTION INTRAVENOUS at 22:05

## 2017-03-24 RX ADMIN — ACETAMINOPHEN 1000 MG: 500 TABLET ORAL at 19:54

## 2017-03-24 RX ADMIN — SODIUM CHLORIDE 500 ML: 9 INJECTION, SOLUTION INTRAVENOUS at 19:54

## 2017-03-24 NOTE — H&P
PROBLEM LIST:   1.  Thoracic spine degenerative joint and degenerative disk disease.   a)  Status post fusion and instrumentation (03/06/2017).  b)  Postoperative dehydration with orthostatic hypotension.   2.  Coronary artery disease.   a)  Status post coronary artery bypass surgery (2009).   b)  Status post pulmonary embolism postoperatively (2009).   3.  Cerebrovascular disease.   a)  Status post left CVA (2001).   b)  Left carotid artery stenosis (50% to 60% stenosis).   4.  GERD.  5.  Migraine.   6.  Osteoporosis.   7.  Degenerative joint disease.   8.  Hypercholesterolemia.   9.  Status post total thyroidectomy for benign disease (1970s).   10.  Chronic anxiety.     HISTORY OF PRESENT ILLNESS:  This pleasant 61-year-old woman underwent spinal fusion with instrumentation on 03/06/2017 for significant degenerative joint and degenerative disk disease. She was discharged home on 03/08/2017. While in hospital, she had some dyspnea and chest pain, and underwent evaluation which included CT scan of the chest PE protocol. This proved to be negative. Troponin levels and EKG were also normal at that time. Since discharge certainly over the last 10-12 days, she has become increasingly weak with associated dizziness, dyspnea, and rapid pounding heart whenever she would attempt to stand up or walk. She was seen in the office yesterday and evaluated. She was seen in followup today and due to persistence of symptoms, she was admitted for further evaluation and treatment. Her back pain has actually subsided significantly and she has not required any analgesia over the last 48 hours. She denied any syncope. There has been no nausea, vomiting, or headache. She denied any dysarthria, dysphagia, focal paresis, or paresthesias. She has not had any chest pain or dyspnea at rest. There has been no cough or wheezing. She denied any abdominal pain, nausea and vomiting, diarrhea, melenic stool, or rectal bleeding.     PAST SURGICAL  HISTORY:    1.  She underwent coronary artery balloon angioplasty and finally coronary artery bypass surgery in 2009. She has been angina-free since that time.   2.  She underwent total thyroidectomy in the 1970s for benign disease and presently is on levothyroxine 0.075 mg daily.    PAST MEDICAL HISTORY:    1.  She had been on aspirin 81 mg per day as her only cardiac medication. Postoperatively, she did sustained pulmonary embolism and was on Coumadin as well as aspirin until 2 or 3 years ago. She has had no recurrence.   2.  She sustained a left CVA in 2001 with right hemiparesis. Fortunately, she has little in the way of residual from that episode.   3.  In 12/2015, carotid artery Doppler showed a 50% to 60% left internal carotid artery stenosis, but was otherwise unremarkable.   4.  She has a history of migraine, but does not require prophylaxis or even acute treatment.   5.  She has osteoporosis, for which she is on vitamin D 2000 units per day as well as Fosamax weekly.   6.  She has known hypercholesterolemia, treated with Crestor 40 mg daily.   7.  She has chronic anxiety, for which she uses Effexor 75 mg daily.     REVIEW OF SYSTEMS:  Other than noted above is negative. She denied any urinary frequency or dysuria. She has not had any nocturia. She relates that she has been drinking perhaps 4 glasses of fluids per day. Her appetite has only recently started to improve, though it is still diminished from her usual p.o. intake.     PHYSICAL EXAMINATION:  GENERAL APPEARANCE: Showed the patient to be a well-nourished, well-developed, pleasant white female; looking ill, although in no acute distress.   VITAL SIGNS: Blood pressure was 125/75 lying and 94/74 standing. Pulse was 78 and regular lying. Respiratory rate was 18 and unlabored. She was afebrile.   SKIN: Showed it to be warm and dry, without rash, petechiae, or ecchymoses.   HEENT: Examination of the eyes showed the sclerae to be clear and the  conjunctivae to be pink. Examination of the oropharynx showed no buccal, lingual, or pharyngeal lesions.   NECK: Showed no carotid bruits.   LUNGS: Auscultation of the lungs showed them to be clear, without rales, rhonchi, or wheezes.   CARDIAC: Showed a regular rhythm. S1 and S2 were normal. There was no S3, S4, rub, click, or murmur.   ABDOMEN: Showed a soft, flat, abdomen, without masses, organomegaly, tenderness, or bruit.   LOWER EXTREMITIES: Showed no pretibial edema. DP pulses were 2+ bilaterally.   NEUROLOGIC: On mental status testing showed the patient to be alert, cooperative, appropriate, and oriented. Speech was normal, as to articulation and content. Motor exam showed 3/4 hand grasp bilaterally. Cranial nerves were grossly symmetric and intact. Cerebellar exam showed slow but steady gait with use of a walker.     DIAGNOSTIC DATA:  Prior to discharge, hemoglobin was 8.9 and white count 7000, with platelet count 203,000. Yesterday, hemoglobin was 11.8 with hematocrit of 37.4, and white count 7000, with platelet count 916,000. EKG today is normal.     ASSESSMENT:  Orthostatic hypotension, symptomatic and very likely related to intravascular volume depletion.     PLAN:  1.  CBC and CMP now, with repeat in a.m.  2.  IV hydration with normal saline 500 mL bolus, followed by normal saline 125 mL per hour.   3.  Continue her regular medications.        Murtaza Berrios M.D.  HBB:ms  D:   03/24/2017 18:54:30  T:   03/24/2017 19:45:46  Job ID:   [id number]  Document ID:   [id number]  cc:

## 2017-03-24 NOTE — PLAN OF CARE
Problem: Patient Care Overview (Adult)  Goal: Plan of Care Review  Outcome: Ongoing (interventions implemented as appropriate)    03/24/17 9976   Coping/Psychosocial Response Interventions   Plan Of Care Reviewed With patient   Patient Care Overview   Progress no change   Outcome Evaluation   Outcome Summary/Follow up Plan VSS, no acute distress noted, call in to MD,        Goal: Adult Individualization and Mutuality  Outcome: Ongoing (interventions implemented as appropriate)

## 2017-03-24 NOTE — PROGRESS NOTES
Subjective   Ivy Chairez is a 61 y.o. female.     History of Present Illness she is here today for follow-up of positional dyspnea, dizziness, and tachycardia.  It has not certain visit yesterday.  She denies any chest pain.  There has been no abdominal pain, nausea and vomiting, diarrhea, or melanotic stool.  There is been no rectal bleeding.  She denies syncope.  On average she has been drinking 4 glasses of liquids per day since hospital discharge.         Review of Systems   Constitutional: Positive for activity change. Negative for appetite change, chills, diaphoresis, fatigue and fever.   Respiratory: Negative for cough, chest tightness, shortness of breath and wheezing.    Cardiovascular: Positive for palpitations. Negative for chest pain and leg swelling.   Gastrointestinal: Negative for abdominal pain, anal bleeding, blood in stool, constipation, diarrhea, nausea and vomiting.   Genitourinary: Negative for dysuria, flank pain, frequency and hematuria.   Musculoskeletal: Negative for arthralgias, back pain and gait problem.   Neurological: Positive for dizziness and weakness. Negative for syncope, facial asymmetry, speech difficulty, numbness and headaches.   Psychiatric/Behavioral: Negative for confusion.       Objective   Physical Exam   Constitutional: She is oriented to person, place, and time. She appears well-developed and well-nourished. She is active. She appears ill.   Eyes: Conjunctivae are normal.   Cardiovascular: Normal rate, regular rhythm, S1 normal and S2 normal.  Exam reveals no S3 and no S4.    No murmur heard.  Pulmonary/Chest: No tachypnea. No respiratory distress. She has no decreased breath sounds. She has no wheezes. She has no rhonchi. She has no rales.   Abdominal: Soft. Normal appearance and bowel sounds are normal. She exhibits no abdominal bruit and no mass. There is no tenderness.       Vascular Status -  Her exam exhibits no right foot edema. Her exam exhibits no left foot  edema.  Neurological: She is alert and oriented to person, place, and time.   Psychiatric: She has a normal mood and affect. Her speech is normal and behavior is normal. Judgment and thought content normal. Cognition and memory are normal.       Assessment/Plan blood pressures 125/75 lying and 94/74 standing.  EKG is normal.  Hematocrit 37.4 hemoglobin 11.8 white count 7000.  Platelet count 916,000    Assessment #1 orthostatic dizziness-with her associated symptoms and significant increase in her hemoglobin over 2 weeks' time it is likely that she is significantly volume depleted.    Plan #1 admitted for vigorous rehydration.

## 2017-03-25 PROBLEM — E86.0 DEHYDRATION SYNDROME: Status: ACTIVE | Noted: 2017-03-25

## 2017-03-25 PROBLEM — D75.839 THROMBOCYTHEMIA: Status: ACTIVE | Noted: 2017-03-25

## 2017-03-25 LAB
ALBUMIN SERPL-MCNC: 3.4 G/DL (ref 3.5–5.2)
ALBUMIN/GLOB SERPL: 1.4 G/DL
ALP SERPL-CCNC: 96 U/L (ref 39–117)
ALT SERPL W P-5'-P-CCNC: 7 U/L (ref 1–33)
ANION GAP SERPL CALCULATED.3IONS-SCNC: 14.6 MMOL/L
AST SERPL-CCNC: 11 U/L (ref 1–32)
BILIRUB SERPL-MCNC: 0.6 MG/DL (ref 0.1–1.2)
BUN BLD-MCNC: 7 MG/DL (ref 8–23)
BUN/CREAT SERPL: 10.9 (ref 7–25)
CALCIUM SPEC-SCNC: 8.8 MG/DL (ref 8.6–10.5)
CHLORIDE SERPL-SCNC: 105 MMOL/L (ref 98–107)
CO2 SERPL-SCNC: 20.4 MMOL/L (ref 22–29)
CREAT BLD-MCNC: 0.64 MG/DL (ref 0.57–1)
DEPRECATED RDW RBC AUTO: 45.7 FL (ref 37–54)
ERYTHROCYTE [DISTWIDTH] IN BLOOD BY AUTOMATED COUNT: 13.2 % (ref 11.7–13)
GFR SERPL CREATININE-BSD FRML MDRD: 94 ML/MIN/1.73
GLOBULIN UR ELPH-MCNC: 2.5 GM/DL
GLUCOSE BLD-MCNC: 93 MG/DL (ref 65–99)
HCT VFR BLD AUTO: 34.7 % (ref 35.6–45.5)
HGB BLD-MCNC: 11.2 G/DL (ref 11.9–15.5)
MCH RBC QN AUTO: 31.1 PG (ref 26.9–32)
MCHC RBC AUTO-ENTMCNC: 32.3 G/DL (ref 32.4–36.3)
MCV RBC AUTO: 96.4 FL (ref 80.5–98.2)
PLATELET # BLD AUTO: 579 10*3/MM3 (ref 140–500)
PMV BLD AUTO: 8.5 FL (ref 6–12)
POTASSIUM BLD-SCNC: 4.5 MMOL/L (ref 3.5–5.2)
PROT SERPL-MCNC: 5.9 G/DL (ref 6–8.5)
RBC # BLD AUTO: 3.6 10*6/MM3 (ref 3.9–5.2)
SODIUM BLD-SCNC: 140 MMOL/L (ref 136–145)
WBC NRBC COR # BLD: 5.59 10*3/MM3 (ref 4.5–10.7)

## 2017-03-25 PROCEDURE — 96361 HYDRATE IV INFUSION ADD-ON: CPT

## 2017-03-25 PROCEDURE — G0378 HOSPITAL OBSERVATION PER HR: HCPCS

## 2017-03-25 PROCEDURE — 85027 COMPLETE CBC AUTOMATED: CPT | Performed by: INTERNAL MEDICINE

## 2017-03-25 PROCEDURE — 63710000001 DIPHENHYDRAMINE PER 50 MG: Performed by: INTERNAL MEDICINE

## 2017-03-25 PROCEDURE — 80053 COMPREHEN METABOLIC PANEL: CPT | Performed by: INTERNAL MEDICINE

## 2017-03-25 PROCEDURE — 99225 PR SBSQ OBSERVATION CARE/DAY 25 MINUTES: CPT | Performed by: INTERNAL MEDICINE

## 2017-03-25 RX ORDER — MECLIZINE HYDROCHLORIDE 25 MG/1
25 TABLET ORAL DAILY
COMMUNITY
End: 2017-03-31 | Stop reason: SDUPTHER

## 2017-03-25 RX ORDER — ACETAMINOPHEN,DIPHENHYDRAMINE HCL 500; 25 MG/1; MG/1
1 TABLET, FILM COATED ORAL NIGHTLY PRN
COMMUNITY
End: 2018-08-27 | Stop reason: ALTCHOICE

## 2017-03-25 RX ADMIN — HYDROCODONE BITARTRATE AND ACETAMINOPHEN 1 TABLET: 5; 325 TABLET ORAL at 21:50

## 2017-03-25 RX ADMIN — DIPHENHYDRAMINE HYDROCHLORIDE 25 MG: 25 CAPSULE ORAL at 21:50

## 2017-03-25 RX ADMIN — VENLAFAXINE HYDROCHLORIDE 75 MG: 75 CAPSULE, EXTENDED RELEASE ORAL at 08:25

## 2017-03-25 RX ADMIN — HYDROCODONE BITARTRATE AND ACETAMINOPHEN 1 TABLET: 5; 325 TABLET ORAL at 13:24

## 2017-03-25 RX ADMIN — ASPIRIN 162 MG: 325 TABLET ORAL at 08:26

## 2017-03-25 RX ADMIN — HYDROCODONE BITARTRATE AND ACETAMINOPHEN 1 TABLET: 5; 325 TABLET ORAL at 06:04

## 2017-03-25 RX ADMIN — SODIUM CHLORIDE 125 ML/HR: 9 INJECTION, SOLUTION INTRAVENOUS at 07:23

## 2017-03-25 RX ADMIN — SODIUM CHLORIDE 100 ML/HR: 9 INJECTION, SOLUTION INTRAVENOUS at 16:34

## 2017-03-25 RX ADMIN — LEVOTHYROXINE SODIUM 75 MCG: 75 TABLET ORAL at 06:04

## 2017-03-25 NOTE — PROGRESS NOTES
"Problem list #1 dehydration syndrome  #2 is status post thoracic spine fusion and instrumentation #3 coronary artery disease      subjective - \"I feel so much better this morning\".  She was up briefly to brush her teeth this morning and felt much better.  She denies dizziness, pounding heart, shortness of breath.  She denies any chest pain.    Objective - she is afebrile.   Pulse 71  respiratory rate 18  and blood pressure 110/70    Auscultation of the lungs showed them to be clear without rales rhonchi or wheezes.  Cardiac exam shows a regular rhythm.  S1 and S2 are normal.  There is no S3-S4 rub click or murmur.  Examination of lower extremity shows no pretibial edema.  Neurologic exam shows the patient to be alert cooperative appropriate and oriented.    Sodium 137 potassium 4.6 BUN 7 creatinine 0.69 glucose 106 calcium 9.2.  The function tests are normal.  Hematocrit 34.7 hemoglobin 11.2 white count 5000.  Lately count has decreased from 916,000 to  579,000.    Assessment #1 dehydration syndrome-much improved    Plan #1 decrease IV rate 100 mL per hour #2 ambulate freely #3 CBC and BMP in a.m.  If she does well today she will be discharged tomorrow.   "

## 2017-03-25 NOTE — PLAN OF CARE
Problem: Patient Care Overview (Adult)  Goal: Plan of Care Review  Outcome: Ongoing (interventions implemented as appropriate)    03/25/17 1452   Coping/Psychosocial Response Interventions   Plan Of Care Reviewed With patient   Patient Care Overview   Progress improving   Outcome Evaluation   Outcome Summary/Follow up Plan incision intact with steristrips, ivf infusing, voiding without difficulty, no bm, c/o feeling hot and nauseous after returning from bathroom at times, sat up in chair, turning self in bed, no emesis, vss       Goal: Adult Individualization and Mutuality  Outcome: Ongoing (interventions implemented as appropriate)  Goal: Discharge Needs Assessment  Outcome: Ongoing (interventions implemented as appropriate)    Problem: Pain, Acute (Adult)  Goal: Acceptable Pain Control/Comfort Level  Outcome: Ongoing (interventions implemented as appropriate)

## 2017-03-25 NOTE — PLAN OF CARE
Problem: Patient Care Overview (Adult)  Goal: Plan of Care Review  Outcome: Ongoing (interventions implemented as appropriate)    03/25/17 0420   Coping/Psychosocial Response Interventions   Plan Of Care Reviewed With patient   Patient Care Overview   Progress no change   Outcome Evaluation   Outcome Summary/Follow up Plan up to bathroom with assist. main c/o of back pain not relieved with tylenol. md notified and hydrocodone ordered/given. appeared to sleep through night.        Goal: Adult Individualization and Mutuality  Outcome: Ongoing (interventions implemented as appropriate)  Goal: Discharge Needs Assessment  Outcome: Ongoing (interventions implemented as appropriate)    Problem: Pain, Acute (Adult)  Goal: Identify Related Risk Factors and Signs and Symptoms  Outcome: Outcome(s) achieved Date Met:  03/25/17  Goal: Acceptable Pain Control/Comfort Level  Outcome: Ongoing (interventions implemented as appropriate)

## 2017-03-26 VITALS
SYSTOLIC BLOOD PRESSURE: 126 MMHG | WEIGHT: 147.38 LBS | TEMPERATURE: 97 F | RESPIRATION RATE: 18 BRPM | OXYGEN SATURATION: 100 % | BODY MASS INDEX: 23.69 KG/M2 | HEART RATE: 80 BPM | HEIGHT: 66 IN | DIASTOLIC BLOOD PRESSURE: 81 MMHG

## 2017-03-26 PROBLEM — I95.1 ORTHOSTATIC HYPOTENSION: Status: RESOLVED | Noted: 2017-03-24 | Resolved: 2017-03-26

## 2017-03-26 PROBLEM — D75.839 THROMBOCYTHEMIA: Status: RESOLVED | Noted: 2017-03-25 | Resolved: 2017-03-26

## 2017-03-26 PROBLEM — D75.839 THROMBOCYTOSIS: Status: ACTIVE | Noted: 2017-03-26

## 2017-03-26 LAB
ANION GAP SERPL CALCULATED.3IONS-SCNC: 10.2 MMOL/L
BUN BLD-MCNC: 6 MG/DL (ref 8–23)
BUN/CREAT SERPL: 9.1 (ref 7–25)
CALCIUM SPEC-SCNC: 8.6 MG/DL (ref 8.6–10.5)
CHLORIDE SERPL-SCNC: 106 MMOL/L (ref 98–107)
CO2 SERPL-SCNC: 25.8 MMOL/L (ref 22–29)
CREAT BLD-MCNC: 0.66 MG/DL (ref 0.57–1)
DEPRECATED RDW RBC AUTO: 46.3 FL (ref 37–54)
ERYTHROCYTE [DISTWIDTH] IN BLOOD BY AUTOMATED COUNT: 13.3 % (ref 11.7–13)
GFR SERPL CREATININE-BSD FRML MDRD: 91 ML/MIN/1.73
GLUCOSE BLD-MCNC: 94 MG/DL (ref 65–99)
HCT VFR BLD AUTO: 32.7 % (ref 35.6–45.5)
HGB BLD-MCNC: 10.6 G/DL (ref 11.9–15.5)
MCH RBC QN AUTO: 30.8 PG (ref 26.9–32)
MCHC RBC AUTO-ENTMCNC: 32.4 G/DL (ref 32.4–36.3)
MCV RBC AUTO: 95.1 FL (ref 80.5–98.2)
PLATELET # BLD AUTO: 566 10*3/MM3 (ref 140–500)
PMV BLD AUTO: 8 FL (ref 6–12)
POTASSIUM BLD-SCNC: 4.4 MMOL/L (ref 3.5–5.2)
RBC # BLD AUTO: 3.44 10*6/MM3 (ref 3.9–5.2)
SODIUM BLD-SCNC: 142 MMOL/L (ref 136–145)
WBC NRBC COR # BLD: 4.79 10*3/MM3 (ref 4.5–10.7)

## 2017-03-26 PROCEDURE — 80048 BASIC METABOLIC PNL TOTAL CA: CPT | Performed by: INTERNAL MEDICINE

## 2017-03-26 PROCEDURE — 96361 HYDRATE IV INFUSION ADD-ON: CPT

## 2017-03-26 PROCEDURE — 85027 COMPLETE CBC AUTOMATED: CPT | Performed by: INTERNAL MEDICINE

## 2017-03-26 PROCEDURE — 99217 PR OBSERVATION CARE DISCHARGE MANAGEMENT: CPT | Performed by: INTERNAL MEDICINE

## 2017-03-26 PROCEDURE — G0378 HOSPITAL OBSERVATION PER HR: HCPCS

## 2017-03-26 RX ADMIN — VENLAFAXINE HYDROCHLORIDE 75 MG: 75 CAPSULE, EXTENDED RELEASE ORAL at 09:00

## 2017-03-26 RX ADMIN — LEVOTHYROXINE SODIUM 75 MCG: 75 TABLET ORAL at 08:59

## 2017-03-26 RX ADMIN — SODIUM CHLORIDE 100 ML/HR: 9 INJECTION, SOLUTION INTRAVENOUS at 02:57

## 2017-03-26 RX ADMIN — HYDROCODONE BITARTRATE AND ACETAMINOPHEN 1 TABLET: 5; 325 TABLET ORAL at 08:59

## 2017-03-26 RX ADMIN — ASPIRIN 162 MG: 325 TABLET ORAL at 08:59

## 2017-03-26 NOTE — PLAN OF CARE
Problem: Patient Care Overview (Adult)  Goal: Plan of Care Review  Outcome: Ongoing (interventions implemented as appropriate)    03/26/17 0456   Coping/Psychosocial Response Interventions   Plan Of Care Reviewed With patient   Patient Care Overview   Progress improving   Outcome Evaluation   Outcome Summary/Follow up Plan ivf, s/s intact on back, ortho bp taken       Goal: Adult Individualization and Mutuality  Outcome: Ongoing (interventions implemented as appropriate)  Goal: Discharge Needs Assessment  Outcome: Ongoing (interventions implemented as appropriate)    Problem: Pain, Acute (Adult)  Goal: Acceptable Pain Control/Comfort Level  Outcome: Ongoing (interventions implemented as appropriate)

## 2017-03-26 NOTE — PROGRESS NOTES
Problem list #1 dehydration syndrome with orthostatic hypotension    Subjective - she feels much better today compared to 2 days ago.  She is able to ambulate around the nurse's station without dizziness, dyspnea, or pounding heart.  She is tolerating diet and offers no complaints this morning.    Objective -blood pressure 126/81 pulse 80 respiratory rate 18 she is afebrile    Auscultation of the lungs showed them to clear without rales rhonchi or wheezes.  Cardiac exam shows a regular rhythm.  S1 and S2 are normal.  There is no S3-S4 rub click or murmur.  Examination of the lower extremity shows no pretibial edema.    Sodium 142 potassium 4.4 glucose 94 calcium 8.6 BUN 6 creatinine 0.66.  Hematocrit 32.7 hemoglobin 10.6 white count 4000 platelet count 566,000    Assessment #1 dehydration syndrome-much improved and orthostatic hypotension has resolved #2 thrombocytosis -likely reactive related to recent surgery and dehydration    Plan #1 home today #2 resume usual medications #3 she is to be up but not undertake any significant exercise or exertion for at least 2 more days.  She will drink at least 8 glasses of fluids daily.

## 2017-03-26 NOTE — DISCHARGE SUMMARY
DISCHARGE DIAGNOSES:  1.  Thoracic spine degenerative joint and degenerative disk disease.  A) Status post fusion and instrumentation (03/06/2017).  2.  Postoperative dehydration syndrome with orthostatic hypertension.  3.  Coronary artery disease.  A) Status post coronary artery bypass surgery (2009).  B) Status post pulmonary embolism postoperatively (2009).  4.  Cerebrovascular disease.  A) Status post left cerebrovascular accident (2001).  B) Left carotid artery stenosis (50% to 60% stenosis).  5.  GERD.  6.  Migraine.  7.  Osteoporosis.  8.  Degenerative joint disease.  9.  Hypercholesterolemia.  10.  Status post total thyroidectomy for benign disease (1970s).  11.  Chronic anxiety.    HISTORY OF PRESENT ILLNESS: This pleasant 61-year-old woman underwent thoracic spine fusion and instrumentation on 03/06/2017 for significant degenerative joint and degenerative disk disease. She was discharged home on 03/08/2017. While in hospital, she had some dyspnea and chest pain, and underwent evaluation which included CT scan of the chest, which was negative for PE. She also had troponin levels and EKGs which were normal. She was discharged at that time and over the subsequent period of time, she had become increasingly weak with dizziness, dyspnea, and pounding heart whenever she would stand up or attempt to walk. She was seen in the office and evaluated. She was found to have significant orthostatic hypotension and to be quite symptomatic upon standing up. It was felt that she was dehydrated and she was admitted for further evaluation and treatment.    PAST MEDICAL HISTORY: Please see history and physical.    PHYSICAL EXAMINATION:  GENERAL: The patient is a well-nourished, well-developed, pleasant, white female, appearing somewhat ill.  VITAL SIGNS: Blood pressure was 125/75 lying, and 94/74 standing. Pulse was 78 and regular. Respiratory rate was 18 and unlabored. She was afebrile.   SKIN: Warm and dry without rash,  petechia, or ecchymoses.  HEENT: Examination of the eyes showed the sclerae to be clear and the conjunctivae to be pink. Examination of the oropharynx showed no buccal, lingual, or pharyngeal lesions.  NECK: No carotid bruits.  LUNGS: Auscultation of the lungs showed them to be clear without rales, rhonchi, or wheezes.  CARDIAC: Regular rhythm. S1 and S2 are normal. There was no S3, S4, rub, click, or murmur.  ABDOMEN: Soft and flat without masses, organomegaly, tenderness, or bruit.  EXTREMITIES: Examination of the lower extremities showed no pretibial edema. DP pulses were 2+ bilaterally.  NEUROLOGIC: Mental status testing showed the patient was cooperative, appropriate, and oriented, though lethargic. Speech was normal as to articulation and content. Motor exam showed 3/4 hand grasp bilaterally. Cranial nerves are grossly symmetric and intact. Cerebellar exam showed that she had a slow, but steady gait and required use of a walker.    HOSPITAL COURSE: Prior to hospital discharge on 03/08/2017, her hemoglobin was 8.9 with a white count of 7000 and platelet count of 230,000. The day prior to admission, hematocrit was 37.4, hemoglobin 11.8, and white count 7000. Platelet count was 916,000. On admission, the patient was started on IV fluids, given a 500 mL saline bolus, followed by normal saline at 125 mL per hour. Her regular medications were continued. On admission, sodium was 137, potassium 4.6, BUN 7, creatinine 0.69, glucose 106, and calcium 9.2. Repeat CBC showed a hematocrit of 34.7, hemoglobin 11.2, white count 5000 and platelet count 579,000. Over the course of the admission, the patient improved significantly. Her intravenous fluid rate was decreased to 200 mL/h over the last 24 hours of her admission. She was able to walk in the halls without any symptoms, though she did try are relatively easily. Vital signs remained normal. Prior to discharge, hematocrit was 32.7, hemoglobin 10.6, white count 4000 and  platelet count 566,000. Electrolytes showed BUN and creatinine all remained normal.    Thus, at the point of discharge, the patient was much improved. She was instructed to rest over the next 2 days and not to exert herself strenuously. She was not to restart physical therapy for at least 2 days. She will be up and about, but do nothing of strenuous nature.   She will drink at least 8 glasses of fluids per day.    DISCHARGE MEDICATIONS:  1.  Aspirin 81 mg p.o. daily.  2.  Crestor 40 mg p.o. daily.  3.  Levothyroxine 0.05 mg p.o. daily.  4.  Effexor 75 mg p.o. daily.  5.  Nitroglycerin 0.4 mg sublingual p.r.n.  6.  Oxycodone 5 mg/325 mg, 1 p.o. b.i.d. p.r.n. back pain.        Murtaza Berrios M.D.  HBB:[facility]  D:   03/26/2017 10:10:46  T:   03/26/2017 10:53:05  Job ID:   [id number]  Document ID:   [id number]  cc:

## 2017-03-27 ENCOUNTER — TELEPHONE (OUTPATIENT)
Dept: ORTHOPEDIC SURGERY | Facility: CLINIC | Age: 62
End: 2017-03-27

## 2017-03-27 NOTE — TELEPHONE ENCOUNTER
Please see message below from the patient and advise.     Thank You     ----- Message from Ivy Chairez sent at 3/27/2017  9:19 AM EDT -----    Regarding: Non-Urgent Medical Question    Contact: [phone]    I originally came home with oxygen after back surgery. I no longer need it [facility] will pick it up tomorrow. They would like a note of release.

## 2017-03-27 NOTE — TELEPHONE ENCOUNTER
I have responded to patient's email with this information and I have also notified her via telephone.

## 2017-03-29 ENCOUNTER — TELEPHONE (OUTPATIENT)
Dept: ORTHOPEDIC SURGERY | Facility: CLINIC | Age: 62
End: 2017-03-29

## 2017-03-30 DIAGNOSIS — R42 DIZZINESS: Primary | ICD-10-CM

## 2017-03-31 RX ORDER — MECLIZINE HYDROCHLORIDE 25 MG/1
25 TABLET ORAL 3 TIMES DAILY
Qty: 270 TABLET | Refills: 3 | Status: SHIPPED | OUTPATIENT
Start: 2017-03-31 | End: 2018-09-12 | Stop reason: SDUPTHER

## 2017-04-03 RX ORDER — CYCLOBENZAPRINE HCL 10 MG
10 TABLET ORAL NIGHTLY PRN
Qty: 30 TABLET | Refills: 0 | Status: SHIPPED | OUTPATIENT
Start: 2017-04-03 | End: 2018-08-27

## 2017-04-12 ENCOUNTER — OFFICE VISIT (OUTPATIENT)
Dept: INTERNAL MEDICINE | Facility: CLINIC | Age: 62
End: 2017-04-12

## 2017-04-12 VITALS
HEART RATE: 84 BPM | BODY MASS INDEX: 25.04 KG/M2 | DIASTOLIC BLOOD PRESSURE: 74 MMHG | HEIGHT: 66 IN | WEIGHT: 155.8 LBS | SYSTOLIC BLOOD PRESSURE: 124 MMHG

## 2017-04-12 DIAGNOSIS — E03.9 ACQUIRED HYPOTHYROIDISM: ICD-10-CM

## 2017-04-12 DIAGNOSIS — D75.839 THROMBOCYTOSIS: ICD-10-CM

## 2017-04-12 DIAGNOSIS — I25.10 TRIPLE VESSEL CORONARY ARTERY DISEASE: Primary | ICD-10-CM

## 2017-04-12 PROBLEM — E86.0 DEHYDRATION SYNDROME: Status: RESOLVED | Noted: 2017-03-25 | Resolved: 2017-04-12

## 2017-04-12 LAB
ALBUMIN SERPL-MCNC: 3.83 G/DL (ref 3.4–4.6)
ALBUMIN/GLOB SERPL: 1.2 G/DL
ALP SERPL-CCNC: 90 U/L (ref 46–116)
ALT SERPL W P-5'-P-CCNC: 17 U/L (ref 14–59)
ANION GAP SERPL CALCULATED.3IONS-SCNC: 9 MMOL/L
AST SERPL-CCNC: 18 U/L (ref 7–37)
BASOPHILS # BLD AUTO: 0.04 10*3/MM3 (ref 0–0.2)
BASOPHILS NFR BLD AUTO: 0.7 % (ref 0–2)
BILIRUB SERPL-MCNC: 0.8 MG/DL (ref 0.2–1)
BUN BLD-MCNC: 9 MG/DL (ref 6–22)
BUN/CREAT SERPL: 10.8 (ref 7–25)
CALCIUM SPEC-SCNC: 9.5 MG/DL (ref 8.6–10.5)
CHLORIDE SERPL-SCNC: 103 MMOL/L (ref 95–107)
CO2 SERPL-SCNC: 28 MMOL/L (ref 23–32)
CREAT BLD-MCNC: 0.83 MG/DL (ref 0.55–1.02)
DEPRECATED RDW RBC AUTO: 43.4 FL (ref 37–54)
EOSINOPHIL # BLD AUTO: 0.09 10*3/MM3 (ref 0–0.7)
EOSINOPHIL NFR BLD AUTO: 1.5 % (ref 0–5)
ERYTHROCYTE [DISTWIDTH] IN BLOOD BY AUTOMATED COUNT: 12.6 % (ref 11.5–15)
GFR SERPL CREATININE-BSD FRML MDRD: 70 ML/MIN/1.73
GLOBULIN UR ELPH-MCNC: 3.3 GM/DL
GLUCOSE BLD-MCNC: 82 MG/DL (ref 70–100)
HCT VFR BLD AUTO: 40 % (ref 34.1–44.9)
HGB BLD-MCNC: 12.8 G/DL (ref 11.2–15.7)
LYMPHOCYTES # BLD AUTO: 1.75 10*3/MM3 (ref 0.8–7)
LYMPHOCYTES NFR BLD AUTO: 28.8 % (ref 10–60)
MCH RBC QN AUTO: 30.8 PG (ref 26–34)
MCHC RBC AUTO-ENTMCNC: 32 G/DL (ref 31–37)
MCV RBC AUTO: 96.4 FL (ref 80–100)
MONOCYTES # BLD AUTO: 0.66 10*3/MM3 (ref 0–1)
MONOCYTES NFR BLD AUTO: 10.9 % (ref 0–13)
NEUTROPHILS # BLD AUTO: 3.53 10*3/MM3 (ref 1–11)
NEUTROPHILS NFR BLD AUTO: 58.1 % (ref 30–85)
PLATELET # BLD AUTO: 447 10*3/MM3 (ref 150–450)
PMV BLD AUTO: 8.4 FL (ref 6–12)
POTASSIUM BLD-SCNC: 5.5 MMOL/L (ref 3.3–5.3)
PROT SERPL-MCNC: 7.1 G/DL (ref 6.3–8.4)
RBC # BLD AUTO: 4.15 10*6/MM3 (ref 3.93–5.22)
SODIUM BLD-SCNC: 140 MMOL/L (ref 136–145)
TSH SERPL DL<=0.05 MIU/L-ACNC: 0.75 MIU/ML (ref 0.4–4.2)
WBC NRBC COR # BLD: 6.07 10*3/MM3 (ref 5–10)

## 2017-04-12 PROCEDURE — 99213 OFFICE O/P EST LOW 20 MIN: CPT | Performed by: INTERNAL MEDICINE

## 2017-04-12 PROCEDURE — 36415 COLL VENOUS BLD VENIPUNCTURE: CPT | Performed by: INTERNAL MEDICINE

## 2017-04-12 PROCEDURE — 85025 COMPLETE CBC W/AUTO DIFF WBC: CPT | Performed by: INTERNAL MEDICINE

## 2017-04-12 PROCEDURE — 80053 COMPREHEN METABOLIC PANEL: CPT | Performed by: INTERNAL MEDICINE

## 2017-04-12 PROCEDURE — 84443 ASSAY THYROID STIM HORMONE: CPT | Performed by: INTERNAL MEDICINE

## 2017-04-12 NOTE — PROGRESS NOTES
Subjective   Ivy Chairez is a 61 y.o. female.     History of Present Illness she is here today for evaluation of persistent orthostatic dizziness lasting one or 2 minutes.  She also relates rapid heartbeat with mild activity.  She has dyspnea at rest as well as when walking 100 feet.  These are all changes since her surgery for thoracic spine degenerative joint and degenerative disc disease in February.  She is using one half oxycodone twice daily for pain relief.  Symptoms do not occur after its use.  She denies any PND or chest pain.  Her pulse go from the 80s up to above 100 with these episodes.  She denies any nausea or vomiting, abdominal pain, diarrhea, melanotic stool, or rectal bleeding.  There has been no syncope.  She underwent coronary artery bypass surgery in 2009.  Her last Cardiolite stress test was in 2014.        Review of Systems   Constitutional: Negative for activity change, appetite change, fatigue and fever.   Respiratory: Positive for shortness of breath. Negative for cough, chest tightness and wheezing.    Cardiovascular: Positive for palpitations. Negative for chest pain and leg swelling.   Gastrointestinal: Negative for abdominal pain, anal bleeding, blood in stool, constipation, diarrhea, nausea and vomiting.   Genitourinary: Negative for flank pain and hematuria.   Musculoskeletal: Positive for back pain. Negative for arthralgias and gait problem.   Neurological: Positive for dizziness. Negative for syncope, facial asymmetry, speech difficulty, weakness, numbness and headaches.   Psychiatric/Behavioral: Negative for confusion and dysphoric mood. The patient is not nervous/anxious.        Objective   Physical Exam   Constitutional: She is oriented to person, place, and time. Vital signs are normal. She appears well-developed and well-nourished. She is active. She does not appear ill.   Eyes: Conjunctivae are normal.   Cardiovascular: Normal rate, regular rhythm, S1 normal and S2  normal.  Exam reveals no S3 and no S4.    No murmur heard.  Pulmonary/Chest: No tachypnea. No respiratory distress. She has no decreased breath sounds. She has no wheezes. She has no rhonchi. She has no rales.   Abdominal: Soft. Normal appearance and bowel sounds are normal. She exhibits no abdominal bruit and no mass. There is no hepatosplenomegaly. There is no tenderness.       Vascular Status -  Her exam exhibits no right foot edema. Her exam exhibits no left foot edema.  Neurological: She is alert and oriented to person, place, and time. Gait normal.   Psychiatric: She has a normal mood and affect. Her speech is normal and behavior is normal. Judgment and thought content normal. Cognition and memory are normal.       Assessment/Plan blood pressure lying is 140/74.  Blood pressure standing is 128/82.  O2 sat is 98% at rest and 95% post ambulation.  Pulse at rest is 79 and 98 post ambulation.    Assessment #1 orthostatic dizziness and tachycardia-although this has improved it is a persistent problem and also associated with new dyspnea on exertion.  In February CT scan of the chest PE protocol was negative for pulmonary embolism.    Plan #1 no change medication #2 CBC, CMP, TSH today #3 cardiology consult for reassessment of coronary artery disease status.  Routine follow-up with me in one month.

## 2017-04-14 ENCOUNTER — OFFICE VISIT (OUTPATIENT)
Dept: CARDIOLOGY | Facility: CLINIC | Age: 62
End: 2017-04-14

## 2017-04-14 VITALS
WEIGHT: 156 LBS | HEART RATE: 82 BPM | DIASTOLIC BLOOD PRESSURE: 84 MMHG | SYSTOLIC BLOOD PRESSURE: 129 MMHG | HEIGHT: 66 IN | BODY MASS INDEX: 25.07 KG/M2

## 2017-04-14 DIAGNOSIS — E03.9 ACQUIRED HYPOTHYROIDISM: ICD-10-CM

## 2017-04-14 DIAGNOSIS — R55 VASOVAGAL SYNCOPE: ICD-10-CM

## 2017-04-14 DIAGNOSIS — I25.10 CORONARY ARTERIOSCLEROSIS IN NATIVE ARTERY: ICD-10-CM

## 2017-04-14 DIAGNOSIS — I25.10 TRIPLE VESSEL CORONARY ARTERY DISEASE: ICD-10-CM

## 2017-04-14 DIAGNOSIS — R07.2 PRECORDIAL PAIN: ICD-10-CM

## 2017-04-14 DIAGNOSIS — I45.9 STOKES-ADAMS SYNCOPE: ICD-10-CM

## 2017-04-14 DIAGNOSIS — Z86.711 HISTORY OF PULMONARY EMBOLISM: ICD-10-CM

## 2017-04-14 DIAGNOSIS — I10 ESSENTIAL HYPERTENSION: ICD-10-CM

## 2017-04-14 DIAGNOSIS — R55 SYNCOPE AND COLLAPSE: Primary | ICD-10-CM

## 2017-04-14 DIAGNOSIS — I25.10 CAD IN NATIVE ARTERY: ICD-10-CM

## 2017-04-14 DIAGNOSIS — D62 ANEMIA DUE TO BLOOD LOSS, ACUTE: ICD-10-CM

## 2017-04-14 DIAGNOSIS — K21.9 GASTROESOPHAGEAL REFLUX DISEASE WITHOUT ESOPHAGITIS: ICD-10-CM

## 2017-04-14 DIAGNOSIS — E78.5 HYPERLIPIDEMIA, UNSPECIFIED HYPERLIPIDEMIA TYPE: ICD-10-CM

## 2017-04-14 DIAGNOSIS — R06.02 SHORTNESS OF BREATH: ICD-10-CM

## 2017-04-14 DIAGNOSIS — Z95.1 HISTORY OF CORONARY ARTERY BYPASS SURGERY: ICD-10-CM

## 2017-04-14 DIAGNOSIS — I77.9 BILATERAL CAROTID ARTERY DISEASE (HCC): ICD-10-CM

## 2017-04-14 DIAGNOSIS — I20.9 ANGINA PECTORIS (HCC): ICD-10-CM

## 2017-04-14 PROCEDURE — 99214 OFFICE O/P EST MOD 30 MIN: CPT | Performed by: INTERNAL MEDICINE

## 2017-04-14 PROCEDURE — 93000 ELECTROCARDIOGRAM COMPLETE: CPT | Performed by: INTERNAL MEDICINE

## 2017-04-14 NOTE — PROGRESS NOTES
[facility]  Cardiology Office Visit Note        Subjective:     Encounter Date:2017      Patient ID: Ivy Chairez   Age: 61 y.o.  Sex: female  :  1955  MRN: [id number]             Date of Office Visit: 2017  Encounter Provider: Kade Humphrey MD  Place of Service: [facility]     .    Chief Complaint:  History of Present Illness    The following portions of the patient's history were reviewed and updated as appropriate: allergies, current medications, past family history, past medical history, past social history, past surgical history and problem list.    Review of Systems   Constitution: Negative for chills, fever and weight gain.   HENT: Negative for congestion, ear pain, headaches, hearing loss and sore throat.    Eyes: Negative for blurred vision and double vision.   Cardiovascular: Positive for orthopnea. Negative for chest pain, claudication, cyanosis, dyspnea on exertion, irregular heartbeat, leg swelling, near-syncope, palpitations, paroxysmal nocturnal dyspnea and syncope.   Respiratory: Positive for shortness of breath. Negative for cough, hemoptysis, snoring, sputum production and wheezing.    Endocrine: Negative for cold intolerance.   Hematologic/Lymphatic: Negative for adenopathy. Does not bruise/bleed easily.   Skin: Negative for rash.   Musculoskeletal: Negative for back pain and neck pain.   Gastrointestinal: Negative for abdominal pain, change in bowel habit, constipation, diarrhea, nausea and vomiting.   Genitourinary: Negative for dysuria, frequency, hematuria and hesitancy.   Neurological: Positive for dizziness and light-headedness. Negative for disturbances in coordination, excessive daytime sleepiness, focal weakness, loss of balance and numbness.   Psychiatric/Behavioral: Negative for depression and memory loss. The patient is not nervous/anxious.    Allergic/Immunologic: Negative for hives.         ECG  12 Lead  Date/Time: 4/14/2017 1:05 PM  Performed by: BARTOLO ARTIS JR  Authorized by: BARTOLO ARTIS JR   Comparison: compared with previous ECG   Similar to previous ECG  Rhythm: sinus rhythm  Ectopy: infrequent PVCs  BPM: 82  T depression: V1 and V2  Clinical impression: non-specific ECG                       HPI     The patient is a 61-year-old white female status post CABG in 2009, with 3 stents prior to that, proximal LAD and diagonal J 23rd 2009, proximal RCA every 23rd 2009, dyspnea should tobacco abuse, hyperlipidemia, pulmonary embolus, after bypass, who presents for initial cardiac evaluation.  She been followed by [facility] but did not want to take 3 blood thinners at one time so switched to [facility] pantoja.  However she went to see a [facility] cardiologist so switched to me.    Prior to her bypass and stents, she had back pain.  She's not had back pain for the past couple weeks but did have back surgery in early March of this year.    Cardiac review systems: She had heartburn one night in the anterior chest bilaterally that awoke her from sleep.  He last met 45 minutes she believes.  She took antacids without relief.  No associated diaphoresis or shortness of breath and nausea.    She also complains of her heart racing associated shortness of breath when she does anything such as makes the bed.  She also complains of feeling lightheaded with standing.  She saw her primary care physician who found that she was mildly orthostatic with supine blood pressure 140/74, then standing 120/82.    Cardiac risk factors: No family history of early CAD.  No diabetes.  No hypertension.  Positive for hyperlipidemia.  No smoking since 2009          Past Medical History:   Diagnosis Date   • Broken toe 03/01/2017    right 4th toe   • Common migraine without aura     Neurologist Dr Govea   • Coronary artery disease    • History of chest pain    • History of CHF (congestive heart  failure)    • History of pulmonary embolism    • History of stroke    • Migraine     eval and management by neurologist   • Orthostatic hypotension    • Pleural effusion     POSTOPERATIVE, REQUIRING THORACENTESIS.   • Polyp of sigmoid colon     REMOVED   • Thoracic degenerative disc disease     Received epidural 12/14 [facility] DR Landin   • Thoracic spine pain    • Thyroid disease    • Weakness of right side of body     [facility]       Past Surgical History:   Procedure Laterality Date   • AUGMENTATION MAMMAPLASTY     • BACK SURGERY     • BREAST AUGMENTATION     • COLONOSCOPY  06/2004    Dr. Salas   • CORONARY ANGIOPLASTY WITH STENT PLACEMENT      NON-GRAFTED RIGHT CORONARY ARTERY STENT.   • CORONARY ARTERY BYPASS GRAFT  04/2009    cabg x 3: LIMA to LAD, vein grafts to diagonal and OM1   • EPIDURAL BLOCK     • HYSTERECTOMY     • KNEE SURGERY Right    • THORACIC DECOMPRESSION POSTERIOR FUSION WITH INSTRUMENTATION N/A 3/6/2017    Procedure: T4-T10 Fusion with instrumentation;  Surgeon: Clyde Callejas MD;  Location: [facility];  Service:    • TOTAL THYROIDECTOMY         Social History     Social History   • Marital status:      Spouse name: N/A   • Number of children: N/A   • Years of education: N/A     Occupational History   • Not on file.     Social History Main Topics   • Smoking status: Never Smoker   • Smokeless tobacco: Never Used   • Alcohol use Not on file   • Drug use: No   • Sexual activity: Defer     Other Topics Concern   • Not on file     Social History Narrative       Family History   Problem Relation Age of Onset   • No Known Problems Mother    • No Known Problems Father            Scheduled Meds:  Current Outpatient Prescriptions on File Prior to Visit   Medication Sig Dispense Refill   • alendronate (FOSAMAX) 70 MG tablet Take 70 mg by mouth Every 7 (Seven) Days. sundays     • aspirin 81 MG tablet Take 81 mg by mouth Daily.     • Cholecalciferol (VITAMIN D3) 2000 UNITS tablet  "Take 2,000 Units by mouth Daily.     • cyclobenzaprine (FLEXERIL) 10 MG tablet Take 1 tablet by mouth At Night As Needed for Muscle Spasms. 30 tablet 0   • diphenhydrAMINE-acetaminophen (TYLENOL PM)  MG tablet per tablet Take 1 tablet by mouth At Night As Needed for Sleep.     • levothyroxine (SYNTHROID, LEVOTHROID) 75 MCG tablet Take 75 mcg by mouth Daily.     • meclizine (ANTIVERT) 25 MG tablet Take 1 tablet by mouth 3 (Three) Times a Day. 270 tablet 3   • NITROGLYCERIN SL Place  under the tongue As Needed. For chest pain     • oxyCODONE-acetaminophen (PERCOCET) 5-325 MG per tablet Take 1 tablet by mouth Every 4 (Four) Hours As Needed.     • rosuvastatin (CRESTOR) 40 MG tablet Take 40 mg by mouth Daily.     • sennosides-docusate sodium (SENOKOT-S) 8.6-50 MG tablet Take 1 tablet by mouth 2 (Two) Times a Day. 35 tablet 1   • venlafaxine (EFFEXOR) 75 MG tablet Take 75 mg by mouth every night at bedtime.       No current facility-administered medications on file prior to visit.        /84  Pulse 82  Ht 66\" (167.6 cm)  Wt 156 lb (70.8 kg)  BMI 25.18 kg/m2    Objective:     Physical Exam   Constitutional: She is oriented to person, place, and time. She appears well-developed and well-nourished. No distress.   HENT:   Head: Normocephalic and atraumatic.   Right Ear: External ear normal.   Left Ear: External ear normal.   Mouth/Throat: Oropharynx is clear and moist. No oropharyngeal exudate.   Eyes: Conjunctivae and EOM are normal. Pupils are equal, round, and reactive to light. No scleral icterus.   Neck: Normal range of motion. Neck supple. No JVD present. No tracheal deviation present. No thyromegaly present.   Cardiovascular: Normal rate, regular rhythm, S1 normal, S2 normal and intact distal pulses.  PMI is not displaced.  Exam reveals no gallop, no distant heart sounds, no friction rub and no decreased pulses.    Murmur heard.  2/6 systolic murmur at the left sternal border   Pulmonary/Chest: Effort " normal and breath sounds normal. No accessory muscle usage. No respiratory distress. She has no wheezes. She has no rales. She exhibits no tenderness.   Abdominal: Soft. Bowel sounds are normal. She exhibits no distension and no mass. There is no tenderness. There is no rebound and no guarding.   Musculoskeletal: Normal range of motion. She exhibits no edema, tenderness or deformity.   Lymphadenopathy:     She has no cervical adenopathy.   Neurological: She is alert and oriented to person, place, and time. She has normal reflexes. No cranial nerve deficit. Coordination normal.   Skin: Skin is dry. No rash noted. She is not diaphoretic. No erythema. No pallor.   Psychiatric: She has a normal mood and affect.             Lab Review:               Lab Review:         Lab Review     No results found for: CHOL  Lab Results   Component Value Date    HDL 93 (H) 02/04/2016    HDL 84 (H) 03/13/2015     Lab Results   Component Value Date    LDL 51 02/04/2016    LDL 44 03/13/2015     Lab Results   Component Value Date    TRIG 44 02/04/2016    TRIG 57 03/13/2015     No components found for: CHOLHDL  Lab Results   Component Value Date    GLUCOSE 82 04/12/2017    BUN 9 04/12/2017    CREATININE 0.83 04/12/2017    EGFRIFNONA 70 04/12/2017    EGFRIFAFRI 78 02/04/2016    BCR 10.8 04/12/2017    CO2 28.0 04/12/2017    CALCIUM 9.5 04/12/2017    PROTENTOTREF 6.3 02/04/2016    ALBUMIN 3.83 04/12/2017    LABIL2 1.2 04/12/2017    AST 18 04/12/2017    ALT 17 04/12/2017     Lab Results   Component Value Date    GLUCOSE 82 04/12/2017    CALCIUM 9.5 04/12/2017     04/12/2017    K 5.5 (H) 04/12/2017    CO2 28.0 04/12/2017     04/12/2017    BUN 9 04/12/2017    CREATININE 0.83 04/12/2017    EGFRIFAFRI 78 02/04/2016    EGFRIFNONA 70 04/12/2017    BCR 10.8 04/12/2017    ANIONGAP 9.0 04/12/2017     Lab Results   Component Value Date    WBC 6.07 04/12/2017    HGB 12.8 04/12/2017    HCT 40.0 04/12/2017    MCV 96.4 04/12/2017      04/12/2017     Lab Results   Component Value Date    DDIMER <150 09/19/2015     Lab Results   Component Value Date    TSH 0.750 04/12/2017     Lab Results   Component Value Date    CKTOTAL 2437 (H) 03/06/2017     No results found for: DIGOXIN  Lab Results   Component Value Date    CKTOTAL 2437 (H) 03/06/2017    CKMB 62.97 (H) 03/06/2017    TROPONINT <0.010 03/07/2017     No results found for: INR, PROTIME  Estimated Creatinine Clearance: 65.8 mL/min (by C-G formula based on Cr of 0.83).    Assessment:          Diagnosis Plan   1. Syncope and collapse  ECG 12 Lead    Adult Transthoracic Echo Complete    Holter Monitor - 24 Hour    Stress Test With Myocardial Perfusion One Day   2. CAD in native artery  ECG 12 Lead    Adult Transthoracic Echo Complete    Holter Monitor - 24 Hour    Stress Test With Myocardial Perfusion One Day   3. Angina pectoris     4. Bilateral carotid artery disease     5. Coronary arteriosclerosis in native artery     6. Hyperlipidemia, unspecified hyperlipidemia type     7. Essential hypertension     8. Carter-Arreaga syncope     9. Triple vessel coronary artery disease     10. Vasovagal syncope     11. Gastroesophageal reflux disease without esophagitis     12. Acquired hypothyroidism     13. Anemia due to blood loss, acute     14. History of pulmonary embolism     15. History of coronary artery bypass surgery     16. Shortness of breath  Adult Transthoracic Echo Complete    Holter Monitor - 24 Hour    Stress Test With Myocardial Perfusion One Day   17. Precordial pain  Adult Transthoracic Echo Complete    Holter Monitor - 24 Hour    Stress Test With Myocardial Perfusion One Day          Assessment and Plan:    Ivy was seen today for coronary artery disease, loss of consciousness and hyperlipidemia.    Diagnoses and all orders for this visit:    Syncope and collapse  -     ECG 12 Lead  -     Adult Transthoracic Echo Complete  -     Holter Monitor - 24 Hour  -     Stress Test With Myocardial  Perfusion One Day    CAD in native artery  -     ECG 12 Lead  -     Adult Transthoracic Echo Complete  -     Holter Monitor - 24 Hour  -     Stress Test With Myocardial Perfusion One Day    Angina pectoris    Bilateral carotid artery disease    Coronary arteriosclerosis in native artery    Hyperlipidemia, unspecified hyperlipidemia type    Essential hypertension    Carter-Arreaga syncope    Triple vessel coronary artery disease    Vasovagal syncope    Gastroesophageal reflux disease without esophagitis    Acquired hypothyroidism    Anemia due to blood loss, acute    History of pulmonary embolism    History of coronary artery bypass surgery    Shortness of breath  -     Adult Transthoracic Echo Complete  -     Holter Monitor - 24 Hour  -     Stress Test With Myocardial Perfusion One Day    Precordial pain  -     Adult Transthoracic Echo Complete  -     Holter Monitor - 24 Hour  -     Stress Test With Myocardial Perfusion One Day    The patient is a 61-year-old female status post 3 stents that apparently failed in early 2009 followed by bypass surgery, who presents for initial cardiac evaluation.  She is only on an aspirin a day.  She no longer takes a anticoagulant nor Plavix.  I think this is okay for now.    She did have one episode of heartburn that sounded more GI in origin as it awoke her from sleep.  It was also different than her angina prior to bypass which was back pain.  However, given that she's not had a stress test for 3 years, I will have her schedule for exercise Cardiolite stress test.    She has had a slight systolic murmur on exam.  She does complain of recent onset shortness breath for the past couple weeks associated with a racing heart.  I will get an echo with Doppler and Holter monitor to evaluate these symptoms.    She states that she had a carotid duplex in the past 6 months it was okay.  She does have history of stroke which is possibly due to hypertension.  She has residual slight weakness of  her right side.      A total of 25 minutes was spent in the care of this patient, including at least 13 minutes face-to-face with the patient.    I not only counseled the patient today on the significant factors noted in the assessment and plan, but I also recommended that the patient reduce salt and saturated animal fat intake in diet, as well as to perform scheduled exercise on a regular basis.      Plan:                  04/14/2017  6:10 PM  MD Kade Hilton MD  4/14/2017, 6:10 PM    EMR Dragon/Transcription disclaimer:   Much of this encounter note is an electronic transcription/translation of spoken language to printed text. The electronic translation of spoken language may permit erroneous, or at times, nonsensical words or phrases to be inadvertently transcribed; Although I have reviewed the note for such errors, some may still exist.

## 2017-04-21 ENCOUNTER — HOSPITAL ENCOUNTER (OUTPATIENT)
Dept: CARDIOLOGY | Facility: HOSPITAL | Age: 62
Discharge: HOME | End: 2017-04-21
Attending: INTERNAL MEDICINE

## 2017-04-21 ENCOUNTER — HOSPITAL ENCOUNTER (OUTPATIENT)
Dept: CARDIOLOGY | Facility: HOSPITAL | Age: 62
Discharge: HOME | End: 2017-04-21
Attending: INTERNAL MEDICINE | Admitting: INTERNAL MEDICINE

## 2017-04-21 VITALS
HEIGHT: 66 IN | DIASTOLIC BLOOD PRESSURE: 84 MMHG | BODY MASS INDEX: 25.07 KG/M2 | SYSTOLIC BLOOD PRESSURE: 129 MMHG | WEIGHT: 156 LBS

## 2017-04-21 VITALS — RESPIRATION RATE: 16 BRPM | DIASTOLIC BLOOD PRESSURE: 70 MMHG | SYSTOLIC BLOOD PRESSURE: 124 MMHG | HEART RATE: 78 BPM

## 2017-04-21 PROCEDURE — A9500 TC99M SESTAMIBI: HCPCS | Performed by: INTERNAL MEDICINE

## 2017-04-21 PROCEDURE — 93306 TTE W/DOPPLER COMPLETE: CPT

## 2017-04-21 PROCEDURE — 78452 HT MUSCLE IMAGE SPECT MULT: CPT

## 2017-04-21 PROCEDURE — 78452 HT MUSCLE IMAGE SPECT MULT: CPT | Performed by: INTERNAL MEDICINE

## 2017-04-21 PROCEDURE — 93017 CV STRESS TEST TRACING ONLY: CPT

## 2017-04-21 PROCEDURE — 0 TECHNETIUM SESTAMIBI: Performed by: INTERNAL MEDICINE

## 2017-04-21 PROCEDURE — 93018 CV STRESS TEST I&R ONLY: CPT | Performed by: INTERNAL MEDICINE

## 2017-04-21 PROCEDURE — 25010000002 REGADENOSON 0.4 MG/5ML SOLUTION: Performed by: INTERNAL MEDICINE

## 2017-04-21 PROCEDURE — 93016 CV STRESS TEST SUPVJ ONLY: CPT | Performed by: INTERNAL MEDICINE

## 2017-04-21 PROCEDURE — 93306 TTE W/DOPPLER COMPLETE: CPT | Performed by: INTERNAL MEDICINE

## 2017-04-21 PROCEDURE — 93225 XTRNL ECG REC<48 HRS REC: CPT

## 2017-04-21 PROCEDURE — 93226 XTRNL ECG REC<48 HR SCAN A/R: CPT

## 2017-04-21 RX ADMIN — Medication 1 DOSE: at 08:28

## 2017-04-21 RX ADMIN — REGADENOSON 0.4 MG: 0.08 INJECTION, SOLUTION INTRAVENOUS at 10:14

## 2017-04-21 RX ADMIN — Medication 1 DOSE: at 10:14

## 2017-04-22 ENCOUNTER — TELEPHONE (OUTPATIENT)
Dept: CARDIOLOGY | Facility: CLINIC | Age: 62
End: 2017-04-22

## 2017-04-22 LAB
BH CV ECHO MEAS - ACS: 1.8 CM
BH CV ECHO MEAS - AO MAX PG (FULL): 4.1 MMHG
BH CV ECHO MEAS - AO MAX PG: 9.1 MMHG
BH CV ECHO MEAS - AO MEAN PG (FULL): 3 MMHG
BH CV ECHO MEAS - AO MEAN PG: 5 MMHG
BH CV ECHO MEAS - AO ROOT AREA (BSA CORRECTED): 1.6
BH CV ECHO MEAS - AO ROOT AREA: 6.2 CM^2
BH CV ECHO MEAS - AO ROOT DIAM: 2.8 CM
BH CV ECHO MEAS - AO V2 MAX: 151 CM/SEC
BH CV ECHO MEAS - AO V2 MEAN: 101 CM/SEC
BH CV ECHO MEAS - AO V2 VTI: 29.8 CM
BH CV ECHO MEAS - AVA(I,A): 2.4 CM^2
BH CV ECHO MEAS - AVA(I,D): 2.4 CM^2
BH CV ECHO MEAS - AVA(V,A): 2.3 CM^2
BH CV ECHO MEAS - AVA(V,D): 2.3 CM^2
BH CV ECHO MEAS - BSA(HAYCOCK): 1.8 M^2
BH CV ECHO MEAS - BSA: 1.8 M^2
BH CV ECHO MEAS - BZI_BMI: 25.2 KILOGRAMS/M^2
BH CV ECHO MEAS - BZI_METRIC_HEIGHT: 167.6 CM
BH CV ECHO MEAS - BZI_METRIC_WEIGHT: 70.8 KG
BH CV ECHO MEAS - CONTRAST EF 4CH: 73.1 ML/M^2
BH CV ECHO MEAS - EDV(CUBED): 32.8 ML
BH CV ECHO MEAS - EDV(MOD-SP4): 52 ML
BH CV ECHO MEAS - EDV(TEICH): 41 ML
BH CV ECHO MEAS - EF(CUBED): 79.1 %
BH CV ECHO MEAS - EF(MOD-SP4): 73.1 %
BH CV ECHO MEAS - EF(TEICH): 72.7 %
BH CV ECHO MEAS - ESV(CUBED): 6.9 ML
BH CV ECHO MEAS - ESV(MOD-SP4): 14 ML
BH CV ECHO MEAS - ESV(TEICH): 11.2 ML
BH CV ECHO MEAS - FS: 40.6 %
BH CV ECHO MEAS - IVS/LVPW: 1.1
BH CV ECHO MEAS - IVSD: 1 CM
BH CV ECHO MEAS - LA DIMENSION: 3.7 CM
BH CV ECHO MEAS - LA/AO: 1.3
BH CV ECHO MEAS - LAT PEAK E' VEL: 11.2 CM/SEC
BH CV ECHO MEAS - LV DIASTOLIC VOL/BSA (35-75): 28.9 ML/M^2
BH CV ECHO MEAS - LV MASS(C)D: 83.7 GRAMS
BH CV ECHO MEAS - LV MASS(C)DI: 46.5 GRAMS/M^2
BH CV ECHO MEAS - LV MAX PG: 5 MMHG
BH CV ECHO MEAS - LV MEAN PG: 2 MMHG
BH CV ECHO MEAS - LV SYSTOLIC VOL/BSA (12-30): 7.8 ML/M^2
BH CV ECHO MEAS - LV V1 MAX: 112 CM/SEC
BH CV ECHO MEAS - LV V1 MEAN: 71.8 CM/SEC
BH CV ECHO MEAS - LV V1 VTI: 22.5 CM
BH CV ECHO MEAS - LVIDD: 3.2 CM
BH CV ECHO MEAS - LVIDS: 1.9 CM
BH CV ECHO MEAS - LVLD AP4: 7.4 CM
BH CV ECHO MEAS - LVLS AP4: 5.5 CM
BH CV ECHO MEAS - LVOT AREA (M): 3.1 CM^2
BH CV ECHO MEAS - LVOT AREA: 3.1 CM^2
BH CV ECHO MEAS - LVOT DIAM: 2 CM
BH CV ECHO MEAS - LVPWD: 0.9 CM
BH CV ECHO MEAS - MED PEAK E' VEL: 7.2 CM/SEC
BH CV ECHO MEAS - MV A DUR: 0.15 SEC
BH CV ECHO MEAS - MV A MAX VEL: 90.2 CM/SEC
BH CV ECHO MEAS - MV DEC SLOPE: 274 CM/SEC^2
BH CV ECHO MEAS - MV DEC TIME: 0.16 SEC
BH CV ECHO MEAS - MV E MAX VEL: 58.2 CM/SEC
BH CV ECHO MEAS - MV E/A: 0.65
BH CV ECHO MEAS - MV MAX PG: 2.8 MMHG
BH CV ECHO MEAS - MV MEAN PG: 2 MMHG
BH CV ECHO MEAS - MV P1/2T MAX VEL: 74.7 CM/SEC
BH CV ECHO MEAS - MV P1/2T: 79.9 MSEC
BH CV ECHO MEAS - MV V2 MAX: 83.2 CM/SEC
BH CV ECHO MEAS - MV V2 MEAN: 57.5 CM/SEC
BH CV ECHO MEAS - MV V2 VTI: 17.9 CM
BH CV ECHO MEAS - MVA P1/2T LCG: 2.9 CM^2
BH CV ECHO MEAS - MVA(P1/2T): 2.8 CM^2
BH CV ECHO MEAS - MVA(VTI): 3.9 CM^2
BH CV ECHO MEAS - PA MAX PG (FULL): 2.4 MMHG
BH CV ECHO MEAS - PA MAX PG: 4.6 MMHG
BH CV ECHO MEAS - PA V2 MAX: 107 CM/SEC
BH CV ECHO MEAS - PULM A REVS DUR: 0.11 SEC
BH CV ECHO MEAS - PULM A REVS VEL: 36.2 CM/SEC
BH CV ECHO MEAS - PULM DIAS VEL: 31.9 CM/SEC
BH CV ECHO MEAS - PULM S/D: 1.6
BH CV ECHO MEAS - PULM SYS VEL: 52.4 CM/SEC
BH CV ECHO MEAS - PVA(V,A): 2.2 CM^2
BH CV ECHO MEAS - PVA(V,D): 2.2 CM^2
BH CV ECHO MEAS - QP/QS: 0.65
BH CV ECHO MEAS - RAP SYSTOLE: 10 MMHG
BH CV ECHO MEAS - RV MAX PG: 2.2 MMHG
BH CV ECHO MEAS - RV MEAN PG: 1 MMHG
BH CV ECHO MEAS - RV V1 MAX: 74.6 CM/SEC
BH CV ECHO MEAS - RV V1 MEAN: 52 CM/SEC
BH CV ECHO MEAS - RV V1 VTI: 14.7 CM
BH CV ECHO MEAS - RVDD: 2 CM
BH CV ECHO MEAS - RVOT AREA: 3.1 CM^2
BH CV ECHO MEAS - RVOT DIAM: 2 CM
BH CV ECHO MEAS - RVSP: 33.4 MMHG
BH CV ECHO MEAS - SI(AO): 102 ML/M^2
BH CV ECHO MEAS - SI(CUBED): 14.4 ML/M^2
BH CV ECHO MEAS - SI(LVOT): 39.3 ML/M^2
BH CV ECHO MEAS - SI(MOD-SP4): 21.1 ML/M^2
BH CV ECHO MEAS - SI(TEICH): 16.6 ML/M^2
BH CV ECHO MEAS - SV(AO): 183.5 ML
BH CV ECHO MEAS - SV(CUBED): 25.9 ML
BH CV ECHO MEAS - SV(LVOT): 70.7 ML
BH CV ECHO MEAS - SV(MOD-SP4): 38 ML
BH CV ECHO MEAS - SV(RVOT): 46.2 ML
BH CV ECHO MEAS - SV(TEICH): 29.8 ML
BH CV ECHO MEAS - TAPSE (>1.6): 1.5 CM2
BH CV ECHO MEAS - TR MAX VEL: 242 CM/SEC
BH CV STRESS BP STAGE 1: NORMAL
BH CV STRESS COMMENTS STAGE 1: NORMAL
BH CV STRESS DOSE REGADENOSON STAGE 1: 0.4
BH CV STRESS DURATION MIN STAGE 1: 2
BH CV STRESS DURATION SEC STAGE 1: 2
BH CV STRESS HR STAGE 1: 123
BH CV STRESS PROTOCOL 1: NORMAL
BH CV STRESS RECOVERY BP: NORMAL MMHG
BH CV STRESS RECOVERY HR: 95 BPM
BH CV STRESS RECOVERY O2: 95 %
BH CV STRESS STAGE 1: 1
BH CV XLRA - RV BASE: 2.8 CM
BH CV XLRA - RV LENGTH: 5.7 CM
BH CV XLRA - RV MID: 2.2 CM
BH CV XLRA - TDI S': 10.7 CM/SEC
LEFT ATRIUM VOLUME INDEX: 31.4 ML/M2
LV EF 2D ECHO EST: 73 %
LV EF NUC BP: 87 %
MAXIMAL PREDICTED HEART RATE: 158 BPM
PERCENT MAX PREDICTED HR: 77.85 %
STRESS BASELINE BP: NORMAL MMHG
STRESS BASELINE HR: 76 BPM
STRESS O2 SAT REST: 95 %
STRESS PERCENT HR: 92 %
STRESS POST ESTIMATED WORKLOAD: 1.8 METS
STRESS POST EXERCISE DUR MIN: 2 MIN
STRESS POST EXERCISE DUR SEC: 2 SEC
STRESS POST PEAK BP: NORMAL MMHG
STRESS POST PEAK HR: 123 BPM
STRESS TARGET HR: 134 BPM

## 2017-04-23 NOTE — TELEPHONE ENCOUNTER
Echo shows EF flow > 70% and diastolic dysfunction. I think the murmur on exam is a benign  murmur.    Cardiolite stress test is normal with EF > 70%    f/u OV in 6 months.

## 2017-04-27 PROCEDURE — 93227 XTRNL ECG REC<48 HR R&I: CPT | Performed by: INTERNAL MEDICINE

## 2017-05-03 ENCOUNTER — OFFICE VISIT (OUTPATIENT)
Dept: ORTHOPEDIC SURGERY | Facility: CLINIC | Age: 62
End: 2017-05-03

## 2017-05-03 ENCOUNTER — TELEPHONE (OUTPATIENT)
Dept: CARDIOLOGY | Facility: CLINIC | Age: 62
End: 2017-05-03

## 2017-05-03 VITALS — BODY MASS INDEX: 25.07 KG/M2 | HEIGHT: 66 IN | WEIGHT: 156 LBS

## 2017-05-03 DIAGNOSIS — Z98.1 H/O SPINAL FUSION: Primary | ICD-10-CM

## 2017-05-03 PROCEDURE — 72070 X-RAY EXAM THORAC SPINE 2VWS: CPT | Performed by: ORTHOPAEDIC SURGERY

## 2017-05-03 PROCEDURE — 99024 POSTOP FOLLOW-UP VISIT: CPT | Performed by: ORTHOPAEDIC SURGERY

## 2017-05-03 RX ORDER — METOPROLOL SUCCINATE 25 MG/1
25 TABLET, EXTENDED RELEASE ORAL DAILY
Qty: 90 TABLET | Refills: 3 | Status: SHIPPED | OUTPATIENT
Start: 2017-05-03 | End: 2019-05-06

## 2017-05-08 ENCOUNTER — TELEPHONE (OUTPATIENT)
Dept: ORTHOPEDIC SURGERY | Facility: CLINIC | Age: 62
End: 2017-05-08

## 2017-05-09 ENCOUNTER — TELEPHONE (OUTPATIENT)
Dept: ORTHOPEDIC SURGERY | Facility: CLINIC | Age: 62
End: 2017-05-09

## 2017-05-09 RX ORDER — TRAMADOL HYDROCHLORIDE 50 MG/1
50 TABLET ORAL EVERY 8 HOURS PRN
Qty: 30 TABLET | Refills: 0 | Status: SHIPPED | OUTPATIENT
Start: 2017-05-09 | End: 2018-04-27

## 2017-05-10 ENCOUNTER — OFFICE VISIT (OUTPATIENT)
Dept: INTERNAL MEDICINE | Facility: CLINIC | Age: 62
End: 2017-05-10

## 2017-05-10 VITALS
HEART RATE: 66 BPM | HEIGHT: 66 IN | WEIGHT: 156 LBS | BODY MASS INDEX: 25.07 KG/M2 | SYSTOLIC BLOOD PRESSURE: 110 MMHG | DIASTOLIC BLOOD PRESSURE: 66 MMHG

## 2017-05-10 DIAGNOSIS — I10 ESSENTIAL HYPERTENSION: ICD-10-CM

## 2017-05-10 DIAGNOSIS — I25.10 TRIPLE VESSEL CORONARY ARTERY DISEASE: ICD-10-CM

## 2017-05-10 DIAGNOSIS — I25.10 CORONARY ARTERIOSCLEROSIS IN NATIVE ARTERY: Primary | ICD-10-CM

## 2017-05-10 PROBLEM — D62 ANEMIA DUE TO BLOOD LOSS, ACUTE: Status: RESOLVED | Noted: 2017-03-23 | Resolved: 2017-05-10

## 2017-05-10 PROBLEM — D75.839 THROMBOCYTOSIS: Status: RESOLVED | Noted: 2017-03-26 | Resolved: 2017-05-10

## 2017-05-10 PROCEDURE — 99213 OFFICE O/P EST LOW 20 MIN: CPT | Performed by: INTERNAL MEDICINE

## 2017-06-01 ENCOUNTER — TREATMENT (OUTPATIENT)
Dept: PHYSICAL THERAPY | Facility: CLINIC | Age: 62
End: 2017-06-01

## 2017-06-01 DIAGNOSIS — S29.019D THORACIC MYOFASCIAL STRAIN, SUBSEQUENT ENCOUNTER: Primary | ICD-10-CM

## 2017-06-01 PROCEDURE — 97140 MANUAL THERAPY 1/> REGIONS: CPT | Performed by: PHYSICAL THERAPIST

## 2017-06-01 PROCEDURE — 97161 PT EVAL LOW COMPLEX 20 MIN: CPT | Performed by: PHYSICAL THERAPIST

## 2017-06-01 PROCEDURE — 97110 THERAPEUTIC EXERCISES: CPT | Performed by: PHYSICAL THERAPIST

## 2017-06-01 NOTE — PROGRESS NOTES
Physical Therapy Initial Evaluation and Plan of Care    TIME IN 2:15 TIME OUT 3:10    Subjective Evaluation    History of Present Illness  Date of surgery: 3/6/2017  Mechanism of injury: Bent over to  part of a fence - discovered osteoporosis with 2 verterbral fxs.  Job is a  with requirement of lifting 30 lbs - back to work now    Pain  Current pain ratin  At best pain ratin  At worst pain ratin  Location: tops of shooulders  Quality: burning  Relieving factors: rest, heat and medications  Aggravating factors: lifting  Progression: improved    Hand dominance: right    Diagnostic Tests  Abnormal x-ray: healing OK post-op x-ray.    Treatments  Previous treatment: massage, immobilization and medication  Current treatment: medication  Current treatment comments: bone stimulator.     Patient Goals  Patient goals for therapy: decreased pain, independence with ADLs/IADLs and return to sport/leisure activities  Patient goal: STGs x 1 wk  1. Demos compliance and julian for HEP  2. Improved postural awareness  3. Dec tenderness and tone in scap mm for dec pain with ADLs    LTGs x 4 wks  1. Independent with HEP, posturing and joint protection principles for prevention  2. Pain with ADLs < 3/10 and down to 0/10 at rest at times  3. Minimal tenderness and abnormal mm tone           Objective     Postural Observations    Additional Postural Observation Details  Thoracic kyphosis increased - long surgical scar healed nicely    Palpation   Left   Hypertonic in the levator scapulae and upper trapezius.   Tenderness of the levator scapulae and upper trapezius.     Right   Hypertonic in the levator scapulae and upper trapezius. Tenderness of the levator scapulae and upper trapezius.     Active Range of Motion   Cervical/Thoracic Spine   Cervical    Flexion: WFL  Extension: WFL  Left lateral flexion: Neck active lateral bend left: 50%   Right lateral flexion: Neck active lateral bend right: 50%   Left  rotation: WFL  Right rotation: WFL    Thoracic   Flexion: WFL  Extension: WFL  Left lateral flexion: WFL  Right lateral flexion: WFL  Left rotation: WFL  Right rotation: WFL    Additional Active Range of Motion Details  All limited but ~ WFL except cervical SB; all shdr WFL    Strength/Myotome Testing     Left Shoulder     Planes of Motion   Flexion: 4   Adduction: 4     Isolated Muscles   Middle trapezius: 4-   Serratus anterior: 4   Upper trapezius: 5     Right Shoulder     Planes of Motion   Flexion: 4   Adduction: 4     Isolated Muscles   Middle trapezius: 4-   Serratus anterior: 4-   Upper trapezius: 5          Assessment & Plan     Assessment  Impairments: abnormal muscle tone, impaired physical strength and lacks appropriate home exercise program  Assessment details: 61 y.o. F presents ~ 3 mos s/p thoracic fusion with Hx of fxs and osteoporosis.  Good progress so far but with tight and tender upper traps and levator mm causing continued pain.  Moderate postural deficits contributing to sxs.  Prognosis: good    Goals  STGs x 1 wk  1. Demos compliance and julian for HEP  2. Improved postural awareness  3. Dec tenderness and tone in scap mm for dec pain with ADLs    LTGs x 4 wks  1. Independent with HEP, posturing and joint protection principles for prevention  2. Pain with ADLs < 3/10 and down to 0/10 at rest at times  3. Minimal tenderness and abnormal mm tone    Plan  Therapy options: will be seen for skilled physical therapy services  Planned modality interventions: thermotherapy (hydrocollator packs) and ultrasound  Planned therapy interventions: soft tissue mobilization, body mechanics training, functional ROM exercises, home exercise program, stretching and strengthening  Frequency: 1x week  Duration in weeks: 4  Treatment plan discussed with: patient        Manual Therapy:    10     mins  88129;  Therapeutic Exercise:    16     mins  19019;     Neuromuscular Rosibel:    0    mins  50341;    Therapeutic  Activity:     5     mins  70530;     Gait Trainin     mins  77450;     Ultrasound:     0     mins  10493;    Work Hardening           0      mins 88753  Iontophoresis               0   mins 50777    Timed Treatment:   31   mins   Total Treatment:     55   mins    PT SIGNATURE: Laura Horan, NATHAN   DATE TREATMENT INITIATED: 2017    Initial Certification  Certification Period: 2017  I certify that the therapy services are furnished while this patient is under my care.  The services outlined above are required by this patient, and will be reviewed every 90 days.     PHYSICIAN: Clyde Callejas MD      DATE:     Please sign and return via fax to [phone].. Thank you, [facility].

## 2017-06-01 NOTE — PATIENT INSTRUCTIONS
Educated about Dx and exam findings, rationale and POC. Gave handout on HEP with instructions.  Posture, including sleeping.

## 2017-06-08 ENCOUNTER — TREATMENT (OUTPATIENT)
Dept: PHYSICAL THERAPY | Facility: CLINIC | Age: 62
End: 2017-06-08

## 2017-06-08 DIAGNOSIS — S29.019D THORACIC MYOFASCIAL STRAIN, SUBSEQUENT ENCOUNTER: Primary | ICD-10-CM

## 2017-06-08 PROCEDURE — 97110 THERAPEUTIC EXERCISES: CPT | Performed by: PHYSICAL THERAPIST

## 2017-06-08 PROCEDURE — 97140 MANUAL THERAPY 1/> REGIONS: CPT | Performed by: PHYSICAL THERAPIST

## 2017-06-08 NOTE — PROGRESS NOTES
Physical Therapy Daily Progress Note    Time In 1:59  Time Out 2:54    Ivy Chairez reports: better but sore because just got of work    Subjective Evaluation    Pain  Current pain ratin           Objective     Static Posture   General Observations  Flexed.     Palpation   Left   Hypertonic in the levator scapulae and upper trapezius.   Tenderness of the levator scapulae and upper trapezius.     Right   Hypertonic in the levator scapulae and upper trapezius. Tenderness of the levator scapulae and upper trapezius.      See Exercise, Manual, and Modality Logs for complete treatment.       Assessment & Plan     Assessment  Assessment details: Responding favorably to Rx, exercises and manual with report of relief but still with postural cues needed and weakness    Plan  Plan details: Continue in 2 wks - pt to try to self-manage more                     Manual Therapy:    16     mins  51770;  Therapeutic Exercise:    31     mins  15118;     Neuromuscular Rosibel:    0    mins  65965;    Therapeutic Activity:     0     mins  49067;     Gait Trainin     mins  19501;     Ultrasound:     0     mins  57689;    Work Hardening           0      mins 65334  Iontophoresis               0   mins 70749    Timed Treatment:   47   mins   Total Treatment:     55   mins    Laura Horan, NATHAN  Physical Therapist

## 2017-06-12 RX ORDER — LEVOTHYROXINE SODIUM 0.07 MG/1
TABLET ORAL
Qty: 90 TABLET | Refills: 3 | Status: SHIPPED | OUTPATIENT
Start: 2017-06-12 | End: 2018-07-06 | Stop reason: SDUPTHER

## 2017-07-11 ENCOUNTER — OFFICE VISIT (OUTPATIENT)
Dept: ORTHOPEDIC SURGERY | Facility: CLINIC | Age: 62
End: 2017-07-11

## 2017-07-11 ENCOUNTER — TELEPHONE (OUTPATIENT)
Dept: ORTHOPEDIC SURGERY | Facility: CLINIC | Age: 62
End: 2017-07-11

## 2017-07-11 VITALS — WEIGHT: 153 LBS | HEIGHT: 66 IN | BODY MASS INDEX: 24.59 KG/M2 | TEMPERATURE: 97.5 F

## 2017-07-11 DIAGNOSIS — M43.24 FUSION OF SPINE OF THORACIC REGION: Primary | ICD-10-CM

## 2017-07-11 PROCEDURE — 72070 X-RAY EXAM THORAC SPINE 2VWS: CPT | Performed by: ORTHOPAEDIC SURGERY

## 2017-07-11 PROCEDURE — 99213 OFFICE O/P EST LOW 20 MIN: CPT | Performed by: ORTHOPAEDIC SURGERY

## 2017-07-11 NOTE — TELEPHONE ENCOUNTER
Tramadol 50mg, #50, 1 q 8 hrs prn pain to called in to [facility] by josue Tavarez.  JULIA

## 2017-07-11 NOTE — PROGRESS NOTES
She is 4 months out from decompression and fusion with instrumentation and his done well.  She is active and very happy with the result.  No balance difficulties bowel or bladder complaints.  The pain is markedly diminished from preoperatively.  On exam she exhibits good strength and sensation in the lower extremities bilaterally.  Two-view x-rays of the lumbar spine show solid fusion compared to prior films.  Overall doing well I will see her back when necessary she will take tramadol for pain on occasion.

## 2017-08-27 DIAGNOSIS — E78.5 HYPERLIPIDEMIA: ICD-10-CM

## 2017-08-28 RX ORDER — ROSUVASTATIN CALCIUM 40 MG/1
TABLET, COATED ORAL
Qty: 30 TABLET | Refills: 11 | Status: SHIPPED | OUTPATIENT
Start: 2017-08-28 | End: 2017-09-19

## 2017-09-19 ENCOUNTER — OFFICE VISIT (OUTPATIENT)
Dept: INTERNAL MEDICINE | Facility: CLINIC | Age: 62
End: 2017-09-19

## 2017-09-19 VITALS
DIASTOLIC BLOOD PRESSURE: 68 MMHG | WEIGHT: 160 LBS | BODY MASS INDEX: 25.11 KG/M2 | HEART RATE: 66 BPM | SYSTOLIC BLOOD PRESSURE: 118 MMHG | HEIGHT: 67 IN

## 2017-09-19 DIAGNOSIS — K59.1 FUNCTIONAL DIARRHEA: ICD-10-CM

## 2017-09-19 DIAGNOSIS — Z23 FLU VACCINE NEED: ICD-10-CM

## 2017-09-19 DIAGNOSIS — I10 ESSENTIAL HYPERTENSION: Primary | ICD-10-CM

## 2017-09-19 DIAGNOSIS — I25.10 TRIPLE VESSEL CORONARY ARTERY DISEASE: ICD-10-CM

## 2017-09-19 DIAGNOSIS — R21 SKIN RASH: ICD-10-CM

## 2017-09-19 PROCEDURE — G0008 ADMIN INFLUENZA VIRUS VAC: HCPCS | Performed by: INTERNAL MEDICINE

## 2017-09-19 PROCEDURE — 99213 OFFICE O/P EST LOW 20 MIN: CPT | Performed by: INTERNAL MEDICINE

## 2017-09-19 PROCEDURE — 90656 IIV3 VACC NO PRSV 0.5 ML IM: CPT | Performed by: INTERNAL MEDICINE

## 2017-09-19 PROCEDURE — 96372 THER/PROPH/DIAG INJ SC/IM: CPT | Performed by: INTERNAL MEDICINE

## 2017-09-19 RX ORDER — COLESEVELAM 180 1/1
625 TABLET ORAL 3 TIMES DAILY
Qty: 90 TABLET | Refills: 11 | Status: SHIPPED | OUTPATIENT
Start: 2017-09-19 | End: 2018-06-19

## 2017-09-19 NOTE — PROGRESS NOTES
Subjective   Ivy Chairez is a 61 y.o. female.     History of Present Illness she relates a 2-3 week history of a waxing and waning red rash over the bridge of the nose and involving the cheeks below the eyes.  It is nonpruritic.  During the same period of time she has noticed 2 or 3 loose stools after eating supper each evening.  She does not have any other stools and this is never a problem during the morning or at night.  There has been no abdominal pain or rectal bleeding.  She does notice some bloating.  Over the same period of time she has had some swelling in her ankles but no PND or dyspnea on exertion.        Review of Systems   Constitutional: Negative for activity change, appetite change, fatigue and unexpected weight change.   HENT: Negative for trouble swallowing.    Respiratory: Negative for cough, chest tightness, shortness of breath and wheezing.    Cardiovascular: Negative for chest pain, palpitations and leg swelling.   Gastrointestinal: Positive for diarrhea. Negative for abdominal pain, anal bleeding, blood in stool, constipation, nausea and vomiting.   Genitourinary: Negative for dysuria, flank pain and hematuria.   Musculoskeletal: Negative for arthralgias.   Neurological: Negative for dizziness, weakness and headaches.       Objective   Physical Exam   Constitutional: She is oriented to person, place, and time. Vital signs are normal. She appears well-developed and well-nourished. She is active. She does not appear ill.   Eyes: Conjunctivae are normal.   Cardiovascular: Normal rate, regular rhythm, S1 normal and S2 normal.  Exam reveals no S3 and no S4.    No murmur heard.  Pulses:       Dorsalis pedis pulses are 2+ on the right side, and 2+ on the left side.   Pulmonary/Chest: No tachypnea. No respiratory distress. She has no decreased breath sounds. She has no wheezes. She has no rhonchi. She has no rales.   Abdominal: Soft. Normal appearance and bowel sounds are normal. She exhibits no  abdominal bruit and no mass. There is no hepatosplenomegaly. There is no tenderness.       Vascular Status -  Her exam exhibits no right foot edema. Her exam exhibits no left foot edema.  Neurological: She is alert and oriented to person, place, and time.   Skin:        Psychiatric: She has a normal mood and affect. Her speech is normal and behavior is normal. Judgment and thought content normal. Cognition and memory are normal.       Assessment/Plan assessment #1 hypertension-good control #2 coronary artery disease-quiescent #3 skin rash-question significance #4 pedal edema-secondary to chronic venous insufficiency #5 gastrocolic reflex    Plan #1 dermatology consult #2 Welchol  1 by mouth 3 times a day.

## 2017-11-08 RX ORDER — VENLAFAXINE 75 MG/1
TABLET ORAL
Qty: 60 TABLET | Refills: 5 | Status: SHIPPED | OUTPATIENT
Start: 2017-11-08 | End: 2018-03-14

## 2018-02-05 ENCOUNTER — TELEPHONE (OUTPATIENT)
Dept: INTERNAL MEDICINE | Facility: CLINIC | Age: 63
End: 2018-02-05

## 2018-02-05 DIAGNOSIS — I25.119 CORONARY ARTERY DISEASE INVOLVING NATIVE HEART WITH ANGINA PECTORIS, UNSPECIFIED VESSEL OR LESION TYPE (HCC): Primary | ICD-10-CM

## 2018-02-05 DIAGNOSIS — I50.9 CONGESTIVE HEART FAILURE, UNSPECIFIED CONGESTIVE HEART FAILURE CHRONICITY, UNSPECIFIED CONGESTIVE HEART FAILURE TYPE: ICD-10-CM

## 2018-02-05 DIAGNOSIS — Z95.1 HX OF CORONARY ARTERY BYPASS SURGERY: ICD-10-CM

## 2018-02-05 NOTE — TELEPHONE ENCOUNTER
----- Message from Marjorie Dockery sent at 2/5/2018 12:26 PM EST -----  Contact: pt  Pt would like a referral to see Dr Humphrey with [facility].  Fax#[phone]    Pt# [phone]

## 2018-03-14 ENCOUNTER — OFFICE VISIT (OUTPATIENT)
Dept: INTERNAL MEDICINE | Facility: CLINIC | Age: 63
End: 2018-03-14

## 2018-03-14 VITALS
HEIGHT: 67 IN | DIASTOLIC BLOOD PRESSURE: 60 MMHG | HEART RATE: 61 BPM | BODY MASS INDEX: 26.06 KG/M2 | OXYGEN SATURATION: 98 % | SYSTOLIC BLOOD PRESSURE: 102 MMHG | WEIGHT: 166 LBS

## 2018-03-14 DIAGNOSIS — I25.10 TRIPLE VESSEL CORONARY ARTERY DISEASE: ICD-10-CM

## 2018-03-14 DIAGNOSIS — K59.1 FUNCTIONAL DIARRHEA: ICD-10-CM

## 2018-03-14 DIAGNOSIS — E78.5 HYPERLIPIDEMIA, UNSPECIFIED HYPERLIPIDEMIA TYPE: ICD-10-CM

## 2018-03-14 DIAGNOSIS — I63.519 CEREBROVASCULAR ACCIDENT (CVA) DUE TO OCCLUSION OF MIDDLE CEREBRAL ARTERY, UNSPECIFIED BLOOD VESSEL LATERALITY (HCC): Primary | ICD-10-CM

## 2018-03-14 DIAGNOSIS — I10 ESSENTIAL HYPERTENSION: ICD-10-CM

## 2018-03-14 DIAGNOSIS — E03.9 ACQUIRED HYPOTHYROIDISM: ICD-10-CM

## 2018-03-14 LAB — T3FREE SERPL-MCNC: 2.94 PG/ML (ref 2–4.4)

## 2018-03-14 PROCEDURE — 36415 COLL VENOUS BLD VENIPUNCTURE: CPT | Performed by: INTERNAL MEDICINE

## 2018-03-14 PROCEDURE — 99214 OFFICE O/P EST MOD 30 MIN: CPT | Performed by: INTERNAL MEDICINE

## 2018-03-14 PROCEDURE — 84481 FREE ASSAY (FT-3): CPT | Performed by: INTERNAL MEDICINE

## 2018-04-27 ENCOUNTER — OFFICE VISIT (OUTPATIENT)
Dept: RETAIL CLINIC | Facility: CLINIC | Age: 63
End: 2018-04-27

## 2018-04-27 VITALS
HEART RATE: 69 BPM | DIASTOLIC BLOOD PRESSURE: 62 MMHG | SYSTOLIC BLOOD PRESSURE: 104 MMHG | OXYGEN SATURATION: 98 % | TEMPERATURE: 98.7 F | RESPIRATION RATE: 18 BRPM

## 2018-04-27 DIAGNOSIS — J06.9 ACUTE URI: ICD-10-CM

## 2018-04-27 DIAGNOSIS — J40 BRONCHITIS: ICD-10-CM

## 2018-04-27 DIAGNOSIS — J01.10 ACUTE FRONTAL SINUSITIS, RECURRENCE NOT SPECIFIED: Primary | ICD-10-CM

## 2018-04-27 PROCEDURE — 99213 OFFICE O/P EST LOW 20 MIN: CPT | Performed by: NURSE PRACTITIONER

## 2018-04-27 RX ORDER — BENZONATATE 200 MG/1
200 CAPSULE ORAL 3 TIMES DAILY PRN
Qty: 15 CAPSULE | Refills: 0 | Status: SHIPPED | OUTPATIENT
Start: 2018-04-27 | End: 2018-05-02

## 2018-04-27 RX ORDER — DOXYCYCLINE 100 MG/1
100 CAPSULE ORAL 2 TIMES DAILY
Qty: 14 CAPSULE | Refills: 0 | Status: SHIPPED | OUTPATIENT
Start: 2018-04-27 | End: 2018-05-04

## 2018-04-27 RX ORDER — GUAIFENESIN 600 MG/1
600 TABLET, EXTENDED RELEASE ORAL EVERY 12 HOURS SCHEDULED
Qty: 10 TABLET | Refills: 0 | Status: SHIPPED | OUTPATIENT
Start: 2018-04-27 | End: 2018-05-02

## 2018-04-27 RX ORDER — DEXTROMETHORPHAN HYDROBROMIDE AND PROMETHAZINE HYDROCHLORIDE 15; 6.25 MG/5ML; MG/5ML
5 SYRUP ORAL NIGHTLY PRN
Qty: 35 ML | Refills: 0 | Status: SHIPPED | OUTPATIENT
Start: 2018-04-27 | End: 2018-05-04

## 2018-04-27 NOTE — PATIENT INSTRUCTIONS
Acute Bronchitis, Adult  Acute bronchitis is sudden (acute) swelling of the air tubes (bronchi) in the lungs. Acute bronchitis causes these tubes to fill with mucus, which can make it hard to breathe. It can also cause coughing or wheezing.  In adults, acute bronchitis usually goes away within 2 weeks. A cough caused by bronchitis may last up to 3 weeks. Smoking, allergies, and asthma can make the condition worse. Repeated episodes of bronchitis may cause further lung problems, such as chronic obstructive pulmonary disease (COPD).  What are the causes?  This condition can be caused by germs and by substances that irritate the lungs, including:  · Cold and flu viruses. This condition is most often caused by the same virus that causes a cold.  · Bacteria.  · Exposure to tobacco smoke, dust, fumes, and air pollution.  What increases the risk?  This condition is more likely to develop in people who:  · Have close contact with someone with acute bronchitis.  · Are exposed to lung irritants, such as tobacco smoke, dust, fumes, and vapors.  · Have a weak immune system.  · Have a respiratory condition such as asthma.  What are the signs or symptoms?  Symptoms of this condition include:  · A cough.  · Coughing up clear, yellow, or green mucus.  · Wheezing.  · Chest congestion.  · Shortness of breath.  · A fever.  · Body aches.  · Chills.  · A sore throat.  How is this diagnosed?  This condition is usually diagnosed with a physical exam. During the exam, your health care provider may order tests, such as chest X-rays, to rule out other conditions. He or she may also:  · Test a sample of your mucus for bacterial infection.  · Check the level of oxygen in your blood. This is done to check for pneumonia.  · Do a chest X-ray or lung function testing to rule out pneumonia and other conditions.  · Perform blood tests.  Your health care provider will also ask about your symptoms and medical history.  How is this treated?  Most cases  of acute bronchitis clear up over time without treatment. Your health care provider may recommend:  · Drinking more fluids. Drinking more makes your mucus thinner, which may make it easier to breathe.  · Taking a medicine for a fever or cough.  · Taking an antibiotic medicine.  · Using an inhaler to help improve shortness of breath and to control a cough.  · Using a cool mist vaporizer or humidifier to make it easier to breathe.  Follow these instructions at home:  Medicines   · Take over-the-counter and prescription medicines only as told by your health care provider.  · If you were prescribed an antibiotic, take it as told by your health care provider. Do not stop taking the antibiotic even if you start to feel better.  General instructions   · Get plenty of rest.  · Drink enough fluids to keep your urine clear or pale yellow.  · Avoid smoking and secondhand smoke. Exposure to cigarette smoke or irritating chemicals will make bronchitis worse. If you smoke and you need help quitting, ask your health care provider. Quitting smoking will help your lungs heal faster.  · Use an inhaler, cool mist vaporizer, or humidifier as told by your health care provider.  · Keep all follow-up visits as told by your health care provider. This is important.  How is this prevented?  To lower your risk of getting this condition again:  · Wash your hands often with soap and water. If soap and water are not available, use hand .  · Avoid contact with people who have cold symptoms.  · Try not to touch your hands to your mouth, nose, or eyes.  · Make sure to get the flu shot every year.  Contact a health care provider if:  · Your symptoms do not improve in 2 weeks of treatment.  Get help right away if:  · You cough up blood.  · You have chest pain.  · You have severe shortness of breath.  · You become dehydrated.  · You faint or keep feeling like you are going to faint.  · You keep vomiting.  · You have a severe headache.  · Your  "fever or chills gets worse.  This information is not intended to replace advice given to you by your health care provider. Make sure you discuss any questions you have with your health care provider.  Document Released: 01/25/2006 Document Revised: 07/12/2017 Document Reviewed: 06/07/2017  [employer] Interactive Patient Education © 2017 [employer]  Upper Respiratory Infection, Adult  Most upper respiratory infections (URIs) are a viral infection of the air passages leading to the lungs. A URI affects the nose, throat, and upper air passages. The most common type of URI is nasopharyngitis and is typically referred to as \"the common cold.\"  URIs run their course and usually go away on their own. Most of the time, a URI does not require medical attention, but sometimes a bacterial infection in the upper airways can follow a viral infection. This is called a secondary infection. Sinus and middle ear infections are common types of secondary upper respiratory infections.  Bacterial pneumonia can also complicate a URI. A URI can worsen asthma and chronic obstructive pulmonary disease (COPD). Sometimes, these complications can require emergency medical care and may be life threatening.  What are the causes?  Almost all URIs are caused by viruses. A virus is a type of germ and can spread from one person to another.  What increases the risk?  You may be at risk for a URI if:  · You smoke.  · You have chronic heart or lung disease.  · You have a weakened defense (immune) system.  · You are very young or very old.  · You have nasal allergies or asthma.  · You work in crowded or poorly ventilated areas.  · You work in health care facilities or schools.  What are the signs or symptoms?  Symptoms typically develop 2-3 days after you come in contact with a cold virus. Most viral URIs last 7-10 days. However, viral URIs from the influenza virus (flu virus) can last 14-18 days and are typically more severe. Symptoms may " include:  · Runny or stuffy (congested) nose.  · Sneezing.  · Cough.  · Sore throat.  · Headache.  · Fatigue.  · Fever.  · Loss of appetite.  · Pain in your forehead, behind your eyes, and over your cheekbones (sinus pain).  · Muscle aches.  How is this diagnosed?  Your health care provider may diagnose a URI by:  · Physical exam.  · Tests to check that your symptoms are not due to another condition such as:  ¨ Strep throat.  ¨ Sinusitis.  ¨ Pneumonia.  ¨ Asthma.  How is this treated?  A URI goes away on its own with time. It cannot be cured with medicines, but medicines may be prescribed or recommended to relieve symptoms. Medicines may help:  · Reduce your fever.  · Reduce your cough.  · Relieve nasal congestion.  Follow these instructions at home:  · Take medicines only as directed by your health care provider.  · Gargle warm saltwater or take cough drops to comfort your throat as directed by your health care provider.  · Use a warm mist humidifier or inhale steam from a shower to increase air moisture. This may make it easier to breathe.  · Drink enough fluid to keep your urine clear or pale yellow.  · Eat soups and other clear broths and maintain good nutrition.  · Rest as needed.  · Return to work when your temperature has returned to normal or as your health care provider advises. You may need to stay home longer to avoid infecting others. You can also use a face mask and careful hand washing to prevent spread of the virus.  · Increase the usage of your inhaler if you have asthma.  · Do not use any tobacco products, including cigarettes, chewing tobacco, or electronic cigarettes. If you need help quitting, ask your health care provider.  How is this prevented?  The best way to protect yourself from getting a cold is to practice good hygiene.  · Avoid oral or hand contact with people with cold symptoms.  · Wash your hands often if contact occurs.  There is no clear evidence that vitamin C, vitamin E,  echinacea, or exercise reduces the chance of developing a cold. However, it is always recommended to get plenty of rest, exercise, and practice good nutrition.  Contact a health care provider if:  · You are getting worse rather than better.  · Your symptoms are not controlled by medicine.  · You have chills.  · You have worsening shortness of breath.  · You have brown or red mucus.  · You have yellow or brown nasal discharge.  · You have pain in your face, especially when you bend forward.  · You have a fever.  · You have swollen neck glands.  · You have pain while swallowing.  · You have white areas in the back of your throat.  Get help right away if:  · You have severe or persistent:  ¨ Headache.  ¨ Ear pain.  ¨ Sinus pain.  ¨ Chest pain.  · You have chronic lung disease and any of the following:  ¨ Wheezing.  ¨ Prolonged cough.  ¨ Coughing up blood.  ¨ A change in your usual mucus.  · You have a stiff neck.  · You have changes in your:  ¨ Vision.  ¨ Hearing.  ¨ Thinking.  ¨ Mood.  This information is not intended to replace advice given to you by your health care provider. Make sure you discuss any questions you have with your health care provider.  Document Released: 06/13/2002 Document Revised: 08/20/2017 Document Reviewed: 03/25/2015  [employer] Interactive Patient Education © 2017 [employer]  Sinusitis, Adult  Sinusitis is soreness and inflammation of your sinuses. Sinuses are hollow spaces in the bones around your face. Your sinuses are located:  · Around your eyes.  · In the middle of your forehead.  · Behind your nose.  · In your cheekbones.  Your sinuses and nasal passages are lined with a stringy fluid (mucus). Mucus normally drains out of your sinuses. When your nasal tissues become inflamed or swollen, the mucus can become trapped or blocked so air cannot flow through your sinuses. This allows bacteria, viruses, and funguses to grow, which leads to infection.  Sinusitis can develop quickly and last  for 7?10 days (acute) or for more than 12 weeks (chronic). Sinusitis often develops after a cold.  What are the causes?  This condition is caused by anything that creates swelling in the sinuses or stops mucus from draining, including:  · Allergies.  · Asthma.  · Bacterial or viral infection.  · Abnormally shaped bones between the nasal passages.  · Nasal growths that contain mucus (nasal polyps).  · Narrow sinus openings.  · Pollutants, such as chemicals or irritants in the air.  · A foreign object stuck in the nose.  · A fungal infection. This is rare.  What increases the risk?  The following factors may make you more likely to develop this condition:  · Having allergies or asthma.  · Having had a recent cold or respiratory tract infection.  · Having structural deformities or blockages in your nose or sinuses.  · Having a weak immune system.  · Doing a lot of swimming or diving.  · Overusing nasal sprays.  · Smoking.  What are the signs or symptoms?  The main symptoms of this condition are pain and a feeling of pressure around the affected sinuses. Other symptoms include:  · Upper toothache.  · Earache.  · Headache.  · Bad breath.  · Decreased sense of smell and taste.  · A cough that may get worse at night.  · Fatigue.  · Fever.  · Thick drainage from your nose. The drainage is often green and it may contain pus (purulent).  · Stuffy nose or congestion.  · Postnasal drip. This is when extra mucus collects in the throat or back of the nose.  · Swelling and warmth over the affected sinuses.  · Sore throat.  · Sensitivity to light.  How is this diagnosed?  This condition is diagnosed based on symptoms, a medical history, and a physical exam. To find out if your condition is acute or chronic, your health care provider may:  · Look in your nose for signs of nasal polyps.  · Tap over the affected sinus to check for signs of infection.  · View the inside of your sinuses using an imaging device that has a light attached  (endoscope).  If your health care provider suspects that you have chronic sinusitis, you may also:  · Be tested for allergies.  · Have a sample of mucus taken from your nose (nasal culture) and checked for bacteria.  · Have a mucus sample examined to see if your sinusitis is related to an allergy.  If your sinusitis does not respond to treatment and it lasts longer than 8 weeks, you may have an MRI or CT scan to check your sinuses. These scans also help to determine how severe your infection is.  In rare cases, a bone biopsy may be done to rule out more serious types of fungal sinus disease.  How is this treated?  Treatment for sinusitis depends on the cause and whether your condition is chronic or acute. If a virus is causing your sinusitis, your symptoms will go away on their own within 10 days. You may be given medicines to relieve your symptoms, including:  · Topical nasal decongestants. They shrink swollen nasal passages and let mucus drain from your sinuses.  · Antihistamines. These drugs block inflammation that is triggered by allergies. This can help to ease swelling in your nose and sinuses.  · Topical nasal corticosteroids. These are nasal sprays that ease inflammation and swelling in your nose and sinuses.  · Nasal saline washes. These rinses can help to get rid of thick mucus in your nose.  If your condition is caused by bacteria, you will be given an antibiotic medicine. If your condition is caused by a fungus, you will be given an antifungal medicine.  Surgery may be needed to correct underlying conditions, such as narrow nasal passages. Surgery may also be needed to remove polyps.  Follow these instructions at home:  Medicines   · Take, use, or apply over-the-counter and prescription medicines only as told by your health care provider. These may include nasal sprays.  · If you were prescribed an antibiotic medicine, take it as told by your health care provider. Do not stop taking the antibiotic even  if you start to feel better.  Hydrate and Humidify   · Drink enough water to keep your urine clear or pale yellow. Staying hydrated will help to thin your mucus.  · Use a cool mist humidifier to keep the humidity level in your home above 50%.  · Inhale steam for 10-15 minutes, 3-4 times a day or as told by your health care provider. You can do this in the bathroom while a hot shower is running.  · Limit your exposure to cool or dry air.  Rest   · Rest as much as possible.  · Sleep with your head raised (elevated).  · Make sure to get enough sleep each night.  General instructions   · Apply a warm, moist washcloth to your face 3-4 times a day or as told by your health care provider. This will help with discomfort.  · Wash your hands often with soap and water to reduce your exposure to viruses and other germs. If soap and water are not available, use hand .  · Do not smoke. Avoid being around people who are smoking (secondhand smoke).  · Keep all follow-up visits as told by your health care provider. This is important.  Contact a health care provider if:  · You have a fever.  · Your symptoms get worse.  · Your symptoms do not improve within 10 days.  Get help right away if:  · You have a severe headache.  · You have persistent vomiting.  · You have pain or swelling around your face or eyes.  · You have vision problems.  · You develop confusion.  · Your neck is stiff.  · You have trouble breathing.  This information is not intended to replace advice given to you by your health care provider. Make sure you discuss any questions you have with your health care provider.  Document Released: 12/18/2006 Document Revised: 08/13/2017 Document Reviewed: 10/12/2016  [employer] Interactive Patient Education © 2017 [employer]

## 2018-04-27 NOTE — PROGRESS NOTES
Subjective   Patient ID: Ivy Chairez is a 62 y.o. female presents with   Chief Complaint   Patient presents with   • URI       URI    This is a new problem. The current episode started 1 to 4 weeks ago (1w). The problem has been gradually worsening. Maximum temperature: low grade. Associated symptoms include congestion, coughing (yellow), ear pain, headaches, rhinorrhea, sinus pain, sneezing and a sore throat. Pertinent negatives include no wheezing. Treatments tried: ryan seltzer plus. The treatment provided mild relief.       Allergies   Allergen Reactions   • Metronidazole Diarrhea and Nausea And Vomiting   • Amoxicillin Swelling       The following portions of the patient's history were reviewed and updated as appropriate: allergies, current medications, past family history, past medical history, past social history, past surgical history and problem list.      Review of Systems   Constitutional: Positive for chills, diaphoresis, fatigue and fever.   HENT: Positive for congestion, ear pain, postnasal drip, rhinorrhea, sinus pain, sinus pressure, sneezing, sore throat and trouble swallowing.    Respiratory: Positive for cough (yellow) and chest tightness. Negative for shortness of breath and wheezing.    Cardiovascular: Negative.    Gastrointestinal: Negative.    Neurological: Positive for headaches.       Objective     Vitals:    04/27/18 0901   BP: 104/62   Pulse: 69   Resp: 18   Temp: 98.7 °F (37.1 °C)   SpO2: 98%         Physical Exam   Constitutional: She is oriented to person, place, and time. She appears well-developed and well-nourished. She does not appear ill. No distress.   HENT:   Head: Normocephalic.   Right Ear: Hearing, tympanic membrane, external ear and ear canal normal.   Left Ear: Hearing, external ear and ear canal normal. A middle ear effusion is present.   Nose: Mucosal edema, rhinorrhea and sinus tenderness present. Right sinus exhibits frontal sinus tenderness. Left sinus exhibits  frontal sinus tenderness.   Mouth/Throat: Oropharynx is clear and moist and mucous membranes are normal. No tonsillar exudate.   Eyes: Conjunctivae are normal.   Sclera white.   Neck: No tracheal deviation present.   Cardiovascular: Normal rate, regular rhythm, S1 normal, S2 normal and normal heart sounds.    Pulmonary/Chest: Effort normal. No accessory muscle usage. No respiratory distress. She has decreased breath sounds in the right lower field and the left lower field. She has no wheezes. She has no rhonchi. She has no rales.   Abdominal: Soft. Bowel sounds are normal. There is no tenderness.   Lymphadenopathy:     She has no cervical adenopathy.   Neurological: She is alert and oriented to person, place, and time.   Skin: Skin is warm and dry.   Vitals reviewed.        Ivy was seen today for uri.    Diagnoses and all orders for this visit:    Acute frontal sinusitis, recurrence not specified  -     doxycycline (MONODOX) 100 MG capsule; Take 1 capsule by mouth 2 (Two) Times a Day for 7 days.    Acute URI    Bronchitis  -     PredniSONE 5 MG tablet therapy pack dosepak; Take as directed on package  -     promethazine-dextromethorphan (PROMETHAZINE-DM) 6.25-15 MG/5ML syrup; Take 5 mL by mouth At Night As Needed for Cough for up to 7 days.  -     guaiFENesin (MUCINEX) 600 MG 12 hr tablet; Take 1 tablet by mouth Every 12 (Twelve) Hours for 5 days.  -     benzonatate (TESSALON) 200 MG capsule; Take 1 capsule by mouth 3 (Three) Times a Day As Needed for Cough for up to 5 days.        Follow-up with Primary Care Physician in 48-72 hours if these symptoms worsen or fail to improve as anticipated. Patient verbalizes understanding.    Acute Bronchitis, Adult  Acute bronchitis is sudden (acute) swelling of the air tubes (bronchi) in the lungs. Acute bronchitis causes these tubes to fill with mucus, which can make it hard to breathe. It can also cause coughing or wheezing.  In adults, acute bronchitis usually goes away  within 2 weeks. A cough caused by bronchitis may last up to 3 weeks. Smoking, allergies, and asthma can make the condition worse. Repeated episodes of bronchitis may cause further lung problems, such as chronic obstructive pulmonary disease (COPD).  What are the causes?  This condition can be caused by germs and by substances that irritate the lungs, including:  · Cold and flu viruses. This condition is most often caused by the same virus that causes a cold.  · Bacteria.  · Exposure to tobacco smoke, dust, fumes, and air pollution.  What increases the risk?  This condition is more likely to develop in people who:  · Have close contact with someone with acute bronchitis.  · Are exposed to lung irritants, such as tobacco smoke, dust, fumes, and vapors.  · Have a weak immune system.  · Have a respiratory condition such as asthma.  What are the signs or symptoms?  Symptoms of this condition include:  · A cough.  · Coughing up clear, yellow, or green mucus.  · Wheezing.  · Chest congestion.  · Shortness of breath.  · A fever.  · Body aches.  · Chills.  · A sore throat.  How is this diagnosed?  This condition is usually diagnosed with a physical exam. During the exam, your health care provider may order tests, such as chest X-rays, to rule out other conditions. He or she may also:  · Test a sample of your mucus for bacterial infection.  · Check the level of oxygen in your blood. This is done to check for pneumonia.  · Do a chest X-ray or lung function testing to rule out pneumonia and other conditions.  · Perform blood tests.  Your health care provider will also ask about your symptoms and medical history.  How is this treated?  Most cases of acute bronchitis clear up over time without treatment. Your health care provider may recommend:  · Drinking more fluids. Drinking more makes your mucus thinner, which may make it easier to breathe.  · Taking a medicine for a fever or cough.  · Taking an antibiotic medicine.  · Using  an inhaler to help improve shortness of breath and to control a cough.  · Using a cool mist vaporizer or humidifier to make it easier to breathe.  Follow these instructions at home:  Medicines   · Take over-the-counter and prescription medicines only as told by your health care provider.  · If you were prescribed an antibiotic, take it as told by your health care provider. Do not stop taking the antibiotic even if you start to feel better.  General instructions   · Get plenty of rest.  · Drink enough fluids to keep your urine clear or pale yellow.  · Avoid smoking and secondhand smoke. Exposure to cigarette smoke or irritating chemicals will make bronchitis worse. If you smoke and you need help quitting, ask your health care provider. Quitting smoking will help your lungs heal faster.  · Use an inhaler, cool mist vaporizer, or humidifier as told by your health care provider.  · Keep all follow-up visits as told by your health care provider. This is important.  How is this prevented?  To lower your risk of getting this condition again:  · Wash your hands often with soap and water. If soap and water are not available, use hand .  · Avoid contact with people who have cold symptoms.  · Try not to touch your hands to your mouth, nose, or eyes.  · Make sure to get the flu shot every year.  Contact a health care provider if:  · Your symptoms do not improve in 2 weeks of treatment.  Get help right away if:  · You cough up blood.  · You have chest pain.  · You have severe shortness of breath.  · You become dehydrated.  · You faint or keep feeling like you are going to faint.  · You keep vomiting.  · You have a severe headache.  · Your fever or chills gets worse.  This information is not intended to replace advice given to you by your health care provider. Make sure you discuss any questions you have with your health care provider.  Document Released: 01/25/2006 Document Revised: 07/12/2017 Document Reviewed:  "06/07/2017  [employer] Interactive Patient Education © 2017 [employer]  Upper Respiratory Infection, Adult  Most upper respiratory infections (URIs) are a viral infection of the air passages leading to the lungs. A URI affects the nose, throat, and upper air passages. The most common type of URI is nasopharyngitis and is typically referred to as \"the common cold.\"  URIs run their course and usually go away on their own. Most of the time, a URI does not require medical attention, but sometimes a bacterial infection in the upper airways can follow a viral infection. This is called a secondary infection. Sinus and middle ear infections are common types of secondary upper respiratory infections.  Bacterial pneumonia can also complicate a URI. A URI can worsen asthma and chronic obstructive pulmonary disease (COPD). Sometimes, these complications can require emergency medical care and may be life threatening.  What are the causes?  Almost all URIs are caused by viruses. A virus is a type of germ and can spread from one person to another.  What increases the risk?  You may be at risk for a URI if:  · You smoke.  · You have chronic heart or lung disease.  · You have a weakened defense (immune) system.  · You are very young or very old.  · You have nasal allergies or asthma.  · You work in crowded or poorly ventilated areas.  · You work in health care facilities or schools.  What are the signs or symptoms?  Symptoms typically develop 2-3 days after you come in contact with a cold virus. Most viral URIs last 7-10 days. However, viral URIs from the influenza virus (flu virus) can last 14-18 days and are typically more severe. Symptoms may include:  · Runny or stuffy (congested) nose.  · Sneezing.  · Cough.  · Sore throat.  · Headache.  · Fatigue.  · Fever.  · Loss of appetite.  · Pain in your forehead, behind your eyes, and over your cheekbones (sinus pain).  · Muscle aches.  How is this diagnosed?  Your health care provider " may diagnose a URI by:  · Physical exam.  · Tests to check that your symptoms are not due to another condition such as:  ¨ Strep throat.  ¨ Sinusitis.  ¨ Pneumonia.  ¨ Asthma.  How is this treated?  A URI goes away on its own with time. It cannot be cured with medicines, but medicines may be prescribed or recommended to relieve symptoms. Medicines may help:  · Reduce your fever.  · Reduce your cough.  · Relieve nasal congestion.  Follow these instructions at home:  · Take medicines only as directed by your health care provider.  · Gargle warm saltwater or take cough drops to comfort your throat as directed by your health care provider.  · Use a warm mist humidifier or inhale steam from a shower to increase air moisture. This may make it easier to breathe.  · Drink enough fluid to keep your urine clear or pale yellow.  · Eat soups and other clear broths and maintain good nutrition.  · Rest as needed.  · Return to work when your temperature has returned to normal or as your health care provider advises. You may need to stay home longer to avoid infecting others. You can also use a face mask and careful hand washing to prevent spread of the virus.  · Increase the usage of your inhaler if you have asthma.  · Do not use any tobacco products, including cigarettes, chewing tobacco, or electronic cigarettes. If you need help quitting, ask your health care provider.  How is this prevented?  The best way to protect yourself from getting a cold is to practice good hygiene.  · Avoid oral or hand contact with people with cold symptoms.  · Wash your hands often if contact occurs.  There is no clear evidence that vitamin C, vitamin E, echinacea, or exercise reduces the chance of developing a cold. However, it is always recommended to get plenty of rest, exercise, and practice good nutrition.  Contact a health care provider if:  · You are getting worse rather than better.  · Your symptoms are not controlled by medicine.  · You have  chills.  · You have worsening shortness of breath.  · You have brown or red mucus.  · You have yellow or brown nasal discharge.  · You have pain in your face, especially when you bend forward.  · You have a fever.  · You have swollen neck glands.  · You have pain while swallowing.  · You have white areas in the back of your throat.  Get help right away if:  · You have severe or persistent:  ¨ Headache.  ¨ Ear pain.  ¨ Sinus pain.  ¨ Chest pain.  · You have chronic lung disease and any of the following:  ¨ Wheezing.  ¨ Prolonged cough.  ¨ Coughing up blood.  ¨ A change in your usual mucus.  · You have a stiff neck.  · You have changes in your:  ¨ Vision.  ¨ Hearing.  ¨ Thinking.  ¨ Mood.  This information is not intended to replace advice given to you by your health care provider. Make sure you discuss any questions you have with your health care provider.  Document Released: 06/13/2002 Document Revised: 08/20/2017 Document Reviewed: 03/25/2015  [employer] Interactive Patient Education © 2017 [employer]  Sinusitis, Adult  Sinusitis is soreness and inflammation of your sinuses. Sinuses are hollow spaces in the bones around your face. Your sinuses are located:  · Around your eyes.  · In the middle of your forehead.  · Behind your nose.  · In your cheekbones.  Your sinuses and nasal passages are lined with a stringy fluid (mucus). Mucus normally drains out of your sinuses. When your nasal tissues become inflamed or swollen, the mucus can become trapped or blocked so air cannot flow through your sinuses. This allows bacteria, viruses, and funguses to grow, which leads to infection.  Sinusitis can develop quickly and last for 7?10 days (acute) or for more than 12 weeks (chronic). Sinusitis often develops after a cold.  What are the causes?  This condition is caused by anything that creates swelling in the sinuses or stops mucus from draining, including:  · Allergies.  · Asthma.  · Bacterial or viral  infection.  · Abnormally shaped bones between the nasal passages.  · Nasal growths that contain mucus (nasal polyps).  · Narrow sinus openings.  · Pollutants, such as chemicals or irritants in the air.  · A foreign object stuck in the nose.  · A fungal infection. This is rare.  What increases the risk?  The following factors may make you more likely to develop this condition:  · Having allergies or asthma.  · Having had a recent cold or respiratory tract infection.  · Having structural deformities or blockages in your nose or sinuses.  · Having a weak immune system.  · Doing a lot of swimming or diving.  · Overusing nasal sprays.  · Smoking.  What are the signs or symptoms?  The main symptoms of this condition are pain and a feeling of pressure around the affected sinuses. Other symptoms include:  · Upper toothache.  · Earache.  · Headache.  · Bad breath.  · Decreased sense of smell and taste.  · A cough that may get worse at night.  · Fatigue.  · Fever.  · Thick drainage from your nose. The drainage is often green and it may contain pus (purulent).  · Stuffy nose or congestion.  · Postnasal drip. This is when extra mucus collects in the throat or back of the nose.  · Swelling and warmth over the affected sinuses.  · Sore throat.  · Sensitivity to light.  How is this diagnosed?  This condition is diagnosed based on symptoms, a medical history, and a physical exam. To find out if your condition is acute or chronic, your health care provider may:  · Look in your nose for signs of nasal polyps.  · Tap over the affected sinus to check for signs of infection.  · View the inside of your sinuses using an imaging device that has a light attached (endoscope).  If your health care provider suspects that you have chronic sinusitis, you may also:  · Be tested for allergies.  · Have a sample of mucus taken from your nose (nasal culture) and checked for bacteria.  · Have a mucus sample examined to see if your sinusitis is related  to an allergy.  If your sinusitis does not respond to treatment and it lasts longer than 8 weeks, you may have an MRI or CT scan to check your sinuses. These scans also help to determine how severe your infection is.  In rare cases, a bone biopsy may be done to rule out more serious types of fungal sinus disease.  How is this treated?  Treatment for sinusitis depends on the cause and whether your condition is chronic or acute. If a virus is causing your sinusitis, your symptoms will go away on their own within 10 days. You may be given medicines to relieve your symptoms, including:  · Topical nasal decongestants. They shrink swollen nasal passages and let mucus drain from your sinuses.  · Antihistamines. These drugs block inflammation that is triggered by allergies. This can help to ease swelling in your nose and sinuses.  · Topical nasal corticosteroids. These are nasal sprays that ease inflammation and swelling in your nose and sinuses.  · Nasal saline washes. These rinses can help to get rid of thick mucus in your nose.  If your condition is caused by bacteria, you will be given an antibiotic medicine. If your condition is caused by a fungus, you will be given an antifungal medicine.  Surgery may be needed to correct underlying conditions, such as narrow nasal passages. Surgery may also be needed to remove polyps.  Follow these instructions at home:  Medicines   · Take, use, or apply over-the-counter and prescription medicines only as told by your health care provider. These may include nasal sprays.  · If you were prescribed an antibiotic medicine, take it as told by your health care provider. Do not stop taking the antibiotic even if you start to feel better.  Hydrate and Humidify   · Drink enough water to keep your urine clear or pale yellow. Staying hydrated will help to thin your mucus.  · Use a cool mist humidifier to keep the humidity level in your home above 50%.  · Inhale steam for 10-15 minutes, 3-4  times a day or as told by your health care provider. You can do this in the bathroom while a hot shower is running.  · Limit your exposure to cool or dry air.  Rest   · Rest as much as possible.  · Sleep with your head raised (elevated).  · Make sure to get enough sleep each night.  General instructions   · Apply a warm, moist washcloth to your face 3-4 times a day or as told by your health care provider. This will help with discomfort.  · Wash your hands often with soap and water to reduce your exposure to viruses and other germs. If soap and water are not available, use hand .  · Do not smoke. Avoid being around people who are smoking (secondhand smoke).  · Keep all follow-up visits as told by your health care provider. This is important.  Contact a health care provider if:  · You have a fever.  · Your symptoms get worse.  · Your symptoms do not improve within 10 days.  Get help right away if:  · You have a severe headache.  · You have persistent vomiting.  · You have pain or swelling around your face or eyes.  · You have vision problems.  · You develop confusion.  · Your neck is stiff.  · You have trouble breathing.  This information is not intended to replace advice given to you by your health care provider. Make sure you discuss any questions you have with your health care provider.  Document Released: 12/18/2006 Document Revised: 08/13/2017 Document Reviewed: 10/12/2016  [employer] Interactive Patient Education © 2017 [employer]

## 2018-06-15 ENCOUNTER — TELEPHONE (OUTPATIENT)
Dept: INTERNAL MEDICINE | Facility: CLINIC | Age: 63
End: 2018-06-15

## 2018-06-15 NOTE — TELEPHONE ENCOUNTER
Left detailed message for patient to call back, need to know which ER patient went to also patient needs to let us know if ER set her up with orthopedic.

## 2018-06-15 NOTE — TELEPHONE ENCOUNTER
----- Message from Vanessa Hernandez sent at 6/15/2018 10:53 AM EDT -----  Contact: Patient  Patient fell yesterday and broke humerus bone. Went to ER and they told her to follow up with Dr. Berrios. When would like to see patient? Please advise    Patient:[phone]

## 2018-06-18 ENCOUNTER — TELEPHONE (OUTPATIENT)
Dept: INTERNAL MEDICINE | Facility: CLINIC | Age: 63
End: 2018-06-18

## 2018-06-18 ENCOUNTER — TELEPHONE (OUTPATIENT)
Dept: ORTHOPEDIC SURGERY | Facility: CLINIC | Age: 63
End: 2018-06-18

## 2018-06-18 DIAGNOSIS — M81.0 OSTEOPOROSIS, UNSPECIFIED OSTEOPOROSIS TYPE, UNSPECIFIED PATHOLOGICAL FRACTURE PRESENCE: Primary | ICD-10-CM

## 2018-06-18 DIAGNOSIS — M17.9 OSTEOARTHRITIS OF KNEE, UNSPECIFIED LATERALITY, UNSPECIFIED OSTEOARTHRITIS TYPE: ICD-10-CM

## 2018-06-18 NOTE — TELEPHONE ENCOUNTER
----- Message from Vianney Nava sent at 6/18/2018  9:22 AM EDT -----  Contact: pt - Dr Berrios's pt - RE: Ortho Referral  Pt calling and would like a referral to orthopedic. Pt informs was not given a referral as pt was instructed to follow up w/ PCP. Could you please put referral in pt's chart? Could you please call pt w/ referral or set appt up w/ referral for pt? Please advise. Thanks

## 2018-06-19 ENCOUNTER — OFFICE VISIT (OUTPATIENT)
Dept: ORTHOPEDIC SURGERY | Facility: CLINIC | Age: 63
End: 2018-06-19

## 2018-06-19 VITALS — BODY MASS INDEX: 28.58 KG/M2 | HEIGHT: 64 IN | WEIGHT: 167.4 LBS | TEMPERATURE: 97.6 F

## 2018-06-19 DIAGNOSIS — S49.91XA RIGHT SHOULDER INJURY, INITIAL ENCOUNTER: Primary | ICD-10-CM

## 2018-06-19 PROCEDURE — 99213 OFFICE O/P EST LOW 20 MIN: CPT | Performed by: ORTHOPAEDIC SURGERY

## 2018-06-19 PROCEDURE — 73030 X-RAY EXAM OF SHOULDER: CPT | Performed by: ORTHOPAEDIC SURGERY

## 2018-06-19 RX ORDER — MELOXICAM 15 MG/1
TABLET ORAL
Qty: 30 TABLET | Refills: 0 | Status: SHIPPED | OUTPATIENT
Start: 2018-06-19 | End: 2018-12-13

## 2018-06-19 NOTE — PROGRESS NOTES
New Right Arm      Patient: Ivy Chairez        YOB: 1955    Medical Record Number: [id number]        Chief Complaints: right arm  Chief Complaint   Patient presents with   • Right Arm - Establish Care, Pain           History of Present Illness: This is a  62 y.o. female who presents with Complaints of right arm injury.  She was getting her neighbor's dog had a by the collar when he twisted she is right-hand-dominant this was Thursday once he twisted she fell she thinks she heard things with a fall not the twisting she was told she had a fractured her humerus her pain is localized to the elbow area no prior history of similar symptoms symptoms are constant severe aching with swelling worse with any kind of movement she works in senior nutrition past medical history marked for CVA cardiac disease thyroid disease osteoporosis and a history DVT          Allergies:   Allergies   Allergen Reactions   • Metronidazole Diarrhea and Nausea And Vomiting   • Amoxicillin Swelling   • Hydrocodone-Acetaminophen Nausea And Vomiting       Medications:   Home Medications:  Current Outpatient Prescriptions on File Prior to Visit   Medication Sig   • aspirin 81 MG tablet Take 81 mg by mouth Daily.   • Cholecalciferol (VITAMIN D3) 2000 UNITS tablet Take 2,000 Units by mouth Daily.   • cyclobenzaprine (FLEXERIL) 10 MG tablet Take 1 tablet by mouth At Night As Needed for Muscle Spasms.   • diphenhydrAMINE-acetaminophen (TYLENOL PM)  MG tablet per tablet Take 1 tablet by mouth At Night As Needed for Sleep.   • levothyroxine (SYNTHROID, LEVOTHROID) 75 MCG tablet TAKE 1 TABLET BY MOUTH EVERY DAY   • meclizine (ANTIVERT) 25 MG tablet Take 1 tablet by mouth 3 (Three) Times a Day.   • metoprolol succinate XL (TOPROL-XL) 25 MG 24 hr tablet Take 1 tablet by mouth Daily.   • NITROGLYCERIN SL Place  under the tongue As Needed. For chest pain   • rosuvastatin (CRESTOR) 40 MG tablet Take 40 mg by mouth Daily.   •  venlafaxine (EFFEXOR) 75 MG tablet Take 75 mg by mouth every night at bedtime.     No current facility-administered medications on file prior to visit.      Current Medications:  Scheduled Meds:  Continuous Infusions:  No current facility-administered medications for this visit.   PRN Meds:.    Past Medical History:   Diagnosis Date   • Angina pectoris    • Broken toe 03/01/2017    right 4th toe   • Common migraine without aura     Neurologist Dr Govea   • Coronary artery disease    • History of chest pain    • History of CHF (congestive heart failure)    • History of pulmonary embolism    • History of stroke    • Migraine     eval and management by neurologist   • Orthostatic hypotension    • Osteoporosis    • Pleural effusion     POSTOPERATIVE, REQUIRING THORACENTESIS.   • Polyp of sigmoid colon     REMOVED   • Stroke    • Thoracic degenerative disc disease     Received epidural 12/14 [facility] DR Landin   • Thoracic spine pain    • Thyroid disease    • Weakness of right side of body     ARM & FACE        Past Surgical History:   Procedure Laterality Date   • AUGMENTATION MAMMAPLASTY     • BACK SURGERY     • BREAST AUGMENTATION     • COLONOSCOPY  06/2004    Dr. Salas   • CORONARY ANGIOPLASTY WITH STENT PLACEMENT      NON-GRAFTED RIGHT CORONARY ARTERY STENT.   • CORONARY ARTERY BYPASS GRAFT  04/2009    cabg x 3: LIMA to LAD, vein grafts to diagonal and OM1   • EPIDURAL BLOCK     • HYSTERECTOMY     • KNEE SURGERY Right    • THORACIC DECOMPRESSION POSTERIOR FUSION WITH INSTRUMENTATION N/A 3/6/2017    Procedure: T4-T10 Fusion with instrumentation;  Surgeon: Clyde Callejas MD;  Location: [facility];  Service:    • TOTAL THYROIDECTOMY          Social History     Occupational History   • Not on file.     Social History Main Topics   • Smoking status: Former Smoker     Quit date: 2009   • Smokeless tobacco: Never Used   • Alcohol use No   • Drug use: No   • Sexual activity: Yes     Birth control/  "protection: Post-menopausal      Social History     Social History Narrative   • No narrative on file        Family History   Problem Relation Age of Onset   • No Known Problems Mother    • Heart disease Father    • Hypertension Father    • Hypertension Sister    • Hypertension Brother              Review of Systems: 14 point review of systems are remarkable for the arm pain only today the remainder are negative    Review of Systems      Physical Exam: 62 y.o. female  General Appearance:    Alert, cooperative, in no acute distress                   Vitals:    06/19/18 1317   Temp: 97.6 °F (36.4 °C)   TempSrc: Temporal Artery    Weight: 75.9 kg (167 lb 6.4 oz)   Height: 162.6 cm (64\")      Patient is alert and read ×3 no acute distress appears her above-listed at height weight and age.  Affect is normal respiratory rate is normal unlabored. Heart rate regular rate rhythm, sclera, dentition and hearing are normal for the purpose of this exam.    Ortho Exam physical exam of the right arm she does have pain in the area the biceps distally no overlying skin changes she can full extend her elbow fully flex it full pronation supination some pain in range particularly extension of the elbow good distal pulses no overlying skin changes shoulder exam appears normal with guarded range of motion rotator cuff strength no overlying skin changes       Procedures          Radiology:   AP, Scapular Y and Axillary Lateral of the right shoulder were ordered/reviewed to evauate shoulder pain.  I have compared to some films that were imported from outside facility which include 2 views of the humerus 2 views the elbow she has some acromioclavicular arthritis I see no evidence of any acute bony pathology no obvious fracture around the elbow or mid shaft of the humerus  Imaging Results (most recent)     Procedure Component Value Units Date/Time    XR Shoulder 2+ View Right [[id number]] Resulted:  06/19/18 1347     Updated:  06/19/18 1347 "    Impression:       Ordering physician's impression is located in the Encounter Note dated 06/19/18. X-ray performed in the DR room.          Assessment/Plan:Severe right upper extremity pain she may well have an occult fracture at this point I want to keep her in a sling limit her activity ice as needed I will see her back in 1-2 weeks with repeat x-ray  I will give her some Mobic with strict precautions

## 2018-07-03 ENCOUNTER — OFFICE VISIT (OUTPATIENT)
Dept: ORTHOPEDIC SURGERY | Facility: CLINIC | Age: 63
End: 2018-07-03

## 2018-07-03 VITALS — WEIGHT: 169.2 LBS | TEMPERATURE: 97.6 F | BODY MASS INDEX: 28.89 KG/M2 | HEIGHT: 64 IN

## 2018-07-03 DIAGNOSIS — M89.8X2 PAIN OF RIGHT HUMERUS: Primary | ICD-10-CM

## 2018-07-03 PROCEDURE — 73030 X-RAY EXAM OF SHOULDER: CPT | Performed by: ORTHOPAEDIC SURGERY

## 2018-07-03 PROCEDURE — 99212 OFFICE O/P EST SF 10 MIN: CPT | Performed by: ORTHOPAEDIC SURGERY

## 2018-07-03 NOTE — PROGRESS NOTES
"Right arm Follow Up      Patient: Ivy Chairez        YOB: 1955            Chief Complaints: right arm pain      History of Present Illness:  Patient here follow-up of an injury to the right upper extremity she states she's doing much better on all accounts feels like she can move it fine mildly symptomatically overall improved she would like to return to work Monday    Physical Exam: 62 y.o. female  General Appearance:    Alert, cooperative, in no acute distress                   Vitals:    07/03/18 1355   Temp: 97.6 °F (36.4 °C)   TempSrc: Temporal Artery    Weight: 76.7 kg (169 lb 3.2 oz)   Height: 162.6 cm (64\")        Patient is alert and read ×3 no acute distress appears her above-listed at height weight and age.  Affect is normal respiratory rate is normal unlabored. Heart rate regular rate rhythm, sclera, dentition and hearing are normal for the purpose of this exam.      Ortho Exam      Physical exam the right elbow she has no effusion no redness full range of motion no palpable tenderness she has full range of motion of the shoulder in all directions good rotator cuff strength isometric strength testing no overlying skin changes    X-rays AP and lateral right elbow were taken to evaluate her symptoms I do see part humerus there was one area in the mid part of the humerus and I questioned a nondisplaced fracture however I do think that is more base's can shadow do not appreciate an obvious fracture    Assessment/Plan:    Right upper extremity pain which has essentially resolved she has very mild symptoms she would like to return to work Monday I'll see her back in 3-4 weeks she is a symptomatically she may cancel            "

## 2018-07-09 RX ORDER — LEVOTHYROXINE SODIUM 0.07 MG/1
TABLET ORAL
Qty: 90 TABLET | Refills: 1 | Status: SHIPPED | OUTPATIENT
Start: 2018-07-09 | End: 2018-08-31 | Stop reason: DRUGHIGH

## 2018-07-31 ENCOUNTER — OFFICE VISIT (OUTPATIENT)
Dept: ORTHOPEDIC SURGERY | Facility: CLINIC | Age: 63
End: 2018-07-31

## 2018-07-31 VITALS — TEMPERATURE: 97.3 F | BODY MASS INDEX: 26.06 KG/M2 | HEIGHT: 67 IN | WEIGHT: 166 LBS

## 2018-07-31 DIAGNOSIS — M79.601 PAIN OF RIGHT UPPER EXTREMITY: Primary | ICD-10-CM

## 2018-07-31 PROCEDURE — 99212 OFFICE O/P EST SF 10 MIN: CPT | Performed by: ORTHOPAEDIC SURGERY

## 2018-08-27 ENCOUNTER — OFFICE VISIT (OUTPATIENT)
Dept: FAMILY MEDICINE CLINIC | Facility: CLINIC | Age: 63
End: 2018-08-27

## 2018-08-27 VITALS
OXYGEN SATURATION: 97 % | TEMPERATURE: 98 F | WEIGHT: 167.9 LBS | HEART RATE: 78 BPM | BODY MASS INDEX: 26.98 KG/M2 | DIASTOLIC BLOOD PRESSURE: 68 MMHG | HEIGHT: 66 IN | SYSTOLIC BLOOD PRESSURE: 114 MMHG

## 2018-08-27 DIAGNOSIS — I10 ESSENTIAL HYPERTENSION: ICD-10-CM

## 2018-08-27 DIAGNOSIS — R60.1 GENERALIZED EDEMA: ICD-10-CM

## 2018-08-27 DIAGNOSIS — E78.5 HYPERLIPIDEMIA, UNSPECIFIED HYPERLIPIDEMIA TYPE: Primary | ICD-10-CM

## 2018-08-27 DIAGNOSIS — E03.9 ACQUIRED HYPOTHYROIDISM: ICD-10-CM

## 2018-08-27 DIAGNOSIS — G47.00 INSOMNIA, UNSPECIFIED TYPE: ICD-10-CM

## 2018-08-27 DIAGNOSIS — Z13.0 SCREENING FOR DEFICIENCY ANEMIA: ICD-10-CM

## 2018-08-27 PROCEDURE — 99214 OFFICE O/P EST MOD 30 MIN: CPT | Performed by: NURSE PRACTITIONER

## 2018-08-27 RX ORDER — AMITRIPTYLINE HYDROCHLORIDE 10 MG/1
10 TABLET, FILM COATED ORAL NIGHTLY
Qty: 30 TABLET | Refills: 5 | Status: SHIPPED | OUTPATIENT
Start: 2018-08-27 | End: 2019-01-02

## 2018-08-28 ENCOUNTER — RESULTS ENCOUNTER (OUTPATIENT)
Dept: FAMILY MEDICINE CLINIC | Facility: CLINIC | Age: 63
End: 2018-08-28

## 2018-08-28 DIAGNOSIS — Z13.0 SCREENING FOR DEFICIENCY ANEMIA: ICD-10-CM

## 2018-08-28 DIAGNOSIS — E78.5 HYPERLIPIDEMIA, UNSPECIFIED HYPERLIPIDEMIA TYPE: ICD-10-CM

## 2018-08-28 DIAGNOSIS — I10 ESSENTIAL HYPERTENSION: ICD-10-CM

## 2018-08-28 DIAGNOSIS — E03.9 ACQUIRED HYPOTHYROIDISM: ICD-10-CM

## 2018-08-28 DIAGNOSIS — R60.1 GENERALIZED EDEMA: ICD-10-CM

## 2018-08-30 LAB
ALBUMIN SERPL-MCNC: 3.9 G/DL (ref 3.5–5.2)
ALBUMIN/GLOB SERPL: 1.8 G/DL
ALP SERPL-CCNC: 64 U/L (ref 39–117)
ALT SERPL-CCNC: 13 U/L (ref 1–33)
AST SERPL-CCNC: 17 U/L (ref 1–32)
BILIRUB SERPL-MCNC: 0.7 MG/DL (ref 0.1–1.2)
BUN SERPL-MCNC: 12 MG/DL (ref 8–23)
BUN/CREAT SERPL: 13.2 (ref 7–25)
CALCIUM SERPL-MCNC: 8.9 MG/DL (ref 8.6–10.5)
CHLORIDE SERPL-SCNC: 105 MMOL/L (ref 98–107)
CHOLEST SERPL-MCNC: 129 MG/DL (ref 0–200)
CHOLEST/HDLC SERPL: 1.98 {RATIO}
CO2 SERPL-SCNC: 23.3 MMOL/L (ref 22–29)
CREAT SERPL-MCNC: 0.91 MG/DL (ref 0.57–1)
ERYTHROCYTE [DISTWIDTH] IN BLOOD BY AUTOMATED COUNT: 13.2 % (ref 11.7–13)
GLOBULIN SER CALC-MCNC: 2.2 GM/DL
GLUCOSE SERPL-MCNC: 94 MG/DL (ref 65–99)
HCT VFR BLD AUTO: 38.9 % (ref 35.6–45.5)
HDLC SERPL-MCNC: 65 MG/DL (ref 40–60)
HGB BLD-MCNC: 12.3 G/DL (ref 11.9–15.5)
IRON SERPL-MCNC: 83 MCG/DL (ref 37–145)
LDLC SERPL CALC-MCNC: 51 MG/DL (ref 0–100)
MCH RBC QN AUTO: 30.4 PG (ref 26.9–32)
MCHC RBC AUTO-ENTMCNC: 31.6 G/DL (ref 32.4–36.3)
MCV RBC AUTO: 96 FL (ref 80.5–98.2)
NT-PROBNP SERPL-MCNC: 808 PG/ML (ref 0–287)
PLATELET # BLD AUTO: 338 10*3/MM3 (ref 140–500)
POTASSIUM SERPL-SCNC: 4.7 MMOL/L (ref 3.5–5.2)
PROT SERPL-MCNC: 6.1 G/DL (ref 6–8.5)
RBC # BLD AUTO: 4.05 10*6/MM3 (ref 3.9–5.2)
SODIUM SERPL-SCNC: 140 MMOL/L (ref 136–145)
TRIGL SERPL-MCNC: 67 MG/DL (ref 0–150)
TSH SERPL DL<=0.005 MIU/L-ACNC: 0.2 MIU/ML (ref 0.27–4.2)
VLDLC SERPL CALC-MCNC: 13.4 MG/DL (ref 5–40)
WBC # BLD AUTO: 5.25 10*3/MM3 (ref 4.5–10.7)

## 2018-08-31 RX ORDER — LEVOTHYROXINE SODIUM 0.05 MG/1
50 TABLET ORAL DAILY
Qty: 30 TABLET | Refills: 5 | Status: SHIPPED | OUTPATIENT
Start: 2018-08-31 | End: 2018-12-18 | Stop reason: DRUGHIGH

## 2018-08-31 RX ORDER — FUROSEMIDE 20 MG/1
20 TABLET ORAL DAILY
Qty: 30 TABLET | Refills: 5 | Status: SHIPPED | OUTPATIENT
Start: 2018-08-31 | End: 2019-03-07 | Stop reason: SDUPTHER

## 2018-09-12 RX ORDER — MECLIZINE HYDROCHLORIDE 25 MG/1
TABLET ORAL
Qty: 270 TABLET | Refills: 1 | Status: SHIPPED | OUTPATIENT
Start: 2018-09-12 | End: 2018-12-13

## 2018-10-19 ENCOUNTER — LAB (OUTPATIENT)
Dept: FAMILY MEDICINE CLINIC | Facility: CLINIC | Age: 63
End: 2018-10-19

## 2018-10-19 DIAGNOSIS — E03.9 ACQUIRED HYPOTHYROIDISM: ICD-10-CM

## 2018-10-19 DIAGNOSIS — I10 ESSENTIAL HYPERTENSION: ICD-10-CM

## 2018-10-19 DIAGNOSIS — E03.9 ACQUIRED HYPOTHYROIDISM: Primary | ICD-10-CM

## 2018-10-19 DIAGNOSIS — R79.89 ELEVATED BRAIN NATRIURETIC PEPTIDE (BNP) LEVEL: ICD-10-CM

## 2018-10-19 DIAGNOSIS — I42.9 CARDIOMYOPATHY, UNSPECIFIED TYPE (HCC): ICD-10-CM

## 2018-10-20 LAB
ALBUMIN SERPL-MCNC: 4.1 G/DL (ref 3.5–5.2)
ALBUMIN/GLOB SERPL: 1.9 G/DL
ALP SERPL-CCNC: 77 U/L (ref 39–117)
ALT SERPL-CCNC: 13 U/L (ref 1–33)
AST SERPL-CCNC: 15 U/L (ref 1–32)
BILIRUB SERPL-MCNC: 0.9 MG/DL (ref 0.1–1.2)
BUN SERPL-MCNC: 12 MG/DL (ref 8–23)
BUN/CREAT SERPL: 13.5 (ref 7–25)
CALCIUM SERPL-MCNC: 9.1 MG/DL (ref 8.6–10.5)
CHLORIDE SERPL-SCNC: 104 MMOL/L (ref 98–107)
CO2 SERPL-SCNC: 25.5 MMOL/L (ref 22–29)
CREAT SERPL-MCNC: 0.89 MG/DL (ref 0.57–1)
GLOBULIN SER CALC-MCNC: 2.2 GM/DL
GLUCOSE SERPL-MCNC: 82 MG/DL (ref 65–99)
NT-PROBNP SERPL-MCNC: 183 PG/ML (ref 0–287)
POTASSIUM SERPL-SCNC: 4.6 MMOL/L (ref 3.5–5.2)
PROT SERPL-MCNC: 6.3 G/DL (ref 6–8.5)
SODIUM SERPL-SCNC: 140 MMOL/L (ref 136–145)
T3FREE SERPL-MCNC: 2.5 PG/ML (ref 2–4.4)
T4 FREE SERPL-MCNC: 1.13 NG/DL (ref 0.93–1.7)
TSH SERPL DL<=0.005 MIU/L-ACNC: 2.98 MIU/ML (ref 0.27–4.2)

## 2018-11-14 RX ORDER — VENLAFAXINE 75 MG/1
75 TABLET ORAL
Qty: 30 TABLET | Refills: 5 | Status: SHIPPED | OUTPATIENT
Start: 2018-11-14 | End: 2018-12-13

## 2018-12-03 ENCOUNTER — OFFICE VISIT (OUTPATIENT)
Dept: FAMILY MEDICINE CLINIC | Facility: CLINIC | Age: 63
End: 2018-12-03

## 2018-12-03 VITALS
BODY MASS INDEX: 26.21 KG/M2 | HEART RATE: 63 BPM | DIASTOLIC BLOOD PRESSURE: 68 MMHG | OXYGEN SATURATION: 98 % | TEMPERATURE: 97.5 F | HEIGHT: 67 IN | RESPIRATION RATE: 16 BRPM | SYSTOLIC BLOOD PRESSURE: 114 MMHG | WEIGHT: 167 LBS

## 2018-12-03 DIAGNOSIS — J01.40 ACUTE NON-RECURRENT PANSINUSITIS: Primary | ICD-10-CM

## 2018-12-03 PROCEDURE — 99213 OFFICE O/P EST LOW 20 MIN: CPT | Performed by: NURSE PRACTITIONER

## 2018-12-03 RX ORDER — CIPROFLOXACIN 500 MG/1
500 TABLET, FILM COATED ORAL 2 TIMES DAILY
Qty: 14 TABLET | Refills: 0 | Status: SHIPPED | OUTPATIENT
Start: 2018-12-03 | End: 2018-12-13

## 2018-12-03 NOTE — PROGRESS NOTES
Subjective   Ivy Chairez is a 63 y.o. female.     Chief Complaint   Patient presents with   • Headache   • Nasal Congestion     3 days    • Eye Pain     3 days of being blood shot and painful      Mrs. Chairez presents today with complaints of right eye being blood shot, right side sinus pressure/pain x 3 days. She denies fever, chills, body aches, matting or crusting of her eyes, nasal congestion or ear congestion. She has not taken any medications for her symptoms.    I have reviewed the patient's medical history in detail and updated the computerized patient record.     The following portions of the patient's history were reviewed and updated as appropriate: allergies, current medications, past family history, past medical history, past social history, past surgical history and problem list.       Current Outpatient Medications:   •  amitriptyline (ELAVIL) 10 MG tablet, Take 1 tablet by mouth Every Night., Disp: 30 tablet, Rfl: 5  •  aspirin 81 MG tablet, Take 81 mg by mouth Daily., Disp: , Rfl:   •  Cholecalciferol (VITAMIN D3) 2000 UNITS tablet, Take 2,000 Units by mouth Daily., Disp: , Rfl:   •  Cyanocobalamin (VITAMIN B 12 PO), Take  by mouth Daily., Disp: , Rfl:   •  DiphenhydrAMINE HCl, Sleep, (SOMINEX PO), Take  by mouth., Disp: , Rfl:   •  furosemide (LASIX) 20 MG tablet, Take 1 tablet by mouth Daily., Disp: 30 tablet, Rfl: 5  •  levothyroxine (SYNTHROID) 50 MCG tablet, Take 1 tablet by mouth Daily., Disp: 30 tablet, Rfl: 5  •  meclizine (ANTIVERT) 25 MG tablet, TAKE 1 TABLET BY MOUTH THREE TIMES DAILY, Disp: 270 tablet, Rfl: 1  •  meloxicam (MOBIC) 15 MG tablet, 1 PO Daily with food., Disp: 30 tablet, Rfl: 0  •  metoprolol succinate XL (TOPROL-XL) 25 MG 24 hr tablet, Take 1 tablet by mouth Daily., Disp: 90 tablet, Rfl: 3  •  NITROGLYCERIN SL, Place  under the tongue As Needed. For chest pain, Disp: , Rfl:   •  rosuvastatin (CRESTOR) 40 MG tablet, Take 40 mg by mouth Daily., Disp: , Rfl:   •   "venlafaxine (EFFEXOR) 75 MG tablet, Take 1 tablet by mouth every night at bedtime., Disp: 30 tablet, Rfl: 5    Review of Systems   Constitutional: Negative for chills and fever.   HENT: Positive for congestion and sinus pressure. Negative for postnasal drip, rhinorrhea and sore throat. Facial swelling: under her eyes.    Eyes: Positive for redness. Negative for blurred vision, pain, discharge and itching.   Respiratory: Negative.    Cardiovascular: Negative.    Neurological: Negative.         Vitals:    12/03/18 1552   BP: 114/68   BP Location: Right arm   Patient Position: Sitting   Cuff Size: Adult   Pulse: 63   Resp: 16   Temp: 97.5 °F (36.4 °C)   TempSrc: Oral   SpO2: 98%   Weight: 75.8 kg (167 lb)   Height: 168.9 cm (66.5\")       Objective   Physical Exam   Constitutional: She is oriented to person, place, and time. She appears well-developed and well-nourished.   HENT:   Right Ear: Hearing, tympanic membrane, external ear and ear canal normal.   Left Ear: Hearing, tympanic membrane, external ear and ear canal normal.   Nose: Mucosal edema (red and swollen) and congestion present. No rhinorrhea. Right sinus exhibits maxillary sinus tenderness and frontal sinus tenderness. Left sinus exhibits no maxillary sinus tenderness and no frontal sinus tenderness.   Mouth/Throat: Uvula is midline, oropharynx is clear and moist and mucous membranes are normal.   Eyes: Conjunctivae and EOM are normal. Pupils are equal, round, and reactive to light.   Edema under both eyes   Cardiovascular: Normal rate, regular rhythm and normal heart sounds.   Pulmonary/Chest: Effort normal and breath sounds normal.   Neurological: She is alert and oriented to person, place, and time.   Skin: Skin is warm and dry.   Psychiatric:   No acute distress   Vitals reviewed.        Assessment/Plan   Ivy was seen today for headache, nasal congestion and eye pain.    Diagnoses and all orders for this visit:    Acute non-recurrent " pansinusitis    Other orders  -     ciprofloxacin (CIPRO) 500 MG tablet; Take 1 tablet by mouth 2 (Two) Times a Day.      You have been diagnosed with acute sinusitis. You have been prescribed an antibiotic along with symptomatic treatment.  You may take Mucinex D for relieving congestion and cough.  If you have high blood pressure, do not take Mucinex D, instead opting for plain Mucinex and Coricidin HBP.  Take Ibuprofen or Tylenol as needed for pain or fever.  Oral antihistamine, such as Zyrtec or Claritin may help reduce ear pressure and relieve some nasal symptoms.  A saline nasal spray may be used to keep nose clear from discharge.  Be sure that you are increasing your intake of clear to decaffeinated fluids and get plenty of rest.  If your symptoms worsen or persist follow up as needed.

## 2018-12-13 ENCOUNTER — HOSPITAL ENCOUNTER (OUTPATIENT)
Facility: HOSPITAL | Age: 63
Setting detail: OBSERVATION
Discharge: HOME | End: 2018-12-14
Attending: EMERGENCY MEDICINE | Admitting: INTERNAL MEDICINE

## 2018-12-13 ENCOUNTER — APPOINTMENT (OUTPATIENT)
Dept: CT IMAGING | Facility: HOSPITAL | Age: 63
End: 2018-12-13

## 2018-12-13 ENCOUNTER — APPOINTMENT (OUTPATIENT)
Dept: GENERAL RADIOLOGY | Facility: HOSPITAL | Age: 63
End: 2018-12-13

## 2018-12-13 DIAGNOSIS — R55 SYNCOPE, UNSPECIFIED SYNCOPE TYPE: Primary | ICD-10-CM

## 2018-12-13 PROBLEM — Z86.73 HISTORY OF STROKE: Status: ACTIVE | Noted: 2018-12-13

## 2018-12-13 LAB
ALBUMIN SERPL-MCNC: 4 G/DL (ref 3.5–5.2)
ALBUMIN/GLOB SERPL: 1.6 G/DL
ALP SERPL-CCNC: 66 U/L (ref 39–117)
ALT SERPL W P-5'-P-CCNC: 17 U/L (ref 1–33)
AMPHET+METHAMPHET UR QL: NEGATIVE
ANION GAP SERPL CALCULATED.3IONS-SCNC: 12 MMOL/L
AST SERPL-CCNC: 22 U/L (ref 1–32)
BACTERIA UR QL AUTO: ABNORMAL /HPF
BARBITURATES UR QL SCN: NEGATIVE
BASOPHILS # BLD AUTO: 0.06 10*3/MM3 (ref 0–0.2)
BASOPHILS NFR BLD AUTO: 0.8 % (ref 0–1.5)
BENZODIAZ UR QL SCN: NEGATIVE
BILIRUB SERPL-MCNC: 0.9 MG/DL (ref 0.1–1.2)
BILIRUB UR QL STRIP: NEGATIVE
BUN BLD-MCNC: 16 MG/DL (ref 8–23)
BUN/CREAT SERPL: 15.2 (ref 7–25)
CALCIUM SPEC-SCNC: 8.8 MG/DL (ref 8.6–10.5)
CANNABINOIDS SERPL QL: NEGATIVE
CHLORIDE SERPL-SCNC: 102 MMOL/L (ref 98–107)
CLARITY UR: CLEAR
CO2 SERPL-SCNC: 26 MMOL/L (ref 22–29)
COCAINE UR QL: NEGATIVE
COLOR UR: YELLOW
CREAT BLD-MCNC: 1.05 MG/DL (ref 0.57–1)
D DIMER PPP FEU-MCNC: 0.46 MCGFEU/ML (ref 0–0.49)
DEPRECATED RDW RBC AUTO: 47.8 FL (ref 37–54)
EOSINOPHIL # BLD AUTO: 0.47 10*3/MM3 (ref 0–0.7)
EOSINOPHIL NFR BLD AUTO: 6.4 % (ref 0.3–6.2)
ERYTHROCYTE [DISTWIDTH] IN BLOOD BY AUTOMATED COUNT: 13.7 % (ref 11.7–13)
ETHANOL BLD-MCNC: <10 MG/DL (ref 0–10)
ETHANOL UR QL: <0.01 %
GFR SERPL CREATININE-BSD FRML MDRD: 53 ML/MIN/1.73
GLOBULIN UR ELPH-MCNC: 2.5 GM/DL
GLUCOSE BLD-MCNC: 135 MG/DL (ref 65–99)
GLUCOSE BLDC GLUCOMTR-MCNC: 119 MG/DL (ref 70–130)
GLUCOSE UR STRIP-MCNC: NEGATIVE MG/DL
HCT VFR BLD AUTO: 38.9 % (ref 35.6–45.5)
HGB BLD-MCNC: 12.6 G/DL (ref 11.9–15.5)
HGB UR QL STRIP.AUTO: ABNORMAL
HOLD SPECIMEN: NORMAL
HOLD SPECIMEN: NORMAL
HYALINE CASTS UR QL AUTO: ABNORMAL /LPF
IMM GRANULOCYTES # BLD: 0.03 10*3/MM3 (ref 0–0.03)
IMM GRANULOCYTES NFR BLD: 0.4 % (ref 0–0.5)
KETONES UR QL STRIP: NEGATIVE
LEUKOCYTE ESTERASE UR QL STRIP.AUTO: ABNORMAL
LYMPHOCYTES # BLD AUTO: 1.78 10*3/MM3 (ref 0.9–4.8)
LYMPHOCYTES NFR BLD AUTO: 24.1 % (ref 19.6–45.3)
MCH RBC QN AUTO: 31.1 PG (ref 26.9–32)
MCHC RBC AUTO-ENTMCNC: 32.4 G/DL (ref 32.4–36.3)
MCV RBC AUTO: 96 FL (ref 80.5–98.2)
METHADONE UR QL SCN: NEGATIVE
MONOCYTES # BLD AUTO: 0.59 10*3/MM3 (ref 0.2–1.2)
MONOCYTES NFR BLD AUTO: 8 % (ref 5–12)
NEUTROPHILS # BLD AUTO: 4.46 10*3/MM3 (ref 1.9–8.1)
NEUTROPHILS NFR BLD AUTO: 60.3 % (ref 42.7–76)
NITRITE UR QL STRIP: NEGATIVE
OPIATES UR QL: NEGATIVE
OXYCODONE UR QL SCN: NEGATIVE
PH UR STRIP.AUTO: 6 [PH] (ref 5–8)
PLATELET # BLD AUTO: 299 10*3/MM3 (ref 140–500)
PMV BLD AUTO: 8.8 FL (ref 6–12)
POTASSIUM BLD-SCNC: 4 MMOL/L (ref 3.5–5.2)
PROT SERPL-MCNC: 6.5 G/DL (ref 6–8.5)
PROT UR QL STRIP: NEGATIVE
RBC # BLD AUTO: 4.05 10*6/MM3 (ref 3.9–5.2)
RBC # UR: ABNORMAL /HPF
REF LAB TEST METHOD: ABNORMAL
SODIUM BLD-SCNC: 140 MMOL/L (ref 136–145)
SP GR UR STRIP: 1.02 (ref 1–1.03)
SQUAMOUS #/AREA URNS HPF: ABNORMAL /HPF
T4 FREE SERPL-MCNC: 1.16 NG/DL (ref 0.93–1.7)
TROPONIN T SERPL-MCNC: <0.01 NG/ML (ref 0–0.03)
TROPONIN T SERPL-MCNC: <0.01 NG/ML (ref 0–0.03)
TSH SERPL DL<=0.05 MIU/L-ACNC: 12.56 MIU/ML (ref 0.27–4.2)
UROBILINOGEN UR QL STRIP: ABNORMAL
WBC NRBC COR # BLD: 7.39 10*3/MM3 (ref 4.5–10.7)
WBC UR QL AUTO: ABNORMAL /HPF
WHOLE BLOOD HOLD SPECIMEN: NORMAL
WHOLE BLOOD HOLD SPECIMEN: NORMAL

## 2018-12-13 PROCEDURE — 99285 EMERGENCY DEPT VISIT HI MDM: CPT

## 2018-12-13 PROCEDURE — 80307 DRUG TEST PRSMV CHEM ANLYZR: CPT | Performed by: EMERGENCY MEDICINE

## 2018-12-13 PROCEDURE — 70450 CT HEAD/BRAIN W/O DYE: CPT

## 2018-12-13 PROCEDURE — 93005 ELECTROCARDIOGRAM TRACING: CPT

## 2018-12-13 PROCEDURE — 71046 X-RAY EXAM CHEST 2 VIEWS: CPT

## 2018-12-13 PROCEDURE — 84439 ASSAY OF FREE THYROXINE: CPT | Performed by: EMERGENCY MEDICINE

## 2018-12-13 PROCEDURE — 80053 COMPREHEN METABOLIC PANEL: CPT | Performed by: EMERGENCY MEDICINE

## 2018-12-13 PROCEDURE — 85379 FIBRIN DEGRADATION QUANT: CPT | Performed by: EMERGENCY MEDICINE

## 2018-12-13 PROCEDURE — G0378 HOSPITAL OBSERVATION PER HR: HCPCS

## 2018-12-13 PROCEDURE — 84443 ASSAY THYROID STIM HORMONE: CPT | Performed by: EMERGENCY MEDICINE

## 2018-12-13 PROCEDURE — 84484 ASSAY OF TROPONIN QUANT: CPT | Performed by: EMERGENCY MEDICINE

## 2018-12-13 PROCEDURE — 93010 ELECTROCARDIOGRAM REPORT: CPT | Performed by: INTERNAL MEDICINE

## 2018-12-13 PROCEDURE — 82962 GLUCOSE BLOOD TEST: CPT

## 2018-12-13 PROCEDURE — 96360 HYDRATION IV INFUSION INIT: CPT

## 2018-12-13 PROCEDURE — 85025 COMPLETE CBC W/AUTO DIFF WBC: CPT | Performed by: EMERGENCY MEDICINE

## 2018-12-13 PROCEDURE — 81001 URINALYSIS AUTO W/SCOPE: CPT | Performed by: EMERGENCY MEDICINE

## 2018-12-13 PROCEDURE — 93005 ELECTROCARDIOGRAM TRACING: CPT | Performed by: EMERGENCY MEDICINE

## 2018-12-13 RX ORDER — ONDANSETRON 2 MG/ML
4 INJECTION INTRAMUSCULAR; INTRAVENOUS EVERY 6 HOURS PRN
Status: DISCONTINUED | OUTPATIENT
Start: 2018-12-13 | End: 2018-12-14 | Stop reason: HOSPADM

## 2018-12-13 RX ORDER — LANOLIN ALCOHOL/MO/W.PET/CERES
1000 CREAM (GRAM) TOPICAL DAILY
COMMUNITY
End: 2020-01-13 | Stop reason: ALTCHOICE

## 2018-12-13 RX ORDER — METOPROLOL SUCCINATE 25 MG/1
25 TABLET, EXTENDED RELEASE ORAL
Status: DISCONTINUED | OUTPATIENT
Start: 2018-12-14 | End: 2018-12-14 | Stop reason: HOSPADM

## 2018-12-13 RX ORDER — AMITRIPTYLINE HYDROCHLORIDE 10 MG/1
10 TABLET, FILM COATED ORAL NIGHTLY
Status: DISCONTINUED | OUTPATIENT
Start: 2018-12-13 | End: 2018-12-14 | Stop reason: HOSPADM

## 2018-12-13 RX ORDER — FUROSEMIDE 20 MG/1
20 TABLET ORAL DAILY
Status: DISCONTINUED | OUTPATIENT
Start: 2018-12-14 | End: 2018-12-14 | Stop reason: HOSPADM

## 2018-12-13 RX ORDER — VENLAFAXINE 75 MG/1
75 TABLET ORAL
COMMUNITY
End: 2019-05-20 | Stop reason: SDUPTHER

## 2018-12-13 RX ORDER — ISOSORBIDE DINITRATE 10 MG/1
10 TABLET ORAL DAILY
Status: DISCONTINUED | OUTPATIENT
Start: 2018-12-14 | End: 2018-12-14 | Stop reason: HOSPADM

## 2018-12-13 RX ORDER — ACETAMINOPHEN 325 MG/1
650 TABLET ORAL EVERY 4 HOURS PRN
Status: DISCONTINUED | OUTPATIENT
Start: 2018-12-13 | End: 2018-12-14 | Stop reason: HOSPADM

## 2018-12-13 RX ORDER — MECLIZINE HYDROCHLORIDE 25 MG/1
25 TABLET ORAL NIGHTLY
COMMUNITY
End: 2020-01-07 | Stop reason: SDUPTHER

## 2018-12-13 RX ORDER — CHOLECALCIFEROL (VITAMIN D3) 125 MCG
1000 CAPSULE ORAL DAILY
Status: DISCONTINUED | OUTPATIENT
Start: 2018-12-14 | End: 2018-12-14 | Stop reason: HOSPADM

## 2018-12-13 RX ORDER — LEVOTHYROXINE SODIUM 0.05 MG/1
50 TABLET ORAL
Status: DISCONTINUED | OUTPATIENT
Start: 2018-12-14 | End: 2018-12-14 | Stop reason: HOSPADM

## 2018-12-13 RX ORDER — MELOXICAM 15 MG/1
15 TABLET ORAL NIGHTLY
COMMUNITY
End: 2019-01-02

## 2018-12-13 RX ORDER — SODIUM CHLORIDE 0.9 % (FLUSH) 0.9 %
3-10 SYRINGE (ML) INJECTION AS NEEDED
Status: DISCONTINUED | OUTPATIENT
Start: 2018-12-13 | End: 2018-12-14 | Stop reason: HOSPADM

## 2018-12-13 RX ORDER — NITROGLYCERIN 0.4 MG/1
0.4 TABLET SUBLINGUAL
Status: DISCONTINUED | OUTPATIENT
Start: 2018-12-13 | End: 2018-12-14 | Stop reason: HOSPADM

## 2018-12-13 RX ORDER — ROSUVASTATIN CALCIUM 40 MG/1
40 TABLET, COATED ORAL NIGHTLY
Status: DISCONTINUED | OUTPATIENT
Start: 2018-12-13 | End: 2018-12-14 | Stop reason: HOSPADM

## 2018-12-13 RX ORDER — VENLAFAXINE 75 MG/1
75 TABLET ORAL
Status: DISCONTINUED | OUTPATIENT
Start: 2018-12-13 | End: 2018-12-14 | Stop reason: HOSPADM

## 2018-12-13 RX ORDER — NITROGLYCERIN 0.4 MG/1
0.4 TABLET SUBLINGUAL
COMMUNITY

## 2018-12-13 RX ORDER — ASPIRIN 81 MG/1
81 TABLET ORAL DAILY
Status: DISCONTINUED | OUTPATIENT
Start: 2018-12-14 | End: 2018-12-14 | Stop reason: HOSPADM

## 2018-12-13 RX ORDER — ISOSORBIDE DINITRATE 10 MG/1
10 TABLET ORAL DAILY
COMMUNITY
End: 2019-01-02

## 2018-12-13 RX ORDER — MECLIZINE HYDROCHLORIDE 25 MG/1
25 TABLET ORAL NIGHTLY
Status: DISCONTINUED | OUTPATIENT
Start: 2018-12-13 | End: 2018-12-14 | Stop reason: HOSPADM

## 2018-12-13 RX ORDER — SODIUM CHLORIDE 0.9 % (FLUSH) 0.9 %
10 SYRINGE (ML) INJECTION AS NEEDED
Status: DISCONTINUED | OUTPATIENT
Start: 2018-12-13 | End: 2018-12-14 | Stop reason: HOSPADM

## 2018-12-13 RX ORDER — MELOXICAM 15 MG/1
15 TABLET ORAL NIGHTLY
Status: DISCONTINUED | OUTPATIENT
Start: 2018-12-13 | End: 2018-12-14 | Stop reason: HOSPADM

## 2018-12-13 RX ORDER — SODIUM CHLORIDE 0.9 % (FLUSH) 0.9 %
3 SYRINGE (ML) INJECTION EVERY 12 HOURS SCHEDULED
Status: DISCONTINUED | OUTPATIENT
Start: 2018-12-13 | End: 2018-12-14 | Stop reason: HOSPADM

## 2018-12-13 RX ADMIN — SODIUM CHLORIDE, PRESERVATIVE FREE 3 ML: 5 INJECTION INTRAVENOUS at 23:14

## 2018-12-13 RX ADMIN — SODIUM CHLORIDE 1000 ML: 9 INJECTION, SOLUTION INTRAVENOUS at 16:10

## 2018-12-13 NOTE — ED PROVIDER NOTES
EMERGENCY DEPARTMENT ENCOUNTER    CHIEF COMPLAINT  Chief Complaint: syncope  History given by: patient  History limited by: none  Room Number: 13/13  PMD: Chloe Leung, APRN      HPI:  Pt is a 63 y.o. female who presents to the ED via EMS s/p syncopal episode at 1300. Pt advised she got up from her desk and then lost consciousness w/o prodrome. The next event the pt remembered was that EMS was instructing her to roll over. She was down for an unknown length, but when she regained consciousness she had dizziness, nausea, LUE numbness, and diaphoresis. En route to the facility she developed brief 2-3 minute epigastric pain that was relieved w/ ASA given by EMS. She denies HA, blurred vision, /GI incontinence, and SOA. Pt advised she has not felt well throughout the day and has had a sinus infection for the past week and recently completed Cipro. She has been complaint w/ her medications. Pt has hx of orthostatic hypotension and PE. Pt also c/o head pain and neck pain s/p the fall. She is unsure if she struck her head on anything. EMS report that upon their arrival pt was pale and clammy w/ a BGL of 119.    Duration:  unknown  Onset: sudden  Timing: constant  Location: n/a  Radiation: n/a  Quality: loss of consciousness  Intensity/Severity: moderate  Progression: resolved  Associated Symptoms: dizziness, nausea, LUE numbness, diaphoresis, epigastric pain  Aggravating Factors: n/a  Alleviating Factors: n/a  Previous Episodes: pt reports hx of orthostatic hypotension  Treatment before arrival: EMS care and intervention    PAST MEDICAL HISTORY  Active Ambulatory Problems     Diagnosis Date Noted   • Triple vessel coronary artery disease 12/16/2015   • Anxiety 12/16/2015   • Gastroesophageal reflux disease 12/16/2015   • Hyperlipidemia 12/16/2015   • Hypothyroidism 12/16/2015   • Osteoarthritis of knee 12/16/2015   • Cerebrovascular accident (CMS/[facility]) 12/16/2015   • Carotid artery disease (CMS/[facility]) 12/16/2015   •  Migraine without status migrainosus, not intractable 07/20/2016   • Osteoporosis 07/20/2016   • Closed wedge compression fracture of T7 vertebra (CMS/HCC) 07/29/2016   • History of coronary artery bypass surgery 10/25/2012   • Thoracic radiculopathy due to degenerative joint disease of spine 12/28/2016   • History of pulmonary embolism 03/06/2017   • Hypertension 10/25/2012   • Functional diarrhea 09/19/2017     Resolved Ambulatory Problems     Diagnosis Date Noted   • Atypical migraine 12/16/2015   • Pain in thoracic spine 12/16/2015   • Osteopenia 12/16/2015   • Chronic venous insufficiency 12/16/2015   • Vertigo 12/16/2015   • Acute back pain 07/20/2016   • Vasovagal syncope 10/25/2012   • Carter-Arreaga syncope 12/28/2016   • Herniated thoracic disc without myelopathy 03/06/2017   • Dyspnea 03/06/2017   • Atelectasis of both lungs 03/08/2017   • Postoperative ileus (CMS/[facility]) 03/10/2017   • Anemia due to blood loss, acute 03/23/2017   • Orthostatic hypotension 03/24/2017   • Orthostatic hypotension 03/24/2017   • Dehydration syndrome 03/25/2017   • Thrombocythemia (CMS/HCC) 03/25/2017   • Thrombocytosis (CMS/[facility]) 03/26/2017   • Coronary arteriosclerosis in native artery 10/25/2012   • Angina pectoris (CMS/[facility]) 10/25/2012     Past Medical History:   Diagnosis Date   • Angina pectoris (CMS/[facility])    • Broken toe 03/01/2017   • Common migraine without aura    • Coronary artery disease    • History of chest pain    • History of CHF (congestive heart failure)    • History of pulmonary embolism    • History of stroke    • Migraine    • Orthostatic hypotension    • Osteoporosis    • Pleural effusion    • Polyp of sigmoid colon    • Stroke (CMS/[facility])    • Thoracic degenerative disc disease    • Thoracic spine pain    • Thyroid disease    • Weakness of right side of body        PAST SURGICAL HISTORY  Past Surgical History:   Procedure Laterality Date   • AUGMENTATION MAMMAPLASTY     • BACK SURGERY     • BREAST AUGMENTATION      • COLONOSCOPY  2004    Dr. Salas   • CORONARY ANGIOPLASTY WITH STENT PLACEMENT      NON-GRAFTED RIGHT CORONARY ARTERY STENT.   • CORONARY ARTERY BYPASS GRAFT  2009    cabg x 3: LIMA to LAD, vein grafts to diagonal and OM1   • EPIDURAL BLOCK     • HYSTERECTOMY     • KNEE SURGERY Right    • TOTAL THYROIDECTOMY         FAMILY HISTORY  Family History   Problem Relation Age of Onset   • No Known Problems Mother    • Heart disease Father    • Hypertension Father    • Hypertension Sister    • Hypertension Brother        SOCIAL HISTORY  Social History     Socioeconomic History   • Marital status:      Spouse name: Not on file   • Number of children: Not on file   • Years of education: Not on file   • Highest education level: Not on file   Social Needs   • Financial resource strain: Not on file   • Food insecurity - worry: Not on file   • Food insecurity - inability: Not on file   • Transportation needs - medical: Not on file   • Transportation needs - non-medical: Not on file   Occupational History   • Not on file   Tobacco Use   • Smoking status: Former Smoker     Last attempt to quit:      Years since quittin.9   • Smokeless tobacco: Never Used   Substance and Sexual Activity   • Alcohol use: No   • Drug use: No   • Sexual activity: Yes     Birth control/protection: Post-menopausal   Other Topics Concern   • Not on file   Social History Narrative   • Not on file       ALLERGIES  Metronidazole; Amoxicillin; and Hydrocodone-acetaminophen    REVIEW OF SYSTEMS  Review of Systems   Constitutional: Positive for diaphoresis. Negative for fever.   HENT: Negative for sore throat.    Eyes: Negative.    Respiratory: Negative for cough and shortness of breath.    Cardiovascular: Negative for chest pain.   Gastrointestinal: Positive for abdominal pain (epigastric) and nausea. Negative for diarrhea and vomiting.   Genitourinary: Negative for dysuria.   Musculoskeletal: Negative for neck pain.   Skin: Negative  for rash.   Allergic/Immunologic: Negative.    Neurological: Positive for dizziness, syncope and numbness (LUE). Negative for weakness and headaches.   Hematological: Negative.    Psychiatric/Behavioral: Negative.    All other systems reviewed and are negative.      PHYSICAL EXAM  ED Triage Vitals [12/13/18 1412]   Temp Heart Rate Resp BP SpO2   97.7 °F (36.5 °C) 72 15 132/72 97 %      Temp src Heart Rate Source Patient Position BP Location FiO2 (%)   Tympanic -- -- -- --       Physical Exam   Constitutional: She is oriented to person, place, and time and well-developed, well-nourished, and in no distress. No distress.   HENT:   Head: Normocephalic and atraumatic.   Right Ear: Tympanic membrane normal.   Left Ear: Tympanic membrane normal.   Nose: Right sinus exhibits no maxillary sinus tenderness. Left sinus exhibits no maxillary sinus tenderness.   Oropharynx dry, no tongue abrasions   Eyes: EOM are normal. Pupils are equal, round, and reactive to light.   Neck: Normal range of motion. Neck supple.   No cervical tenderness, enlarged L anterior cervical lymph node that is not TTP and not pulsatile.   Cardiovascular: Normal rate, regular rhythm and normal heart sounds.   No murmur heard.  Pulmonary/Chest: Effort normal and breath sounds normal. No respiratory distress.   Abdominal: Soft. Bowel sounds are normal. She exhibits no distension. There is no tenderness. There is no rebound and no guarding.   Musculoskeletal: Normal range of motion. She exhibits no edema.   No calf tenderness, no edema.   Neurological: She is alert and oriented to person, place, and time. She has normal sensation, normal strength and intact cranial nerves. She displays facial symmetry. She has a normal Straight Leg Raise Test. She shows no pronator drift.   Skin: Skin is warm and dry. No rash noted.   Psychiatric: Mood and affect normal.   Nursing note and vitals reviewed.      LAB RESULTS  Lab Results (last 24 hours)     Procedure  Component Value Units Date/Time    CBC & Differential [[id number]] Collected:  12/13/18 1458    Specimen:  Blood Updated:  12/13/18 1510    Narrative:       The following orders were created for panel order CBC & Differential.  Procedure                               Abnormality         Status                     ---------                               -----------         ------                     CBC Auto Differential[[id number]]        Abnormal            Final result                 Please view results for these tests on the individual orders.    Comprehensive Metabolic Panel [[id number]]  (Abnormal) Collected:  12/13/18 1458    Specimen:  Blood Updated:  12/13/18 1535     Glucose 135 mg/dL      BUN 16 mg/dL      Creatinine 1.05 mg/dL      Sodium 140 mmol/L      Potassium 4.0 mmol/L      Chloride 102 mmol/L      CO2 26.0 mmol/L      Calcium 8.8 mg/dL      Total Protein 6.5 g/dL      Albumin 4.00 g/dL      ALT (SGPT) 17 U/L      AST (SGOT) 22 U/L      Alkaline Phosphatase 66 U/L      Total Bilirubin 0.9 mg/dL      eGFR Non African Amer 53 mL/min/1.73      Globulin 2.5 gm/dL      A/G Ratio 1.6 g/dL      BUN/Creatinine Ratio 15.2     Anion Gap 12.0 mmol/L     Troponin [[id number]]  (Normal) Collected:  12/13/18 1458    Specimen:  Blood Updated:  12/13/18 1535     Troponin T <0.010 ng/mL     Narrative:       Troponin T Reference Ranges:  Less than 0.03 ng/mL:    Negative for AMI  0.03 to 0.09 ng/mL:      Indeterminant for AMI  Greater than 0.09 ng/mL: Positive for AMI    D-dimer, Quantitative [[id number]]  (Normal) Collected:  12/13/18 1458    Specimen:  Blood Updated:  12/13/18 1532     D-Dimer, Quantitative 0.46 MCGFEU/mL     Narrative:       The [employer]ago D-Dimer test used in conjunction with a clinical pretest probability (PTP) assessment model, has been approved by the FDA to rule out the presence of venous thromboembolism (VTE) in outpatients suspected of deep venous thrombosis (DVT) or pulmonary embolism (PE).     CBC  Auto Differential [[id number]]  (Abnormal) Collected:  12/13/18 1458    Specimen:  Blood Updated:  12/13/18 1510     WBC 7.39 10*3/mm3      RBC 4.05 10*6/mm3      Hemoglobin 12.6 g/dL      Hematocrit 38.9 %      MCV 96.0 fL      MCH 31.1 pg      MCHC 32.4 g/dL      RDW 13.7 %      RDW-SD 47.8 fl      MPV 8.8 fL      Platelets 299 10*3/mm3      Neutrophil % 60.3 %      Lymphocyte % 24.1 %      Monocyte % 8.0 %      Eosinophil % 6.4 %      Basophil % 0.8 %      Immature Grans % 0.4 %      Neutrophils, Absolute 4.46 10*3/mm3      Lymphocytes, Absolute 1.78 10*3/mm3      Monocytes, Absolute 0.59 10*3/mm3      Eosinophils, Absolute 0.47 10*3/mm3      Basophils, Absolute 0.06 10*3/mm3      Immature Grans, Absolute 0.03 10*3/mm3     TSH [[id number]]  (Abnormal) Collected:  12/13/18 1458    Specimen:  Blood Updated:  12/13/18 1630     TSH 12.560 mIU/mL     T4, Free [[id number]]  (Normal) Collected:  12/13/18 1458    Specimen:  Blood Updated:  12/13/18 1633     Free T4 1.16 ng/dL     Ethanol [[id number]] Collected:  12/13/18 1458    Specimen:  Blood Updated:  12/13/18 1621     Ethanol <10 mg/dL      Ethanol % <0.010 %     POC Glucose Once [[id number]]  (Normal) Collected:  12/13/18 1505    Specimen:  Blood Updated:  12/13/18 1506     Glucose 119 mg/dL     Urinalysis With Microscopic If Indicated (No Culture) - Urine, Clean Catch [[id number]]  (Abnormal) Collected:  12/13/18 1559    Specimen:  Urine, Clean Catch Updated:  12/13/18 1619     Color, UA Yellow     Appearance, UA Clear     pH, UA 6.0     Specific Gravity, UA 1.017     Glucose, UA Negative     Ketones, UA Negative     Bilirubin, UA Negative     Blood, UA Trace     Protein, UA Negative     Leuk Esterase, UA Small (1+)     Nitrite, UA Negative     Urobilinogen, UA 0.2 E.U./dL    Urine Drug Screen - Urine, Clean Catch [[id number]]  (Normal) Collected:  12/13/18 1559    Specimen:  Urine, Clean Catch Updated:  12/13/18 1652     Amphet/Methamphet, Screen Negative      Barbiturates Screen, Urine Negative     Benzodiazepine Screen, Urine Negative     Cocaine Screen, Urine Negative     Opiate Screen Negative     THC, Screen, Urine Negative     Methadone Screen, Urine Negative     Oxycodone Screen, Urine Negative    Narrative:       Negative Thresholds For Drugs Screened:     Amphetamines               500 ng/ml   Barbiturates               200 ng/ml   Benzodiazepines            100 ng/ml   Cocaine                    300 ng/ml   Methadone                  300 ng/ml   Opiates                    300 ng/ml   Oxycodone                  100 ng/ml   THC                        50 ng/ml    The Normal Value for all drugs tested is negative. This report includes final unconfirmed screening results to be used for medical treatment purposes only. Unconfirmed results must not be used for non-medical purposes such as employment or legal testing. Clinical consideration should be applied to any drug of abuse test, particulary when unconfirmed results are used.    Urinalysis, Microscopic Only - Urine, Clean Catch [[id number]]  (Abnormal) Collected:  12/13/18 1559    Specimen:  Urine, Clean Catch Updated:  12/13/18 1619     RBC, UA 3-5 /HPF      WBC, UA 3-5 /HPF      Bacteria, UA None Seen /HPF      Squamous Epithelial Cells, UA 0-2 /HPF      Hyaline Casts, UA 0-2 /LPF      Methodology Automated Microscopy    Troponin [[id number]] Collected:  12/13/18 1744    Specimen:  Blood Updated:  12/13/18 1749          I ordered the above labs and reviewed the results    RADIOLOGY  XR Chest 2 View   Preliminary Result   1. No evidence for active disease in the chest.   2. In the lateral view overlying the posterior costophrenic angle there   is a rounded opacity that is consistent with a small diaphragmatic   hernia with herniation of fat and this was evident on previous CT   angiogram March 2017.          CT Head Without Contrast   Preliminary Result   Normal head CT with no change when compared to prior head CT    from [facility] 09/19/[facility] and the etiology of the   syncopal episode and the current dizziness and nausea is not established   on this exam.       Radiation dose reduction techniques were utilized, including automated   exposure control and exposure modulation based on body size.                   I ordered the above noted radiological studies. Interpreted by radiologist. Reviewed by me in PACS.       PROCEDURES  Procedures      PROGRESS AND CONSULTS      1557  Reviewed pt's orthostatics.     1722  ED tech advised that while doing pt's ortho static vitals pt developed brief chest tightness that went away.    1724  BP- 127/87 HR- 70 Temp- 97.7 °F (36.5 °C) (Tympanic) O2 sat- 98%  Rechecked the patient who is in NAD and is resting comfortably. Discussed labs and imaging being unremarkable but due to syncope w/o prodrome and presentation after regaining consciousness the recommendation for her to be admitted. Pt advised she would prefer to be d/c and f/u w/ her cardiologist. Discussed at length the risks up to and including death for not following recommendation for admission. Pt advised she would stay for repeat troponin and consult with cardiology.    1741  Discussed the pt with Dr. Oneal, cardiology. He vehemently agrees that the pt should stay for a syncope work up from an arhythmia stand point.    1746  BP- 127/59 HR- 80 Temp- 97.7 °F (36.5 °C) (Tympanic) O2 sat- 99%  Rechecked the patient who is in NAD and is resting comfortably. Discussed the consult with Dr. Oneal, cardiology and that he desires the pt to stay and be admitted. Pt now agrees to be admitted.    1807  Discussed the pt w/ Dr. Cornejo PARISH. He agreed to admit to telemetry.    MEDICAL DECISION MAKING  Results were reviewed/discussed with the patient and they were also made aware of online access. Pt also made aware that some labs, such as cultures, will not be resulted during ER visit and follow up with PMD is necessary.      MDM  Number of Diagnoses or Management Options     Amount and/or Complexity of Data Reviewed  Clinical lab tests: reviewed (Unremarkable)  Tests in the radiology section of CPT®: reviewed (CT head-negative acute  CXR-negative acute)  Tests in the medicine section of CPT®: reviewed (See EKG procedure note.)  Discuss the patient with other providers: yes (Dr. Oneal, cardiology  Dr. Cornejo, [facility])  Independent visualization of images, tracings, or specimens: yes    Patient Progress  Patient progress: stable         DIAGNOSIS  Final diagnoses:   Syncope, unspecified syncope type       DISPOSITION  ADMISSION    Discussed treatment plan and reason for admission with pt/family and admitting physician.  Pt/family voiced understanding of the plan for admission for further testing/treatment as needed.     Latest Documented Vital Signs:  As of 6:12 PM  BP- 127/87 HR- 77 Temp- 97.7 °F (36.5 °C) (Tympanic) O2 sat- 99%    --  Documentation assistance provided by flakito Reddy for Dr. Figueroa.  Information recorded by the scribe was done at my direction and has been verified and validated by me.     Peggy Reddy  12/13/18 2345       Silver Figueroa MD  12/13/18 2516

## 2018-12-13 NOTE — PROGRESS NOTES
Clinical Pharmacy Services: Medication History    Ivy Chairez is a 63 y.o. female presenting to [facility] for   Chief Complaint   Patient presents with   • Syncope       She  has a past medical history of Angina pectoris (CMS/[facility]), Broken toe (03/01/2017), Common migraine without aura, Coronary artery disease, History of chest pain, History of CHF (congestive heart failure), History of pulmonary embolism, History of stroke, Migraine, Orthostatic hypotension, Osteoporosis, Pleural effusion, Polyp of sigmoid colon, Stroke (CMS/[facility]), Thoracic degenerative disc disease, Thoracic spine pain, Thyroid disease, and Weakness of right side of body.    Allergies as of 12/13/2018 - Reviewed 12/13/2018   Allergen Reaction Noted   • Metronidazole Diarrhea and Nausea And Vomiting 01/31/2016   • Amoxicillin Swelling 01/31/2016   • Hydrocodone-acetaminophen Nausea And Vomiting 06/15/2018       Medication information was obtained from: Patient  Pharmacy and Phone Number: [facility] [phone]    Prior to Admission Medications     Prescriptions Last Dose Informant Patient Reported? Taking?    amitriptyline (ELAVIL) 10 MG tablet  Self No Yes    Take 1 tablet by mouth Every Night.    aspirin 81 MG tablet  Self Yes Yes    Take 81 mg by mouth Daily With Dinner.    furosemide (LASIX) 20 MG tablet  Self No Yes    Take 1 tablet by mouth Daily.    isosorbide dinitrate (ISORDIL) 10 MG tablet  Self Yes Yes    Take 10 mg by mouth Daily.    levothyroxine (SYNTHROID) 50 MCG tablet  Self No Yes    Take 1 tablet by mouth Daily.    meclizine (ANTIVERT) 25 MG tablet  Self Yes Yes    Take 25 mg by mouth Every Night. Patient may take an additional tablet daily PRN    meloxicam (MOBIC) 15 MG tablet  Self Yes Yes    Take 15 mg by mouth Every Night.    metoprolol succinate XL (TOPROL-XL) 25 MG 24 hr tablet  Self No Yes    Take 1 tablet by mouth Daily.    nitroglycerin (NITROSTAT) 0.4 MG SL tablet  Self Yes Yes    Place 0.4 mg under  the tongue Every 5 (Five) Minutes As Needed for Chest Pain. Take no more than 3 doses in 15 minutes.    rosuvastatin (CRESTOR) 40 MG tablet  Self Yes Yes    Take 40 mg by mouth Every Night.    Unable to find  Self Yes Yes    Take 1 each by mouth Daily With Dinner. Med Name: Patient states she takes a Biotin supplement that contains Vitamin D. Unsure of dosage.    venlafaxine (EFFEXOR) 75 MG tablet  Self Yes Yes    Take 75 mg by mouth Daily With Dinner.    vitamin B-12 (CYANOCOBALAMIN) 1000 MCG tablet  Self Yes Yes    Take 1,000 mcg by mouth Daily.            Medication notes: Removed per patient:  Vitamin D-patient reports that she is taking a Biotin supplement that contains Vitamin D  Cipro-therapy complete  Sominex-not effective, replaced with Amitriptyline    This medication list is complete to the best of my knowledge as of 12/13/2018    Please call if questions.    Maria Hector, Medication History Technician  12/13/2018 6:56 PM

## 2018-12-13 NOTE — ED TRIAGE NOTES
Patient presents to er via ems from her work.  Patient was trying to get up out of a chair and had a syncopal episode.  Patient now feels generalized weakness and malaise.

## 2018-12-14 ENCOUNTER — APPOINTMENT (OUTPATIENT)
Dept: CARDIOLOGY | Facility: HOSPITAL | Age: 63
End: 2018-12-14

## 2018-12-14 ENCOUNTER — APPOINTMENT (OUTPATIENT)
Dept: CT IMAGING | Facility: HOSPITAL | Age: 63
End: 2018-12-14

## 2018-12-14 VITALS
HEIGHT: 66 IN | OXYGEN SATURATION: 98 % | TEMPERATURE: 98.3 F | RESPIRATION RATE: 16 BRPM | BODY MASS INDEX: 28.03 KG/M2 | WEIGHT: 174.44 LBS | HEART RATE: 67 BPM | SYSTOLIC BLOOD PRESSURE: 141 MMHG | DIASTOLIC BLOOD PRESSURE: 74 MMHG

## 2018-12-14 PROBLEM — K11.8 PAROTID MASS: Status: ACTIVE | Noted: 2018-12-14

## 2018-12-14 LAB
ALBUMIN SERPL-MCNC: 3.4 G/DL (ref 3.5–5.2)
ALBUMIN/GLOB SERPL: 1.4 G/DL
ALP SERPL-CCNC: 58 U/L (ref 39–117)
ALT SERPL W P-5'-P-CCNC: 12 U/L (ref 1–33)
ANION GAP SERPL CALCULATED.3IONS-SCNC: 8 MMOL/L
AST SERPL-CCNC: 19 U/L (ref 1–32)
BASOPHILS # BLD AUTO: 0.04 10*3/MM3 (ref 0–0.2)
BASOPHILS NFR BLD AUTO: 0.6 % (ref 0–1.5)
BILIRUB SERPL-MCNC: 0.7 MG/DL (ref 0.1–1.2)
BUN BLD-MCNC: 15 MG/DL (ref 8–23)
BUN/CREAT SERPL: 18.8 (ref 7–25)
CALCIUM SPEC-SCNC: 8.9 MG/DL (ref 8.6–10.5)
CHLORIDE SERPL-SCNC: 105 MMOL/L (ref 98–107)
CO2 SERPL-SCNC: 29 MMOL/L (ref 22–29)
CREAT BLD-MCNC: 0.8 MG/DL (ref 0.57–1)
DEPRECATED RDW RBC AUTO: 46.6 FL (ref 37–54)
EOSINOPHIL # BLD AUTO: 0.37 10*3/MM3 (ref 0–0.7)
EOSINOPHIL NFR BLD AUTO: 5.7 % (ref 0.3–6.2)
ERYTHROCYTE [DISTWIDTH] IN BLOOD BY AUTOMATED COUNT: 13.9 % (ref 11.7–13)
GFR SERPL CREATININE-BSD FRML MDRD: 72 ML/MIN/1.73
GLOBULIN UR ELPH-MCNC: 2.4 GM/DL
GLUCOSE BLD-MCNC: 92 MG/DL (ref 65–99)
HBA1C MFR BLD: 5.4 % (ref 4.8–5.6)
HCT VFR BLD AUTO: 33.7 % (ref 35.6–45.5)
HGB BLD-MCNC: 11.2 G/DL (ref 11.9–15.5)
IMM GRANULOCYTES # BLD: 0.01 10*3/MM3 (ref 0–0.03)
IMM GRANULOCYTES NFR BLD: 0.2 % (ref 0–0.5)
LYMPHOCYTES # BLD AUTO: 2.36 10*3/MM3 (ref 0.9–4.8)
LYMPHOCYTES NFR BLD AUTO: 36.1 % (ref 19.6–45.3)
MCH RBC QN AUTO: 30.6 PG (ref 26.9–32)
MCHC RBC AUTO-ENTMCNC: 33.2 G/DL (ref 32.4–36.3)
MCV RBC AUTO: 92.1 FL (ref 80.5–98.2)
MONOCYTES # BLD AUTO: 0.75 10*3/MM3 (ref 0.2–1.2)
MONOCYTES NFR BLD AUTO: 11.5 % (ref 5–12)
NEUTROPHILS # BLD AUTO: 3.01 10*3/MM3 (ref 1.9–8.1)
NEUTROPHILS NFR BLD AUTO: 46.1 % (ref 42.7–76)
PLATELET # BLD AUTO: 287 10*3/MM3 (ref 140–500)
PMV BLD AUTO: 9.1 FL (ref 6–12)
POTASSIUM BLD-SCNC: 3.9 MMOL/L (ref 3.5–5.2)
PROT SERPL-MCNC: 5.8 G/DL (ref 6–8.5)
RBC # BLD AUTO: 3.66 10*6/MM3 (ref 3.9–5.2)
SODIUM BLD-SCNC: 142 MMOL/L (ref 136–145)
WBC NRBC COR # BLD: 6.53 10*3/MM3 (ref 4.5–10.7)

## 2018-12-14 PROCEDURE — 25010000002 INFLUENZA VAC SUBUNIT QUAD 0.5 ML SUSPENSION PREFILLED SYRINGE: Performed by: INTERNAL MEDICINE

## 2018-12-14 PROCEDURE — 99213 OFFICE O/P EST LOW 20 MIN: CPT | Performed by: INTERNAL MEDICINE

## 2018-12-14 PROCEDURE — 85025 COMPLETE CBC W/AUTO DIFF WBC: CPT | Performed by: INTERNAL MEDICINE

## 2018-12-14 PROCEDURE — 80053 COMPREHEN METABOLIC PANEL: CPT | Performed by: INTERNAL MEDICINE

## 2018-12-14 PROCEDURE — 93010 ELECTROCARDIOGRAM REPORT: CPT | Performed by: INTERNAL MEDICINE

## 2018-12-14 PROCEDURE — 25010000002 IOPAMIDOL 61 % SOLUTION: Performed by: INTERNAL MEDICINE

## 2018-12-14 PROCEDURE — G0008 ADMIN INFLUENZA VIRUS VAC: HCPCS | Performed by: INTERNAL MEDICINE

## 2018-12-14 PROCEDURE — 70491 CT SOFT TISSUE NECK W/DYE: CPT

## 2018-12-14 PROCEDURE — G0378 HOSPITAL OBSERVATION PER HR: HCPCS

## 2018-12-14 PROCEDURE — 93005 ELECTROCARDIOGRAM TRACING: CPT | Performed by: NURSE PRACTITIONER

## 2018-12-14 PROCEDURE — 83036 HEMOGLOBIN GLYCOSYLATED A1C: CPT | Performed by: INTERNAL MEDICINE

## 2018-12-14 PROCEDURE — 0296T HC EXT ECG > 48HR TO 21 DAY RCRD W/CONECT INTL RCRD: CPT

## 2018-12-14 PROCEDURE — 90661 CCIIV3 VAC ABX FR 0.5 ML IM: CPT | Performed by: INTERNAL MEDICINE

## 2018-12-14 RX ADMIN — MELOXICAM 15 MG: 15 TABLET ORAL at 00:55

## 2018-12-14 RX ADMIN — INFLUENZA A VIRUS A/SINGAPORE/GP1908/2015 IVR-180 (H1N1) ANTIGEN (MDCK CELL DERIVED, PROPIOLACTONE INACTIVATED), INFLUENZA A VIRUS A/NORTH CAROLINA/04/2016 (H3N2) HEMAGGLUTININ ANTIGEN (MDCK CELL DERIVED, PROPIOLACTONE INACTIVATED), INFLUENZA B VIRUS B/IOWA/06/2017 HEMAGGLUTININ ANTIGEN (MDCK CELL DERIVED, PROPIOLACTONE INACTIVATED), INFLUENZA B VIRUS B/SINGAPORE/INFTT-16-0610/2016 HEMAGGLUTININ ANTIGEN (MDCK CELL DERIVED, PROPIOLACTONE INACTIVATED) 0.5 ML: 15; 15; 15; 15 INJECTION, SUSPENSION INTRAMUSCULAR at 11:38

## 2018-12-14 RX ADMIN — Medication 1000 MCG: at 09:19

## 2018-12-14 RX ADMIN — METOPROLOL SUCCINATE 25 MG: 25 TABLET, FILM COATED, EXTENDED RELEASE ORAL at 09:19

## 2018-12-14 RX ADMIN — ISOSORBIDE DINITRATE 10 MG: 10 TABLET ORAL at 09:20

## 2018-12-14 RX ADMIN — MECLIZINE HYDROCHLORIDE 25 MG: 25 TABLET ORAL at 00:55

## 2018-12-14 RX ADMIN — LEVOTHYROXINE SODIUM 50 MCG: 50 TABLET ORAL at 09:19

## 2018-12-14 RX ADMIN — FUROSEMIDE 20 MG: 20 TABLET ORAL at 09:20

## 2018-12-14 RX ADMIN — IOPAMIDOL 75 ML: 612 INJECTION, SOLUTION INTRAVENOUS at 07:10

## 2018-12-14 RX ADMIN — ASPIRIN 81 MG: 81 TABLET, DELAYED RELEASE ORAL at 09:19

## 2018-12-14 RX ADMIN — AMITRIPTYLINE HYDROCHLORIDE 10 MG: 10 TABLET, FILM COATED ORAL at 00:56

## 2018-12-14 RX ADMIN — SODIUM CHLORIDE, PRESERVATIVE FREE 3 ML: 5 INJECTION INTRAVENOUS at 09:21

## 2018-12-14 NOTE — H&P
Internal medicine history and physical    INTERNAL MEDICINE   [facility]       Patient Identification:  Name: Ivy Chairez  Age: 63 y.o.  Sex: female  :  1955  MRN: [id number]                    Primary Care Physician: Chloe Leung APRN                                   Chief Complaint:  Passed out at work around 1 PM.    History of Present Illness:   Patient is a 63-year-old female whose past medical history as noted below was in her usual state of her health until about 10 days ago when she developed headache and congestion and was diagnosed with sinus infection by her nurse practitioner 8 days ago.  She was started on antibiotics she took it every day and last dose was yesterday.  It appears that it was ciprofloxacin.  According to her her headache and sinus congestion improved but when she woke up today she felt tired and felt like she needed to call in sick.  She somehow managed to muster energy and went to work and throughout the day she felt tired and in fact called her primary care physician's office to see if she can have another round of antibiotics as she thinks that her sinus infection has not completely resolved.  She was told by the office no.  That background she remembers getting up from her desk to go to the bathroom and next thing she remembers that she was on the floor surrounded by the EMS personnel.  She is not sure how long she was down and will call EMS.  Patient and subsequently felt nauseous dizziness felt hot and flushed and sweaty and had left upper extremity numbness.  She also complaining of some sort of not a lymph node on the left side of her neck first noticed this afternoon.  She never had any concern regarding this before this evening.  Patient was given aspirin in route to the emergency room and in the emergency room given a liter of Normal saline bolus.  Patient is overall feeling much better.  Patient denies any diarrhea or any palpitation and  adamant that she has not have any decrease in appetite and she is eating and drinking well prior to this.  She denies any incontinence.      Past Medical History:  Past Medical History:   Diagnosis Date   • Angina pectoris (CMS/HCC)    • Broken toe 03/01/2017    right 4th toe   • Common migraine without aura     Neurologist Dr Govea   • Coronary artery disease    • History of chest pain    • History of CHF (congestive heart failure)    • History of pulmonary embolism    • History of stroke    • Migraine     eval and management by neurologist   • Orthostatic hypotension    • Osteoporosis    • Pleural effusion     POSTOPERATIVE, REQUIRING THORACENTESIS.   • Polyp of sigmoid colon     REMOVED   • Stroke (CMS/HCC)    • Thoracic degenerative disc disease     Received epidural 12/14 [facility] DR Landin   • Thoracic spine pain    • Thyroid disease    • Weakness of right side of body     ARM & FACE     Past Surgical History:  Past Surgical History:   Procedure Laterality Date   • AUGMENTATION MAMMAPLASTY     • BACK SURGERY     • BREAST AUGMENTATION     • COLONOSCOPY  06/2004    Dr. Salas   • CORONARY ANGIOPLASTY WITH STENT PLACEMENT      NON-GRAFTED RIGHT CORONARY ARTERY STENT.   • CORONARY ARTERY BYPASS GRAFT  04/2009    cabg x 3: LIMA to LAD, vein grafts to diagonal and OM1   • EPIDURAL BLOCK     • HYSTERECTOMY     • KNEE SURGERY Right    • TOTAL THYROIDECTOMY        Home Meds:  Medications Prior to Admission   Medication Sig Dispense Refill Last Dose   • amitriptyline (ELAVIL) 10 MG tablet Take 1 tablet by mouth Every Night. 30 tablet 5 12/12/2018 at Unknown time   • aspirin 81 MG tablet Take 81 mg by mouth Daily With Dinner.   12/12/2018 at Unknown time   • furosemide (LASIX) 20 MG tablet Take 1 tablet by mouth Daily. 30 tablet 5 12/13/2018 at Unknown time   • isosorbide dinitrate (ISORDIL) 10 MG tablet Take 10 mg by mouth Daily.   12/13/2018 at Unknown time   • levothyroxine (SYNTHROID) 50 MCG tablet Take  1 tablet by mouth Daily. 30 tablet 5 2018 at Unknown time   • meclizine (ANTIVERT) 25 MG tablet Take 25 mg by mouth Every Night. Patient may take an additional tablet daily PRN   2018 at Unknown time   • meloxicam (MOBIC) 15 MG tablet Take 15 mg by mouth Every Night.   2018 at Unknown time   • metoprolol succinate XL (TOPROL-XL) 25 MG 24 hr tablet Take 1 tablet by mouth Daily. 90 tablet 3 2018 at Unknown time   • nitroglycerin (NITROSTAT) 0.4 MG SL tablet Place 0.4 mg under the tongue Every 5 (Five) Minutes As Needed for Chest Pain. Take no more than 3 doses in 15 minutes.      • rosuvastatin (CRESTOR) 40 MG tablet Take 40 mg by mouth Every Night.   2018 at Unknown time   • Unable to find Take 1 each by mouth Daily With Dinner. Med Name: Patient states she takes a Biotin supplement that contains Vitamin D. Unsure of dosage.   2018 at Unknown time   • venlafaxine (EFFEXOR) 75 MG tablet Take 75 mg by mouth Daily With Dinner.   2018 at Unknown time   • vitamin B-12 (CYANOCOBALAMIN) 1000 MCG tablet Take 1,000 mcg by mouth Daily.   2018 at Unknown time     Current Meds:     Current Facility-Administered Medications:   •  [START ON 2018] levothyroxine (SYNTHROID, LEVOTHROID) tablet 50 mcg, 50 mcg, Oral, Q AM, Mikala Cornejo MD  •  [START ON 2018] metoprolol succinate XL (TOPROL-XL) 24 hr tablet 25 mg, 25 mg, Oral, Q24H, Mikala Cornejo MD  •  [COMPLETED] Insert peripheral IV, , , Once **AND** sodium chloride 0.9 % flush 10 mL, 10 mL, Intravenous, PRN, Silver Figueroa MD  Allergies:  Allergies   Allergen Reactions   • Metronidazole Diarrhea and Nausea And Vomiting   • Amoxicillin Swelling   • Hydrocodone-Acetaminophen Nausea And Vomiting     Social History:   Social History     Tobacco Use   • Smoking status: Former Smoker     Last attempt to quit: 2009     Years since quittin.9   • Smokeless tobacco: Never Used   Substance Use Topics   • Alcohol use: No     "  Family History:  Family History   Problem Relation Age of Onset   • No Known Problems Mother    • Heart disease Father    • Hypertension Father    • Hypertension Sister    • Hypertension Brother           Review of Systems  See history of present illness and past medical history.  Constitutional: Positive for diaphoresis. Negative for fever.   HENT: Negative for sore throat.    Eyes: Negative.    Respiratory: Negative for cough and shortness of breath.    Cardiovascular: Negative for chest pain.   Gastrointestinal: Positive for abdominal pain (epigastric) and nausea. Negative for diarrhea and vomiting.   Genitourinary: Negative for dysuria.   Musculoskeletal: Negative for neck pain.   Skin: Negative for rash.   Allergic/Immunologic: Negative.    Neurological: Positive for dizziness, syncope and numbness (LUE). Negative for weakness and headaches.   Hematological: Negative.    Psychiatric/Behavioral: Negative.    Remainder of ROS is negative.      Vitals:   /79 (BP Location: Right arm, Patient Position: Standing)   Pulse 66   Temp 97.3 °F (36.3 °C) (Oral)   Resp 16   Ht 167.6 cm (66\")   Wt 79.1 kg (174 lb 7 oz)   SpO2 100%   BMI 28.15 kg/m²   I/O:     Intake/Output Summary (Last 24 hours) at 12/13/2018 4804  Last data filed at 12/13/2018 1745  Gross per 24 hour   Intake 1000 ml   Output --   Net 1000 ml     Exam:  General Appearance:    Alert, cooperative, no distress, appears stated age   Head:    Normocephalic, without obvious abnormality, atraumatic   Eyes:    PERRL, conjunctiva/corneas clear, EOM's intact, both eyes   Ears:    Normal external ear canals, both ears   Nose:   Nares normal, septum midline, mucosa normal, no drainage    or sinus tenderness   Throat:   Lips, tongue, gums normal; oral mucosa pink and moist   Neck:   Supple, symmetrical, trachea midline, no adenopathy there is a mildly tender palpable structure in the left posterior neck that appears to be sternocleidomastoid muscle which " she attributes as swollen lymph node;     thyroid:  no enlargement/tenderness/nodules; no carotid    bruit or JVD   Back:     Symmetric, no curvature, ROM normal, no CVA tenderness   Lungs:     Clear to auscultation bilaterally, respirations unlabored   Chest Wall:    No tenderness or deformity    Heart:    Regular rate and rhythm, S1 and S2 normal, no murmur, rub   or gallop   Abdomen:     Soft, non-tender, bowel sounds active all four quadrants,     no masses, no hepatomegaly, no splenomegaly   Extremities:   Extremities normal, atraumatic, no cyanosis or edema   Pulses:   Pulses palpable in all extremities; symmetric all extremities   Skin:   Skin color normal, Skin is warm and dry,  no rashes or palpable lesions   Neurologic:   CNII-XII intact, motor strength grossly intact, sensation grossly intact to light touch, no focal deficits noted       Data Review:      I reviewed the patient's new clinical results.  Results from last 7 days   Lab Units  12/13/18   1458   WBC 10*3/mm3  7.39   HEMOGLOBIN g/dL  12.6   PLATELETS 10*3/mm3  299     Results from last 7 days   Lab Units  12/13/18   1458   SODIUM mmol/L  140   POTASSIUM mmol/L  4.0   CHLORIDE mmol/L  102   CO2 mmol/L  26.0   BUN mg/dL  16   CREATININE mg/dL  1.05*   CALCIUM mg/dL  8.8   GLUCOSE mg/dL  135*         Xr Chest 2 View    Result Date: 12/13/2018  1. No evidence for active disease in the chest. 2. 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  This report was finalized on 12/13/2018 6:51 PM by Dr. Esteban Curtis M.D.      Ct Head Without Contrast    Result Date: 12/13/2018  Normal head CT with no change when compared to prior head CT from [facility] 09/19/2015 and the etiology of the syncopal episode and the current dizziness and nausea is not established  on this exam.  Radiation dose reduction techniques were utilized, including automated exposure control and exposure modulation based on body size.           Assessment:  Active Hospital Problems    Diagnosis Date Noted   • **Syncope [R55] 12/13/2018   • History of stroke [Z86.73] 12/13/2018   • History of pulmonary embolism [Z86.711] 03/06/2017   • Osteoporosis [M81.0] 07/20/2016   • Migraine without status migrainosus, not intractable [G43.909] 07/20/2016   • Triple vessel coronary artery disease [I25.10] 12/16/2015   • Hypothyroidism [E03.9] 12/16/2015   • Hyperlipidemia [E78.5] 12/16/2015   • Anxiety [F41.9] 12/16/2015   • Carotid artery disease (CMS/HCC) [I77.9] 12/16/2015     Patient is followed yearly by vascular surgeon, Dr. Cooney/Curt     • Hypertension [I10] 10/25/2012       Medical decision making:  Syncope in the context of acute sinusitis ongoing antibiotic use evidence of dehydration based on her blood work and significant improvement in her sense of well-being after receiving a bolus of fluid with symptoms of dizziness and left arm weakness occurring after the initial syncopal episode-this presentation argues for orthostatic syncope associated with vasovagal process.  Rule out other etiologies such as cardiac arrhythmias or seizure.  Plan is to admit the patient on her to her orthostatics continue IV fluids and provide her with neuro checks.  Consider neurology evaluation to decide if further imaging studies such as MRI is needed.  Consider cardiology evaluation if cardiac arrhythmias are detected was suspected to be the underlying reason for her presentation.  Recent acute sinusitis status post 1 week course of oral ciprofloxacin last dose on 12/12/2018- making above presentation could be the side effect of ciprofloxacin as it can induce neuropsychiatric symptoms or arrhythmias such as prolonged QTc interval.  Plan is to monitor for recurrence of symptoms or any arrhythmias and follow-up EKG for  any changes and QTc interval.  Concern about palpable area in the left neck - question adenopathy versus spastic sternocleidomastoid muscle.  Patient is anxious and is not unreasonable to perform CT scan of soft tissue her neck to rule out possibility of lymphadenopathy and associated with another benign causes.  History of migraine currently denies any headaches-plan is to monitor  Hypothyroidism-continue thyroid replacement therapy  ?History of pulmonary embolism-patient currently denies any symptoms.  Hypertension-continue hypertensive regimen and avoid hypotensive episodes.  Dehydration-continue IV fluids and check orthostatics.  Anxiety and depression continue her current regimen consisting of Elavil and Effexor.  History of coronary artery disease and coronary artery bypass grafting-continue with her current regimen.  History of carotid artery disease on close follow-up with Dr. Cooney.  Plan is to monitor.  Mikala Cornejo MD   12/13/2018  10:44 PM  Much of this encounter note is an electronic transcription/translation of spoken language to printed text. The electronic translation of spoken language may permit erroneous, or at times, nonsensical words or phrases to be inadvertently transcribed; Although I have reviewed the note for such errors, some may still exist

## 2018-12-14 NOTE — PLAN OF CARE
Problem: Patient Care Overview  Goal: Plan of Care Review  Outcome: Ongoing (interventions implemented as appropriate)   12/14/18 0648   Coping/Psychosocial   Plan of Care Reviewed With patient   Plan of Care Review   Progress improving   OTHER   Outcome Summary VSS. Orthostatics WNL. No c/o voiced through the night. Cardiology consulted. Will continue too monitor.       Problem: Fall Risk (Adult)  Goal: Absence of Fall  Outcome: Ongoing (interventions implemented as appropriate)        
Problem: Patient Care Overview  Goal: Plan of Care Review  Outcome: Ongoing (interventions implemented as appropriate)   12/14/18 1074   Coping/Psychosocial   Plan of Care Reviewed With patient   Plan of Care Review   Progress improving   OTHER   Outcome Summary Pt VSS. Pt had 12 lead ECG and CT neck this am per MD order. Pt will have Echo today and holter monitor placed after. Pt denies CP. PT in bed resting. will continue to monitor.      Goal: Individualization and Mutuality  Outcome: Ongoing (interventions implemented as appropriate)    Goal: Discharge Needs Assessment  Outcome: Ongoing (interventions implemented as appropriate)    Goal: Interprofessional Rounds/Family Conf  Outcome: Ongoing (interventions implemented as appropriate)      Problem: Fall Risk (Adult)  Goal: Identify Related Risk Factors and Signs and Symptoms  Outcome: Ongoing (interventions implemented as appropriate)    Goal: Absence of Fall  Outcome: Ongoing (interventions implemented as appropriate)        
36.5

## 2018-12-14 NOTE — CONSULTS
[facility]  Cardiology Consult Note    Patient Identification:  Name: Ivy Chairez  Age: 63 y.o.  Sex: female  :  1955  MRN: [id number]             Requesting Physician: Dr. Cornejo    Reason for Consultation / Chief Complaint: Syncope    History of Present Illness:     The patient is a 63-year-old white female who is followed by Dr. Humphrey with history of CAD status post CABG in , with 3 stents prior to that to proximal LAD, diagonal, proximal RCA .  She also has history of tobacco abuse (Quite ),  hyperlipidemia, pulmonary embolus (follow CABG)  who presented to [facility] after syncopal episode at work. We have been asked by Dr. Cornejo to consult for syncope.  She states that she had been ill approximate 2 weeks ago with with a sinus infection and was started on ciprofloxacin and had completed, however yesterday she felt more fatigued but felt she needed to go into work.  While at work after getting up from her desk she had a syncopal episode without any prewarning symptoms such as palpitations, chest pain, dizziness, tunnel vision, or shortness of breath.  The next thing she recalls she was surrounded by coworkers and EMS and was transported here for further evaluation.   After regaining consciousness she had complaints of nausea, left arm numbness, and diaphoresis.  While en route with EMS she began experiencing band like epigastric pain that was relieved with aspirin.     Reports that over the course the last 6 months she's had increased lower extremity swelling and was started on a diuretic along with metoprolol by Dr. Humphrey's office.  About 2 months ago she began experiencing angina symptoms which she describes as fleeting lasting only minutes that come and go without warning could be with activity or without activity but describes it as a chest heaviness.  She was thus started on Imdur and has noted a significant decline in her anginal episodes.  At times with those anginal  episode she will develop some left arm symptoms of numbness and tingling.       She had a previous syncopal episode over a year and a half ago that occurred while she was walking and again without warning which prompted a cardiac workup by Dr. Humphrey in which an echocardiogram was performed 4/21/17 showing hyperdynamic LV systolic function with EF greater than 70% with noted grade 1 diastolic dysfunction, no significant valvular abnormalities were noted on that echo.  Myocardial perfusion study at that same time showed no evidence of ischemia.  44 hour Holter monitor showed average sinus rhythm heart rate 88, Min  heart rate 71, max heart rate 126.  Diary included reports of chest pain that were not correlated with any arrhythmias.  Her were no evidence of ventricular tachycardia however there were frequent PVCs noted up to 271 per hour.        Currently she denies any anginal episodes since admission.  Orthostatic BPs negative yesterday afternoon as well as last night.  Prominence negative ×2.  It is noted that her TSH is 12.560 with free T4 normal at 1.16 and d-dimer was normal at 0.46.  No significant anemia hemoglobin was 12.6 on admission with mild drop in hemoglobin 11.2 today.  There was a mild elevation in her creatinine at 1.04 on the date of admission with normalization today.  She denies any syncope near syncope, palpitations, shortness of breath, or dizziness.  She is quite anxious for discharge home as she has family coming in from out of town to celebrate [place] tomorrow and currently is declining any testing that could not be completed today.         EKG on admit:  12/13/18 Noted NS T wave abnormalities in anterolateral leads.        Comorbid cardiac risk factors: HLD, previous tobacco use quit 2009    Past Medical History:  Past Medical History:   Diagnosis Date   • Angina pectoris (CMS/HCC)    • Broken toe 03/01/2017    right 4th toe   • Common migraine without aura     Neurologist Dr Govea    • Coronary artery disease    • History of chest pain    • History of CHF (congestive heart failure)    • History of pulmonary embolism    • History of stroke    • Migraine     eval and management by neurologist   • Orthostatic hypotension    • Osteoporosis    • Pleural effusion     POSTOPERATIVE, REQUIRING THORACENTESIS.   • Polyp of sigmoid colon     REMOVED   • Stroke (CMS/HCC)    • Thoracic degenerative disc disease     Received epidural 12/14 [facility] DR Landin   • Thoracic spine pain    • Thyroid disease    • Weakness of right side of body     ARM & FACE     Past Surgical History:  Past Surgical History:   Procedure Laterality Date   • AUGMENTATION MAMMAPLASTY     • BACK SURGERY     • BREAST AUGMENTATION     • COLONOSCOPY  06/2004    Dr. Salas   • CORONARY ANGIOPLASTY WITH STENT PLACEMENT      NON-GRAFTED RIGHT CORONARY ARTERY STENT.   • CORONARY ARTERY BYPASS GRAFT  04/2009    cabg x 3: LIMA to LAD, vein grafts to diagonal and OM1   • EPIDURAL BLOCK     • HYSTERECTOMY     • KNEE SURGERY Right    • TOTAL THYROIDECTOMY        Allergies:  Allergies   Allergen Reactions   • Metronidazole Diarrhea and Nausea And Vomiting   • Amoxicillin Swelling   • Hydrocodone-Acetaminophen Nausea And Vomiting     Home Meds:  Medications Prior to Admission   Medication Sig Dispense Refill Last Dose   • amitriptyline (ELAVIL) 10 MG tablet Take 1 tablet by mouth Every Night. 30 tablet 5 12/12/2018 at Unknown time   • aspirin 81 MG tablet Take 81 mg by mouth Daily With Dinner.   12/12/2018 at Unknown time   • furosemide (LASIX) 20 MG tablet Take 1 tablet by mouth Daily. 30 tablet 5 12/13/2018 at Unknown time   • isosorbide dinitrate (ISORDIL) 10 MG tablet Take 10 mg by mouth Daily.   12/13/2018 at Unknown time   • levothyroxine (SYNTHROID) 50 MCG tablet Take 1 tablet by mouth Daily. 30 tablet 5 12/13/2018 at Unknown time   • meclizine (ANTIVERT) 25 MG tablet Take 25 mg by mouth Every Night. Patient may take an additional  tablet daily PRN   12/12/2018 at Unknown time   • meloxicam (MOBIC) 15 MG tablet Take 15 mg by mouth Every Night.   12/12/2018 at Unknown time   • metoprolol succinate XL (TOPROL-XL) 25 MG 24 hr tablet Take 1 tablet by mouth Daily. 90 tablet 3 12/12/2018 at Unknown time   • nitroglycerin (NITROSTAT) 0.4 MG SL tablet Place 0.4 mg under the tongue Every 5 (Five) Minutes As Needed for Chest Pain. Take no more than 3 doses in 15 minutes.      • rosuvastatin (CRESTOR) 40 MG tablet Take 40 mg by mouth Every Night.   12/12/2018 at Unknown time   • Unable to find Take 1 each by mouth Daily With Dinner. Med Name: Patient states she takes a Biotin supplement that contains Vitamin D. Unsure of dosage.   12/13/2018 at Unknown time   • venlafaxine (EFFEXOR) 75 MG tablet Take 75 mg by mouth Daily With Dinner.   12/12/2018 at Unknown time   • vitamin B-12 (CYANOCOBALAMIN) 1000 MCG tablet Take 1,000 mcg by mouth Daily.   12/13/2018 at Unknown time     Current Meds:     Current Facility-Administered Medications:   •  acetaminophen (TYLENOL) tablet 650 mg, 650 mg, Oral, Q4H PRN, Mikala Cornejo MD  •  amitriptyline (ELAVIL) tablet 10 mg, 10 mg, Oral, Nightly, Mikala Cornejo MD, 10 mg at 12/14/18 0056  •  aspirin EC tablet 81 mg, 81 mg, Oral, Daily, Mikala Cornejo MD  •  furosemide (LASIX) tablet 20 mg, 20 mg, Oral, Daily, Mikala Cornejo MD  •  Influenza Vac Subunit Quad (FLUCELVAX) injection 0.5 mL, 0.5 mL, Intramuscular, Once, Mikala Cornejo MD  •  isosorbide dinitrate (ISORDIL) tablet 10 mg, 10 mg, Oral, Daily, Mikala Cornejo MD  •  levothyroxine (SYNTHROID, LEVOTHROID) tablet 50 mcg, 50 mcg, Oral, Q AM, Mikala Cornejo MD  •  meclizine (ANTIVERT) tablet 25 mg, 25 mg, Oral, Nightly, Mikala Cornejo MD, 25 mg at 12/14/18 0055  •  meloxicam (MOBIC) tablet 15 mg, 15 mg, Oral, Nightly, Mikala Cornejo MD, 15 mg at 12/14/18 0055  •  metoprolol succinate XL (TOPROL-XL) 24 hr tablet 25 mg, 25 mg, Oral, Q24H, Mikala Cornejo MD  •  nitroglycerin  (NITROSTAT) SL tablet 0.4 mg, 0.4 mg, Sublingual, Q5 Min PRN, Mikala Cornejo MD  •  ondansetron (ZOFRAN) injection 4 mg, 4 mg, Intravenous, Q6H PRN, Mikala Cornejo MD  •  rosuvastatin (CRESTOR) tablet 40 mg, 40 mg, Oral, Nightly, Mikala Cornejo MD  •  [COMPLETED] Insert peripheral IV, , , Once **AND** sodium chloride 0.9 % flush 10 mL, 10 mL, Intravenous, PRN, Silver Figueroa MD  •  sodium chloride 0.9 % flush 3 mL, 3 mL, Intravenous, Q12H, Mikala Cornejo MD, 3 mL at 18 [id number]  •  sodium chloride 0.9 % flush 3-10 mL, 3-10 mL, Intravenous, PRN, Mikala Cornejo MD  •  venlafaxine (EFFEXOR) tablet 75 mg, 75 mg, Oral, Daily With Dinner, Mikala Cornejo MD  •  vitamin B-12 (CYANOCOBALAMIN) tablet 1,000 mcg, 1,000 mcg, Oral, Daily, Mikala Cornejo MD        Social History:   Social History     Tobacco Use   • Smoking status: Former Smoker     Last attempt to quit: 2009     Years since quittin.9   • Smokeless tobacco: Never Used   Substance Use Topics   • Alcohol use: No      Family History:  Family History   Problem Relation Age of Onset   • No Known Problems Mother    • Heart disease Father    • Hypertension Father    • Hypertension Sister    • Hypertension Brother         Review of Systems    Constitutional: No weakness,fatigue, fever, rigors, chills   Eyes: No vision changes, eye pain   ENT/oropharynx: No difficulty swallowing, sore throat, epistaxis, changes in hearing   Cardiovascular: + chest pain, chest tightness, palpitations, syncope; denies paroxysmal nocturnal dyspnea, orthopnea,    Respiratory: No shortness of breath, dyspnea on exertion, cough, wheezing hemoptysis   Gastrointestinal: + abdominal pain, nausea; denies vomiting, diarrhea, bloody stools   Genitourinary: No hematuria, dysuria   Neurological: No headache, tremors, numbness,  one-sided weakness; + left arm numbness/ tingling   Musculoskeletal: No cramps, myalgias,  joint pain, joint swelling   Integument: No rash' + pedal edema        Constitutional:  Temp:  [97.3 °F (36.3 °C)-97.7 °F (36.5 °C)] 97.3 °F (36.3 °C)  Heart Rate:  [66-77] 66  Resp:  [15-16] 16  BP: (108-143)/(55-87) 119/79   SpO2:  [97 %-100 %] 98 %    Physical Exam   General:  Appears in no acute distress  Eyes: PERTL, non icteric, EOMI  HEENT:  No JVD. Thyroid not visibly enlarged. No mucosal pallor or cyanosis  Respiratory: Respirations regular and unlabored at rest. BBS with good air entry in all fields. No crackles, rubs or wheezes auscultated  Cardiovascular: S1S2 Regular rate and rhythm. No murmur, rub or gallop auscultated. No carotid bruits. DP/PT pulses 2+. No pretibial pitting edema  Gastrointestinal: Abdomen soft, flat, non tender. Bowel sounds present. No hepatosplenomegaly. No ascites  Musculoskeletal: SCHWARTZ x4. No abnormal movements  Extremities: No digital clubbing or cyanosis  Skin: Color pink. Skin warm and dry to touch. No rashes  No xanthoma  Neuro: AAO x3 CN II-XII grossly intact      Cardiographics      Telemetry:              EKG    12/13/18 Noted New NS T wave abnormalities in V3.  Previously noted NS T wave changes in V1-V2 on EKG 4/2017.               4/14/17 Previous EKG        3/6/17            Previous Cardiac Testing    Echo 4/21/17  Interpretation Summary     · Left ventricular function is hyperdynamic (EF > 70).  · Left ventricular diastolic dysfunction (grade I) consistent with impaired relaxation.       Myocardial Perfusion Study 4/21/17  Interpretation Summary        · Left ventricular ejection fraction is hyperdynamic (Calculated EF > 70%).  · Myocardial perfusion imaging indicates a normal myocardial perfusion study with no evidence of ischemia.       Monitor Procedure Holter 4/21/17    Study Description Monitor hooked-up on 4/21/2017 at 12:13 EDT. The monitor was scanned on 4/23/2017. The patient was monitored for 23 hours and 59 minutes. Indications for this exam include palpitations. Total beats: [id number]. Average HR: 88. Min HR: 71. Max HR:  126.   Study Findings Patient diary submitted.Chest pain was reported during the monitoring period. Chest pain had no correlations. Patient reported occasional episodes of chest pain. No complications noted. The predominant rhythm noted during the testing period was sinus rhythm. Premature atrial contractions occured rarely. There was no evidence of atrial arrhythmias. There were no episodes of supraventricular tachycardia. Premature ventricular contractions occured frequently. There were 271 premature ventricular contractions per hour. There were no episodes of ventricular tachycardia. Sinoatrial node conduction was normal. No atrioventricular block noted.   Study Impressions An abnormal monitor study.     Carotid Doppler 1/15/18        Imaging  Chest X-ray:     Study Result 12/13/18    PA AND LATERAL CHEST     HISTORY: Syncopal episode.     COMPARISON: AP chest 03/06/2017.     FINDINGS: The heart and mediastinal structures are within normal limits.  Sternotomy wires and thoracic spine rods and screws are present. The  lungs are clear and there is no evidence for pneumothorax or pleural  effusion. On the lateral view there is a rounded density overlying the  posterior costophrenic angle. This is consistent with a small  diaphragmatic herniation of fat that was evident on previous CT  angiogram March 2017.     IMPRESSION:  1. No evidence for active disease in the chest.  2. On the lateral view overlying the posterior costophrenic angle there  is a rounded opacity that is consistent with a small diaphragmatic  hernia with herniation of fat and this was evident on previous CT  angiogram March 2017.       CT of head w/o contrast 12/13/18  IMPRESSION:  Normal head CT with no change when compared to prior head CT  from [facility] 09/19/2015 and the etiology of the  syncopal episode and the current dizziness and nausea is not established  on this exam.      Study Result CT 12/14/18 soft tissue Neck with  Contrast    CT SCAN OF THE SOFT TISSUE NECK WITH INTRAVENOUS CONTRAST     HISTORY: Recent antibiotics for probable sinusitis. Syncopal episode.  Possible enlarged lymph node in the lower left neck.     The CT scan was performed through the soft tissue neck with intravenous  contrast and demonstrates the followin. The area of concern marked with a BB in the lower left neck  corresponds to the level of the sternocleidomastoid muscle which appears  normal. There is a slightly prominent lymph node more anteriorly and  deeper in the left jugulodigastric space that measures 12 mm. There is a  similar slightly prominent lymph node in the right jugulodigastric space  measuring 10 mm. These are nonspecific and likely reactive.     2. There is some slight distortion of the soft tissues higher in the  left neck at the level of the left parotid gland which appears markedly  atrophic or has potentially been surgically removed. The right parotid  gland appears normal. The submandibular glands also appear normal.     3. The patient has had previous thyroidectomy. There is normal  enhancement of the vascular structures. The visualized sinuses and  mastoid air cells are clear.     4. The CT images through the lung apices demonstrate some mild  nonspecific ground glass opacities.         Lab Review   Results from last 7 days   Lab Units  18   1744  18   1458   TROPONIN T ng/mL  <0.010  <0.010         Results from last 7 days   Lab Units  18   0402   SODIUM mmol/L  142   POTASSIUM mmol/L  3.9   BUN mg/dL  15   CREATININE mg/dL  0.80   CALCIUM mg/dL  8.9       Results from last 7 days   Lab Units  18   0402  18   1458   WBC 10*3/mm3  6.53  7.39   HEMOGLOBIN g/dL  11.2*  12.6   HEMATOCRIT %  33.7*  38.9   PLATELETS 10*3/mm3  287  299         18 TSH 12.560 Free T4 1.16           Estimated Creatinine Clearance: 75.4 mL/min (by C-G formula based on SCr of 0.8 mg/dL).    Assessment:   Syncope:  ortho BP's neg x 2.  Creatinine mild elevation on admit.  Improved today. This is her 2nd syncopal episode without warning in last 1.5 years. Tele SR.   Recommend 24 hour holter or Zios patch placement. I did discuss use of loop recorder if holter / zios neg.     Chest Pain: in pt with CAB 2009.  Neg trop.  Nuc stress lexiscan test recommended.   She had caffeine today so would have to be a 2 day study with rest today and stress in am.  Angina as oupt improved with use of isosorbide.     Hx of PVC's: no episodes of VT noted previous holter 4/21/17 but frequent PVCs with up to 271 per / hr noted.  She is on metoprolol.  SR on tele.     CAD s/p CAB 2009: trop neg x 2. 12 lead EKG with NS Anterolateral T wave abnormalities. Is on statin, BB, and asa.   Last lipid panel showing HDL 65 with LDL 51.  4/2017 showing Hyperdynamic LVSF at 70 percent with no signficant valvular abnormalities and noted grade 1 diastolic dysfunction     Carotid Artery Disease: last doppler 1/2018 showing 50-60% LICA, 15-49% on SAEED. Is on statin and asa. CT of head w/o contrast neg acute abnormalities.     Hypothyroidism: s/p partial thyroidectomy on Replacement. Elevated TSH with normal free T4. Defer to IM admitting    Renal insufficiency: noted mild elevated on admit. Normalized today.        Recommendations:   In summary this is 63-year-old white female who is followed by Dr. Humphrey with history of CAD status post CABG in 2009, with 3 stents prior to that to proximal LAD, diagonal, proximal RCA 2009.  She also has history of tobacco abuse (Quite 2009),  hyperlipidemia, pulmonary embolus (following CABG)  who presented to [facility] after syncopal episode at work.  He did have some bandlike chest pressure en route with EMS that was relieved with aspirin.  Twelve-lead EKG on admit and did show anterior lateral T wave abnormalities.  Troponins have been negative ×2.  Will obtain a repeat EKG this a.m.  She has had increasing anginal episodes over the  course of the last 2 months and was started on  isosorbide as an outpatient with improvement in her symptoms.  Recommend ischemia workup with Lexiscan myocardial perfusion testing and echo.  Patient declines to perform stress at this time as it would need to be a 2 day study since she has already eaten and had caffeine this morning.  She agrees to echocardiogram can be performed today.  She is remaining this sinus rhythm on telemetry and 24 hours Holter monitor recommended that she again declines as this study would not be completed until midmorning tomorrow.      She wants to follow-up with her primary cardiologist Dr. Humphrey to have further testing done but is agreeable to echocardiogram during this stay.  Patient is quite anxious for discharge home as she has family coming in from out of town tomorrow to celebrate [place] mid afternoon.  I explained to her the concerning nature of her symptoms and that complete cardiac workup without recommended testing would not allow us to definitively evaluate her cardiovascular status and it does place her at risk for subsequent cardiovascular events.  She understands this and again declines further cardiovascular workup except for echocardiogram.        Spoke with Dr. Watson regarding above findings and CV recommendations.      Addendum: 12/14/18 1059am spoke with EKG technician Harper and pts insurance will cover Zios patch placed prior to discharge.       Gera Silveira MD  12/14/2018, 8:25 AM      EMR Dragon/Transcription:   Dictated utilizing Dragon dictation

## 2018-12-14 NOTE — DISCHARGE SUMMARY
Name: Ivy Chairez  Age: 63 y.o.  Sex: female  :  1955  MRN: [id number]         Primary Care Physician: Chloe Leung APRN      Date of Admission:  2018  Date of Discharge:  2018      Chief Complaint:  Syncope      Presenting Problem/History of Present Illness:  Syncope, unspecified syncope type [R55]     Discharge Diagnosis:  Active Hospital Problems    Diagnosis Date Noted   • **Syncope [R55] 2018   • Parotid mass [K11.9] 2018   • History of stroke [Z86.73] 2018   • History of pulmonary embolism [Z86.711] 2017   • Osteoporosis [M81.0] 2016   • Migraine without status migrainosus, not intractable [G43.909] 2016   • Triple vessel coronary artery disease [I25.10] 2015   • Hypothyroidism [E03.9] 2015   • Hyperlipidemia [E78.5] 2015   • Anxiety [F41.9] 2015   • Carotid artery disease (CMS/HCC) [I77.9] 2015   • Hypertension [I10] 10/25/2012      Resolved Hospital Problems   No resolved problems to display.       Secondary Diagnoses:  Past Medical History:   Diagnosis Date   • Angina pectoris (CMS/HCC)    • Broken toe 2017    right 4th toe   • Common migraine without aura     Neurologist Dr Govea   • Coronary artery disease    • History of chest pain    • History of CHF (congestive heart failure)    • History of pulmonary embolism    • History of stroke    • Migraine     eval and management by neurologist   • Orthostatic hypotension    • Osteoporosis    • Pleural effusion     POSTOPERATIVE, REQUIRING THORACENTESIS.   • Polyp of sigmoid colon     REMOVED   • Stroke (CMS/HCC)    • Thoracic degenerative disc disease     Received epidural  [facility] DR Landin   • Thoracic spine pain    • Thyroid disease    • Weakness of right side of body     ARM & FACE         Consults:  Consult Orders (all) (From admission, onward)    Start     Ordered    18  Inpatient Cardiology Consult  Once     Specialty:  " Cardiology  Provider:  Gera Silveira MD    12/13/18 2054 12/13/18 1752  [facility] (on-call MD unless specified)  Once     Specialty:  Internal Medicine  Provider:  (Not yet assigned)    12/13/18 1751        Procedures Performed:  None        Hospital Course:  Pleasant 64yo woman admitted for syncopal episode at work, no warning signs. She had recently taken Cipro for URI symptoms. There was question of whether this could have caused QT prolongation/arrhythmia. She has some non-specific ST changes on EKG. Troponins are negative serially. Cardiology was consulted. She is normally followed by Dr. Humphrey. Stress test, Echo, and ZioPatch were recommended. She had already had caffeine this AM so a 2 day stress test would have to be ordered. She staunchly refused this bc she has family coming into town for a holiday party and she requires discharge today. She did consent to Echo, however as the day progressed and she realized it would be quite late when the Echo was completed she decided to go home. She says she will simply follow up with her regular cardiologist for any further workup. She did consent to prolonged cardiac monitoring in the form of a ZioPatch. It will be placed prior to discharge. It should be noted that both Cardiology and myself attempted to get pt to stay in the hospital to complete the workup, and yet she declined. She understands the risks of doing this, but prefers to seek care with her primary cardiologist in the outpt setting.  She also had elevated TSH with normal free T4. Recommend her PCP repeat TFTs in a few weeks when acute URI is resolved.  She complained of a \"knot\" on her left neck. CT Neck revealed a 12mm cervical lymph node--likely reactive. There is also a 10mm lymph node on the right side of the neck. She had an abnormal area of \"atrophy\" in left parotid that radiology felt could be site of old surgery. Pt says that she had tumor removed from that area many years ago. She should " f/u with her PCP regarding the cervical LAD. She voiced her understanding of this.      Physical Exam:  Temp:  [97.3 °F (36.3 °C)-98.3 °F (36.8 °C)] 98.3 °F (36.8 °C)  Heart Rate:  [66-77] 67  Resp:  [16] 16  BP: (108-143)/(55-87) 141/74  Body mass index is 28.15 kg/m².  Physical Exam   Constitutional: She is oriented to person, place, and time. She appears well-developed and well-nourished. No distress.   HENT:   Head: Normocephalic and atraumatic.   Mouth/Throat: Oropharynx is clear and moist. No oropharyngeal exudate.   Eyes: Conjunctivae and EOM are normal. Pupils are equal, round, and reactive to light. Right eye exhibits no discharge. Left eye exhibits no discharge. No scleral icterus.   Neck: Normal range of motion. Neck supple. No thyromegaly present.   Cardiovascular: Normal rate, regular rhythm, normal heart sounds and intact distal pulses.   No murmur heard.  Pulmonary/Chest: Effort normal and breath sounds normal. No respiratory distress.   Abdominal: Soft. Bowel sounds are normal. She exhibits no distension. There is no tenderness.   Musculoskeletal: She exhibits no edema or deformity.   Lymphadenopathy:     She has cervical adenopathy.   Neurological: She is alert and oriented to person, place, and time. No cranial nerve deficit or sensory deficit.   Skin: Skin is warm and dry. Capillary refill takes less than 2 seconds. No rash noted. She is not diaphoretic. No erythema.   Psychiatric: She has a normal mood and affect. Her behavior is normal. Thought content normal.   Nursing note and vitals reviewed.      Condition on Discharge:  Stable.    Discharge Disposition:   Home    Allergies:   Allergies   Allergen Reactions   • Metronidazole Diarrhea and Nausea And Vomiting   • Amoxicillin Swelling   • Hydrocodone-Acetaminophen Nausea And Vomiting       Discharge Medications:      Discharge Medications      Continue These Medications      Instructions Start Date   amitriptyline 10 MG tablet  Commonly known  as:  ELAVIL   10 mg, Oral, Nightly      aspirin 81 MG tablet   81 mg, Oral, Daily With Dinner      CRESTOR 40 MG tablet  Generic drug:  rosuvastatin   40 mg, Oral, Nightly      furosemide 20 MG tablet  Commonly known as:  LASIX   20 mg, Oral, Daily      isosorbide dinitrate 10 MG tablet  Commonly known as:  ISORDIL   10 mg, Oral, Daily      levothyroxine 50 MCG tablet  Commonly known as:  SYNTHROID   50 mcg, Oral, Daily      meclizine 25 MG tablet  Commonly known as:  ANTIVERT   25 mg, Oral, Nightly, Patient may take an additional tablet daily PRN      meloxicam 15 MG tablet  Commonly known as:  MOBIC   15 mg, Oral, Nightly      metoprolol succinate XL 25 MG 24 hr tablet  Commonly known as:  TOPROL-XL   25 mg, Oral, Daily      nitroglycerin 0.4 MG SL tablet  Commonly known as:  NITROSTAT   0.4 mg, Sublingual, Every 5 Minutes PRN, Take no more than 3 doses in 15 minutes.      venlafaxine 75 MG tablet  Commonly known as:  EFFEXOR   75 mg, Oral, Daily With Dinner      vitamin B-12 1000 MCG tablet  Commonly known as:  CYANOCOBALAMIN   1,000 mcg, Oral, Daily         Stop These Medications    Unable to find            Discharge Diet:     Activity at Discharge:     Follow-up Appointments:  Future Appointments   Date Time Provider Department Center   1/2/2019  2:45 PM Chloe Leung APRN [facility]  [facility] None   2/27/2019  2:00 PM Chloe Leung APRN [facility]  [facility] None     Additional Instructions for the Follow-ups that You Need to Schedule     Discharge Follow-up with PCP   As directed       Currently Documented PCP:    Chloe Leung APRN    PCP Phone Number:    [phone]     Follow Up Details:  Madhu NP (PCP) in 1 week         Discharge Follow-up with Specified Provider: Dr. Humphrey (Card); 1 Week   As directed      To:  Dr. Humphrey (Card)    Follow Up:  1 Week    Follow Up Details:  at next available appointment           Follow-up Information     Chloe Leung APRN .    Specialty:  Family Medicine  Why:  Madhu  NP (PCP) in 1 week  Contact information:  [address] [facility] [address]  [facility] [address]  [phone]                 Additional Instructions for the Follow-ups that You Need to Schedule     Discharge Follow-up with PCP   As directed       Currently Documented PCP:    Chloe Leung APRN    PCP Phone Number:    [phone]     Follow Up Details:  Madhu NP (PCP) in 1 week         Discharge Follow-up with Specified Provider: Dr. Humphrey (Card); 1 Week   As directed      To:  Dr. Humphrey (Card)    Follow Up:  1 Week    Follow Up Details:  at next available appointment               Test Results Pending at Discharge:  None     Code status:   Code Status and Medical Interventions:   Ordered at: 12/13/18 2246     Code Status:    CPR     Medical Interventions (Level of Support Prior to Arrest):    Full         Prasanth Watson MD  [facility]  12/14/18  2:40 PM      Time: greater than 30 minutes.

## 2018-12-14 NOTE — PROGRESS NOTES
Continued Stay Note  [facility]     Patient Name: Ivy Chairez  MRN: [id number]  Today's Date: 12/14/2018    Admit Date: 12/13/2018    Discharge Plan     Row Name 12/14/18 1556       Plan    Final Discharge Disposition Code  01 - home or self-care    Final Note  Home with zio patch, declines rehab        Discharge Codes    No documentation.       Expected Discharge Date and Time     Expected Discharge Date Expected Discharge Time    Dec 14, 2018             Adela Nielsen RN

## 2018-12-17 ENCOUNTER — OFFICE VISIT (OUTPATIENT)
Dept: FAMILY MEDICINE CLINIC | Facility: CLINIC | Age: 63
End: 2018-12-17

## 2018-12-17 VITALS
DIASTOLIC BLOOD PRESSURE: 60 MMHG | HEIGHT: 66 IN | RESPIRATION RATE: 16 BRPM | HEART RATE: 68 BPM | TEMPERATURE: 98 F | BODY MASS INDEX: 27.97 KG/M2 | OXYGEN SATURATION: 98 % | WEIGHT: 174 LBS | SYSTOLIC BLOOD PRESSURE: 108 MMHG

## 2018-12-17 DIAGNOSIS — E03.9 ACQUIRED HYPOTHYROIDISM: Primary | ICD-10-CM

## 2018-12-17 DIAGNOSIS — R59.0 CERVICAL LYMPHADENOPATHY: ICD-10-CM

## 2018-12-17 LAB — TSH SERPL DL<=0.005 MIU/L-ACNC: 7.84 MIU/ML (ref 0.27–4.2)

## 2018-12-17 PROCEDURE — 99214 OFFICE O/P EST MOD 30 MIN: CPT | Performed by: NURSE PRACTITIONER

## 2018-12-17 NOTE — PROGRESS NOTES
Subjective   Ivy Chairez is a 63 y.o. female.     Chief Complaint   Patient presents with   • Syncope     Hospital f/u     Ms. Chairez presents today to follow up on a recent ER visit. She had a syncopal episode last week (12/13/18) at work and was taken to [facility] per ambulance. She was to be admitted over night for a cardiac workup but the patient refused to stay because she had family coming in over the weekend for the holidays (refer to the ER note). She denies any syncope since the ER visit. Also denies chest pain or shortness of breath. She is concerned that she is constipated. She states that she was told that her TSH was 3 times the normal range. She states she is taking the Levothyroxine 50 mcg daily as prescribed.       I have reviewed the patient's medical history in detail and updated the computerized patient record.    The following portions of the patient's history were reviewed and updated as appropriate: allergies, current medications, past family history, past medical history, past social history, past surgical history and problem list.       Current Outpatient Medications:   •  amitriptyline (ELAVIL) 10 MG tablet, Take 1 tablet by mouth Every Night., Disp: 30 tablet, Rfl: 5  •  aspirin 81 MG tablet, Take 81 mg by mouth Daily With Dinner., Disp: , Rfl:   •  furosemide (LASIX) 20 MG tablet, Take 1 tablet by mouth Daily., Disp: 30 tablet, Rfl: 5  •  isosorbide dinitrate (ISORDIL) 10 MG tablet, Take 10 mg by mouth Daily., Disp: , Rfl:   •  levothyroxine (SYNTHROID) 50 MCG tablet, Take 1 tablet by mouth Daily., Disp: 30 tablet, Rfl: 5  •  meclizine (ANTIVERT) 25 MG tablet, Take 25 mg by mouth Every Night. Patient may take an additional tablet daily PRN, Disp: , Rfl:   •  meloxicam (MOBIC) 15 MG tablet, Take 15 mg by mouth Every Night., Disp: , Rfl:   •  metoprolol succinate XL (TOPROL-XL) 25 MG 24 hr tablet, Take 1 tablet by mouth Daily., Disp: 90 tablet, Rfl: 3  •  nitroglycerin (NITROSTAT) 0.4 MG SL  "tablet, Place 0.4 mg under the tongue Every 5 (Five) Minutes As Needed for Chest Pain. Take no more than 3 doses in 15 minutes., Disp: , Rfl:   •  rosuvastatin (CRESTOR) 40 MG tablet, Take 40 mg by mouth Every Night., Disp: , Rfl:   •  venlafaxine (EFFEXOR) 75 MG tablet, Take 75 mg by mouth Daily With Dinner., Disp: , Rfl:   •  vitamin B-12 (CYANOCOBALAMIN) 1000 MCG tablet, Take 1,000 mcg by mouth Daily., Disp: , Rfl:     Review of Systems   Constitutional: Positive for fatigue.   Respiratory: Negative.    Cardiovascular: Negative for chest pain and palpitations. Leg swelling: of her hands.   Gastrointestinal: Positive for constipation.   Neurological: Positive for syncope (on 12/13/18).        Vitals:    12/17/18 0929   BP: 108/60   BP Location: Left arm   Patient Position: Sitting   Cuff Size: Adult   Pulse: 68   Resp: 16   Temp: 98 °F (36.7 °C)   TempSrc: Oral   SpO2: 98%   Weight: 78.9 kg (174 lb)   Height: 167.6 cm (66\")       Objective   Physical Exam   Constitutional: She is oriented to person, place, and time. She appears well-developed and well-nourished.   Neck: Normal range of motion. Neck supple.   Cardiovascular: Normal rate, regular rhythm, normal heart sounds and intact distal pulses.   Pulmonary/Chest: Effort normal and breath sounds normal.   Abdominal: Soft. Bowel sounds are normal.   Lymphadenopathy:     She has cervical adenopathy.   Neurological: She is alert and oriented to person, place, and time.   Skin: Skin is warm and dry.   Psychiatric:   No acute distress   Vitals reviewed.        Assessment/Plan   Ivy was seen today for syncope.    Diagnoses and all orders for this visit:    Acquired hypothyroidism  -     TSH    Cervical lymphadenopathy  -     Ambulatory Referral to ENT (Otolaryngology)    1. I have reviewed the ER note and agree. I have reviewed and reconciled her medications with her today.   2. She has an appointment with her Cardiologist on 12/26/18, which is next week and I " have advised her to keep it.   3. I am rechecking her TSH today before changing her Levothyroxine dose. Her TSH has gone up from 2.890 in October to 12.560 last week. Patient reports she is taking her medications.   4. I am referring her to ENT for further evaluation of the reactive cervical lymph nodes in her neck.   5. She is to follow up as scheduled.

## 2018-12-18 DIAGNOSIS — E03.9 ACQUIRED HYPOTHYROIDISM: Primary | ICD-10-CM

## 2018-12-18 RX ORDER — LEVOTHYROXINE SODIUM 75 MCG
75 TABLET ORAL DAILY
Qty: 90 TABLET | Refills: 1 | Status: SHIPPED | OUTPATIENT
Start: 2018-12-18 | End: 2019-08-07 | Stop reason: SDUPTHER

## 2018-12-18 NOTE — PROGRESS NOTES
Please call Ms. Chairez and give her the TSH results. Let her know I have increased the Synthroid to 75 mcg daily. I have ordered the Brand name Synthroid as we discussed. Return in 6 weeks for TSH (labs only).

## 2019-01-02 ENCOUNTER — OFFICE VISIT (OUTPATIENT)
Dept: FAMILY MEDICINE CLINIC | Facility: CLINIC | Age: 64
End: 2019-01-02

## 2019-01-02 VITALS
DIASTOLIC BLOOD PRESSURE: 64 MMHG | TEMPERATURE: 97.4 F | HEART RATE: 64 BPM | SYSTOLIC BLOOD PRESSURE: 114 MMHG | BODY MASS INDEX: 27.97 KG/M2 | HEIGHT: 66 IN | WEIGHT: 174 LBS | OXYGEN SATURATION: 97 %

## 2019-01-02 DIAGNOSIS — M81.0 OSTEOPOROSIS, UNSPECIFIED OSTEOPOROSIS TYPE, UNSPECIFIED PATHOLOGICAL FRACTURE PRESENCE: ICD-10-CM

## 2019-01-02 DIAGNOSIS — Z12.39 SCREENING FOR BREAST CANCER: Primary | ICD-10-CM

## 2019-01-02 DIAGNOSIS — M85.80 OSTEOPENIA, UNSPECIFIED LOCATION: ICD-10-CM

## 2019-01-02 DIAGNOSIS — Z00.00 MEDICARE ANNUAL WELLNESS VISIT, SUBSEQUENT: ICD-10-CM

## 2019-01-02 PROCEDURE — G0439 PPPS, SUBSEQ VISIT: HCPCS | Performed by: NURSE PRACTITIONER

## 2019-01-02 RX ORDER — ISOSORBIDE MONONITRATE 30 MG/1
30 TABLET, EXTENDED RELEASE ORAL DAILY
Refills: 3 | COMMUNITY
Start: 2018-12-27 | End: 2019-01-02

## 2019-01-02 NOTE — PROGRESS NOTES
"Subjective   Ivy Chairez is a 63 y.o. female.     History of Present Illness   Chief Complaint   Patient presents with   • Annual Exam     AWV     {Common H&P Review Areas:67002}  Vitals:    01/02/19 1453   BP: 114/64   Pulse: 64   Temp: 97.4 °F (36.3 °C)   SpO2: 97%   Weight: 78.9 kg (174 lb)   Height: 167.6 cm (66\")     Review of Systems  Current Outpatient Medications on File Prior to Visit   Medication Sig Dispense Refill   • [DISCONTINUED] isosorbide dinitrate (ISORDIL) 10 MG tablet Take 10 mg by mouth Daily.     • aspirin 81 MG tablet Take 81 mg by mouth Daily With Dinner.     • furosemide (LASIX) 20 MG tablet Take 1 tablet by mouth Daily. 30 tablet 5   • meclizine (ANTIVERT) 25 MG tablet Take 25 mg by mouth Every Night. Patient may take an additional tablet daily PRN     • metoprolol succinate XL (TOPROL-XL) 25 MG 24 hr tablet Take 1 tablet by mouth Daily. 90 tablet 3   • nitroglycerin (NITROSTAT) 0.4 MG SL tablet Place 0.4 mg under the tongue Every 5 (Five) Minutes As Needed for Chest Pain. Take no more than 3 doses in 15 minutes.     • rosuvastatin (CRESTOR) 40 MG tablet Take 40 mg by mouth Every Night.     • SYNTHROID 75 MCG tablet Take 1 tablet by mouth Daily. 90 tablet 1   • venlafaxine (EFFEXOR) 75 MG tablet Take 75 mg by mouth Daily With Dinner.     • vitamin B-12 (CYANOCOBALAMIN) 1000 MCG tablet Take 1,000 mcg by mouth Daily.     • [DISCONTINUED] amitriptyline (ELAVIL) 10 MG tablet Take 1 tablet by mouth Every Night. 30 tablet 5   • [DISCONTINUED] isosorbide mononitrate (IMDUR) 30 MG 24 hr tablet Take 30 mg by mouth Daily.  3   • [DISCONTINUED] meloxicam (MOBIC) 15 MG tablet Take 15 mg by mouth Every Night.       No current facility-administered medications on file prior to visit.      Objective   Physical Exam      Assessment/Plan   {Assess/PlanSmartLinks:67449}           "

## 2019-01-02 NOTE — PROGRESS NOTES
QUICK REFERENCE INFORMATION:  The ABCs of the Annual Wellness Visit    Subsequent Medicare Wellness Visit    HEALTH RISK ASSESSMENT    1955    Recent Hospitalizations:  Recently treated at the following:  [facility].        Current Medical Providers:  Patient Care Team:  Chloe Leung APRN as PCP - General (Family Medicine)  Kade Humphrey Jr., MD (Cardiology)        Smoking Status:  Social History     Tobacco Use   Smoking Status Former Smoker   • Last attempt to quit: 2009   • Years since quitting: 10.0   Smokeless Tobacco Never Used       Alcohol Consumption:  Social History     Substance and Sexual Activity   Alcohol Use No       Depression Screen:   PHQ-2/PHQ-9 Depression Screening 1/2/2019   Little interest or pleasure in doing things 0   Feeling down, depressed, or hopeless 0   Total Score 0       Health Habits and Functional and Cognitive Screening:  Functional & Cognitive Status 1/2/2019   Do you have difficulty preparing food and eating? No   Do you have difficulty bathing yourself, getting dressed or grooming yourself? No   Do you have difficulty using the toilet? No   Do you have difficulty moving around from place to place? No   Do you have trouble with steps or getting out of a bed or a chair? No   In the past year have you fallen or experienced a near fall? No   Current Diet Well Balanced Diet   Dental Exam Up to date   Eye Exam Up to date   Exercise (times per week) 5 times per week   Current Exercise Activities Include Walking   Do you need help using the phone?  No   Are you deaf or do you have serious difficulty hearing?  No   Do you need help with transportation? No   Do you need help shopping? No   Do you need help preparing meals?  No   Do you need help with housework?  No   Do you need help with laundry? No   Do you need help taking your medications? No   Do you need help managing money? No   Do you ever drive or ride in a car without wearing a seat belt? No   Have  you felt unusual stress, anger or loneliness in the last month? No   Who do you live with? Spouse   If you need help, do you have trouble finding someone available to you? No   Do you have difficulty concentrating, remembering or making decisions? No           Does the patient have evidence of cognitive impairment? No    Aspirin use counseling: Taking ASA appropriately as indicated      Recent Lab Results:  CMP:  Lab Results   Component Value Date    GLU 82 10/19/2018    BUN 15 12/14/2018    CREATININE 0.80 12/14/2018    EGFRIFNONA 72 12/14/2018    EGFRIFAFRI 78 10/19/2018    BCR 18.8 12/14/2018     12/14/2018    K 3.9 12/14/2018    CO2 29.0 12/14/2018    CALCIUM 8.9 12/14/2018    PROTENTOTREF 6.3 10/19/2018    ALBUMIN 3.40 (L) 12/14/2018    LABGLOBREF 2.2 10/19/2018    LABIL2 1.9 10/19/2018    BILITOT 0.7 12/14/2018    ALKPHOS 58 12/14/2018    AST 19 12/14/2018    ALT 12 12/14/2018     Lipid Panel:  Lab Results   Component Value Date    TRIG 67 08/29/2018    HDL 65 (H) 08/29/2018    VLDL 13.4 08/29/2018    LDLHDL 0.5 02/04/2016     HbA1c:  Lab Results   Component Value Date    HGBA1C 5.40 12/14/2018       Visual Acuity:  No exam data present    Age-appropriate Screening Schedule:  Refer to the list below for future screening recommendations based on patient's age, sex and/or medical conditions. Orders for these recommended tests are listed in the plan section. The patient has been provided with a written plan.    Health Maintenance   Topic Date Due   • ZOSTER VACCINE (1 of 2) 11/20/2004   • DXA SCAN  02/09/2018   • TDAP/TD VACCINES (2 - Td) 04/01/2018   • MAMMOGRAM  08/12/2018   • LIPID PANEL  08/29/2019   • PAP SMEAR  08/27/2021   • COLONOSCOPY  02/01/2023   • INFLUENZA VACCINE  Completed        Subjective   History of Present Illness    Ivy Chairez is a 63 y.o. female who presents for an Subsequent Wellness Visit.    The following portions of the patient's history were reviewed and updated as  appropriate: allergies, current medications, past family history, past medical history, past social history, past surgical history and problem list.    Outpatient Medications Prior to Visit   Medication Sig Dispense Refill   • isosorbide dinitrate (ISORDIL) 10 MG tablet Take 10 mg by mouth Daily.     • aspirin 81 MG tablet Take 81 mg by mouth Daily With Dinner.     • furosemide (LASIX) 20 MG tablet Take 1 tablet by mouth Daily. 30 tablet 5   • meclizine (ANTIVERT) 25 MG tablet Take 25 mg by mouth Every Night. Patient may take an additional tablet daily PRN     • metoprolol succinate XL (TOPROL-XL) 25 MG 24 hr tablet Take 1 tablet by mouth Daily. 90 tablet 3   • nitroglycerin (NITROSTAT) 0.4 MG SL tablet Place 0.4 mg under the tongue Every 5 (Five) Minutes As Needed for Chest Pain. Take no more than 3 doses in 15 minutes.     • rosuvastatin (CRESTOR) 40 MG tablet Take 40 mg by mouth Every Night.     • SYNTHROID 75 MCG tablet Take 1 tablet by mouth Daily. 90 tablet 1   • venlafaxine (EFFEXOR) 75 MG tablet Take 75 mg by mouth Daily With Dinner.     • vitamin B-12 (CYANOCOBALAMIN) 1000 MCG tablet Take 1,000 mcg by mouth Daily.     • amitriptyline (ELAVIL) 10 MG tablet Take 1 tablet by mouth Every Night. 30 tablet 5   • isosorbide mononitrate (IMDUR) 30 MG 24 hr tablet Take 30 mg by mouth Daily.  3   • meloxicam (MOBIC) 15 MG tablet Take 15 mg by mouth Every Night.       No facility-administered medications prior to visit.        Patient Active Problem List   Diagnosis   • Triple vessel coronary artery disease   • Anxiety   • Gastroesophageal reflux disease   • Hyperlipidemia   • Hypothyroidism   • Osteoarthritis of knee   • Cerebrovascular accident (CMS/HCC)   • Carotid artery disease (CMS/HCC)   • Migraine without status migrainosus, not intractable   • Osteoporosis   • Closed wedge compression fracture of T7 vertebra (CMS/HCC)   • History of coronary artery bypass surgery   • Thoracic radiculopathy due to degenerative  "joint disease of spine   • History of pulmonary embolism   • Hypertension   • Functional diarrhea   • Syncope   • History of stroke   • Parotid mass       Advance Care Planning:  has an advance directive - a copy has been provided and is in file    Identification of Risk Factors:  Risk factors include: cardiovascular risk.    Review of Systems   Constitutional: Negative.    Eyes: Negative.    Respiratory: Negative.    Cardiovascular: Negative.    Gastrointestinal: Negative.    Genitourinary: Negative.    Musculoskeletal: Negative.    Skin: Negative.    Neurological: Negative.    All other systems reviewed and are negative.      Compared to one year ago, the patient feels her physical health is the same.  Compared to one year ago, the patient feels her mental health is the same.    Objective     Physical Exam   Constitutional: She is oriented to person, place, and time. She appears well-developed and well-nourished.   Neurological: She is alert and oriented to person, place, and time.   Skin: Skin is warm and dry.   Psychiatric:   No acute distress   Vitals reviewed.      Vitals:    01/02/19 1453   BP: 114/64   Pulse: 64   Temp: 97.4 °F (36.3 °C)   SpO2: 97%   Weight: 78.9 kg (174 lb)   Height: 167.6 cm (66\")       Patient's Body mass index is 28.08 kg/m². BMI is above normal parameters. Recommendations include: educational material.      Assessment/Plan   Patient Self-Management and Personalized Health Advice  The patient has been provided with information about: diet, exercise and weight management and preventive services including:   · Bone densitometry screening, Counseling for cardiovascular disease risk reduction, Screening mammography, referral placed.    Visit Diagnoses:  No diagnosis found.    No orders of the defined types were placed in this encounter.      Outpatient Encounter Medications as of 1/2/2019   Medication Sig Dispense Refill   • [DISCONTINUED] isosorbide dinitrate (ISORDIL) 10 MG tablet Take 10 " mg by mouth Daily.     • aspirin 81 MG tablet Take 81 mg by mouth Daily With Dinner.     • furosemide (LASIX) 20 MG tablet Take 1 tablet by mouth Daily. 30 tablet 5   • meclizine (ANTIVERT) 25 MG tablet Take 25 mg by mouth Every Night. Patient may take an additional tablet daily PRN     • metoprolol succinate XL (TOPROL-XL) 25 MG 24 hr tablet Take 1 tablet by mouth Daily. 90 tablet 3   • nitroglycerin (NITROSTAT) 0.4 MG SL tablet Place 0.4 mg under the tongue Every 5 (Five) Minutes As Needed for Chest Pain. Take no more than 3 doses in 15 minutes.     • rosuvastatin (CRESTOR) 40 MG tablet Take 40 mg by mouth Every Night.     • SYNTHROID 75 MCG tablet Take 1 tablet by mouth Daily. 90 tablet 1   • venlafaxine (EFFEXOR) 75 MG tablet Take 75 mg by mouth Daily With Dinner.     • vitamin B-12 (CYANOCOBALAMIN) 1000 MCG tablet Take 1,000 mcg by mouth Daily.     • [DISCONTINUED] amitriptyline (ELAVIL) 10 MG tablet Take 1 tablet by mouth Every Night. 30 tablet 5   • [DISCONTINUED] isosorbide mononitrate (IMDUR) 30 MG 24 hr tablet Take 30 mg by mouth Daily.  3   • [DISCONTINUED] meloxicam (MOBIC) 15 MG tablet Take 15 mg by mouth Every Night.       No facility-administered encounter medications on file as of 1/2/2019.        Reviewed use of high risk medication in the elderly: no  Reviewed for potential of harmful drug interactions in the elderly: no    Follow Up:  No Follow-up on file.     An After Visit Summary and PPPS with all of these plans were given to the patient.

## 2019-01-02 NOTE — PATIENT INSTRUCTIONS
Medicare Wellness  Personal Prevention Plan of Service     Date of Office Visit:  2019  Encounter Provider:  CARMELITA Lovelace  Place of Service:  [facility]  Patient Name: Ivy Chairez  :  1955    As part of the Medicare Wellness portion of your visit today, we are providing you with this personalized preventive plan of services (PPPS). This plan is based upon recommendations of the [place] Preventive Services Task Force (USPSTF) and the Advisory Committee on Immunization Practices (ACIP).    This lists the preventive care services that should be considered, and provides dates of when you are due. Items listed as completed are up-to-date and do not require any further intervention.    Health Maintenance   Topic Date Due   • HEPATITIS A VACCINE ADULT (1 of 2) 1972   • ZOSTER VACCINE (1 of 2) 2004   • HEPATITIS C SCREENING  2016   • MEDICARE ANNUAL WELLNESS  2016   • DXA SCAN  2018   • TDAP/TD VACCINES (2 - Td) 2018   • MAMMOGRAM  2018   • LIPID PANEL  2019   • PAP SMEAR  2021   • COLONOSCOPY  2023   • INFLUENZA VACCINE  Completed       Orders Placed This Encounter   Procedures   • DEXA Bone Density Axial     Order Specific Question:   Reason for Exam:     Answer:   osteoparosis   • Mammo Screening Bilateral With CAD     Order Specific Question:   Reason for Exam:     Answer:   screening for breast cancer       No Follow-up on file.

## 2019-01-04 ENCOUNTER — TRANSCRIBE ORDERS (OUTPATIENT)
Dept: ADMINISTRATIVE | Facility: HOSPITAL | Age: 64
End: 2019-01-04

## 2019-01-04 DIAGNOSIS — Z12.31 SCREENING MAMMOGRAM, ENCOUNTER FOR: Primary | ICD-10-CM

## 2019-01-08 PROCEDURE — 0298T HOLTER MONITOR - 72 HOUR UP TO 21 DAY: CPT | Performed by: INTERNAL MEDICINE

## 2019-01-16 ENCOUNTER — APPOINTMENT (OUTPATIENT)
Dept: BONE DENSITY | Facility: HOSPITAL | Age: 64
End: 2019-01-16

## 2019-01-20 ENCOUNTER — RESULTS ENCOUNTER (OUTPATIENT)
Dept: FAMILY MEDICINE CLINIC | Facility: CLINIC | Age: 64
End: 2019-01-20

## 2019-01-20 DIAGNOSIS — E03.9 ACQUIRED HYPOTHYROIDISM: ICD-10-CM

## 2019-02-01 ENCOUNTER — HOSPITAL ENCOUNTER (OUTPATIENT)
Dept: BONE DENSITY | Facility: HOSPITAL | Age: 64
Discharge: HOME | End: 2019-02-01
Admitting: NURSE PRACTITIONER

## 2019-02-01 ENCOUNTER — HOSPITAL ENCOUNTER (OUTPATIENT)
Dept: MAMMOGRAPHY | Facility: HOSPITAL | Age: 64
Discharge: HOME | End: 2019-02-01

## 2019-02-01 DIAGNOSIS — Z12.31 SCREENING MAMMOGRAM, ENCOUNTER FOR: ICD-10-CM

## 2019-02-01 PROCEDURE — 77080 DXA BONE DENSITY AXIAL: CPT

## 2019-02-01 PROCEDURE — 77067 SCR MAMMO BI INCL CAD: CPT

## 2019-02-01 PROCEDURE — 77063 BREAST TOMOSYNTHESIS BI: CPT

## 2019-02-06 ENCOUNTER — APPOINTMENT (OUTPATIENT)
Dept: MAMMOGRAPHY | Facility: HOSPITAL | Age: 64
End: 2019-02-06

## 2019-02-25 ENCOUNTER — OFFICE VISIT (OUTPATIENT)
Dept: FAMILY MEDICINE CLINIC | Facility: CLINIC | Age: 64
End: 2019-02-25

## 2019-02-25 VITALS
SYSTOLIC BLOOD PRESSURE: 114 MMHG | BODY MASS INDEX: 27.48 KG/M2 | WEIGHT: 171 LBS | DIASTOLIC BLOOD PRESSURE: 66 MMHG | HEIGHT: 66 IN | TEMPERATURE: 98.2 F | HEART RATE: 80 BPM | OXYGEN SATURATION: 98 % | RESPIRATION RATE: 16 BRPM

## 2019-02-25 DIAGNOSIS — E03.9 ACQUIRED HYPOTHYROIDISM: Primary | ICD-10-CM

## 2019-02-25 PROCEDURE — 99213 OFFICE O/P EST LOW 20 MIN: CPT | Performed by: NURSE PRACTITIONER

## 2019-02-25 NOTE — PROGRESS NOTES
Subjective   Ivy Chairez is a 63 y.o. female.     Chief Complaint   Patient presents with   • Hypothyroidism     f/u     Thyroid Problem   Presents for follow-up visit. Symptoms include constipation, fatigue, nail problem and weight gain. Patient reports no anxiety, depressed mood, hair loss, leg swelling or palpitations. The symptoms have been stable.     I have reviewed the patient's medical history in detail and updated the computerized patient record.     The following portions of the patient's history were reviewed and updated as appropriate: allergies, current medications, past family history, past medical history, past social history, past surgical history and problem list.       Current Outpatient Medications:   •  aspirin 81 MG tablet, Take 81 mg by mouth Daily With Dinner., Disp: , Rfl:   •  furosemide (LASIX) 20 MG tablet, Take 1 tablet by mouth Daily., Disp: 30 tablet, Rfl: 5  •  meclizine (ANTIVERT) 25 MG tablet, Take 25 mg by mouth Every Night. Patient may take an additional tablet daily PRN, Disp: , Rfl:   •  metoprolol succinate XL (TOPROL-XL) 25 MG 24 hr tablet, Take 1 tablet by mouth Daily., Disp: 90 tablet, Rfl: 3  •  nitroglycerin (NITROSTAT) 0.4 MG SL tablet, Place 0.4 mg under the tongue Every 5 (Five) Minutes As Needed for Chest Pain. Take no more than 3 doses in 15 minutes., Disp: , Rfl:   •  rosuvastatin (CRESTOR) 40 MG tablet, Take 40 mg by mouth Every Night., Disp: , Rfl:   •  SYNTHROID 75 MCG tablet, Take 1 tablet by mouth Daily., Disp: 90 tablet, Rfl: 1  •  venlafaxine (EFFEXOR) 75 MG tablet, Take 75 mg by mouth Daily With Dinner., Disp: , Rfl:   •  vitamin B-12 (CYANOCOBALAMIN) 1000 MCG tablet, Take 1,000 mcg by mouth Daily., Disp: , Rfl:     Review of Systems   Constitutional: Positive for fatigue and unexpected weight gain.   Cardiovascular: Negative for palpitations.   Gastrointestinal: Positive for constipation.   Psychiatric/Behavioral: Negative for depressed mood. The  "patient is not nervous/anxious.         Vitals:    02/25/19 1359   BP: 114/66   BP Location: Right arm   Patient Position: Sitting   Cuff Size: Adult   Pulse: 80   Resp: 16   Temp: 98.2 °F (36.8 °C)   TempSrc: Oral   SpO2: 98%   Weight: 77.6 kg (171 lb)   Height: 167.6 cm (66\")       Objective   Physical Exam   Constitutional: She is oriented to person, place, and time. She appears well-developed and well-nourished.   Neck: Normal range of motion. Neck supple. No thyromegaly present.   Cardiovascular: Normal rate, regular rhythm and normal heart sounds.   Pulmonary/Chest: Effort normal and breath sounds normal.   Musculoskeletal: She exhibits no edema.   Lymphadenopathy:     She has no cervical adenopathy.   Neurological: She is alert and oriented to person, place, and time.   Skin: Skin is warm and dry.   Psychiatric:   No acute distress   Vitals reviewed.        Assessment/Plan   Ivy was seen today for hypothyroidism.    Diagnoses and all orders for this visit:    Acquired hypothyroidism    1. She is still having issues with fatigue and difficulty loosing weight. I will check her TSH today while she is here. She is to continue with the Synthroid 75 mg daily as prescribed for now.   2. She is to continue all other medications as prescribed.   3. She is to follow up in November for an annual physical exam with fasting labs the week before.              "

## 2019-02-26 DIAGNOSIS — E03.9 ACQUIRED HYPOTHYROIDISM: Primary | ICD-10-CM

## 2019-02-26 LAB — TSH SERPL DL<=0.005 MIU/L-ACNC: 0.16 UIU/ML (ref 0.45–4.5)

## 2019-03-01 DIAGNOSIS — E03.9 ACQUIRED HYPOTHYROIDISM: Primary | ICD-10-CM

## 2019-03-07 RX ORDER — FUROSEMIDE 20 MG/1
20 TABLET ORAL DAILY
Qty: 30 TABLET | Refills: 0 | Status: SHIPPED | OUTPATIENT
Start: 2019-03-07 | End: 2019-04-07 | Stop reason: SDUPTHER

## 2019-03-24 DIAGNOSIS — G47.00 INSOMNIA, UNSPECIFIED TYPE: ICD-10-CM

## 2019-03-25 RX ORDER — AMITRIPTYLINE HYDROCHLORIDE 10 MG/1
10 TABLET, FILM COATED ORAL NIGHTLY
Qty: 30 TABLET | Refills: 5 | Status: SHIPPED | OUTPATIENT
Start: 2019-03-25 | End: 2019-10-23 | Stop reason: SINTOL

## 2019-03-31 ENCOUNTER — RESULTS ENCOUNTER (OUTPATIENT)
Dept: FAMILY MEDICINE CLINIC | Facility: CLINIC | Age: 64
End: 2019-03-31

## 2019-03-31 DIAGNOSIS — E03.9 ACQUIRED HYPOTHYROIDISM: ICD-10-CM

## 2019-04-08 RX ORDER — FUROSEMIDE 20 MG/1
20 TABLET ORAL DAILY
Qty: 30 TABLET | Refills: 5 | Status: SHIPPED | OUTPATIENT
Start: 2019-04-08 | End: 2019-10-23 | Stop reason: SDUPTHER

## 2019-04-22 ENCOUNTER — OFFICE VISIT (OUTPATIENT)
Dept: FAMILY MEDICINE CLINIC | Facility: CLINIC | Age: 64
End: 2019-04-22

## 2019-04-22 VITALS
DIASTOLIC BLOOD PRESSURE: 72 MMHG | HEART RATE: 61 BPM | BODY MASS INDEX: 27.64 KG/M2 | SYSTOLIC BLOOD PRESSURE: 122 MMHG | OXYGEN SATURATION: 98 % | HEIGHT: 66 IN | WEIGHT: 172 LBS

## 2019-04-22 DIAGNOSIS — J30.1 SEASONAL ALLERGIC RHINITIS DUE TO POLLEN: ICD-10-CM

## 2019-04-22 DIAGNOSIS — R42 DIZZINESS: Primary | ICD-10-CM

## 2019-04-22 PROCEDURE — 99213 OFFICE O/P EST LOW 20 MIN: CPT | Performed by: NURSE PRACTITIONER

## 2019-04-22 RX ORDER — MELOXICAM 15 MG/1
15 TABLET ORAL DAILY
Refills: 4 | COMMUNITY
Start: 2019-03-22

## 2019-04-22 NOTE — PROGRESS NOTES
Subjective   Ivy Chairez is a 63 y.o. female.     Chief Complaint   Patient presents with   • Dizziness     Dizziness   This is a new problem. The current episode started in the past 7 days (started 3 days ago). The problem occurs constantly. Associated symptoms include congestion, nausea (friday and Saturday) and vertigo. Pertinent negatives include no chills, fatigue, fever or vomiting. Associated symptoms comments: Ringing in her ears. Exacerbated by: movement makes it worse. Treatments tried: Antivert.     I have reviewed the patient's medical history in detail and updated the computerized patient record.     The following portions of the patient's history were reviewed and updated as appropriate: allergies, current medications, past family history, past medical history, past social history, past surgical history and problem list.      Current Outpatient Medications:   •  amitriptyline (ELAVIL) 10 MG tablet, TAKE 1 TABLET BY MOUTH EVERY NIGHT, Disp: 30 tablet, Rfl: 5  •  aspirin 81 MG tablet, Take 81 mg by mouth Daily With Dinner., Disp: , Rfl:   •  furosemide (LASIX) 20 MG tablet, TAKE 1 TABLET BY MOUTH DAILY, Disp: 30 tablet, Rfl: 5  •  meclizine (ANTIVERT) 25 MG tablet, Take 25 mg by mouth Every Night. Patient may take an additional tablet daily PRN, Disp: , Rfl:   •  meloxicam (MOBIC) 15 MG tablet, TK 1 T PO  D PRN P, Disp: , Rfl: 4  •  metoprolol succinate XL (TOPROL-XL) 25 MG 24 hr tablet, Take 1 tablet by mouth Daily., Disp: 90 tablet, Rfl: 3  •  nitroglycerin (NITROSTAT) 0.4 MG SL tablet, Place 0.4 mg under the tongue Every 5 (Five) Minutes As Needed for Chest Pain. Take no more than 3 doses in 15 minutes., Disp: , Rfl:   •  rosuvastatin (CRESTOR) 40 MG tablet, Take 40 mg by mouth Every Night., Disp: , Rfl:   •  SYNTHROID 75 MCG tablet, Take 1 tablet by mouth Daily., Disp: 90 tablet, Rfl: 1  •  venlafaxine (EFFEXOR) 75 MG tablet, Take 75 mg by mouth Daily With Dinner., Disp: , Rfl:   •  vitamin  B-12 (CYANOCOBALAMIN) 1000 MCG tablet, Take 1,000 mcg by mouth Daily., Disp: , Rfl:   •  Chlorcyclizine-Pseudoephed (STAHIST AD) 25-60 MG tablet, Take 1 tablet by mouth 3 (Three) Times a Day., Disp: 21 tablet, Rfl: 0     Review of Systems   Constitutional: Negative for chills, fatigue and fever.   HENT: Positive for congestion and sinus pressure. Negative for postnasal drip.    Respiratory: Negative.    Cardiovascular: Negative.    Gastrointestinal: Positive for nausea (friday and Saturday). Negative for vomiting.   Neurological: Positive for dizziness and vertigo.       Objective    Vitals:    04/22/19 1314   BP: 122/72   Pulse: 61   SpO2: 98%     Physical Exam   Constitutional: She is oriented to person, place, and time. She appears well-developed and well-nourished.   HENT:   Right Ear: Tympanic membrane normal.   Left Ear: Tympanic membrane normal.   Nose: Mucosal edema and congestion present. No rhinorrhea. Right sinus exhibits no maxillary sinus tenderness and no frontal sinus tenderness. Left sinus exhibits no maxillary sinus tenderness and no frontal sinus tenderness.   Mouth/Throat: Uvula is midline, oropharynx is clear and moist and mucous membranes are normal.   Cardiovascular: Normal rate, regular rhythm, normal heart sounds and intact distal pulses.   Pulmonary/Chest: Effort normal and breath sounds normal. She has no wheezes.   Neurological: She is alert and oriented to person, place, and time.   Skin: Skin is warm and dry.   Psychiatric:   No acute distress   Vitals reviewed.        Assessment/Plan   Ivy was seen today for dizziness.    Diagnoses and all orders for this visit:    Dizziness    Seasonal allergic rhinitis due to pollen    Other orders  -     Chlorcyclizine-Pseudoephed (STAHIST AD) 25-60 MG tablet; Take 1 tablet by mouth 3 (Three) Times a Day.    You have been diagnosed with allergic rhinitis. Symptomatic treatment is best.  You may take Mucinex D for relieving congestion and cough.   If you have high blood pressure, do not take Mucinex D, instead opting for plain Mucinex and Coricidin HBP. Oral antihistamine, such as Allegra, Zyrtec or Claritin may help reduce ear pressure and relieve some nasal symptoms.  A saline nasal spray may be used to keep nose clear from discharge.  Be sure that you are increasing your intake of clear to decaffeinated fluids and get plenty of rest.  If your symptoms worsen or persist follow up as needed.

## 2019-04-24 DIAGNOSIS — R42 VERTIGO: Primary | ICD-10-CM

## 2019-05-06 ENCOUNTER — OFFICE VISIT (OUTPATIENT)
Dept: ENDOCRINOLOGY | Age: 64
End: 2019-05-06

## 2019-05-06 VITALS
HEART RATE: 73 BPM | OXYGEN SATURATION: 98 % | BODY MASS INDEX: 27.64 KG/M2 | WEIGHT: 172 LBS | HEIGHT: 66 IN | SYSTOLIC BLOOD PRESSURE: 112 MMHG | DIASTOLIC BLOOD PRESSURE: 70 MMHG

## 2019-05-06 DIAGNOSIS — E03.9 ACQUIRED HYPOTHYROIDISM: Primary | ICD-10-CM

## 2019-05-06 DIAGNOSIS — M81.0 AGE-RELATED OSTEOPOROSIS WITHOUT CURRENT PATHOLOGICAL FRACTURE: ICD-10-CM

## 2019-05-06 PROCEDURE — 99204 OFFICE O/P NEW MOD 45 MIN: CPT | Performed by: INTERNAL MEDICINE

## 2019-05-06 NOTE — PROGRESS NOTES
Chief Complaint   Patient presents with   • Hypothyroidism   NEW PATIENT APPOINTMENT/ HYPOTHYROIDISM    Ivy Chairez 63 y.o. presents as a new patient for the evaluation of Hypothyroidism. Consulted by Miss. Leung.     Patient was diagnosed with Graves' disease 40 years ago.  She had to undergo total thyroidectomy at that time.  She feels may be 10% of her thyroid was remained at that time.  She is not entirely certain if she had graves orbitopathy at that time but she was never told that her eyes have changed secondary to the thyroid disease.    Today in the clinic patient is on levothyroxine 75 mcg oral daily, her dose was recently adjusted from 50 mcg oral daily in the month of February 2019.  She reports being compliant on the medication and takes it on empty stomach.    She complains of feeling tired, gaining weight of about 20 pounds, alternating c/o constipation and diarrhea, some c/o hair loss, no increased sweating, no dry skin, sleep is ok. c/o heat intolerance and no cold intolerance. c/o tremors, racing of heart  When her dose was decreased from 75 to 50 mcg and no eye symptoms.   Denied c/o difficulty breathing, swallowing and change in voice.   Does have family hx of thyroid disease.     Menopause around 10 years ago, she has 2 kids of her own, no issues in conceiving.      Bone density scan from February 2019 showed osteoporosis, she tried oral bone medication few years ago and could not tolerate the medication secondary to racing of heart.  She was not started on any injections for her bone medication as it was not approved by her insurance at that time.    Reviewed primary care physician's/consulting physician documentation and lab results :     I have reviewed the patient's allergies, medicines, past medical hx, family hx and social hx in detail.    Past Medical History:   Diagnosis Date   • Angina pectoris (CMS/[facility])    • Broken toe 03/01/2017    right 4th toe   • Common migraine without  aura     Neurologist Dr Govea   • Coronary artery disease    • History of chest pain    • History of CHF (congestive heart failure)    • History of pulmonary embolism    • History of stroke    • Migraine     eval and management by neurologist   • Orthostatic hypotension    • Osteoporosis    • Pleural effusion     POSTOPERATIVE, REQUIRING THORACENTESIS.   • Polyp of sigmoid colon     REMOVED   • Stroke (CMS/HCC)    • Thoracic degenerative disc disease     Received epidural 12/14 [facility] DR Landin   • Thoracic spine pain    • Thyroid disease    • Weakness of right side of body     ARM & FACE       Family History   Problem Relation Age of Onset   • No Known Problems Mother    • Heart disease Father    • Hypertension Father    • Hypertension Sister    • Hypertension Brother        Social History     Socioeconomic History   • Marital status:      Spouse name: Not on file   • Number of children: Not on file   • Years of education: Not on file   • Highest education level: Not on file   Tobacco Use   • Smoking status: Former Smoker     Last attempt to quit: 2009     Years since quitting: 10.3   • Smokeless tobacco: Never Used   Substance and Sexual Activity   • Alcohol use: No   • Drug use: No   • Sexual activity: Yes     Birth control/protection: Post-menopausal       Allergies   Allergen Reactions   • Metronidazole Diarrhea and Nausea And Vomiting   • Amoxicillin Swelling   • Hydrocodone-Acetaminophen Nausea And Vomiting         Current Outpatient Medications:   •  amitriptyline (ELAVIL) 10 MG tablet, TAKE 1 TABLET BY MOUTH EVERY NIGHT, Disp: 30 tablet, Rfl: 5  •  aspirin 81 MG tablet, Take 81 mg by mouth Daily With Dinner., Disp: , Rfl:   •  furosemide (LASIX) 20 MG tablet, TAKE 1 TABLET BY MOUTH DAILY, Disp: 30 tablet, Rfl: 5  •  meclizine (ANTIVERT) 25 MG tablet, Take 25 mg by mouth Every Night. Patient may take an additional tablet daily PRN, Disp: , Rfl:   •  meloxicam (MOBIC) 15 MG tablet, TK 1  "T PO  D PRN P, Disp: , Rfl: 4  •  nitroglycerin (NITROSTAT) 0.4 MG SL tablet, Place 0.4 mg under the tongue Every 5 (Five) Minutes As Needed for Chest Pain. Take no more than 3 doses in 15 minutes., Disp: , Rfl:   •  rosuvastatin (CRESTOR) 40 MG tablet, Take 40 mg by mouth Every Night., Disp: , Rfl:   •  SYNTHROID 75 MCG tablet, Take 1 tablet by mouth Daily., Disp: 90 tablet, Rfl: 1  •  venlafaxine (EFFEXOR) 75 MG tablet, Take 75 mg by mouth Daily With Dinner., Disp: , Rfl:   •  vitamin B-12 (CYANOCOBALAMIN) 1000 MCG tablet, Take 1,000 mcg by mouth Daily., Disp: , Rfl:      Review of Systems   Constitutional: Positive for fatigue and unexpected weight change. Negative for appetite change and fever.   Eyes: Negative for visual disturbance.   Respiratory: Negative for shortness of breath.    Cardiovascular: Positive for leg swelling. Negative for palpitations.   Gastrointestinal: Negative for abdominal pain and vomiting.   Endocrine: Negative for polydipsia and polyuria.   Musculoskeletal: Negative for joint swelling and neck pain.   Skin: Negative for rash.   Neurological: Negative for weakness and numbness.   Psychiatric/Behavioral: Negative for behavioral problems.       Objective:    /70   Pulse 73   Ht 167.6 cm (66\")   Wt 78 kg (172 lb)   SpO2 98%   BMI 27.76 kg/m²     Physical Exam   Constitutional: She is oriented to person, place, and time. She appears well-nourished.   HENT:   Head: Normocephalic and atraumatic.   Eyes: Conjunctivae and EOM are normal. No scleral icterus.   Neck: Normal range of motion. Neck supple.   Empty thyroid bed   Cardiovascular: Normal rate and normal heart sounds. Exam reveals no friction rub.   No murmur heard.  Pulmonary/Chest: Effort normal and breath sounds normal. No stridor. She has no wheezes. She has no rales.   Abdominal: Soft. Bowel sounds are normal. She exhibits no distension. There is no tenderness.   Central obesity   Musculoskeletal: She exhibits no edema or " "tenderness.   Lymphadenopathy:     She has no cervical adenopathy.   Neurological: She is alert and oriented to person, place, and time.   Skin: Skin is warm and dry. She is not diaphoretic.   Psychiatric: She has a normal mood and affect.   Vitals reviewed.      Results Review:    I reviewed the patient's new clinical results.    Results Encounter on 01/20/2019   Component Date Value Ref Range Status   • TSH 02/25/2019 0.161* 0.450 - 4.500 uIU/mL Final         Ivy was seen today for hypothyroidism.    Diagnoses and all orders for this visit:    Acquired hypothyroidism  -     TSH  -     T3, Free  -     T4, Free  -     Vitamin B12 & Folate  -     Vitamin D 25 Hydroxy  -     TSH; Future  -     T4, Free; Future  -     T3, Free; Future  -     Comprehensive Metabolic Panel; Future    Age-related osteoporosis without current pathological fracture  -     TSH  -     T3, Free  -     T4, Free  -     Vitamin B12 & Folate  -     Vitamin D 25 Hydroxy  -     TSH; Future  -     T4, Free; Future  -     T3, Free; Future  -     Comprehensive Metabolic Panel; Future        Hypothyroidism secondary to total thyroidectomy  We will check the thyroid panel  Discussed with the patient based on her lab results we will adjust her levothyroxine dosage  Explained to the patient that it would take at least 4 to 6 weeks before the symptoms stabilize.    Osteoporosis  Currently not on any medication  Bone density scan from February 2019 showed osteoporosis in her bilateral hips.  Discussed with the patient options of medical treatment.  Advised the patient to get the dental clearance prior to the initiation of the medication.    Thank you for asking me to see your patient, Ivy Chairez in consultation.        Vida Rodriguez MD  05/06/19    EMR Dragon / transcription disclaimer:     \"Dictated utilizing Dragon dictation\".          "

## 2019-05-07 LAB
25(OH)D3+25(OH)D2 SERPL-MCNC: 36 NG/ML (ref 30–100)
FOLATE SERPL-MCNC: >20 NG/ML (ref 4.78–24.2)
T3FREE SERPL-MCNC: 2 PG/ML (ref 2–4.4)
T4 FREE SERPL-MCNC: 1.15 NG/DL (ref 0.93–1.7)
TSH SERPL DL<=0.005 MIU/L-ACNC: 1.58 MIU/ML (ref 0.27–4.2)
VIT B12 SERPL-MCNC: >2000 PG/ML (ref 211–946)

## 2019-05-07 NOTE — PROGRESS NOTES
Mail results to pt, no treatment changes at this time.  Normal thyroid labs, vitamin D, vitamin B12 and folic acid levels.  No changes at this time.

## 2019-05-10 DIAGNOSIS — E03.9 ACQUIRED HYPOTHYROIDISM: Primary | ICD-10-CM

## 2019-05-10 DIAGNOSIS — M81.0 AGE-RELATED OSTEOPOROSIS WITHOUT CURRENT PATHOLOGICAL FRACTURE: ICD-10-CM

## 2019-05-20 RX ORDER — VENLAFAXINE 75 MG/1
75 TABLET ORAL
Qty: 30 TABLET | Refills: 2 | Status: SHIPPED | OUTPATIENT
Start: 2019-05-20 | End: 2019-08-23 | Stop reason: SDUPTHER

## 2019-05-22 ENCOUNTER — LAB (OUTPATIENT)
Dept: OTHER | Facility: HOSPITAL | Age: 64
End: 2019-05-22

## 2019-05-22 DIAGNOSIS — M81.0 AGE-RELATED OSTEOPOROSIS WITHOUT CURRENT PATHOLOGICAL FRACTURE: Primary | ICD-10-CM

## 2019-05-22 DIAGNOSIS — E03.9 ACQUIRED HYPOTHYROIDISM: ICD-10-CM

## 2019-05-22 DIAGNOSIS — M81.0 AGE-RELATED OSTEOPOROSIS WITHOUT CURRENT PATHOLOGICAL FRACTURE: ICD-10-CM

## 2019-05-22 LAB
ALBUMIN SERPL-MCNC: 4 G/DL (ref 3.5–5.2)
ALBUMIN/GLOB SERPL: 1.5 G/DL
ALP SERPL-CCNC: 73 U/L (ref 39–117)
ALT SERPL W P-5'-P-CCNC: 12 U/L (ref 1–33)
ANION GAP SERPL CALCULATED.3IONS-SCNC: 15.3 MMOL/L
AST SERPL-CCNC: 16 U/L (ref 1–32)
BILIRUB SERPL-MCNC: 0.6 MG/DL (ref 0.2–1.2)
BUN BLD-MCNC: 9 MG/DL (ref 8–23)
BUN/CREAT SERPL: 9 (ref 7–25)
CALCIUM SPEC-SCNC: 9.3 MG/DL (ref 8.6–10.5)
CHLORIDE SERPL-SCNC: 101 MMOL/L (ref 98–107)
CO2 SERPL-SCNC: 24.7 MMOL/L (ref 22–29)
CREAT BLD-MCNC: 1 MG/DL (ref 0.57–1)
GFR SERPL CREATININE-BSD FRML MDRD: 56 ML/MIN/1.73
GLOBULIN UR ELPH-MCNC: 2.7 GM/DL
GLUCOSE BLD-MCNC: 98 MG/DL (ref 65–99)
POTASSIUM BLD-SCNC: 3.9 MMOL/L (ref 3.5–5.2)
PROT SERPL-MCNC: 6.7 G/DL (ref 6–8.5)
SODIUM BLD-SCNC: 141 MMOL/L (ref 136–145)

## 2019-05-22 PROCEDURE — 80053 COMPREHEN METABOLIC PANEL: CPT | Performed by: INTERNAL MEDICINE

## 2019-05-22 PROCEDURE — 36415 COLL VENOUS BLD VENIPUNCTURE: CPT

## 2019-05-23 LAB
CORTIS F 24H UR-MRATE: 9 UG/24 HR (ref 6–42)
CORTIS F UR-MCNC: 9 UG/L

## 2019-05-23 NOTE — PROGRESS NOTES
Mail results to pt, no treatment changes at this time.  Normal cortisol levels no changes at this time.

## 2019-05-30 ENCOUNTER — INFUSION (OUTPATIENT)
Dept: ONCOLOGY | Facility: HOSPITAL | Age: 64
End: 2019-05-30

## 2019-05-30 VITALS
SYSTOLIC BLOOD PRESSURE: 117 MMHG | TEMPERATURE: 97.6 F | OXYGEN SATURATION: 98 % | DIASTOLIC BLOOD PRESSURE: 76 MMHG | HEART RATE: 69 BPM

## 2019-05-30 DIAGNOSIS — M81.0 AGE-RELATED OSTEOPOROSIS WITHOUT CURRENT PATHOLOGICAL FRACTURE: Primary | ICD-10-CM

## 2019-05-30 PROCEDURE — 25010000002 DENOSUMAB 60 MG/ML SOLUTION PREFILLED SYRINGE: Performed by: NURSE PRACTITIONER

## 2019-05-30 PROCEDURE — 96372 THER/PROPH/DIAG INJ SC/IM: CPT | Performed by: NURSE PRACTITIONER

## 2019-05-30 RX ADMIN — DENOSUMAB 60 MG: 60 INJECTION SUBCUTANEOUS at 16:16

## 2019-05-30 NOTE — PROGRESS NOTES
Arrived ambulatory with boot on right foot from broken foot for prolia injection. Indication and side effects reviewed. Denies recent dental work. Labs and medications verified. Patient encouraged to take Calcium 1000 mg PO daily and Vitamin D 400 iu PO daily.  Prolia administered in right arm without incidence. Instructed to call prescribing MD for any concerns or questions and instructed on how to schedule future appts.  Pt vu and discharged ambulatory.

## 2019-08-04 ENCOUNTER — RESULTS ENCOUNTER (OUTPATIENT)
Dept: ENDOCRINOLOGY | Age: 64
End: 2019-08-04

## 2019-08-04 DIAGNOSIS — E03.9 ACQUIRED HYPOTHYROIDISM: ICD-10-CM

## 2019-08-04 DIAGNOSIS — M81.0 AGE-RELATED OSTEOPOROSIS WITHOUT CURRENT PATHOLOGICAL FRACTURE: ICD-10-CM

## 2019-08-07 RX ORDER — LEVOTHYROXINE SODIUM 75 MCG
75 TABLET ORAL DAILY
Qty: 90 TABLET | Refills: 1 | Status: SHIPPED | OUTPATIENT
Start: 2019-08-07 | End: 2019-12-16 | Stop reason: CLARIF

## 2019-08-23 RX ORDER — VENLAFAXINE 75 MG/1
75 TABLET ORAL
Qty: 90 TABLET | Refills: 1 | Status: SHIPPED | OUTPATIENT
Start: 2019-08-23 | End: 2020-02-18 | Stop reason: SDUPTHER

## 2019-10-23 ENCOUNTER — OFFICE VISIT (OUTPATIENT)
Dept: FAMILY MEDICINE CLINIC | Facility: CLINIC | Age: 64
End: 2019-10-23

## 2019-10-23 VITALS
HEART RATE: 62 BPM | HEIGHT: 66 IN | TEMPERATURE: 98.2 F | BODY MASS INDEX: 27.64 KG/M2 | DIASTOLIC BLOOD PRESSURE: 62 MMHG | SYSTOLIC BLOOD PRESSURE: 124 MMHG | WEIGHT: 172 LBS | OXYGEN SATURATION: 97 % | RESPIRATION RATE: 18 BRPM

## 2019-10-23 DIAGNOSIS — M81.0 OSTEOPOROSIS, UNSPECIFIED OSTEOPOROSIS TYPE, UNSPECIFIED PATHOLOGICAL FRACTURE PRESENCE: ICD-10-CM

## 2019-10-23 DIAGNOSIS — J06.9 UPPER RESPIRATORY INFECTION, VIRAL: Primary | ICD-10-CM

## 2019-10-23 PROCEDURE — 99214 OFFICE O/P EST MOD 30 MIN: CPT | Performed by: NURSE PRACTITIONER

## 2019-10-23 RX ORDER — FUROSEMIDE 20 MG/1
20 TABLET ORAL DAILY
Qty: 30 TABLET | Refills: 0 | Status: SHIPPED | OUTPATIENT
Start: 2019-10-23 | End: 2019-11-21 | Stop reason: SDUPTHER

## 2019-10-23 RX ORDER — GUAIFENESIN, PSEUDOEPHEDRINE HYDROCHLORIDE 600; 60 MG/1; MG/1
1 TABLET, EXTENDED RELEASE ORAL EVERY 12 HOURS
Qty: 28 TABLET | Refills: 0 | Status: SHIPPED | OUTPATIENT
Start: 2019-10-23 | End: 2020-01-13 | Stop reason: ALTCHOICE

## 2019-11-18 ENCOUNTER — RESULTS ENCOUNTER (OUTPATIENT)
Dept: FAMILY MEDICINE CLINIC | Facility: CLINIC | Age: 64
End: 2019-11-18

## 2019-11-18 DIAGNOSIS — M81.0 OSTEOPOROSIS, UNSPECIFIED OSTEOPOROSIS TYPE, UNSPECIFIED PATHOLOGICAL FRACTURE PRESENCE: ICD-10-CM

## 2019-11-18 LAB
25(OH)D3+25(OH)D2 SERPL-MCNC: 31 NG/ML (ref 30–100)
BUN SERPL-MCNC: 11 MG/DL (ref 8–23)
BUN/CREAT SERPL: 11.3 (ref 7–25)
CALCIUM SERPL-MCNC: 8.6 MG/DL (ref 8.6–10.5)
CHLORIDE SERPL-SCNC: 100 MMOL/L (ref 98–107)
CO2 SERPL-SCNC: 25.9 MMOL/L (ref 22–29)
CREAT SERPL-MCNC: 0.97 MG/DL (ref 0.57–1)
GLUCOSE SERPL-MCNC: 167 MG/DL (ref 65–99)
MAGNESIUM SERPL-MCNC: 1.9 MG/DL (ref 1.6–2.4)
PHOSPHATE SERPL-MCNC: 2.6 MG/DL (ref 2.5–4.5)
POTASSIUM SERPL-SCNC: 4.6 MMOL/L (ref 3.5–5.2)
SODIUM SERPL-SCNC: 139 MMOL/L (ref 136–145)

## 2019-11-19 ENCOUNTER — OFFICE VISIT (OUTPATIENT)
Dept: FAMILY MEDICINE CLINIC | Facility: CLINIC | Age: 64
End: 2019-11-19

## 2019-11-19 VITALS
RESPIRATION RATE: 18 BRPM | SYSTOLIC BLOOD PRESSURE: 140 MMHG | HEIGHT: 66 IN | DIASTOLIC BLOOD PRESSURE: 78 MMHG | OXYGEN SATURATION: 98 % | WEIGHT: 172 LBS | HEART RATE: 73 BPM | BODY MASS INDEX: 27.64 KG/M2

## 2019-11-19 DIAGNOSIS — J20.9 ACUTE BRONCHITIS, UNSPECIFIED ORGANISM: Primary | ICD-10-CM

## 2019-11-19 PROCEDURE — 99213 OFFICE O/P EST LOW 20 MIN: CPT | Performed by: NURSE PRACTITIONER

## 2019-11-19 RX ORDER — PREDNISONE 20 MG/1
20 TABLET ORAL 2 TIMES DAILY
Qty: 10 TABLET | Refills: 0 | Status: SHIPPED | OUTPATIENT
Start: 2019-11-19 | End: 2019-11-24

## 2019-11-19 RX ORDER — ALBUTEROL SULFATE 90 UG/1
2 AEROSOL, METERED RESPIRATORY (INHALATION) EVERY 4 HOURS PRN
Qty: 18 G | Refills: 0 | Status: SHIPPED | OUTPATIENT
Start: 2019-11-19 | End: 2019-11-19 | Stop reason: SDUPTHER

## 2019-11-19 RX ORDER — CLARITHROMYCIN 500 MG/1
500 TABLET, COATED ORAL 2 TIMES DAILY
Qty: 14 TABLET | Refills: 0 | Status: SHIPPED | OUTPATIENT
Start: 2019-11-19 | End: 2019-11-25

## 2019-11-19 RX ORDER — DEXTROMETHORPHAN HYDROBROMIDE AND PROMETHAZINE HYDROCHLORIDE 15; 6.25 MG/5ML; MG/5ML
5 SYRUP ORAL 4 TIMES DAILY PRN
Qty: 240 ML | Refills: 0 | Status: SHIPPED | OUTPATIENT
Start: 2019-11-19 | End: 2020-01-13 | Stop reason: ALTCHOICE

## 2019-11-19 NOTE — PROGRESS NOTES
Subjective   Ivy Chairez is a 64 y.o. female.     Chief Complaint   Patient presents with   • Pneumonia     Possible      Cough   This is a new problem. The current episode started 1 to 4 weeks ago. The problem has been gradually worsening. The problem occurs every few minutes. The cough is non-productive. Associated symptoms include chills, headaches, heartburn, rhinorrhea, shortness of breath and wheezing. Pertinent negatives include no fever, nasal congestion or postnasal drip. Exacerbated by: walking  and talking. She has tried OTC cough suppressant (benadryl allergy) for the symptoms.      I have reviewed the patient's medical history in detail and updated the computerized patient record.    The following portions of the patient's history were reviewed and updated as appropriate: allergies, current medications, past family history, past medical history, past social history, past surgical history and problem list.       Current Outpatient Medications:   •  aspirin 81 MG tablet, Take 81 mg by mouth Daily With Dinner., Disp: , Rfl:   •  furosemide (LASIX) 20 MG tablet, TAKE 1 TABLET BY MOUTH DAILY, Disp: 30 tablet, Rfl: 0  •  meclizine (ANTIVERT) 25 MG tablet, Take 25 mg by mouth Every Night. Patient may take an additional tablet daily PRN, Disp: , Rfl:   •  meloxicam (MOBIC) 15 MG tablet, TK 1 T PO  D PRN P, Disp: , Rfl: 4  •  metoprolol tartrate (LOPRESSOR) 25 MG tablet, Take 25 mg by mouth 2 (Two) Times a Day., Disp: , Rfl:   •  nitroglycerin (NITROSTAT) 0.4 MG SL tablet, Place 0.4 mg under the tongue Every 5 (Five) Minutes As Needed for Chest Pain. Take no more than 3 doses in 15 minutes., Disp: , Rfl:   •  pseudoephedrine-guaifenesin (MUCINEX D)  MG per 12 hr tablet, Take 1 tablet by mouth Every 12 (Twelve) Hours., Disp: 28 tablet, Rfl: 0  •  rosuvastatin (CRESTOR) 40 MG tablet, Take 40 mg by mouth Every Night., Disp: , Rfl:   •  SYNTHROID 75 MCG tablet, Take 1 tablet by mouth Daily., Disp: 90  "tablet, Rfl: 1  •  venlafaxine (EFFEXOR) 75 MG tablet, TAKE 1 TABLET BY MOUTH EVERY NIGHT AT BEDTIME, Disp: 90 tablet, Rfl: 1  •  vitamin B-12 (CYANOCOBALAMIN) 1000 MCG tablet, Take 1,000 mcg by mouth Daily., Disp: , Rfl:   •  ALBUTEROL SULFATE  (90 Base) MCG/ACT inhaler, INHALE 2 PUFFS BY MOUTH EVERY 4 HOURS AS NEEDED FOR SHORTNESS OF BREATH, Disp: 90 g, Rfl: 0  •  clarithromycin (BIAXIN) 500 MG tablet, Take 1 tablet by mouth 2 (Two) Times a Day., Disp: 14 tablet, Rfl: 0  •  predniSONE (DELTASONE) 20 MG tablet, Take 1 tablet by mouth 2 (Two) Times a Day for 5 days., Disp: 10 tablet, Rfl: 0  •  promethazine-dextromethorphan (PROMETHAZINE-DM) 6.25-15 MG/5ML syrup, Take 5 mL by mouth 4 (Four) Times a Day As Needed for Cough., Disp: 240 mL, Rfl: 0    Review of Systems   Constitutional: Positive for chills. Negative for fever.   HENT: Positive for rhinorrhea. Negative for postnasal drip.    Respiratory: Positive for cough, shortness of breath and wheezing.         Vitals:    11/19/19 1643   BP: 140/78   BP Location: Left arm   Patient Position: Sitting   Cuff Size: Adult   Pulse: 73   Resp: 18   SpO2: 98%   Weight: 78 kg (172 lb)   Height: 167.6 cm (66\")       Objective   Physical Exam   Constitutional: She appears well-developed and well-nourished. She appears ill.   HENT:   Right Ear: Tympanic membrane is not erythematous. A middle ear effusion is present.   Left Ear: Tympanic membrane is not erythematous. A middle ear effusion is present.   Nose: Mucosal edema, rhinorrhea and congestion present.   Mouth/Throat: Uvula is midline, oropharynx is clear and moist and mucous membranes are normal.   Vitals reviewed.        Assessment/Plan   Ivy was seen today for pneumonia.    Diagnoses and all orders for this visit:    Acute bronchitis, unspecified organism    Other orders  -     predniSONE (DELTASONE) 20 MG tablet; Take 1 tablet by mouth 2 (Two) Times a Day for 5 days.  -     promethazine-dextromethorphan " (PROMETHAZINE-DM) 6.25-15 MG/5ML syrup; Take 5 mL by mouth 4 (Four) Times a Day As Needed for Cough.  -     Discontinue: albuterol sulfate  (90 Base) MCG/ACT inhaler; Inhale 2 puffs Every 4 (Four) Hours As Needed for Wheezing or Shortness of Air.  -     clarithromycin (BIAXIN) 500 MG tablet; Take 1 tablet by mouth 2 (Two) Times a Day.      Ms. Chairez presents today with URI and bronchitis symptoms.   She is to start Clarithromycin 500 mg twice a day as prescribed, promethazine DM 6.25-15 mg/ 5 ml every four hours as needed for cough and prednisone 20 mg twice a day for 5 days.   She is to follow up as needed and next week for her annual preventive exam.

## 2019-11-20 RX ORDER — ALBUTEROL SULFATE 90 UG/1
AEROSOL, METERED RESPIRATORY (INHALATION)
Qty: 90 G | Refills: 0 | Status: SHIPPED | OUTPATIENT
Start: 2019-11-20 | End: 2020-01-13 | Stop reason: ALTCHOICE

## 2019-11-21 RX ORDER — FUROSEMIDE 20 MG/1
20 TABLET ORAL DAILY
Qty: 30 TABLET | Refills: 0 | Status: SHIPPED | OUTPATIENT
Start: 2019-11-21 | End: 2019-12-23

## 2019-11-25 ENCOUNTER — OFFICE VISIT (OUTPATIENT)
Dept: FAMILY MEDICINE CLINIC | Facility: CLINIC | Age: 64
End: 2019-11-25

## 2019-11-25 VITALS
SYSTOLIC BLOOD PRESSURE: 118 MMHG | DIASTOLIC BLOOD PRESSURE: 60 MMHG | RESPIRATION RATE: 18 BRPM | TEMPERATURE: 98.9 F | OXYGEN SATURATION: 98 % | BODY MASS INDEX: 27.64 KG/M2 | HEART RATE: 74 BPM | WEIGHT: 172 LBS | HEIGHT: 66 IN

## 2019-11-25 DIAGNOSIS — Z00.00 MEDICARE ANNUAL WELLNESS VISIT, INITIAL: Primary | ICD-10-CM

## 2019-11-25 PROCEDURE — 99397 PER PM REEVAL EST PAT 65+ YR: CPT | Performed by: NURSE PRACTITIONER

## 2019-11-25 RX ORDER — RANOLAZINE 500 MG/1
TABLET, EXTENDED RELEASE ORAL
COMMUNITY
Start: 2019-11-21 | End: 2020-02-20

## 2019-12-02 ENCOUNTER — INFUSION (OUTPATIENT)
Dept: ONCOLOGY | Facility: HOSPITAL | Age: 64
End: 2019-12-02

## 2019-12-02 VITALS
WEIGHT: 171.2 LBS | OXYGEN SATURATION: 98 % | HEART RATE: 70 BPM | BODY MASS INDEX: 27.63 KG/M2 | TEMPERATURE: 97.8 F | SYSTOLIC BLOOD PRESSURE: 168 MMHG | DIASTOLIC BLOOD PRESSURE: 80 MMHG

## 2019-12-02 DIAGNOSIS — M81.0 AGE-RELATED OSTEOPOROSIS WITHOUT CURRENT PATHOLOGICAL FRACTURE: Primary | ICD-10-CM

## 2019-12-02 PROCEDURE — 96372 THER/PROPH/DIAG INJ SC/IM: CPT | Performed by: NURSE PRACTITIONER

## 2019-12-02 PROCEDURE — 25010000002 DENOSUMAB 60 MG/ML SOLUTION PREFILLED SYRINGE: Performed by: INTERNAL MEDICINE

## 2019-12-02 RX ADMIN — DENOSUMAB 60 MG: 60 INJECTION SUBCUTANEOUS at 16:19

## 2019-12-02 NOTE — NURSING NOTE
Arrived  for prolia injection. Indication and side effects reviewed. Denies recent dental work. Labs and medications verified. Prolia administered in left   arm without incidence. Instructed to call prescribing MD for any concerns or questions and instructed on how to schedule future appts.  Pt vu and discharged ambulatory. F/U appt in June.

## 2019-12-03 ENCOUNTER — HOSPITAL ENCOUNTER (OUTPATIENT)
Dept: CT IMAGING | Facility: HOSPITAL | Age: 64
Discharge: HOME | End: 2019-12-03
Admitting: NURSE PRACTITIONER

## 2019-12-03 ENCOUNTER — OFFICE VISIT (OUTPATIENT)
Dept: FAMILY MEDICINE CLINIC | Facility: CLINIC | Age: 64
End: 2019-12-03

## 2019-12-03 VITALS
HEART RATE: 74 BPM | RESPIRATION RATE: 18 BRPM | OXYGEN SATURATION: 98 % | WEIGHT: 172 LBS | BODY MASS INDEX: 27.64 KG/M2 | SYSTOLIC BLOOD PRESSURE: 110 MMHG | DIASTOLIC BLOOD PRESSURE: 64 MMHG | HEIGHT: 66 IN

## 2019-12-03 DIAGNOSIS — R10.13 ACUTE EPIGASTRIC PAIN: Primary | ICD-10-CM

## 2019-12-03 LAB — CREAT BLDA-MCNC: 1 MG/DL (ref 0.6–1.3)

## 2019-12-03 PROCEDURE — 99214 OFFICE O/P EST MOD 30 MIN: CPT | Performed by: NURSE PRACTITIONER

## 2019-12-03 PROCEDURE — 82565 ASSAY OF CREATININE: CPT

## 2019-12-03 PROCEDURE — 74177 CT ABD & PELVIS W/CONTRAST: CPT

## 2019-12-03 PROCEDURE — 25010000002 IOPAMIDOL 61 % SOLUTION: Performed by: NURSE PRACTITIONER

## 2019-12-03 RX ADMIN — IOPAMIDOL 85 ML: 612 INJECTION, SOLUTION INTRAVENOUS at 16:53

## 2019-12-03 NOTE — PROGRESS NOTES
Subjective   Ivy Chairez is a 64 y.o. female.     Chief Complaint   Patient presents with   • Chest Pain     Abdominal Pain   This is a new problem. The current episode started 1 to 4 weeks ago (started 2 weeks ago). The onset quality is undetermined. The problem occurs constantly. The problem has been unchanged. The pain is located in the epigastric region. The pain is at a severity of 8/10 (a 10/10 when she is awaken during the night). The pain is moderate. Quality: stabbing pain. The abdominal pain radiates to the back. Pertinent negatives include no belching, constipation, diarrhea, flatus, nausea or vomiting. The pain is aggravated by movement. The pain is relieved by nothing. She has tried acetaminophen (NTG, Ranexa) for the symptoms. The treatment provided no relief.      I have reviewed the patient's medical history in detail and updated the computerized patient record.    The following portions of the patient's history were reviewed and updated as appropriate: allergies, current medications, past family history, past medical history, past social history, past surgical history and problem list.       Current Outpatient Medications:   •  ALBUTEROL SULFATE  (90 Base) MCG/ACT inhaler, INHALE 2 PUFFS BY MOUTH EVERY 4 HOURS AS NEEDED FOR SHORTNESS OF BREATH, Disp: 90 g, Rfl: 0  •  aspirin 81 MG tablet, Take 81 mg by mouth Daily With Dinner., Disp: , Rfl:   •  furosemide (LASIX) 20 MG tablet, TAKE 1 TABLET BY MOUTH DAILY, Disp: 30 tablet, Rfl: 0  •  meclizine (ANTIVERT) 25 MG tablet, Take 25 mg by mouth Every Night. Patient may take an additional tablet daily PRN, Disp: , Rfl:   •  meloxicam (MOBIC) 15 MG tablet, TK 1 T PO  D PRN P, Disp: , Rfl: 4  •  metoprolol tartrate (LOPRESSOR) 25 MG tablet, Take 25 mg by mouth 2 (Two) Times a Day., Disp: , Rfl:   •  nitroglycerin (NITROSTAT) 0.4 MG SL tablet, Place 0.4 mg under the tongue Every 5 (Five) Minutes As Needed for Chest Pain. Take no more than 3 doses  "in 15 minutes., Disp: , Rfl:   •  promethazine-dextromethorphan (PROMETHAZINE-DM) 6.25-15 MG/5ML syrup, Take 5 mL by mouth 4 (Four) Times a Day As Needed for Cough., Disp: 240 mL, Rfl: 0  •  pseudoephedrine-guaifenesin (MUCINEX D)  MG per 12 hr tablet, Take 1 tablet by mouth Every 12 (Twelve) Hours., Disp: 28 tablet, Rfl: 0  •  ranolazine (RANEXA) 500 MG 12 hr tablet, TAKE 1 TABLET BY MOUTH TWICE DAILY, Disp: , Rfl:   •  rosuvastatin (CRESTOR) 40 MG tablet, Take 40 mg by mouth Every Night., Disp: , Rfl:   •  SYNTHROID 75 MCG tablet, Take 1 tablet by mouth Daily., Disp: 90 tablet, Rfl: 1  •  venlafaxine (EFFEXOR) 75 MG tablet, TAKE 1 TABLET BY MOUTH EVERY NIGHT AT BEDTIME, Disp: 90 tablet, Rfl: 1  •  vitamin B-12 (CYANOCOBALAMIN) 1000 MCG tablet, Take 1,000 mcg by mouth Daily., Disp: , Rfl:   No current facility-administered medications for this visit.     Review of Systems   Constitutional: Negative.    Respiratory: Negative.    Cardiovascular: Negative.    Gastrointestinal: Positive for abdominal pain. Negative for constipation, diarrhea, flatus, nausea and vomiting.   Musculoskeletal: Positive for back pain (mid upper back pain).        Vitals:    12/03/19 1342   BP: 110/64   BP Location: Left arm   Patient Position: Sitting   Cuff Size: Adult   Pulse: 74   Resp: 18   SpO2: 98%   Weight: 78 kg (172 lb)   Height: 167.6 cm (66\")       Objective   Physical Exam   Constitutional: She is oriented to person, place, and time. She appears well-developed and well-nourished.   Cardiovascular: Normal rate, regular rhythm and normal heart sounds.   Pulmonary/Chest: Effort normal and breath sounds normal.   Abdominal: Soft. Normal appearance. She exhibits no pulsatile liver, no pulsatile midline mass and no mass. Bowel sounds are decreased. There is no splenomegaly or hepatomegaly. There is tenderness in the epigastric area.   Neurological: She is alert and oriented to person, place, and time.   Skin: Skin is warm and " dry.   Psychiatric:   No acute distress   Vitals reviewed.        Assessment/Plan   Ivy was seen today for chest pain.    Diagnoses and all orders for this visit:    Acute epigastric pain  -     CT Abdomen Pelvis With & Without Contrast      Ms. Chairez presents today with acute epigastric pain that radiates to her back that has been going on and worsening over the past 2 weeks. She reports the pain is constant and wakes her from her sleep at night. Her physical assessment is positive for tenderness, bowel sounds are hypoactive. No bruit noted. I am sending her to [facility] for a stat CT of the abdomin.

## 2019-12-03 NOTE — NURSING NOTE
"1730 Dr. Sands phoned me and stated \"nothing acute, sigmoid diverticulosis, no diverticulitis, no aneurysm, tiny aorta\".  1734 I phoned Chloe Leung NP and reported these findings to her.  She then spoke to the patient and told her that it is ok for her to leave, she gave the patient some dietary instructions.  IV removed, patient ambulated toward exit, with family member.    "

## 2019-12-04 RX ORDER — IBUPROFEN 800 MG/1
800 TABLET ORAL EVERY 8 HOURS PRN
Qty: 90 TABLET | Refills: 3 | Status: SHIPPED | OUTPATIENT
Start: 2019-12-04 | End: 2020-01-13 | Stop reason: ALTCHOICE

## 2019-12-04 RX ORDER — CIPROFLOXACIN 500 MG/1
500 TABLET, FILM COATED ORAL 2 TIMES DAILY
Qty: 14 TABLET | Refills: 0 | Status: SHIPPED | OUTPATIENT
Start: 2019-12-04 | End: 2020-01-13 | Stop reason: ALTCHOICE

## 2019-12-16 DIAGNOSIS — K57.90 DIVERTICULOSIS: Primary | ICD-10-CM

## 2019-12-16 RX ORDER — LEVOTHYROXINE SODIUM 0.07 MG/1
75 TABLET ORAL DAILY
COMMUNITY
End: 2019-12-16 | Stop reason: CLARIF

## 2019-12-16 RX ORDER — LEVOTHYROXINE SODIUM 0.07 MG/1
75 TABLET ORAL DAILY
Qty: 30 TABLET | Refills: 0 | Status: SHIPPED | OUTPATIENT
Start: 2019-12-16 | End: 2020-01-07 | Stop reason: SDUPTHER

## 2019-12-23 RX ORDER — FUROSEMIDE 20 MG/1
20 TABLET ORAL DAILY
Qty: 30 TABLET | Refills: 6 | Status: SHIPPED | OUTPATIENT
Start: 2019-12-23 | End: 2020-03-23 | Stop reason: SDUPTHER

## 2020-01-07 RX ORDER — ROSUVASTATIN CALCIUM 40 MG/1
40 TABLET, COATED ORAL NIGHTLY
Qty: 90 TABLET | Refills: 0 | Status: SHIPPED | OUTPATIENT
Start: 2020-01-07 | End: 2020-01-13

## 2020-01-07 RX ORDER — LEVOTHYROXINE SODIUM 0.07 MG/1
75 TABLET ORAL DAILY
Qty: 90 TABLET | Refills: 0 | Status: SHIPPED | OUTPATIENT
Start: 2020-01-07 | End: 2020-01-13

## 2020-01-07 RX ORDER — MECLIZINE HYDROCHLORIDE 25 MG/1
25 TABLET ORAL NIGHTLY
Qty: 90 TABLET | Refills: 0 | Status: SHIPPED | OUTPATIENT
Start: 2020-01-07 | End: 2020-01-13

## 2020-01-13 ENCOUNTER — OFFICE VISIT (OUTPATIENT)
Dept: CARDIAC SURGERY | Facility: CLINIC | Age: 65
End: 2020-01-13

## 2020-01-13 VITALS
TEMPERATURE: 98.1 F | HEART RATE: 65 BPM | RESPIRATION RATE: 20 BRPM | BODY MASS INDEX: 26.84 KG/M2 | SYSTOLIC BLOOD PRESSURE: 128 MMHG | DIASTOLIC BLOOD PRESSURE: 83 MMHG | WEIGHT: 167 LBS | OXYGEN SATURATION: 100 % | HEIGHT: 66 IN

## 2020-01-13 DIAGNOSIS — K21.9 GASTROESOPHAGEAL REFLUX DISEASE WITHOUT ESOPHAGITIS: Primary | ICD-10-CM

## 2020-01-13 DIAGNOSIS — Z95.1 HISTORY OF CORONARY ARTERY BYPASS SURGERY: ICD-10-CM

## 2020-01-13 PROCEDURE — 99024 POSTOP FOLLOW-UP VISIT: CPT | Performed by: THORACIC SURGERY (CARDIOTHORACIC VASCULAR SURGERY)

## 2020-01-13 RX ORDER — ROSUVASTATIN CALCIUM 40 MG/1
40 TABLET, COATED ORAL NIGHTLY
Qty: 90 TABLET | Refills: 0 | Status: SHIPPED | OUTPATIENT
Start: 2020-01-13 | End: 2020-04-14 | Stop reason: SDUPTHER

## 2020-01-13 RX ORDER — ISOSORBIDE MONONITRATE 30 MG/1
30 TABLET, EXTENDED RELEASE ORAL DAILY
COMMUNITY
End: 2020-02-20

## 2020-01-13 RX ORDER — LEVOTHYROXINE SODIUM 0.07 MG/1
75 TABLET ORAL DAILY
Qty: 90 TABLET | Refills: 0 | Status: SHIPPED | OUTPATIENT
Start: 2020-01-13 | End: 2020-04-22

## 2020-01-13 RX ORDER — MECLIZINE HYDROCHLORIDE 25 MG/1
TABLET ORAL
Qty: 90 TABLET | Refills: 0 | Status: SHIPPED | OUTPATIENT
Start: 2020-01-13 | End: 2020-05-08

## 2020-01-13 NOTE — PROGRESS NOTES
I am seeing this patient of [facility] who had surgery in 2009.  She had a three-vessel bypass at that time.  She is done well over the years but now is having chest pain that occurs mostly at night around 3 AM.  She does have some shortness of air when she exercises.  She had a recent catheterization that shows a good-looking vein graft to the marginal system.  There is a good-looking vein graft to the diagonal branch that actually fills the LAD.  She has a small mammary to the LAD.  Her LV function is normal.  Her echocardiogram was normal.  The mammary is small because most of the flow was coming through the vein graft to the diagonal branch.  Most of these VIOLETTE's will  in size if the graft to the diagonal branch has disease but this vein looks perfect.  She told me she does not have reflux disease but her diagnosis is in her chart.  I wonder if this is esophageal spasm.  She has a lot of hardware in her back also.  I do not think it is angina.  She sees Dr. Cuauhtemoc Krishnamurthy tomorrow.  She does have a CT scan that shows diverticular disease but this is not typically chest pain causing.  I bet that he does a EGD on her.  I talked with her and her  and explained all this and reassured them.  I think everything is fine with her cardiac situation.  The only thing she has not had is a CT or CTA of the chest but I think this sounds more like esophageal spasm to me.

## 2020-01-14 ENCOUNTER — OFFICE VISIT (OUTPATIENT)
Dept: GASTROENTEROLOGY | Facility: CLINIC | Age: 65
End: 2020-01-14

## 2020-01-14 VITALS
WEIGHT: 171.6 LBS | BODY MASS INDEX: 27.58 KG/M2 | SYSTOLIC BLOOD PRESSURE: 124 MMHG | HEIGHT: 66 IN | TEMPERATURE: 97.9 F | DIASTOLIC BLOOD PRESSURE: 78 MMHG

## 2020-01-14 DIAGNOSIS — R10.13 EPIGASTRIC PAIN: Primary | ICD-10-CM

## 2020-01-14 DIAGNOSIS — K21.9 GASTROESOPHAGEAL REFLUX DISEASE, ESOPHAGITIS PRESENCE NOT SPECIFIED: ICD-10-CM

## 2020-01-14 PROCEDURE — 99204 OFFICE O/P NEW MOD 45 MIN: CPT | Performed by: INTERNAL MEDICINE

## 2020-01-14 RX ORDER — SODIUM CHLORIDE, SODIUM LACTATE, POTASSIUM CHLORIDE, CALCIUM CHLORIDE 600; 310; 30; 20 MG/100ML; MG/100ML; MG/100ML; MG/100ML
30 INJECTION, SOLUTION INTRAVENOUS CONTINUOUS
Status: CANCELLED | OUTPATIENT
Start: 2020-01-24

## 2020-01-24 ENCOUNTER — ANESTHESIA EVENT (OUTPATIENT)
Dept: GASTROENTEROLOGY | Facility: HOSPITAL | Age: 65
End: 2020-01-24

## 2020-01-24 ENCOUNTER — HOSPITAL ENCOUNTER (OUTPATIENT)
Facility: HOSPITAL | Age: 65
Setting detail: HOSPITAL OUTPATIENT SURGERY
Discharge: HOME | End: 2020-01-24
Attending: INTERNAL MEDICINE | Admitting: INTERNAL MEDICINE

## 2020-01-24 ENCOUNTER — ANESTHESIA (OUTPATIENT)
Dept: GASTROENTEROLOGY | Facility: HOSPITAL | Age: 65
End: 2020-01-24

## 2020-01-24 VITALS
TEMPERATURE: 98 F | HEIGHT: 66 IN | BODY MASS INDEX: 27.4 KG/M2 | WEIGHT: 170.5 LBS | HEART RATE: 70 BPM | OXYGEN SATURATION: 97 % | DIASTOLIC BLOOD PRESSURE: 94 MMHG | RESPIRATION RATE: 16 BRPM | SYSTOLIC BLOOD PRESSURE: 146 MMHG

## 2020-01-24 DIAGNOSIS — R10.13 EPIGASTRIC PAIN: ICD-10-CM

## 2020-01-24 DIAGNOSIS — K21.9 GASTROESOPHAGEAL REFLUX DISEASE, ESOPHAGITIS PRESENCE NOT SPECIFIED: ICD-10-CM

## 2020-01-24 PROCEDURE — 43239 EGD BIOPSY SINGLE/MULTIPLE: CPT | Performed by: INTERNAL MEDICINE

## 2020-01-24 PROCEDURE — 87081 CULTURE SCREEN ONLY: CPT | Performed by: INTERNAL MEDICINE

## 2020-01-24 PROCEDURE — 25010000002 PROPOFOL 10 MG/ML EMULSION: Performed by: NURSE ANESTHETIST, CERTIFIED REGISTERED

## 2020-01-24 PROCEDURE — 88305 TISSUE EXAM BY PATHOLOGIST: CPT | Performed by: INTERNAL MEDICINE

## 2020-01-24 PROCEDURE — S0260 H&P FOR SURGERY: HCPCS | Performed by: INTERNAL MEDICINE

## 2020-01-24 RX ORDER — PROPOFOL 10 MG/ML
VIAL (ML) INTRAVENOUS CONTINUOUS PRN
Status: DISCONTINUED | OUTPATIENT
Start: 2020-01-24 | End: 2020-01-24 | Stop reason: SURG

## 2020-01-24 RX ORDER — SODIUM CHLORIDE, SODIUM LACTATE, POTASSIUM CHLORIDE, CALCIUM CHLORIDE 600; 310; 30; 20 MG/100ML; MG/100ML; MG/100ML; MG/100ML
30 INJECTION, SOLUTION INTRAVENOUS CONTINUOUS
Status: DISCONTINUED | OUTPATIENT
Start: 2020-01-24 | End: 2020-01-24 | Stop reason: HOSPADM

## 2020-01-24 RX ORDER — PROPOFOL 10 MG/ML
VIAL (ML) INTRAVENOUS AS NEEDED
Status: DISCONTINUED | OUTPATIENT
Start: 2020-01-24 | End: 2020-01-24 | Stop reason: SURG

## 2020-01-24 RX ORDER — SODIUM CHLORIDE 0.9 % (FLUSH) 0.9 %
10 SYRINGE (ML) INJECTION AS NEEDED
Status: DISCONTINUED | OUTPATIENT
Start: 2020-01-24 | End: 2020-01-24 | Stop reason: HOSPADM

## 2020-01-24 RX ORDER — SODIUM CHLORIDE 0.9 % (FLUSH) 0.9 %
3 SYRINGE (ML) INJECTION EVERY 12 HOURS SCHEDULED
Status: DISCONTINUED | OUTPATIENT
Start: 2020-01-24 | End: 2020-01-24 | Stop reason: HOSPADM

## 2020-01-24 RX ORDER — LIDOCAINE HYDROCHLORIDE 20 MG/ML
INJECTION, SOLUTION INFILTRATION; PERINEURAL AS NEEDED
Status: DISCONTINUED | OUTPATIENT
Start: 2020-01-24 | End: 2020-01-24 | Stop reason: SURG

## 2020-01-24 RX ORDER — ONDANSETRON 2 MG/ML
4 INJECTION INTRAMUSCULAR; INTRAVENOUS ONCE AS NEEDED
Status: DISCONTINUED | OUTPATIENT
Start: 2020-01-24 | End: 2020-01-24 | Stop reason: HOSPADM

## 2020-01-24 RX ADMIN — PROPOFOL 50 MG: 10 INJECTION, EMULSION INTRAVENOUS at 14:35

## 2020-01-24 RX ADMIN — PROPOFOL 200 MCG/KG/MIN: 10 INJECTION, EMULSION INTRAVENOUS at 14:35

## 2020-01-24 RX ADMIN — LIDOCAINE HYDROCHLORIDE 40 MG: 20 INJECTION, SOLUTION INFILTRATION; PERINEURAL at 14:32

## 2020-01-24 RX ADMIN — SODIUM CHLORIDE, POTASSIUM CHLORIDE, SODIUM LACTATE AND CALCIUM CHLORIDE 30 ML/HR: 600; 310; 30; 20 INJECTION, SOLUTION INTRAVENOUS at 13:21

## 2020-01-24 RX ADMIN — PROPOFOL 50 MG: 10 INJECTION, EMULSION INTRAVENOUS at 14:32

## 2020-01-24 NOTE — ANESTHESIA PREPROCEDURE EVALUATION
Anesthesia Evaluation     Patient summary reviewed and Nursing notes reviewed   NPO Solid Status: > 8 hours  NPO Liquid Status: > 2 hours           Airway   Mallampati: II  TM distance: >3 FB  Neck ROM: full  Dental - normal exam     Pulmonary - normal exam   (+) pleural effusion, pulmonary embolism, a smoker Former,   Cardiovascular - normal exam    (+) hypertension, CAD, CABG, angina, hyperlipidemia,  carotid artery disease      Neuro/Psych  (+) CVA, headaches, syncope, numbness, psychiatric history Anxiety,     GI/Hepatic/Renal/Endo    (+)  GERD,      Musculoskeletal     (+) back pain,   Abdominal    Substance History - negative use     OB/GYN negative ob/gyn ROS         Other   arthritis,                      Anesthesia Plan    ASA 4     MAC       Anesthetic plan, all risks, benefits, and alternatives have been provided, discussed and informed consent has been obtained with: patient.

## 2020-01-24 NOTE — ANESTHESIA POSTPROCEDURE EVALUATION
"Patient: Ivy Chairez    Procedure Summary     Date:  01/24/20 Room / Location:   [facility]    Anesthesia Start:  1422 Anesthesia Stop:  1450    Procedure:  ESOPHAGOGASTRODUODENOSCOPY (N/A Esophagus) Diagnosis:       Epigastric pain      Gastroesophageal reflux disease, esophagitis presence not specified      (Epigastric pain [R10.13])      (Gastroesophageal reflux disease, esophagitis presence not specified [K21.9])    Surgeon:  Cuauhtemoc Krishnamurthy MD Provider:  Sergio Monroe MD    Anesthesia Type:  MAC ASA Status:  4          Anesthesia Type: MAC    Vitals  Vitals Value Taken Time   /64 1/24/2020  2:50 PM   Temp     Pulse 67 1/24/2020  2:50 PM   Resp 16 1/24/2020  2:50 PM   SpO2 100 % 1/24/2020  2:50 PM           Post Anesthesia Care and Evaluation    Patient location during evaluation: bedside  Patient participation: complete - patient participated  Level of consciousness: awake  Pain score: 2  Pain management: adequate  Airway patency: patent  Anesthetic complications: No anesthetic complications  PONV Status: none  Cardiovascular status: acceptable  Respiratory status: acceptable  Hydration status: acceptable    Comments: /64 (BP Location: Left arm, Patient Position: Lying)   Pulse 67   Temp 36.7 °C (98 °F) (Oral)   Resp 16   Ht 167.6 cm (66\")   Wt 77.3 kg (170 lb 8 oz)   SpO2 100%   BMI 27.52 kg/m²         "

## 2020-01-24 NOTE — H&P
[facility] ([facility]  Pre Procedure History & Physical    Chief Complaint:   Chest pain, GERD    Subjective     HPI:   64-year-old female presents the endoscopy suite for upper endoscopic evaluation.  She is had issues with chest pain as well as reflux.    Past Medical History:   Past Medical History:   Diagnosis Date   • Angina pectoris (CMS/HCC)    • Broken toe 03/01/2017    right 4th toe   • Common migraine without aura     Neurologist Dr Govea   • Coronary artery disease    • History of chest pain    • History of CHF (congestive heart failure)    • History of pulmonary embolism    • History of stroke    • Hyperlipidemia    • Hypertension 2015   • Migraine     eval and management by neurologist   • Orthostatic hypotension    • Osteoporosis    • Pleural effusion     POSTOPERATIVE, REQUIRING THORACENTESIS.   • Polyp of sigmoid colon     REMOVED   • Stroke (CMS/HCC)    • Thoracic degenerative disc disease     Received epidural 12/14 [facility] DR Landin   • Thoracic spine pain    • Thyroid disease    • Weakness of right side of body     ARM & FACE       Past Surgical History:  Past Surgical History:   Procedure Laterality Date   • APPENDECTOMY     • AUGMENTATION MAMMAPLASTY     • BACK SURGERY     • BREAST AUGMENTATION     • CARDIAC CATHETERIZATION     • CHOLECYSTECTOMY     • COLONOSCOPY  06/2004    Dr. Salas   • CORONARY ANGIOPLASTY WITH STENT PLACEMENT      NON-GRAFTED RIGHT CORONARY ARTERY STENT.   • CORONARY ARTERY BYPASS GRAFT  04/2009    cabg x 3: LIMA to LAD, vein grafts to diagonal and OM1   • EPIDURAL BLOCK     • HYSTERECTOMY     • KNEE SURGERY Right    • THORACIC DECOMPRESSION POSTERIOR FUSION WITH INSTRUMENTATION N/A 3/6/2017    Procedure: T4-T10 Fusion with instrumentation;  Surgeon: Clyde Callejas MD;  Location: [facility];  Service:    • TOTAL THYROIDECTOMY         Family History:  Family History   Problem Relation Age of Onset   • No Known Problems Mother    • Heart  disease Father    • Hypertension Father    • Colon cancer Father    • Hypertension Sister    • Colon polyps Sister    • Hypertension Brother        Social History:   reports that she quit smoking about 11 years ago. She has a 30.00 pack-year smoking history. She has never used smokeless tobacco. She reports that she does not drink alcohol or use drugs.    Medications:   No medications prior to admission.       Allergies:  Metronidazole; Amoxicillin; and Hydrocodone-acetaminophen    ROS:    Pertinent items are noted in HPI, all other systems reviewed and negative     Objective     not currently breastfeeding.    Physical Exam   Constitutional: Pt is oriented to person, place, and time and well-developed, well-nourished, and in no distress.   Mouth/Throat: Oropharynx is clear and moist.   Neck: Normal range of motion.   Cardiovascular: Normal rate, regular rhythm and normal heart sounds.    Pulmonary/Chest: Effort normal and breath sounds normal.   Abdominal: Soft. Nontender  Skin: Skin is warm and dry.   Psychiatric: Mood, memory, affect and judgment normal.     Assessment/Plan     Diagnosis:  GERD  Chest pain    Anticipated Surgical Procedure:  EGD    The risks, benefits, and alternatives of this procedure have been discussed with the patient or the responsible party- the patient understands and agrees to proceed.

## 2020-01-26 LAB — UREASE TISS QL: NEGATIVE

## 2020-01-27 ENCOUNTER — TELEPHONE (OUTPATIENT)
Dept: GASTROENTEROLOGY | Facility: CLINIC | Age: 65
End: 2020-01-27

## 2020-01-27 DIAGNOSIS — K26.9 MULTIPLE DUODENAL ULCERS: Primary | ICD-10-CM

## 2020-01-27 LAB
CYTO UR: NORMAL
LAB AP CASE REPORT: NORMAL
PATH REPORT.FINAL DX SPEC: NORMAL
PATH REPORT.GROSS SPEC: NORMAL

## 2020-01-27 RX ORDER — ESOMEPRAZOLE MAGNESIUM 40 MG/1
40 CAPSULE, DELAYED RELEASE ORAL DAILY
Qty: 90 CAPSULE | Refills: 1 | Status: SHIPPED | OUTPATIENT
Start: 2020-01-27 | End: 2020-06-23

## 2020-01-27 NOTE — TELEPHONE ENCOUNTER
Called pt and advised per Dr Krishnamurthy that since she has duodenal ulcers , he recommends Nexium 40mg po daily. Advised we will escribe this to her [facility] pharmacy. Pt verb understanding.     Egd case request placed.

## 2020-01-27 NOTE — TELEPHONE ENCOUNTER
Call to pt.  Advise per path results that first duodenal bx benign.  Second duodenal bx with nonspecific duodenitis with focal erosion and repair.  Stomach body with mild, nonspecific gastritis.  GE junction with reparative changes.      Advise per Dr Krishnamurthy that recommend current med regimen.  Needs f/u EGD in 2-3 mo's since last exam to verify ulcer healing.    Verb understanding.  States takes otc nexium - message to Dr Krishnamurthy.

## 2020-01-27 NOTE — TELEPHONE ENCOUNTER
----- Message from Cuauhtemoc HEREDIA MD sent at 1/27/2020 12:50 PM EST -----  Regarding: Biopsy results  Okay to call results, recommend continue current medical regimen.  Needs follow-up EGD 2 to 3 months since her last examination to verify ulcer healing.  ----- Message -----  From: Lab, Background User  Sent: 1/26/2020   4:08 PM EST  To: Cuauhtemoc HEREDIA MD

## 2020-01-28 PROBLEM — K26.9 MULTIPLE DUODENAL ULCERS: Status: ACTIVE | Noted: 2020-01-28

## 2020-02-18 RX ORDER — VENLAFAXINE 75 MG/1
75 TABLET ORAL
Qty: 90 TABLET | Refills: 1 | Status: SHIPPED | OUTPATIENT
Start: 2020-02-18 | End: 2020-05-22 | Stop reason: SDUPTHER

## 2020-02-21 ENCOUNTER — ANESTHESIA EVENT (OUTPATIENT)
Dept: GASTROENTEROLOGY | Facility: HOSPITAL | Age: 65
End: 2020-02-21

## 2020-02-21 ENCOUNTER — ANESTHESIA (OUTPATIENT)
Dept: GASTROENTEROLOGY | Facility: HOSPITAL | Age: 65
End: 2020-02-21

## 2020-02-21 ENCOUNTER — HOSPITAL ENCOUNTER (OUTPATIENT)
Facility: HOSPITAL | Age: 65
Setting detail: HOSPITAL OUTPATIENT SURGERY
Discharge: HOME | End: 2020-02-21
Attending: INTERNAL MEDICINE | Admitting: INTERNAL MEDICINE

## 2020-02-21 VITALS
HEIGHT: 66 IN | OXYGEN SATURATION: 100 % | HEART RATE: 68 BPM | BODY MASS INDEX: 27.2 KG/M2 | DIASTOLIC BLOOD PRESSURE: 76 MMHG | TEMPERATURE: 98.4 F | RESPIRATION RATE: 12 BRPM | WEIGHT: 169.25 LBS | SYSTOLIC BLOOD PRESSURE: 118 MMHG

## 2020-02-21 DIAGNOSIS — K26.9 MULTIPLE DUODENAL ULCERS: ICD-10-CM

## 2020-02-21 PROCEDURE — 87081 CULTURE SCREEN ONLY: CPT | Performed by: INTERNAL MEDICINE

## 2020-02-21 PROCEDURE — S0260 H&P FOR SURGERY: HCPCS | Performed by: INTERNAL MEDICINE

## 2020-02-21 PROCEDURE — 43239 EGD BIOPSY SINGLE/MULTIPLE: CPT | Performed by: INTERNAL MEDICINE

## 2020-02-21 PROCEDURE — 88305 TISSUE EXAM BY PATHOLOGIST: CPT | Performed by: INTERNAL MEDICINE

## 2020-02-21 PROCEDURE — 25010000002 PROPOFOL 10 MG/ML EMULSION: Performed by: ANESTHESIOLOGY

## 2020-02-21 PROCEDURE — 88313 SPECIAL STAINS GROUP 2: CPT | Performed by: INTERNAL MEDICINE

## 2020-02-21 RX ORDER — PROPOFOL 10 MG/ML
VIAL (ML) INTRAVENOUS AS NEEDED
Status: DISCONTINUED | OUTPATIENT
Start: 2020-02-21 | End: 2020-02-21 | Stop reason: SURG

## 2020-02-21 RX ORDER — PROPOFOL 10 MG/ML
VIAL (ML) INTRAVENOUS CONTINUOUS PRN
Status: DISCONTINUED | OUTPATIENT
Start: 2020-02-21 | End: 2020-02-21 | Stop reason: SURG

## 2020-02-21 RX ORDER — LIDOCAINE HYDROCHLORIDE 20 MG/ML
INJECTION, SOLUTION INFILTRATION; PERINEURAL AS NEEDED
Status: DISCONTINUED | OUTPATIENT
Start: 2020-02-21 | End: 2020-02-21 | Stop reason: SURG

## 2020-02-21 RX ORDER — SODIUM CHLORIDE 0.9 % (FLUSH) 0.9 %
10 SYRINGE (ML) INJECTION AS NEEDED
Status: DISCONTINUED | OUTPATIENT
Start: 2020-02-21 | End: 2020-02-21 | Stop reason: HOSPADM

## 2020-02-21 RX ORDER — SODIUM CHLORIDE, SODIUM LACTATE, POTASSIUM CHLORIDE, CALCIUM CHLORIDE 600; 310; 30; 20 MG/100ML; MG/100ML; MG/100ML; MG/100ML
30 INJECTION, SOLUTION INTRAVENOUS CONTINUOUS PRN
Status: DISCONTINUED | OUTPATIENT
Start: 2020-02-21 | End: 2020-02-21 | Stop reason: HOSPADM

## 2020-02-21 RX ADMIN — LIDOCAINE HYDROCHLORIDE 50 MG: 20 INJECTION, SOLUTION INFILTRATION; PERINEURAL at 13:06

## 2020-02-21 RX ADMIN — PROPOFOL 125 MCG/KG/MIN: 10 INJECTION, EMULSION INTRAVENOUS at 13:06

## 2020-02-21 RX ADMIN — SODIUM CHLORIDE, POTASSIUM CHLORIDE, SODIUM LACTATE AND CALCIUM CHLORIDE 30 ML/HR: 600; 310; 30; 20 INJECTION, SOLUTION INTRAVENOUS at 12:17

## 2020-02-21 RX ADMIN — PROPOFOL 50 MG: 10 INJECTION, EMULSION INTRAVENOUS at 13:12

## 2020-02-21 RX ADMIN — PROPOFOL 75 MG: 10 INJECTION, EMULSION INTRAVENOUS at 13:06

## 2020-02-21 NOTE — ANESTHESIA PREPROCEDURE EVALUATION
Anesthesia Evaluation     Patient summary reviewed                Airway   No difficulty expected  Dental      Pulmonary    Cardiovascular     Rhythm: regular    (+) hypertension, CABG >6 Months,       Neuro/Psych  (+) CVA, numbness,     GI/Hepatic/Renal/Endo      ROS Comment: Duodenal ulcers      Musculoskeletal     Abdominal    Substance History      OB/GYN          Other                      Anesthesia Plan    ASA 3     MAC       Anesthetic plan, all risks, benefits, and alternatives have been provided, discussed and informed consent has been obtained with: patient.

## 2020-02-21 NOTE — H&P
[facility]  Pre Procedure History & Physical    Chief Complaint:   Epigastric pain, atypical reflux    Subjective     HPI:   This 64-year-old female presents the endoscopy suite for upper endoscopic evaluation.  She has had issues with epigastric and chest pain as well as atypical reflux symptoms.    Past Medical History:   Past Medical History:   Diagnosis Date   • Angina pectoris (CMS/HCC)    • Broken toe 03/01/2017    right 4th toe   • Common migraine without aura     Neurologist Dr Govea   • Coronary artery disease     stent   • History of chest pain    • History of CHF (congestive heart failure)    • History of pulmonary embolism    • History of stroke    • Hyperlipidemia    • Hypertension 2015   • Migraine     eval and management by neurologist   • Orthostatic hypotension    • Osteoporosis    • Pleural effusion     POSTOPERATIVE, REQUIRING THORACENTESIS.   • Polyp of sigmoid colon     REMOVED   • Stroke (CMS/HCC) 2003   • Thoracic degenerative disc disease     Received epidural 12/14 [facility] DR Landin   • Thoracic spine pain    • Thyroid disease    • Weakness of right side of body     ARM & FACE       Past Surgical History:  Past Surgical History:   Procedure Laterality Date   • APPENDECTOMY     • AUGMENTATION MAMMAPLASTY     • BACK SURGERY     • BREAST AUGMENTATION     • CARDIAC CATHETERIZATION     • CHOLECYSTECTOMY     • COLONOSCOPY  06/2004    Dr. Salas   • CORONARY ANGIOPLASTY WITH STENT PLACEMENT      NON-GRAFTED RIGHT CORONARY ARTERY STENT.   • CORONARY ARTERY BYPASS GRAFT  04/2009    cabg x 3: LIMA to LAD, vein grafts to diagonal and OM1   • ENDOSCOPY N/A 1/24/2020    Procedure: ESOPHAGOGASTRODUODENOSCOPY with bx;  Surgeon: Cuauhtemoc Krishnamurthy MD;  Location: [facility] ENDOSCOPY;  Service: Gastroenterology   • EPIDURAL BLOCK     • HYSTERECTOMY     • KNEE SURGERY Right    • THORACIC DECOMPRESSION POSTERIOR FUSION WITH INSTRUMENTATION N/A 3/6/2017    Procedure: T4-T10  "Fusion with instrumentation;  Surgeon: Clyde Callejas MD;  Location: [facility];  Service:    • TOTAL THYROIDECTOMY         Family History:  Family History   Problem Relation Age of Onset   • No Known Problems Mother    • Heart disease Father    • Hypertension Father    • Colon cancer Father    • Hypertension Sister    • Colon polyps Sister    • Hypertension Brother    • Malig Hyperthermia Neg Hx        Social History:   reports that she quit smoking about 11 years ago. Her smoking use included cigarettes. She has a 30.00 pack-year smoking history. She has never used smokeless tobacco. She reports that she does not drink alcohol or use drugs.    Medications:   No medications prior to admission.       Allergies:  Metronidazole; Amoxicillin; and Hydrocodone-acetaminophen    ROS:    Pertinent items are noted in HPI, all other systems reviewed and negative     Objective     Height 167.6 cm (66\"), not currently breastfeeding.    Physical Exam   Constitutional: Pt is oriented to person, place, and time and well-developed, well-nourished, and in no distress.   Mouth/Throat: Oropharynx is clear and moist.   Neck: Normal range of motion.   Cardiovascular: Normal rate, regular rhythm and normal heart sounds.    Pulmonary/Chest: Effort normal and breath sounds normal.   Abdominal: Soft. Nontender  Skin: Skin is warm and dry.   Psychiatric: Mood, memory, affect and judgment normal.     Assessment/Plan     Diagnosis:  Epigastric pain  GERD    Anticipated Surgical Procedure:  EGD    The risks, benefits, and alternatives of this procedure have been discussed with the patient or the responsible party- the patient understands and agrees to proceed.                                                          "

## 2020-02-21 NOTE — ANESTHESIA POSTPROCEDURE EVALUATION
Patient: Ivy Chairez    Procedure Summary     Date:  02/21/20 Room / Location:   [facility] /  [facility]    Anesthesia Start:  1301 Anesthesia Stop:  1326    Procedure:  ESOPHAGOGASTRODUODENOSCOPY WITH COLD BIOPSIES (N/A Esophagus) Diagnosis:       Hiatal hernia      Gastritis      Duodenitis      Duodenal diverticulum      (Multiple duodenal ulcers [K29.81])    Surgeon:  Cuauhtemoc Krishnamurthy MD Provider:  Nikolay Barnes MD    Anesthesia Type:  MAC ASA Status:  3          Anesthesia Type: MAC    Vitals  Vitals Value Taken Time   /76 2/21/2020  1:44 PM   Temp 36.9 °C (98.4 °F) 2/21/2020  1:26 PM   Pulse 68 2/21/2020  1:44 PM   Resp 12 2/21/2020  1:44 PM   SpO2 100 % 2/21/2020  1:44 PM           Post Anesthesia Care and Evaluation    Patient location during evaluation: PACU  Patient participation: complete - patient participated  Level of consciousness: awake  Pain score: 1  Pain management: adequate  Airway patency: patent  Anesthetic complications: No anesthetic complications  PONV Status: none  Cardiovascular status: acceptable  Respiratory status: acceptable  Hydration status: acceptable

## 2020-02-22 LAB — UREASE TISS QL: NEGATIVE

## 2020-02-25 LAB
CYTO UR: NORMAL
LAB AP CASE REPORT: NORMAL
LAB AP DIAGNOSIS COMMENT: NORMAL
PATH REPORT.FINAL DX SPEC: NORMAL
PATH REPORT.GROSS SPEC: NORMAL

## 2020-03-11 ENCOUNTER — TELEPHONE (OUTPATIENT)
Dept: GASTROENTEROLOGY | Facility: CLINIC | Age: 65
End: 2020-03-11

## 2020-03-11 NOTE — TELEPHONE ENCOUNTER
----- Message from Cuauhtemoc HEREDIA MD sent at 2/27/2020  7:48 AM EST -----  Regarding: Biopsy results  Okay to call results, would recommend continued PPI.  If she continues to have symptoms may warrant gastric emptying study to assess performance given the residual material found on endoscopy.  ----- Message -----  From: Lab, Background User  Sent: 2/22/2020   4:07 PM EST  To: Cuauhtemoc HEREDIA MD

## 2020-03-11 NOTE — TELEPHONE ENCOUNTER
Call to pt.  Advise per path report that bx's benign.  Stomach body and GE junction with minimal, nonspecific inflammation.  Special stain negative for Benavides's.    Advise per DR Krishnamurthy note.  Verb understanding.  States symptoms well controlled with nexium.

## 2020-03-23 RX ORDER — FUROSEMIDE 20 MG/1
20 TABLET ORAL DAILY
Qty: 90 TABLET | Refills: 0 | Status: SHIPPED | OUTPATIENT
Start: 2020-03-23 | End: 2020-05-26

## 2020-04-14 RX ORDER — ROSUVASTATIN CALCIUM 40 MG/1
40 TABLET, COATED ORAL NIGHTLY
Qty: 90 TABLET | Refills: 1 | Status: SHIPPED | OUTPATIENT
Start: 2020-04-14 | End: 2020-09-02

## 2020-04-22 RX ORDER — LEVOTHYROXINE SODIUM 0.07 MG/1
75 TABLET ORAL DAILY
Qty: 90 TABLET | Refills: 0 | Status: SHIPPED | OUTPATIENT
Start: 2020-04-22 | End: 2020-06-23

## 2020-05-08 RX ORDER — MECLIZINE HYDROCHLORIDE 25 MG/1
TABLET ORAL
Qty: 90 TABLET | Refills: 0 | Status: SHIPPED | OUTPATIENT
Start: 2020-05-08 | End: 2020-07-30

## 2020-05-22 DIAGNOSIS — M81.0 OSTEOPOROSIS, UNSPECIFIED OSTEOPOROSIS TYPE, UNSPECIFIED PATHOLOGICAL FRACTURE PRESENCE: Primary | ICD-10-CM

## 2020-05-22 RX ORDER — VENLAFAXINE 75 MG/1
75 TABLET ORAL
Qty: 90 TABLET | Refills: 1 | Status: SHIPPED | OUTPATIENT
Start: 2020-05-22 | End: 2020-09-17

## 2020-05-26 RX ORDER — FUROSEMIDE 20 MG/1
20 TABLET ORAL DAILY
Qty: 90 TABLET | Refills: 0 | Status: SHIPPED | OUTPATIENT
Start: 2020-05-26 | End: 2020-08-17

## 2020-05-31 ENCOUNTER — RESULTS ENCOUNTER (OUTPATIENT)
Dept: FAMILY MEDICINE CLINIC | Facility: CLINIC | Age: 65
End: 2020-05-31

## 2020-05-31 DIAGNOSIS — M81.0 OSTEOPOROSIS, UNSPECIFIED OSTEOPOROSIS TYPE, UNSPECIFIED PATHOLOGICAL FRACTURE PRESENCE: ICD-10-CM

## 2020-06-01 ENCOUNTER — LAB (OUTPATIENT)
Dept: FAMILY MEDICINE CLINIC | Facility: CLINIC | Age: 65
End: 2020-06-01

## 2020-06-01 DIAGNOSIS — E03.9 ACQUIRED HYPOTHYROIDISM: ICD-10-CM

## 2020-06-01 DIAGNOSIS — Z11.59 ENCOUNTER FOR HEPATITIS C SCREENING TEST FOR LOW RISK PATIENT: ICD-10-CM

## 2020-06-01 DIAGNOSIS — M81.0 OSTEOPOROSIS, UNSPECIFIED OSTEOPOROSIS TYPE, UNSPECIFIED PATHOLOGICAL FRACTURE PRESENCE: ICD-10-CM

## 2020-06-01 DIAGNOSIS — I10 ESSENTIAL HYPERTENSION: ICD-10-CM

## 2020-06-01 DIAGNOSIS — E78.5 HYPERLIPIDEMIA, UNSPECIFIED HYPERLIPIDEMIA TYPE: ICD-10-CM

## 2020-06-02 LAB
25(OH)D3+25(OH)D2 SERPL-MCNC: 29.8 NG/ML (ref 30–100)
BUN SERPL-MCNC: 14 MG/DL (ref 8–23)
BUN/CREAT SERPL: 16.1 (ref 7–25)
CALCIUM SERPL-MCNC: 8.9 MG/DL (ref 8.6–10.5)
CHLORIDE SERPL-SCNC: 108 MMOL/L (ref 98–107)
CO2 SERPL-SCNC: 24.6 MMOL/L (ref 22–29)
CREAT SERPL-MCNC: 0.87 MG/DL (ref 0.57–1)
GLUCOSE SERPL-MCNC: 95 MG/DL (ref 65–99)
MAGNESIUM SERPL-MCNC: 1.9 MG/DL (ref 1.6–2.4)
PHOSPHATE SERPL-MCNC: 3 MG/DL (ref 2.5–4.5)
POTASSIUM SERPL-SCNC: 4.4 MMOL/L (ref 3.5–5.2)
SODIUM SERPL-SCNC: 141 MMOL/L (ref 136–145)

## 2020-06-04 ENCOUNTER — INFUSION (OUTPATIENT)
Dept: ONCOLOGY | Facility: HOSPITAL | Age: 65
End: 2020-06-04

## 2020-06-04 VITALS
HEIGHT: 66 IN | WEIGHT: 171 LBS | DIASTOLIC BLOOD PRESSURE: 70 MMHG | OXYGEN SATURATION: 99 % | TEMPERATURE: 97.7 F | RESPIRATION RATE: 18 BRPM | BODY MASS INDEX: 27.48 KG/M2 | SYSTOLIC BLOOD PRESSURE: 110 MMHG | HEART RATE: 63 BPM

## 2020-06-04 DIAGNOSIS — M81.0 AGE-RELATED OSTEOPOROSIS WITHOUT CURRENT PATHOLOGICAL FRACTURE: Primary | ICD-10-CM

## 2020-06-04 PROCEDURE — 96372 THER/PROPH/DIAG INJ SC/IM: CPT

## 2020-06-04 PROCEDURE — 25010000002 DENOSUMAB 60 MG/ML SOLUTION PREFILLED SYRINGE: Performed by: NURSE PRACTITIONER

## 2020-06-04 RX ADMIN — DENOSUMAB 60 MG: 60 INJECTION SUBCUTANEOUS at 12:25

## 2020-06-23 RX ORDER — LEVOTHYROXINE SODIUM 0.07 MG/1
75 TABLET ORAL DAILY
Qty: 90 TABLET | Refills: 0 | Status: SHIPPED | OUTPATIENT
Start: 2020-06-23 | End: 2020-09-02

## 2020-06-23 RX ORDER — ESOMEPRAZOLE MAGNESIUM 40 MG/1
40 CAPSULE, DELAYED RELEASE ORAL DAILY
Qty: 90 CAPSULE | Refills: 1 | Status: SHIPPED | OUTPATIENT
Start: 2020-06-23 | End: 2020-10-29

## 2020-07-30 RX ORDER — MECLIZINE HYDROCHLORIDE 25 MG/1
TABLET ORAL
Qty: 90 TABLET | Refills: 0 | Status: SHIPPED | OUTPATIENT
Start: 2020-07-30 | End: 2020-10-09

## 2020-08-17 RX ORDER — FUROSEMIDE 20 MG/1
20 TABLET ORAL DAILY
Qty: 90 TABLET | Refills: 0 | Status: SHIPPED | OUTPATIENT
Start: 2020-08-17 | End: 2020-11-30

## 2020-09-02 RX ORDER — LEVOTHYROXINE SODIUM 0.07 MG/1
75 TABLET ORAL DAILY
Qty: 90 TABLET | Refills: 0 | Status: SHIPPED | OUTPATIENT
Start: 2020-09-02 | End: 2020-12-16

## 2020-09-02 RX ORDER — ROSUVASTATIN CALCIUM 40 MG/1
TABLET, COATED ORAL
Qty: 90 TABLET | Refills: 0 | Status: SHIPPED | OUTPATIENT
Start: 2020-09-02 | End: 2020-11-30

## 2020-09-17 RX ORDER — VENLAFAXINE 75 MG/1
TABLET ORAL
Qty: 90 TABLET | Refills: 0 | Status: SHIPPED | OUTPATIENT
Start: 2020-09-17 | End: 2021-02-08 | Stop reason: SDUPTHER

## 2020-10-09 RX ORDER — MECLIZINE HYDROCHLORIDE 25 MG/1
TABLET ORAL
Qty: 90 TABLET | Refills: 0 | Status: SHIPPED | OUTPATIENT
Start: 2020-10-09 | End: 2021-01-04

## 2020-10-27 DIAGNOSIS — Z11.59 ENCOUNTER FOR HEPATITIS C SCREENING TEST FOR LOW RISK PATIENT: ICD-10-CM

## 2020-10-27 DIAGNOSIS — M81.0 OSTEOPOROSIS, UNSPECIFIED OSTEOPOROSIS TYPE, UNSPECIFIED PATHOLOGICAL FRACTURE PRESENCE: Primary | ICD-10-CM

## 2020-10-27 DIAGNOSIS — E03.9 ACQUIRED HYPOTHYROIDISM: ICD-10-CM

## 2020-10-27 DIAGNOSIS — I10 ESSENTIAL HYPERTENSION: ICD-10-CM

## 2020-10-27 DIAGNOSIS — E78.5 HYPERLIPIDEMIA, UNSPECIFIED HYPERLIPIDEMIA TYPE: ICD-10-CM

## 2020-10-27 NOTE — TELEPHONE ENCOUNTER
Pts message forwarded to Dr Krishnamurthy.  Omeprazole ready to be cosigned if approved by Dr Krishnamurthy.

## 2020-10-27 NOTE — TELEPHONE ENCOUNTER
----- Message from Ivy Chairez sent at 10/27/2020  9:34 AM EDT -----  Regarding: Non-Urgent Medical Question  Contact: [phone]  [facility] is saying that Esomeprazole Cap is Tier 3. Is it possible that Lansoprazole Omeprazole, or Pantoprazole have the same effect. If this is the case could you send a new prescription to [facility]

## 2020-10-29 RX ORDER — OMEPRAZOLE 40 MG/1
40 CAPSULE, DELAYED RELEASE ORAL DAILY
Qty: 90 CAPSULE | Refills: 1 | Status: SHIPPED | OUTPATIENT
Start: 2020-10-29 | End: 2020-11-03

## 2020-11-02 ENCOUNTER — TELEPHONE (OUTPATIENT)
Dept: GASTROENTEROLOGY | Facility: CLINIC | Age: 65
End: 2020-11-02

## 2020-11-02 NOTE — TELEPHONE ENCOUNTER
----- Message from Ivy Chairez sent at 10/29/2020  9:20 AM EDT -----  Regarding: Prescription Question  Contact: [phone]  the Esomeprazole Cap 40 that I am on is a Tier 3 drug can it be changed to Lansoprazole, Omeprazole, or Pantoprazole and still be as effective? If so can you send and order to [facility] service connected to my [facility].

## 2020-11-03 RX ORDER — PANTOPRAZOLE SODIUM 40 MG/1
40 TABLET, DELAYED RELEASE ORAL DAILY
Qty: 90 TABLET | Refills: 3 | Status: SHIPPED | OUTPATIENT
Start: 2020-11-03 | End: 2022-01-17

## 2020-11-03 NOTE — TELEPHONE ENCOUNTER
Okay to switch to pantoprazole 40 mg daily #30 with 11 refills. Per Dr Krishnamurthy.      Called pt and advised of the above. Pt verb understanding and wanted script sent to [facility].      Message sent to Dr Krishnamurthy to cosign medication.

## 2020-11-25 ENCOUNTER — TELEMEDICINE (OUTPATIENT)
Dept: FAMILY MEDICINE CLINIC | Facility: CLINIC | Age: 65
End: 2020-11-25

## 2020-11-25 DIAGNOSIS — J01.40 ACUTE NON-RECURRENT PANSINUSITIS: Primary | ICD-10-CM

## 2020-11-25 PROCEDURE — 99213 OFFICE O/P EST LOW 20 MIN: CPT | Performed by: NURSE PRACTITIONER

## 2020-11-25 RX ORDER — CIPROFLOXACIN 500 MG/1
500 TABLET, FILM COATED ORAL 2 TIMES DAILY
Qty: 14 TABLET | Refills: 0 | Status: SHIPPED | OUTPATIENT
Start: 2020-11-25 | End: 2020-12-16

## 2020-11-25 NOTE — PROGRESS NOTES
Subjective   Ivy Chairez is a 65 y.o. female.     Chief Complaint   Patient presents with   • Sore Throat     for the past 5 days   • Headache     This visit has been rescheduled as a telehealth visit to comply with patient safety concerns in accordance with CDC recommendations. Total time of discussion was 20 minutes.  You have chosen to receive care through a telehealth visit.  Do you consent to use a video/audio connection for your medical care today? Yes    Sore Throat   This is a new problem. The current episode started in the past 7 days (started 5 days ago). The problem has been unchanged. There has been no fever. The pain is moderate. Associated symptoms include congestion (sinus), coughing (mild ), headaches and a hoarse voice. Pertinent negatives include no ear pain, plugged ear sensation, shortness of breath (on exertion), swollen glands or trouble swallowing. She has tried cool liquids (Coriciden HBP and robitussin) for the symptoms.   Headache   Associated symptoms include coughing (mild ), sinus pressure and a sore throat. Pertinent negatives include no ear pain, fever, rhinorrhea or swollen glands.      I have reviewed the patient's medical history in detail and updated the computerized patient record.    The following portions of the patient's history were reviewed and updated as appropriate: allergies, current medications, past family history, past medical history, past social history, past surgical history and problem list.    Current Outpatient Medications on File Prior to Visit   Medication Sig   • aspirin 81 MG tablet Take 81 mg by mouth Daily With Dinner.   • furosemide (LASIX) 20 MG tablet TAKE 1 TABLET BY MOUTH  DAILY   • levothyroxine (SYNTHROID, LEVOTHROID) 75 MCG tablet TAKE 1 TABLET BY MOUTH  DAILY   • meclizine (ANTIVERT) 25 MG tablet TAKE 1 TABLET BY MOUTH AT  NIGHT MAY TAKE AN  ADDITIONAL TABLET DAILY AS  NEEDED   • meloxicam (MOBIC) 15 MG tablet TK 1 T PO  D PRN P   • metoprolol  tartrate (LOPRESSOR) 25 MG tablet TAKE 1 TABLET BY MOUTH TWO  TIMES DAILY   • nitroglycerin (NITROSTAT) 0.4 MG SL tablet Place 0.4 mg under the tongue Every 5 (Five) Minutes As Needed for Chest Pain. Take no more than 3 doses in 15 minutes.   • pantoprazole (PROTONIX) 40 MG EC tablet Take 1 tablet by mouth Daily.   • rosuvastatin (CRESTOR) 40 MG tablet TAKE 1 TABLET BY MOUTH IN  THE EVENING   • venlafaxine (EFFEXOR) 75 MG tablet TAKE 1 TABLET BY MOUTH AT  BEDTIME     No current facility-administered medications on file prior to visit.      Review of Systems   Constitutional: Positive for fatigue. Negative for chills and fever.   HENT: Positive for congestion (sinus), hoarse voice, postnasal drip, sinus pressure and sore throat. Negative for dental problem, ear pain, rhinorrhea, swollen glands and trouble swallowing.    Respiratory: Positive for cough (mild ). Negative for shortness of breath (on exertion).    Cardiovascular: Negative.    Musculoskeletal: Negative for myalgias.   Neurological: Positive for headache.       Objective    There were no vitals filed for this visit.     Physical Exam  Constitutional:       Appearance: Normal appearance.   Neurological:      Mental Status: She is alert and oriented to person, place, and time.   Psychiatric:      Comments: No acute distress           Assessment/Plan   Diagnoses and all orders for this visit:    1. Acute non-recurrent pansinusitis (Primary)  -     ciprofloxacin (CIPRO) 500 MG tablet; Take 1 tablet by mouth 2 (Two) Times a Day.  Dispense: 14 tablet; Refill: 0      You have been diagnosed with acute sinusitis. You have been prescribed an antibiotic along with symptomatic treatment.  You may take Mucinex D for relieving congestion and cough.  If you have high blood pressure, do not take Mucinex D, instead opting for plain Mucinex and Coricidin HBP.  Take Ibuprofen or Tylenol as needed for pain or fever.  Oral antihistamine, such as Zyrtec or Claritin may help  reduce ear pressure and relieve some nasal symptoms.  A saline nasal spray may be used to keep nose clear from discharge.  Be sure that you are increasing your intake of clear to decaffeinated fluids and get plenty of rest.  If your symptoms worsen or persist follow up as needed.

## 2020-11-30 RX ORDER — ROSUVASTATIN CALCIUM 40 MG/1
TABLET, COATED ORAL
Qty: 90 TABLET | Refills: 0 | Status: SHIPPED | OUTPATIENT
Start: 2020-11-30 | End: 2021-04-22

## 2020-11-30 RX ORDER — FUROSEMIDE 20 MG/1
20 TABLET ORAL DAILY
Qty: 90 TABLET | Refills: 0 | Status: SHIPPED | OUTPATIENT
Start: 2020-11-30 | End: 2021-04-08

## 2020-12-15 LAB
25(OH)D3+25(OH)D2 SERPL-MCNC: 40.1 NG/ML (ref 30–100)
ALBUMIN SERPL-MCNC: 4.2 G/DL (ref 3.8–4.8)
ALBUMIN/GLOB SERPL: 1.8 {RATIO} (ref 1.2–2.2)
ALP SERPL-CCNC: 59 IU/L (ref 39–117)
ALT SERPL-CCNC: 13 IU/L (ref 0–32)
AST SERPL-CCNC: 17 IU/L (ref 0–40)
BILIRUB SERPL-MCNC: 0.8 MG/DL (ref 0–1.2)
BUN SERPL-MCNC: 12 MG/DL (ref 8–27)
BUN/CREAT SERPL: 12 (ref 12–28)
CALCIUM SERPL-MCNC: 9.5 MG/DL (ref 8.7–10.3)
CHLORIDE SERPL-SCNC: 101 MMOL/L (ref 96–106)
CHOLEST SERPL-MCNC: 147 MG/DL (ref 100–199)
CO2 SERPL-SCNC: 23 MMOL/L (ref 20–29)
CREAT SERPL-MCNC: 0.97 MG/DL (ref 0.57–1)
GLOBULIN SER CALC-MCNC: 2.4 G/DL (ref 1.5–4.5)
GLUCOSE SERPL-MCNC: 71 MG/DL (ref 65–99)
HCV AB S/CO SERPL IA: <0.1 S/CO RATIO (ref 0–0.9)
HDLC SERPL-MCNC: 76 MG/DL
LDLC SERPL CALC-MCNC: 58 MG/DL (ref 0–99)
MAGNESIUM SERPL-MCNC: 2 MG/DL (ref 1.6–2.3)
PHOSPHATE SERPL-MCNC: 3.4 MG/DL (ref 3–4.3)
POTASSIUM SERPL-SCNC: 5.1 MMOL/L (ref 3.5–5.2)
PROT SERPL-MCNC: 6.6 G/DL (ref 6–8.5)
SODIUM SERPL-SCNC: 138 MMOL/L (ref 134–144)
T3FREE SERPL-MCNC: 3.2 PG/ML (ref 2–4.4)
T4 FREE SERPL-MCNC: 1.38 NG/DL (ref 0.82–1.77)
TRIGL SERPL-MCNC: 63 MG/DL (ref 0–149)
TSH SERPL DL<=0.005 MIU/L-ACNC: 0.64 UIU/ML (ref 0.45–4.5)
VLDLC SERPL CALC-MCNC: 13 MG/DL (ref 5–40)

## 2020-12-16 ENCOUNTER — INFUSION (OUTPATIENT)
Dept: ONCOLOGY | Facility: HOSPITAL | Age: 65
End: 2020-12-16

## 2020-12-16 VITALS
HEART RATE: 69 BPM | SYSTOLIC BLOOD PRESSURE: 149 MMHG | WEIGHT: 172 LBS | TEMPERATURE: 97.1 F | OXYGEN SATURATION: 100 % | RESPIRATION RATE: 18 BRPM | BODY MASS INDEX: 27.64 KG/M2 | DIASTOLIC BLOOD PRESSURE: 79 MMHG | HEIGHT: 66 IN

## 2020-12-16 DIAGNOSIS — M81.0 AGE-RELATED OSTEOPOROSIS WITHOUT CURRENT PATHOLOGICAL FRACTURE: Primary | ICD-10-CM

## 2020-12-16 PROCEDURE — 96372 THER/PROPH/DIAG INJ SC/IM: CPT

## 2020-12-16 PROCEDURE — 25010000002 DENOSUMAB 60 MG/ML SOLUTION PREFILLED SYRINGE: Performed by: NURSE PRACTITIONER

## 2020-12-16 RX ORDER — LEVOTHYROXINE SODIUM 0.07 MG/1
75 TABLET ORAL DAILY
Qty: 90 TABLET | Refills: 0 | Status: SHIPPED | OUTPATIENT
Start: 2020-12-16 | End: 2021-04-08

## 2020-12-16 RX ADMIN — DENOSUMAB 60 MG: 60 INJECTION SUBCUTANEOUS at 15:27

## 2020-12-30 ENCOUNTER — OFFICE VISIT (OUTPATIENT)
Dept: FAMILY MEDICINE CLINIC | Facility: CLINIC | Age: 65
End: 2020-12-30

## 2020-12-30 VITALS
BODY MASS INDEX: 28.12 KG/M2 | HEART RATE: 83 BPM | HEIGHT: 66 IN | WEIGHT: 175 LBS | TEMPERATURE: 97.1 F | DIASTOLIC BLOOD PRESSURE: 76 MMHG | OXYGEN SATURATION: 94 % | SYSTOLIC BLOOD PRESSURE: 132 MMHG

## 2020-12-30 DIAGNOSIS — Z23 NEED FOR INFLUENZA VACCINATION: ICD-10-CM

## 2020-12-30 DIAGNOSIS — Z23 NEED FOR 23-POLYVALENT PNEUMOCOCCAL POLYSACCHARIDE VACCINE: ICD-10-CM

## 2020-12-30 DIAGNOSIS — Z00.00 MEDICARE ANNUAL WELLNESS VISIT, SUBSEQUENT: Primary | ICD-10-CM

## 2020-12-30 PROCEDURE — 90694 VACC AIIV4 NO PRSRV 0.5ML IM: CPT | Performed by: NURSE PRACTITIONER

## 2020-12-30 PROCEDURE — 90732 PPSV23 VACC 2 YRS+ SUBQ/IM: CPT | Performed by: NURSE PRACTITIONER

## 2020-12-30 PROCEDURE — G0009 ADMIN PNEUMOCOCCAL VACCINE: HCPCS | Performed by: NURSE PRACTITIONER

## 2020-12-30 PROCEDURE — G0439 PPPS, SUBSEQ VISIT: HCPCS | Performed by: NURSE PRACTITIONER

## 2020-12-30 PROCEDURE — G0008 ADMIN INFLUENZA VIRUS VAC: HCPCS | Performed by: NURSE PRACTITIONER

## 2020-12-30 NOTE — PROGRESS NOTES
The ABCs of the Annual Wellness Visit  Initial Medicare Wellness Visit    Chief Complaint   Patient presents with   • Medicare Wellness-subsequent       Subjective   History of Present Illness:  Ivy Chairez is a 65 y.o. female who presents for an Initial Medicare Wellness Visit.    HEALTH RISK ASSESSMENT    Recent Hospitalizations:  No hospitalization(s) within the last year.    Current Medical Providers:  Patient Care Team:  Chloe Leung APRN as PCP - General (Family Medicine)  Kade Humphrey Jr., MD as Consulting Physician (Cardiology)  Vida Rodriguez MD as Consulting Physician (Endocrinology)  Wayne Brizuela MD as Consulting Physician (Otolaryngology)    Smoking Status:  Social History     Tobacco Use   Smoking Status Former Smoker   • Packs/day: 1.00   • Years: 30.00   • Pack years: 30.00   • Types: Cigarettes   • Quit date:    • Years since quittin.9   Smokeless Tobacco Never Used       Alcohol Consumption:  Social History     Substance and Sexual Activity   Alcohol Use No       Depression Screen:   PHQ-2/PHQ-9 Depression Screening 2020   Little interest or pleasure in doing things 0   Feeling down, depressed, or hopeless 0   Total Score 0       Fall Risk Screen:  STEADI Fall Risk Assessment was completed, and patient is at LOW risk for falls.Assessment completed on:2020    Health Habits and Functional and Cognitive Screening:  Functional & Cognitive Status 2020   Do you have difficulty preparing food and eating? No   Do you have difficulty bathing yourself, getting dressed or grooming yourself? No   Do you have difficulty using the toilet? No   Do you have difficulty moving around from place to place? No   Do you have trouble with steps or getting out of a bed or a chair? No   Current Diet Well Balanced Diet   Dental Exam Up to date   Eye Exam Up to date   Exercise (times per week) 3 times per week   Current Exercise Activities Include Walking   Do you need help  using the phone?  No   Are you deaf or do you have serious difficulty hearing?  No   Do you need help with transportation? No   Do you need help shopping? No   Do you need help preparing meals?  No   Do you need help with housework?  No   Do you need help with laundry? No   Do you need help taking your medications? No   Do you need help managing money? No   Do you ever drive or ride in a car without wearing a seat belt? No   Have you felt unusual stress, anger or loneliness in the last month? No   Who do you live with? Spouse   If you need help, do you have trouble finding someone available to you? No   Have you been bothered in the last four weeks by sexual problems? No   Do you have difficulty concentrating, remembering or making decisions? No         Does the patient have evidence of cognitive impairment? No    Asprin use counseling:Taking ASA appropriately as indicated    Age-appropriate Screening Schedule:  Refer to the list below for future screening recommendations based on patient's age, sex and/or medical conditions. Orders for these recommended tests are listed in the plan section. The patient has been provided with a written plan.    Health Maintenance   Topic Date Due   • ZOSTER VACCINE (1 of 2) 11/20/2004   • TDAP/TD VACCINES (2 - Td) 04/01/2018   • INFLUENZA VACCINE  08/01/2020   • MAMMOGRAM  02/01/2021   • DXA SCAN  02/01/2021   • PAP SMEAR  08/27/2021   • LIPID PANEL  12/14/2021   • COLONOSCOPY  02/01/2023          The following portions of the patient's history were reviewed and updated as appropriate: allergies, current medications, past family history, past medical history, past social history, past surgical history and problem list.    Outpatient Medications Prior to Visit   Medication Sig Dispense Refill   • aspirin 81 MG tablet Take 81 mg by mouth Daily With Dinner.     • furosemide (LASIX) 20 MG tablet TAKE 1 TABLET BY MOUTH  DAILY 90 tablet 0   • levothyroxine (SYNTHROID, LEVOTHROID) 75 MCG  tablet TAKE 1 TABLET BY MOUTH  DAILY 90 tablet 0   • meclizine (ANTIVERT) 25 MG tablet TAKE 1 TABLET BY MOUTH AT  NIGHT MAY TAKE AN  ADDITIONAL TABLET DAILY AS  NEEDED 90 tablet 0   • meloxicam (MOBIC) 15 MG tablet TK 1 T PO  D PRN P  4   • metoprolol tartrate (LOPRESSOR) 25 MG tablet TAKE 1 TABLET BY MOUTH TWO  TIMES DAILY 180 tablet 0   • nitroglycerin (NITROSTAT) 0.4 MG SL tablet Place 0.4 mg under the tongue Every 5 (Five) Minutes As Needed for Chest Pain. Take no more than 3 doses in 15 minutes.     • pantoprazole (PROTONIX) 40 MG EC tablet Take 1 tablet by mouth Daily. 90 tablet 3   • rosuvastatin (CRESTOR) 40 MG tablet TAKE 1 TABLET BY MOUTH IN  THE EVENING 90 tablet 0   • venlafaxine (EFFEXOR) 75 MG tablet TAKE 1 TABLET BY MOUTH AT  BEDTIME 90 tablet 0     No facility-administered medications prior to visit.        Patient Active Problem List   Diagnosis   • Triple vessel coronary artery disease   • Anxiety   • Gastroesophageal reflux disease   • Hyperlipidemia   • Hypothyroidism   • Osteoarthritis of knee   • Cerebrovascular accident (CMS/HCC)   • Carotid artery disease (CMS/HCC)   • Migraine without status migrainosus, not intractable   • Osteoporosis   • Closed wedge compression fracture of T7 vertebra (CMS/HCC)   • History of coronary artery bypass surgery   • Thoracic radiculopathy due to degenerative joint disease of spine   • History of pulmonary embolism   • Hypertension   • Functional diarrhea   • Syncope   • History of stroke   • Parotid mass   • Epigastric pain   • Multiple duodenal ulcers       Advanced Care Planning:  ACP discussion was held with the patient during this visit. Patient has an advance directive in EMR which is still valid.     Review of Systems   Constitutional: Negative.    HENT: Negative.    Eyes: Negative.    Respiratory: Negative.    Cardiovascular: Negative.    Gastrointestinal: Negative.    Genitourinary: Negative.    Musculoskeletal: Negative.    Skin: Negative.   "  Neurological: Negative.    Psychiatric/Behavioral: Negative.        Compared to one year ago, the patient feels her physical health is the same.  Compared to one year ago, the patient feels her mental health is the same.    Reviewed chart for potential of high risk medication in the elderly: yes  Reviewed chart for potential of harmful drug interactions in the elderly:yes    Objective         Vitals:    12/30/20 1606   BP: 132/76   BP Location: Right arm   Patient Position: Sitting   Cuff Size: Adult   Pulse: 83   Temp: 97.1 °F (36.2 °C)   TempSrc: Infrared   SpO2: 94%   Weight: 79.4 kg (175 lb)   Height: 167.6 cm (66\")       Body mass index is 28.25 kg/m².  Discussed the patient's BMI with her. The BMI is in the acceptable range.    Physical Exam  Constitutional:       Appearance: Normal appearance.   HENT:      Right Ear: Tympanic membrane normal.      Left Ear: Tympanic membrane normal.   Cardiovascular:      Rate and Rhythm: Normal rate and regular rhythm.      Pulses: Normal pulses.      Heart sounds: Normal heart sounds.   Pulmonary:      Effort: Pulmonary effort is normal.      Breath sounds: Normal breath sounds.   Skin:     General: Skin is warm and dry.   Neurological:      Mental Status: She is alert and oriented to person, place, and time.   Psychiatric:         Mood and Affect: Mood normal.         Behavior: Behavior normal.         Thought Content: Thought content normal.         Judgment: Judgment normal.         Lab Results   Component Value Date    GLU 71 12/14/2020    CHLPL 147 12/14/2020    TRIG 63 12/14/2020    HDL 76 12/14/2020    LDL 58 12/14/2020    VLDL 13 12/14/2020        Assessment/Plan   Medicare Risks and Personalized Health Plan  CMS Preventative Services Quick Reference  Immunizations Discussed/Encouraged (specific immunizations; Influenza, Pneumococcal 23 and Shingrix )    The above risks/problems have been discussed with the patient.  Pertinent information has been shared with the " patient in the After Visit Summary.  Follow up plans and orders are seen below in the Assessment/Plan Section.    Diagnoses and all orders for this visit:    1. Medicare annual wellness visit, subsequent (Primary)      Follow Up:  No follow-ups on file.     An After Visit Summary and PPPS were given to the patient.

## 2021-01-04 RX ORDER — MECLIZINE HYDROCHLORIDE 25 MG/1
TABLET ORAL
Qty: 90 TABLET | Refills: 3 | Status: SHIPPED | OUTPATIENT
Start: 2021-01-04 | End: 2021-12-13

## 2021-02-08 RX ORDER — VENLAFAXINE 75 MG/1
75 TABLET ORAL
Qty: 90 TABLET | Refills: 3 | Status: SHIPPED | OUTPATIENT
Start: 2021-02-08 | End: 2022-06-20

## 2021-02-11 DIAGNOSIS — M81.0 AGE-RELATED OSTEOPOROSIS WITHOUT CURRENT PATHOLOGICAL FRACTURE: Primary | ICD-10-CM

## 2021-02-12 NOTE — PROGRESS NOTES
Chief Complaint   Patient presents with   • Diverticulosis   • Chest Pain        Ivy Chairez is a  64 y.o. female here for an initial visit for chest pain/epigastric pain.    HPI this 64-year-old white female patient of Chloe [facility]E presents with complaints of chest and epigastric pain for the past 6 weeks.  She had been seen by her primary care tender as well as her cardiovascular thoracic surgeon.  The pain is described as a sharp quality and intense in nature that may have a positional component, seems to be worse at night.  She denies any dysphagia or difficulty swallowing.  No complaints of nausea or vomiting.  She did note adjustment of her diet afforded symptomatic relief on a transient basis.  She had tried nitroglycerin as well as corticosteroids without significant relief.  Her cardiac status post bypass in 2009 is stable.  No history of any local trauma.  CT scan of the abdomen was negative except for left-sided diverticulosis.  We talked about further assessment and will schedule upper endoscopy.  I will also initiate a proton pump inhibitor.                                                                                                                                                                               Past Medical History:   Diagnosis Date   • Angina pectoris (CMS/HCC)    • Broken toe 03/01/2017    right 4th toe   • Common migraine without aura     Neurologist Dr Govea   • Coronary artery disease    • History of chest pain    • History of CHF (congestive heart failure)    • History of pulmonary embolism    • History of stroke    • Hyperlipidemia    • Hypertension 2015   • Migraine     eval and management by neurologist   • Orthostatic hypotension    • Osteoporosis    • Pleural effusion     POSTOPERATIVE, REQUIRING THORACENTESIS.   • Polyp of sigmoid colon     REMOVED   • Stroke (CMS/HCC)    • Thoracic degenerative disc disease     [facility]   • Thoracic spine pain    • Thyroid disease    • Weakness of right side of body     ARM & FACE       Current Outpatient Medications   Medication Sig Dispense Refill   • aspirin 81 MG tablet Take 81 mg by mouth Daily With Dinner.     • furosemide (LASIX) 20 MG tablet TAKE 1 TABLET BY MOUTH DAILY 30 tablet 6   • levothyroxine (SYNTHROID, LEVOTHROID) 75 MCG tablet TAKE 1 TABLET BY MOUTH  DAILY 90 tablet 0   • meclizine (ANTIVERT) 25 MG tablet TAKE 1 TABLET BY MOUTH  EVERY NIGHT. MAY TAKE AN  ADDITIONAL TABLET DAILY AS  NEEDED 90 tablet 0   • meloxicam (MOBIC) 15 MG tablet TK 1 T PO  D PRN P  4   • metoprolol tartrate (LOPRESSOR) 25 MG tablet Take 25 mg by mouth 2 (Two) Times a Day.     • nitroglycerin (NITROSTAT) 0.4 MG SL tablet Place 0.4 mg under the tongue Every 5 (Five) Minutes As Needed for Chest Pain. Take no more than 3 doses in 15 minutes.     • rosuvastatin (CRESTOR) 40 MG tablet TAKE 1 TABLET BY MOUTH  EVERY NIGHT 90 tablet 0   • venlafaxine (EFFEXOR) 75 MG tablet TAKE 1 TABLET BY MOUTH EVERY NIGHT AT BEDTIME 90 tablet 1   • isosorbide mononitrate (IMDUR) 30 MG 24 hr tablet Take 30 mg by mouth Daily.     • ranolazine (RANEXA) 500 MG 12 hr tablet TAKE 1 TABLET BY MOUTH TWICE DAILY       No current facility-administered medications for this visit.        PRN Meds:.    Allergies   Allergen Reactions   • Metronidazole Diarrhea and Nausea And Vomiting   • Amoxicillin Swelling   • Hydrocodone-Acetaminophen Nausea And Vomiting       Social History     Socioeconomic History   • Marital status:      Spouse name: Not on file   • Number of children: Not on file   • Years of education: Not on file   • Highest education level: Not on file   Tobacco Use   • Smoking status: Former Smoker     Packs/day: 1.00     Years: 30.00     Pack years: 30.00     Last attempt to quit:      Years since quittin.0   • Smokeless tobacco: Never Used   Substance and Sexual Activity   • Alcohol use: No   • Drug use: No    • Sexual activity: Yes     Partners: Male     Birth control/protection: Post-menopausal       Family History   Problem Relation Age of Onset   • No Known Problems Mother    • Heart disease Father    • Hypertension Father    • Colon cancer Father    • Hypertension Sister    • Colon polyps Sister    • Hypertension Brother        Review of Systems   Constitutional: Negative for activity change, appetite change, fatigue, fever and unexpected weight change.   HENT: Negative for congestion, facial swelling, sore throat, trouble swallowing and voice change.    Eyes: Negative for photophobia and visual disturbance.   Respiratory: Negative for cough and choking.    Cardiovascular: Positive for chest pain.   Gastrointestinal: Positive for abdominal pain and constipation. Negative for abdominal distention, anal bleeding, blood in stool, diarrhea, nausea, rectal pain and vomiting.   Endocrine: Negative for polyphagia.   Genitourinary: Negative for dysuria, frequency and hematuria.   Musculoskeletal: Negative for arthralgias, gait problem, joint swelling and myalgias.   Skin: Negative for color change, pallor and rash.   Allergic/Immunologic: Negative for food allergies.   Neurological: Negative for speech difficulty and headaches.   Hematological: Does not bruise/bleed easily.   Psychiatric/Behavioral: Negative for agitation, confusion and sleep disturbance.       Vitals:    01/14/20 0829   BP: 124/78   Temp: 97.9 °F (36.6 °C)       Physical Exam   Constitutional: She is oriented to person, place, and time. She appears well-developed and well-nourished. No distress.   HENT:   Head: Normocephalic.   Mouth/Throat: Oropharynx is clear and moist. No oropharyngeal exudate.   Eyes: Conjunctivae and EOM are normal. No scleral icterus.   Neck: Normal range of motion. No thyromegaly present.   Cardiovascular: Normal rate and regular rhythm.   No murmur heard.  Pulmonary/Chest: Breath sounds normal. No respiratory distress. She has no  wheezes. She has no rales.   Abdominal: Soft. Bowel sounds are normal. She exhibits no distension and no mass. There is no hepatosplenomegaly. There is no tenderness.   Epigastric tenderness to palpation   Musculoskeletal: Normal range of motion. She exhibits no edema or tenderness.   Lymphadenopathy:     She has no cervical adenopathy.   Neurological: She is alert and oriented to person, place, and time.   Skin: Skin is warm and dry. No rash noted. She is not diaphoretic. No erythema.   Psychiatric: She has a normal mood and affect. Her behavior is normal.   Vitals reviewed.      ASSESSMENT  #1 epigastric/chest pain: Not classic for reflux but presentation at night consistent with this.  Does not seem to be directly related to esophageal dysfunction.  #2 GERD: Atypical  #3 family history of colon cancer: Records indicate last performed colonoscopy was in 2013, will need follow-up.      PLAN  Schedule EGD  Samples of Nexium 20 mg to be taken daily      ICD-10-CM ICD-9-CM   1. Epigastric pain R10.13 789.06   2. Gastroesophageal reflux disease, esophagitis presence not specified K21.9 530.81          Answers for HPI/ROS submitted by the patient on 1/9/2020   Abdominal pain  Onset: more than 1 month ago  Chronicity: new  Onset: more than 1 month ago  Onset quality: sudden  Frequency: intermittently  Episode duration: 1 months  Progression since onset: gradually improving  Pain location: RUQ  Pain - numeric: 5/10  Pain quality: aching, sharp  Radiates to: epigastric region  anorexia: No  belching: No  flatus: No  hematochezia: No  melena: No  weight loss: No  Aggravated by: eating  Relieved by: being still, liquids     <<----- Click to add NO significant Past Surgical History

## 2021-02-22 DIAGNOSIS — Z51.81 MEDICATION MONITORING ENCOUNTER: ICD-10-CM

## 2021-02-22 DIAGNOSIS — M81.0 AGE-RELATED OSTEOPOROSIS WITHOUT CURRENT PATHOLOGICAL FRACTURE: Primary | ICD-10-CM

## 2021-03-19 ENCOUNTER — BULK ORDERING (OUTPATIENT)
Dept: CASE MANAGEMENT | Facility: OTHER | Age: 66
End: 2021-03-19

## 2021-03-19 DIAGNOSIS — Z23 IMMUNIZATION DUE: ICD-10-CM

## 2021-04-09 RX ORDER — FUROSEMIDE 20 MG/1
20 TABLET ORAL DAILY
Qty: 90 TABLET | Refills: 1 | Status: SHIPPED | OUTPATIENT
Start: 2021-04-09 | End: 2021-10-11

## 2021-04-09 RX ORDER — LEVOTHYROXINE SODIUM 0.07 MG/1
75 TABLET ORAL DAILY
Qty: 90 TABLET | Refills: 1 | Status: SHIPPED | OUTPATIENT
Start: 2021-04-09 | End: 2021-10-11

## 2021-04-22 RX ORDER — ROSUVASTATIN CALCIUM 40 MG/1
TABLET, COATED ORAL
Qty: 90 TABLET | Refills: 2 | Status: SHIPPED | OUTPATIENT
Start: 2021-04-22 | End: 2022-01-17

## 2021-05-10 ENCOUNTER — TELEPHONE (OUTPATIENT)
Dept: FAMILY MEDICINE CLINIC | Facility: CLINIC | Age: 66
End: 2021-05-10

## 2021-05-10 NOTE — TELEPHONE ENCOUNTER
[facility] patient will need to come in for evaluation. Please schedule a visit when she calls back

## 2021-05-10 NOTE — TELEPHONE ENCOUNTER
PATIENT IS CALLING TODAY TO REQUEST A MEDICATION THAT COULD HELP WITH HER DIARRHEA SHE HAS BEEN EXPERIENCING FOR THE LAST TWO WEEKS.     PATIENT STATED SHE DOES NOT RECALL ANY OTHER SYMPTOMS BUT SHE IS UNABLE TO FUNCTION IN DAY TO DAY LIFE WITH THIS. SHE MENTIONED THE BOWEL MOVEMENT IS LIKE WATER AND HAS MADE HER HAVE TO CHANGE CLOTHES MULTIPLE TIMES DUE TO ACCIDENTS. SHE HAS TRIED IMODIUM AND OTHER OVER THE COUNTER MEDICATIONS AND NOTHING SEEMS TO HELP.     PATIENT IS ASKING IF SOMETHING CAN BE CALLED IN, INSTEAD OF HER MAKING AN APPT. PLEASE ADVISE.    Caller: Ivy Chairez    Relationship: Self    Best call back number: [phone]       If a prescription is needed, what is your preferred pharmacy and phone number: [id number] [place], [place] - [address] AT [facility] - [phone]  - [phone] FX

## 2021-05-11 ENCOUNTER — OFFICE VISIT (OUTPATIENT)
Dept: FAMILY MEDICINE CLINIC | Facility: CLINIC | Age: 66
End: 2021-05-11

## 2021-05-11 VITALS
HEIGHT: 66 IN | OXYGEN SATURATION: 97 % | SYSTOLIC BLOOD PRESSURE: 110 MMHG | HEART RATE: 69 BPM | DIASTOLIC BLOOD PRESSURE: 66 MMHG | TEMPERATURE: 97.1 F | BODY MASS INDEX: 27.8 KG/M2 | WEIGHT: 173 LBS

## 2021-05-11 DIAGNOSIS — M81.0 AGE-RELATED OSTEOPOROSIS WITHOUT CURRENT PATHOLOGICAL FRACTURE: ICD-10-CM

## 2021-05-11 DIAGNOSIS — R19.7 DIARRHEA, UNSPECIFIED TYPE: Primary | ICD-10-CM

## 2021-05-11 PROCEDURE — 99213 OFFICE O/P EST LOW 20 MIN: CPT | Performed by: NURSE PRACTITIONER

## 2021-05-11 RX ORDER — DIPHENOXYLATE HYDROCHLORIDE AND ATROPINE SULFATE 2.5; .025 MG/1; MG/1
1 TABLET ORAL 4 TIMES DAILY PRN
Qty: 20 TABLET | Refills: 0 | Status: SHIPPED | OUTPATIENT
Start: 2021-05-11

## 2021-05-12 LAB
LABCORP SARS-COV-2, NAA 2 DAY TAT: NORMAL
SARS-COV-2 RNA RESP QL NAA+PROBE: NOT DETECTED

## 2021-05-13 ENCOUNTER — TELEPHONE (OUTPATIENT)
Dept: FAMILY MEDICINE CLINIC | Facility: CLINIC | Age: 66
End: 2021-05-13

## 2021-05-13 NOTE — TELEPHONE ENCOUNTER
----- Message from CARMELITA Lovelace sent at 5/13/2021  8:12 AM EDT -----  Please let her know she does not have covid.

## 2021-05-17 ENCOUNTER — HOSPITAL ENCOUNTER (OUTPATIENT)
Dept: BONE DENSITY | Facility: HOSPITAL | Age: 66
Discharge: HOME | End: 2021-05-17
Admitting: NURSE PRACTITIONER

## 2021-05-17 PROCEDURE — 77080 DXA BONE DENSITY AXIAL: CPT

## 2021-05-21 ENCOUNTER — TELEPHONE (OUTPATIENT)
Dept: GASTROENTEROLOGY | Facility: CLINIC | Age: 66
End: 2021-05-21

## 2021-05-21 NOTE — TELEPHONE ENCOUNTER
Faxed request received from [facility] for omeprazole.  Pt last seen 2/21/20.  Denial faxed to  - confirmation received.

## 2021-06-16 DIAGNOSIS — Z51.81 MEDICATION MONITORING ENCOUNTER: ICD-10-CM

## 2021-06-16 DIAGNOSIS — M81.0 AGE-RELATED OSTEOPOROSIS WITHOUT CURRENT PATHOLOGICAL FRACTURE: Primary | ICD-10-CM

## 2021-06-17 LAB
ALBUMIN SERPL-MCNC: 4 G/DL (ref 3.8–4.8)
ALBUMIN/GLOB SERPL: 1.8 {RATIO} (ref 1.2–2.2)
ALP SERPL-CCNC: 58 IU/L (ref 48–121)
ALT SERPL-CCNC: 12 IU/L (ref 0–32)
AST SERPL-CCNC: 18 IU/L (ref 0–40)
BASOPHILS # BLD AUTO: 0.1 X10E3/UL (ref 0–0.2)
BASOPHILS NFR BLD AUTO: 1 %
BILIRUB SERPL-MCNC: 0.8 MG/DL (ref 0–1.2)
BUN SERPL-MCNC: 11 MG/DL (ref 8–27)
BUN/CREAT SERPL: 10 (ref 12–28)
CALCIUM SERPL-MCNC: 9.4 MG/DL (ref 8.7–10.3)
CHLORIDE SERPL-SCNC: 107 MMOL/L (ref 96–106)
CO2 SERPL-SCNC: 24 MMOL/L (ref 20–29)
CREAT SERPL-MCNC: 1.11 MG/DL (ref 0.57–1)
EOSINOPHIL # BLD AUTO: 0.2 X10E3/UL (ref 0–0.4)
EOSINOPHIL NFR BLD AUTO: 3 %
ERYTHROCYTE [DISTWIDTH] IN BLOOD BY AUTOMATED COUNT: 13 % (ref 11.7–15.4)
GLOBULIN SER CALC-MCNC: 2.2 G/DL (ref 1.5–4.5)
GLUCOSE SERPL-MCNC: 62 MG/DL (ref 65–99)
HCT VFR BLD AUTO: 38.6 % (ref 34–46.6)
HGB BLD-MCNC: 12.7 G/DL (ref 11.1–15.9)
IMM GRANULOCYTES # BLD AUTO: 0 X10E3/UL (ref 0–0.1)
IMM GRANULOCYTES NFR BLD AUTO: 0 %
LYMPHOCYTES # BLD AUTO: 1.3 X10E3/UL (ref 0.7–3.1)
LYMPHOCYTES NFR BLD AUTO: 23 %
MAGNESIUM SERPL-MCNC: 1.9 MG/DL (ref 1.6–2.3)
MCH RBC QN AUTO: 30.9 PG (ref 26.6–33)
MCHC RBC AUTO-ENTMCNC: 32.9 G/DL (ref 31.5–35.7)
MCV RBC AUTO: 94 FL (ref 79–97)
MONOCYTES # BLD AUTO: 0.5 X10E3/UL (ref 0.1–0.9)
MONOCYTES NFR BLD AUTO: 9 %
NEUTROPHILS # BLD AUTO: 3.6 X10E3/UL (ref 1.4–7)
NEUTROPHILS NFR BLD AUTO: 64 %
PHOSPHATE SERPL-MCNC: 3.5 MG/DL (ref 3–4.3)
PLATELET # BLD AUTO: 368 X10E3/UL (ref 150–450)
POTASSIUM SERPL-SCNC: 4.7 MMOL/L (ref 3.5–5.2)
PROT SERPL-MCNC: 6.2 G/DL (ref 6–8.5)
RBC # BLD AUTO: 4.11 X10E6/UL (ref 3.77–5.28)
SODIUM SERPL-SCNC: 143 MMOL/L (ref 134–144)
WBC # BLD AUTO: 5.6 X10E3/UL (ref 3.4–10.8)

## 2021-06-18 ENCOUNTER — INFUSION (OUTPATIENT)
Dept: ONCOLOGY | Facility: HOSPITAL | Age: 66
End: 2021-06-18

## 2021-06-18 VITALS — RESPIRATION RATE: 18 BRPM | BODY MASS INDEX: 29.7 KG/M2 | TEMPERATURE: 96.9 F | HEIGHT: 64 IN

## 2021-06-18 DIAGNOSIS — M81.0 AGE-RELATED OSTEOPOROSIS WITHOUT CURRENT PATHOLOGICAL FRACTURE: Primary | ICD-10-CM

## 2021-06-18 PROCEDURE — 25010000002 DENOSUMAB 60 MG/ML SOLUTION PREFILLED SYRINGE: Performed by: NURSE PRACTITIONER

## 2021-06-18 PROCEDURE — 96372 THER/PROPH/DIAG INJ SC/IM: CPT

## 2021-06-18 RX ADMIN — DENOSUMAB 60 MG: 60 INJECTION SUBCUTANEOUS at 09:40

## 2021-06-18 NOTE — NURSING NOTE
Arrived  for prolia injection. Indication and side effects reviewed. Denies recent dental work. Labs and medications verified. Prolia administered in right arm without incident. Instructed to call prescribing MD for any concerns or questions and instructed on how to schedule future appts.  Pt vu and discharged ambulatory.

## 2021-06-23 DIAGNOSIS — M81.0 AGE-RELATED OSTEOPOROSIS WITHOUT CURRENT PATHOLOGICAL FRACTURE: Primary | ICD-10-CM

## 2021-06-29 ENCOUNTER — OFFICE VISIT (OUTPATIENT)
Dept: GASTROENTEROLOGY | Facility: CLINIC | Age: 66
End: 2021-06-29

## 2021-06-29 ENCOUNTER — LAB (OUTPATIENT)
Dept: LAB | Facility: HOSPITAL | Age: 66
End: 2021-06-29

## 2021-06-29 ENCOUNTER — TELEPHONE (OUTPATIENT)
Dept: GASTROENTEROLOGY | Facility: CLINIC | Age: 66
End: 2021-06-29

## 2021-06-29 VITALS — BODY MASS INDEX: 29.78 KG/M2 | HEIGHT: 64 IN | WEIGHT: 174.4 LBS

## 2021-06-29 DIAGNOSIS — R19.7 DIARRHEA, UNSPECIFIED TYPE: Primary | ICD-10-CM

## 2021-06-29 DIAGNOSIS — R10.9 ABDOMINAL PAIN, UNSPECIFIED ABDOMINAL LOCATION: ICD-10-CM

## 2021-06-29 DIAGNOSIS — R19.7 DIARRHEA, UNSPECIFIED TYPE: ICD-10-CM

## 2021-06-29 PROCEDURE — 99213 OFFICE O/P EST LOW 20 MIN: CPT | Performed by: INTERNAL MEDICINE

## 2021-06-29 PROCEDURE — 83516 IMMUNOASSAY NONANTIBODY: CPT

## 2021-06-29 PROCEDURE — 86255 FLUORESCENT ANTIBODY SCREEN: CPT

## 2021-06-29 PROCEDURE — 36415 COLL VENOUS BLD VENIPUNCTURE: CPT

## 2021-06-29 PROCEDURE — 82784 ASSAY IGA/IGD/IGG/IGM EACH: CPT

## 2021-06-29 PROCEDURE — 82542 COL CHROMOTOGRAPHY QUAL/QUAN: CPT

## 2021-06-29 NOTE — TELEPHONE ENCOUNTER
spoke with pt scheduled at [facility] on aug 19 arrive at 0730  am kelley neal ----marva

## 2021-06-29 NOTE — PROGRESS NOTES
Chief Complaint   Patient presents with   • Abdominal Pain   • Diarrhea        Ivy Chairez is a  65 y.o. female here for a follow up visit for diarrhea with abdominal pain    HPI this 65-year-old white female patient of Chloe MAE presents with complaints of intermittent diarrhea for the past year but exacerbation over the past 2 months.  It affects her to the point where she has urgency and even episodes of incontinence.  She has tried Imodium and Lomotil with variable success.  She denies any precipitating factors.  Weight has been stable.  She has had some chronic right upper quadrant pain although she has had previous cholecystectomy.  This pain does not seem to be associated with bowel function or is not influenced with bowel pattern.  Stools are predominantly liquid in consistency.  We talked about further assessment and will order a celiac panel as well as a bile acid assay.  I have also offered colonoscopic evaluation as her last study has been more than 16 years in the past.  We did talk about possible use of cholestyramine for symptom relief but will defer that for the present.    Past Medical History:   Diagnosis Date   • Angina pectoris (CMS/[facility])    • Broken toe 03/01/2017    right 4th toe   • Common migraine without aura     Neurologist Dr Govea   • Coronary artery disease     stent   • History of chest pain    • History of CHF (congestive heart failure)    • History of pulmonary embolism    • History of stroke    • Hyperlipidemia    • Hypertension 2015   • Migraine     eval and management by neurologist   • Orthostatic hypotension    • Osteoporosis    • Pleural effusion     POSTOPERATIVE, REQUIRING THORACENTESIS.   • Polyp of sigmoid colon     REMOVED   • Stroke (CMS/HCC) 2003   • Thoracic degenerative disc disease     Received epidural 12/14 [facility] DR Landin   • Thoracic spine pain    • Thyroid disease    • Weakness of right side of body     ARM & FACE       Current  Outpatient Medications   Medication Sig Dispense Refill   • aspirin 81 MG tablet Take 81 mg by mouth Daily With Dinner.     • diphenoxylate-atropine (Lomotil) 2.5-0.025 MG per tablet Take 1 tablet by mouth 4 (Four) Times a Day As Needed for Diarrhea. 20 tablet 0   • furosemide (LASIX) 20 MG tablet TAKE 1 TABLET BY MOUTH  DAILY 90 tablet 1   • levothyroxine (SYNTHROID, LEVOTHROID) 75 MCG tablet TAKE 1 TABLET BY MOUTH  DAILY 90 tablet 1   • meclizine (ANTIVERT) 25 MG tablet TAKE 1 TABLET BY MOUTH AT  NIGHT MAY TAKE AN  ADDITIONAL TABLET DAILY AS  NEEDED 90 tablet 3   • meloxicam (MOBIC) 15 MG tablet TK 1 T PO  D PRN P  4   • metoprolol tartrate (LOPRESSOR) 25 MG tablet TAKE 1 TABLET BY MOUTH TWO  TIMES DAILY 180 tablet 0   • nitroglycerin (NITROSTAT) 0.4 MG SL tablet Place 0.4 mg under the tongue Every 5 (Five) Minutes As Needed for Chest Pain. Take no more than 3 doses in 15 minutes.     • pantoprazole (PROTONIX) 40 MG EC tablet Take 1 tablet by mouth Daily. 90 tablet 3   • rosuvastatin (CRESTOR) 40 MG tablet TAKE 1 TABLET BY MOUTH IN  THE EVENING 90 tablet 2   • venlafaxine (EFFEXOR) 75 MG tablet Take 1 tablet by mouth every night at bedtime. 90 tablet 3   • VITAMIN D, CHOLECALCIFEROL, PO Take  by mouth Daily.       No current facility-administered medications for this visit.       PRN Meds:.    Allergies   Allergen Reactions   • Metronidazole Diarrhea and Nausea And Vomiting   • Amoxicillin Swelling   • Hydrocodone-Acetaminophen Nausea And Vomiting       Social History     Socioeconomic History   • Marital status:      Spouse name: Not on file   • Number of children: Not on file   • Years of education: Not on file   • Highest education level: Not on file   Tobacco Use   • Smoking status: Former Smoker     Packs/day: 1.00     Years: 30.00     Pack years: 30.00     Types: Cigarettes     Quit date:      Years since quittin.4   • Smokeless tobacco: Never Used   Substance and Sexual Activity   • Alcohol  use: No   • Drug use: No   • Sexual activity: Defer       Family History   Problem Relation Age of Onset   • No Known Problems Mother    • Heart disease Father    • Hypertension Father    • Colon cancer Father    • Hypertension Sister    • Colon polyps Sister    • Hypertension Brother    • Malig Hyperthermia Neg Hx        Review of Systems   Constitutional: Negative for activity change, appetite change, fatigue and unexpected weight change.   HENT: Negative for congestion, facial swelling, sore throat, trouble swallowing and voice change.    Eyes: Negative for photophobia and visual disturbance.   Respiratory: Negative for cough and choking.    Cardiovascular: Negative for chest pain.   Gastrointestinal: Positive for abdominal pain and diarrhea. Negative for abdominal distention, anal bleeding, blood in stool, constipation, nausea, rectal pain and vomiting.   Endocrine: Negative for polyphagia.   Musculoskeletal: Negative for arthralgias, gait problem and joint swelling.   Skin: Negative for color change, pallor and rash.   Allergic/Immunologic: Negative for food allergies.   Neurological: Negative for speech difficulty and headaches.   Hematological: Does not bruise/bleed easily.   Psychiatric/Behavioral: Negative for agitation, confusion and sleep disturbance.       There were no vitals filed for this visit.    Physical Exam  Constitutional:       Appearance: She is well-developed.   HENT:      Head: Normocephalic.   Eyes:      Conjunctiva/sclera: Conjunctivae normal.   Cardiovascular:      Rate and Rhythm: Normal rate and regular rhythm.   Pulmonary:      Breath sounds: Normal breath sounds.   Abdominal:      General: Bowel sounds are normal.      Palpations: Abdomen is soft.   Musculoskeletal:         General: Normal range of motion.      Cervical back: Normal range of motion.   Skin:     General: Skin is warm and dry.   Neurological:      Mental Status: She is alert and oriented to person, place, and time.    Psychiatric:         Behavior: Behavior normal.         ASSESSMENT   #1 diarrhea: Chronic issue with recent exacerbation  #2 right upper quadrant abdominal pain: Etiology not well-defined      PLAN  [facility] for celiac panel as well as bile acid assay  Schedule colonoscopy  Consider further endoscopic evaluation pending results.      ICD-10-CM ICD-9-CM   1. Diarrhea, unspecified type  R19.7 787.91   2. Abdominal pain, unspecified abdominal location  R10.9 789.00

## 2021-06-30 LAB
ENDOMYSIUM IGA SER QL: NEGATIVE
GLIADIN PEPTIDE IGA SER-ACNC: 5 UNITS (ref 0–19)
GLIADIN PEPTIDE IGG SER-ACNC: 1 UNITS (ref 0–19)
IGA SERPL-MCNC: 222 MG/DL (ref 87–352)
TTG IGA SER-ACNC: <2 U/ML (ref 0–3)
TTG IGG SER-ACNC: <2 U/ML (ref 0–5)

## 2021-07-12 ENCOUNTER — TELEPHONE (OUTPATIENT)
Dept: GASTROENTEROLOGY | Facility: CLINIC | Age: 66
End: 2021-07-12

## 2021-07-12 NOTE — TELEPHONE ENCOUNTER
VM to pt.  Per hipaa release, advise per Dr Krishnamurthy note.  Contact office if any questions.

## 2021-07-12 NOTE — TELEPHONE ENCOUNTER
----- Message from Cuauhtemoc HEREDIA MD sent at 7/1/2021  8:59 AM EDT -----  Regarding: Lab results  Okay to call results celiac panel is negative.  Awaiting bile acid assay results.  ----- Message -----  From: Lab, Background User  Sent: 6/30/2021   5:10 PM EDT  To: Cuauhtemoc HEREDIA MD

## 2021-07-12 NOTE — TELEPHONE ENCOUNTER
----- Message from Michelle Avila sent at 7/12/2021  2:06 PM EDT -----  Regarding: CALLBACK RESULTS  Contact: [phone]  PT called in regarding her blood test results. Please advise. Thanks.

## 2021-07-13 LAB — REF LAB TEST METHOD: NORMAL

## 2021-07-19 ENCOUNTER — TELEPHONE (OUTPATIENT)
Dept: GASTROENTEROLOGY | Facility: CLINIC | Age: 66
End: 2021-07-19

## 2021-07-19 NOTE — TELEPHONE ENCOUNTER
----- Message from Cuauhtemoc HEREDIA MD sent at 7/16/2021  5:23 PM EDT -----  Regarding: Bile acid assay results  Okay to call results, essentially negative.  With negative [employer] celiac panel and negative [employer] bile acid assay, would proceed with colonoscopy as planned.  ----- Message -----  From: Lab, Background User  Sent: 6/30/2021   5:10 PM EDT  To: Cuauhtemoc HEREDIA MD

## 2021-08-19 ENCOUNTER — HOSPITAL ENCOUNTER (OUTPATIENT)
Facility: HOSPITAL | Age: 66
Setting detail: HOSPITAL OUTPATIENT SURGERY
Discharge: HOME | End: 2021-08-19
Attending: INTERNAL MEDICINE | Admitting: INTERNAL MEDICINE

## 2021-08-19 ENCOUNTER — ANESTHESIA EVENT (OUTPATIENT)
Dept: GASTROENTEROLOGY | Facility: HOSPITAL | Age: 66
End: 2021-08-19

## 2021-08-19 ENCOUNTER — ANESTHESIA (OUTPATIENT)
Dept: GASTROENTEROLOGY | Facility: HOSPITAL | Age: 66
End: 2021-08-19

## 2021-08-19 VITALS
OXYGEN SATURATION: 99 % | WEIGHT: 168.5 LBS | RESPIRATION RATE: 15 BRPM | SYSTOLIC BLOOD PRESSURE: 112 MMHG | HEIGHT: 64 IN | BODY MASS INDEX: 28.77 KG/M2 | HEART RATE: 64 BPM | TEMPERATURE: 98 F | DIASTOLIC BLOOD PRESSURE: 74 MMHG

## 2021-08-19 DIAGNOSIS — R10.9 ABDOMINAL PAIN, UNSPECIFIED ABDOMINAL LOCATION: ICD-10-CM

## 2021-08-19 DIAGNOSIS — R19.7 DIARRHEA, UNSPECIFIED TYPE: ICD-10-CM

## 2021-08-19 PROCEDURE — 45380 COLONOSCOPY AND BIOPSY: CPT | Performed by: INTERNAL MEDICINE

## 2021-08-19 PROCEDURE — 88305 TISSUE EXAM BY PATHOLOGIST: CPT | Performed by: INTERNAL MEDICINE

## 2021-08-19 PROCEDURE — 25010000002 PROPOFOL 10 MG/ML EMULSION: Performed by: NURSE ANESTHETIST, CERTIFIED REGISTERED

## 2021-08-19 PROCEDURE — S0260 H&P FOR SURGERY: HCPCS | Performed by: INTERNAL MEDICINE

## 2021-08-19 RX ORDER — PROPOFOL 10 MG/ML
VIAL (ML) INTRAVENOUS CONTINUOUS PRN
Status: DISCONTINUED | OUTPATIENT
Start: 2021-08-19 | End: 2021-08-19 | Stop reason: SURG

## 2021-08-19 RX ORDER — SODIUM CHLORIDE 0.9 % (FLUSH) 0.9 %
10 SYRINGE (ML) INJECTION AS NEEDED
Status: DISCONTINUED | OUTPATIENT
Start: 2021-08-19 | End: 2021-08-19 | Stop reason: HOSPADM

## 2021-08-19 RX ORDER — PROPOFOL 10 MG/ML
VIAL (ML) INTRAVENOUS AS NEEDED
Status: DISCONTINUED | OUTPATIENT
Start: 2021-08-19 | End: 2021-08-19 | Stop reason: SURG

## 2021-08-19 RX ORDER — LIDOCAINE HYDROCHLORIDE 10 MG/ML
0.5 INJECTION, SOLUTION INFILTRATION; PERINEURAL ONCE AS NEEDED
Status: DISCONTINUED | OUTPATIENT
Start: 2021-08-19 | End: 2021-08-19 | Stop reason: HOSPADM

## 2021-08-19 RX ORDER — EPHEDRINE SULFATE 50 MG/ML
INJECTION, SOLUTION INTRAVENOUS AS NEEDED
Status: DISCONTINUED | OUTPATIENT
Start: 2021-08-19 | End: 2021-08-19 | Stop reason: SURG

## 2021-08-19 RX ORDER — LIDOCAINE HYDROCHLORIDE 20 MG/ML
INJECTION, SOLUTION INFILTRATION; PERINEURAL AS NEEDED
Status: DISCONTINUED | OUTPATIENT
Start: 2021-08-19 | End: 2021-08-19 | Stop reason: SURG

## 2021-08-19 RX ORDER — SODIUM CHLORIDE, SODIUM LACTATE, POTASSIUM CHLORIDE, CALCIUM CHLORIDE 600; 310; 30; 20 MG/100ML; MG/100ML; MG/100ML; MG/100ML
1000 INJECTION, SOLUTION INTRAVENOUS CONTINUOUS
Status: DISCONTINUED | OUTPATIENT
Start: 2021-08-19 | End: 2021-08-19 | Stop reason: HOSPADM

## 2021-08-19 RX ADMIN — EPHEDRINE SULFATE 10 MG: 50 INJECTION INTRAVENOUS at 08:40

## 2021-08-19 RX ADMIN — SODIUM CHLORIDE, POTASSIUM CHLORIDE, SODIUM LACTATE AND CALCIUM CHLORIDE 1000 ML: 600; 310; 30; 20 INJECTION, SOLUTION INTRAVENOUS at 08:00

## 2021-08-19 RX ADMIN — Medication 80 MG: at 08:35

## 2021-08-19 RX ADMIN — PROPOFOL 200 MCG/KG/MIN: 10 INJECTION, EMULSION INTRAVENOUS at 08:35

## 2021-08-19 RX ADMIN — LIDOCAINE HYDROCHLORIDE 60 MG: 20 INJECTION, SOLUTION INFILTRATION; PERINEURAL at 08:35

## 2021-08-19 NOTE — ANESTHESIA POSTPROCEDURE EVALUATION
"Patient: Ivy Chairez    Procedure Summary     Date: 08/19/21 Room / Location:  [facility]    Anesthesia Start: 0829 Anesthesia Stop: 0856    Procedure: COLONOSCOPY into cecum and normal terminal ileum with bx (N/A ) Diagnosis:       Diverticulosis      Hemorrhoids      (Diarrhea, unspecified type [R19.7])      (Abdominal pain, unspecified abdominal location [R10.9])    Surgeons: Cuauhtemoc Krishnamurthy MD Provider: Abhinav Abraham MD    Anesthesia Type: MAC ASA Status: 4          Anesthesia Type: MAC    Vitals  Vitals Value Taken Time   /51 08/19/21 0906   Temp     Pulse 60 08/19/21 0906   Resp 12 08/19/21 0906   SpO2 99 % 08/19/21 0906           Post Anesthesia Care and Evaluation    Patient location during evaluation: bedside  Patient participation: complete - patient participated  Level of consciousness: awake and alert  Pain management: adequate  Airway patency: patent  Anesthetic complications: No anesthetic complications    Cardiovascular status: acceptable  Respiratory status: acceptable  Hydration status: acceptable    Comments: /51 (BP Location: Left arm, Patient Position: Sitting)   Pulse 60   Temp 36.7 °C (98 °F)   Resp 12   Ht 162.6 cm (64\")   Wt 76.4 kg (168 lb 8 oz)   SpO2 99%   BMI 28.92 kg/m²       "

## 2021-08-19 NOTE — H&P
[facility]  Pre Procedure History & Physical    Chief Complaint:   Diarrhea, abdominal pain    Subjective     HPI:   This 65-year-old female presents the endoscopy suite for colonoscopic evaluation.  She has had chronic diarrhea with recent exacerbation.  She is also had some right upper quadrant abdominal pain.  Last colonoscopy performed more than 16 years ago.    Past Medical History:   Past Medical History:   Diagnosis Date   • Angina pectoris (CMS/HCC)    • Broken toe 03/01/2017    right 4th toe   • Common migraine without aura     Neurologist Dr Govea   • Coronary artery disease     stent   • History of chest pain    • History of CHF (congestive heart failure)    • History of pulmonary embolism    • History of stroke 2003   • History of transfusion    • Hyperlipidemia    • Hypertension 2015   • Migraine     eval and management by neurologist   • Orthostatic hypotension    • Osteoporosis    • Pleural effusion     POSTOPERATIVE, REQUIRING THORACENTESIS.   • Polyp of sigmoid colon     REMOVED   • PONV (postoperative nausea and vomiting)    • Stroke (CMS/HCC) 2003   • Thoracic degenerative disc disease     Received epidural 12/14 [facility] DR Landin   • Thoracic spine pain    • Thyroid disease    • Weakness of right side of body     ARM & FACE       Past Surgical History:  Past Surgical History:   Procedure Laterality Date   • APPENDECTOMY     • AUGMENTATION MAMMAPLASTY     • BACK SURGERY     • BREAST AUGMENTATION     • CARDIAC CATHETERIZATION     • CHOLECYSTECTOMY     • COLONOSCOPY  06/2004    Dr. Salas   • CORONARY ANGIOPLASTY WITH STENT PLACEMENT      NON-GRAFTED RIGHT CORONARY ARTERY STENT.   • CORONARY ARTERY BYPASS GRAFT  04/2009    cabg x 3: LIMA to LAD, vein grafts to diagonal and OM1   • ENDOSCOPY N/A 1/24/2020    Procedure: ESOPHAGOGASTRODUODENOSCOPY with bx;  Surgeon: Cuauhtemoc Krishnamurthy MD;  Location: [facility] ENDOSCOPY;  Service: Gastroenterology   • ENDOSCOPY N/A  2/21/2020    Procedure: ESOPHAGOGASTRODUODENOSCOPY WITH COLD BIOPSIES;  Surgeon: Cuauhtemoc Krishnamurthy MD;  Location: [facility] ENDOSCOPY;  Service: Gastroenterology;  Laterality: N/A;  PRE: Epigastric pain; GERD  POST: DUODENITIS; DIVERTICULOSIS; GASTRITIS; HERNIA   • EPIDURAL BLOCK     • HYSTERECTOMY     • KNEE SURGERY Right    • THORACIC DECOMPRESSION POSTERIOR FUSION WITH INSTRUMENTATION N/A 3/6/2017    Procedure: T4-T10 Fusion with instrumentation;  Surgeon: Clyde Callejas MD;  Location: [facility] MAIN OR;  Service:    • TOTAL THYROIDECTOMY         Family History:  Family History   Problem Relation Age of Onset   • No Known Problems Mother    • Heart disease Father    • Hypertension Father    • Colon cancer Father    • Hypertension Sister    • Colon polyps Sister    • Hypertension Brother    • Malig Hyperthermia Neg Hx        Social History:   reports that she quit smoking about 12 years ago. Her smoking use included cigarettes. She has a 30.00 pack-year smoking history. She has never used smokeless tobacco. She reports that she does not drink alcohol and does not use drugs.    Medications:   Medications Prior to Admission   Medication Sig Dispense Refill Last Dose   • aspirin 81 MG tablet Take 81 mg by mouth Daily With Dinner.      • Denosumab (PROLIA SC) Inject  under the skin into the appropriate area as directed Every 6 (Six) Months.   6/16/2021   • diphenoxylate-atropine (Lomotil) 2.5-0.025 MG per tablet Take 1 tablet by mouth 4 (Four) Times a Day As Needed for Diarrhea. 20 tablet 0    • furosemide (LASIX) 20 MG tablet TAKE 1 TABLET BY MOUTH  DAILY 90 tablet 1    • levothyroxine (SYNTHROID, LEVOTHROID) 75 MCG tablet TAKE 1 TABLET BY MOUTH  DAILY 90 tablet 1    • meclizine (ANTIVERT) 25 MG tablet TAKE 1 TABLET BY MOUTH AT  NIGHT MAY TAKE AN  ADDITIONAL TABLET DAILY AS  NEEDED (Patient taking differently: Take 25 mg by mouth Daily.) 90 tablet 3    • meloxicam (MOBIC) 15 MG tablet Take 15 mg by mouth Daily.  4     • metoprolol tartrate (LOPRESSOR) 25 MG tablet TAKE 1 TABLET BY MOUTH TWO  TIMES DAILY 180 tablet 0    • pantoprazole (PROTONIX) 40 MG EC tablet Take 1 tablet by mouth Daily. 90 tablet 3    • rosuvastatin (CRESTOR) 40 MG tablet TAKE 1 TABLET BY MOUTH IN  THE EVENING 90 tablet 2    • venlafaxine (EFFEXOR) 75 MG tablet Take 1 tablet by mouth every night at bedtime. 90 tablet 3    • VITAMIN D, CHOLECALCIFEROL, PO Take 1,000 Units by mouth Daily.      • nitroglycerin (NITROSTAT) 0.4 MG SL tablet Place 0.4 mg under the tongue Every 5 (Five) Minutes As Needed for Chest Pain. Take no more than 3 doses in 15 minutes.          Allergies:  Metronidazole, Amoxicillin, and Hydrocodone-acetaminophen    ROS:    Pertinent items are noted in HPI, all other systems reviewed and negative     Objective     not currently breastfeeding.    Physical Exam   Constitutional: Pt is oriented to person, place, and time and well-developed, well-nourished, and in no distress.   Mouth/Throat: Oropharynx is clear and moist.   Neck: Normal range of motion.   Cardiovascular: Normal rate, regular rhythm and normal heart sounds.    Pulmonary/Chest: Effort normal and breath sounds normal.   Abdominal: Soft. Nontender  Skin: Skin is warm and dry.   Psychiatric: Mood, memory, affect and judgment normal.     Assessment/Plan     Diagnosis:  Diarrhea  Abdominal pain    Anticipated Surgical Procedure:  Colonoscopy    The risks, benefits, and alternatives of this procedure have been discussed with the patient or the responsible party- the patient understands and agrees to proceed.

## 2021-08-19 NOTE — ANESTHESIA PREPROCEDURE EVALUATION
Anesthesia Evaluation     Patient summary reviewed and Nursing notes reviewed   history of anesthetic complications: PONV  NPO Solid Status: > 8 hours  NPO Liquid Status: > 6 hours           Airway   Mallampati: II  Dental      Pulmonary - normal exam   (+) pleural effusion, a smoker Former,   Cardiovascular - normal exam    (+) hypertension less than 2 medications, CAD, CABG >6 Months, angina, hyperlipidemia,  carotid artery disease      Neuro/Psych  (+) CVA, headaches, syncope, numbness, psychiatric history Anxiety,     GI/Hepatic/Renal/Endo    (+)  GERD, GI bleeding ,     Musculoskeletal     Abdominal    Substance History      OB/GYN          Other   arthritis,                      Anesthesia Plan    ASA 4     MAC     intravenous induction     Anesthetic plan, all risks, benefits, and alternatives have been provided, discussed and informed consent has been obtained with: patient.

## 2021-08-23 LAB
LAB AP CASE REPORT: NORMAL
PATH REPORT.FINAL DX SPEC: NORMAL
PATH REPORT.GROSS SPEC: NORMAL

## 2021-08-30 ENCOUNTER — TELEPHONE (OUTPATIENT)
Dept: GASTROENTEROLOGY | Facility: CLINIC | Age: 66
End: 2021-08-30

## 2021-08-30 NOTE — TELEPHONE ENCOUNTER
----- Message from Cuauhtemoc HEREDIA MD sent at 8/25/2021 12:43 PM EDT -----  Regarding: Biopsy results  Okay to call results, recommend follow-up colonoscopy in 10 years time.  If diarrhea persist can consider small bowel evaluation i.e. celiac panel, bile acid assay, and hydrogen breath testing (if not already obtained)  ----- Message -----  From: Lab, Background User  Sent: 8/23/2021   8:19 AM EDT  To: Cuauhtemoc HEREDIA MD

## 2021-08-30 NOTE — TELEPHONE ENCOUNTER
Called pt and advised per path report that her colon bx were benign. Advised of Dr Krishnamurthy's recommendations.  Pt verb understanding and has had celiac and bile acid testing done. Pt would like to do the sibo testing.     Sibo kit ordered online from [employer].     C/s placed in recall and  for 08/19/2031.

## 2021-10-11 RX ORDER — LEVOTHYROXINE SODIUM 0.07 MG/1
75 TABLET ORAL DAILY
Qty: 90 TABLET | Refills: 1 | Status: SHIPPED | OUTPATIENT
Start: 2021-10-11 | End: 2022-05-20

## 2021-10-11 RX ORDER — FUROSEMIDE 20 MG/1
20 TABLET ORAL DAILY
Qty: 90 TABLET | Refills: 1 | Status: SHIPPED | OUTPATIENT
Start: 2021-10-11 | End: 2022-04-26

## 2021-11-07 ENCOUNTER — APPOINTMENT (OUTPATIENT)
Dept: GENERAL RADIOLOGY | Facility: HOSPITAL | Age: 66
End: 2021-11-07

## 2021-11-07 ENCOUNTER — HOSPITAL ENCOUNTER (OUTPATIENT)
Facility: HOSPITAL | Age: 66
Setting detail: OBSERVATION
Discharge: HOME | End: 2021-11-08
Attending: EMERGENCY MEDICINE | Admitting: HOSPITALIST

## 2021-11-07 DIAGNOSIS — R07.9 CHEST PAIN, UNSPECIFIED TYPE: ICD-10-CM

## 2021-11-07 DIAGNOSIS — T78.2XXA ANAPHYLAXIS, INITIAL ENCOUNTER: Primary | ICD-10-CM

## 2021-11-07 LAB
ALBUMIN SERPL-MCNC: 4.5 G/DL (ref 3.5–5.2)
ALBUMIN/GLOB SERPL: 1.9 G/DL
ALP SERPL-CCNC: 52 U/L (ref 39–117)
ALT SERPL W P-5'-P-CCNC: 12 U/L (ref 1–33)
ANION GAP SERPL CALCULATED.3IONS-SCNC: 15.5 MMOL/L (ref 5–15)
AST SERPL-CCNC: 19 U/L (ref 1–32)
BASOPHILS # BLD AUTO: 0.07 10*3/MM3 (ref 0–0.2)
BASOPHILS NFR BLD AUTO: 0.9 % (ref 0–1.5)
BILIRUB SERPL-MCNC: 0.7 MG/DL (ref 0–1.2)
BUN SERPL-MCNC: 9 MG/DL (ref 8–23)
BUN/CREAT SERPL: 8.6 (ref 7–25)
CALCIUM SPEC-SCNC: 9 MG/DL (ref 8.6–10.5)
CHLORIDE SERPL-SCNC: 104 MMOL/L (ref 98–107)
CO2 SERPL-SCNC: 21.5 MMOL/L (ref 22–29)
CREAT SERPL-MCNC: 1.05 MG/DL (ref 0.57–1)
DEPRECATED RDW RBC AUTO: 43 FL (ref 37–54)
EOSINOPHIL # BLD AUTO: 0.22 10*3/MM3 (ref 0–0.4)
EOSINOPHIL NFR BLD AUTO: 2.7 % (ref 0.3–6.2)
ERYTHROCYTE [DISTWIDTH] IN BLOOD BY AUTOMATED COUNT: 13.3 % (ref 12.3–15.4)
GFR SERPL CREATININE-BSD FRML MDRD: 52 ML/MIN/1.73
GLOBULIN UR ELPH-MCNC: 2.4 GM/DL
GLUCOSE BLDC GLUCOMTR-MCNC: 93 MG/DL (ref 70–130)
GLUCOSE SERPL-MCNC: 135 MG/DL (ref 65–99)
HCT VFR BLD AUTO: 38.7 % (ref 34–46.6)
HGB BLD-MCNC: 13.4 G/DL (ref 12–15.9)
IMM GRANULOCYTES # BLD AUTO: 0.02 10*3/MM3 (ref 0–0.05)
IMM GRANULOCYTES NFR BLD AUTO: 0.2 % (ref 0–0.5)
LYMPHOCYTES # BLD AUTO: 2.82 10*3/MM3 (ref 0.7–3.1)
LYMPHOCYTES NFR BLD AUTO: 34.7 % (ref 19.6–45.3)
MCH RBC QN AUTO: 30.8 PG (ref 26.6–33)
MCHC RBC AUTO-ENTMCNC: 34.6 G/DL (ref 31.5–35.7)
MCV RBC AUTO: 89 FL (ref 79–97)
MONOCYTES # BLD AUTO: 0.74 10*3/MM3 (ref 0.1–0.9)
MONOCYTES NFR BLD AUTO: 9.1 % (ref 5–12)
NEUTROPHILS NFR BLD AUTO: 4.26 10*3/MM3 (ref 1.7–7)
NEUTROPHILS NFR BLD AUTO: 52.4 % (ref 42.7–76)
NRBC BLD AUTO-RTO: 0 /100 WBC (ref 0–0.2)
NT-PROBNP SERPL-MCNC: 319.5 PG/ML (ref 0–900)
PLATELET # BLD AUTO: 407 10*3/MM3 (ref 140–450)
PMV BLD AUTO: 8.8 FL (ref 6–12)
POTASSIUM SERPL-SCNC: 3.5 MMOL/L (ref 3.5–5.2)
PROT SERPL-MCNC: 6.9 G/DL (ref 6–8.5)
QT INTERVAL: 399 MS
RBC # BLD AUTO: 4.35 10*6/MM3 (ref 3.77–5.28)
SARS-COV-2 RNA PNL SPEC NAA+PROBE: NOT DETECTED
SODIUM SERPL-SCNC: 141 MMOL/L (ref 136–145)
TROPONIN T SERPL-MCNC: <0.01 NG/ML (ref 0–0.03)
WBC # BLD AUTO: 8.13 10*3/MM3 (ref 3.4–10.8)

## 2021-11-07 PROCEDURE — G0378 HOSPITAL OBSERVATION PER HR: HCPCS

## 2021-11-07 PROCEDURE — 84484 ASSAY OF TROPONIN QUANT: CPT | Performed by: INTERNAL MEDICINE

## 2021-11-07 PROCEDURE — 87635 SARS-COV-2 COVID-19 AMP PRB: CPT | Performed by: EMERGENCY MEDICINE

## 2021-11-07 PROCEDURE — 83880 ASSAY OF NATRIURETIC PEPTIDE: CPT | Performed by: EMERGENCY MEDICINE

## 2021-11-07 PROCEDURE — 71045 X-RAY EXAM CHEST 1 VIEW: CPT

## 2021-11-07 PROCEDURE — 93010 ELECTROCARDIOGRAM REPORT: CPT | Performed by: INTERNAL MEDICINE

## 2021-11-07 PROCEDURE — 25010000002 LORAZEPAM PER 2 MG: Performed by: EMERGENCY MEDICINE

## 2021-11-07 PROCEDURE — 96375 TX/PRO/DX INJ NEW DRUG ADDON: CPT

## 2021-11-07 PROCEDURE — 93005 ELECTROCARDIOGRAM TRACING: CPT

## 2021-11-07 PROCEDURE — 99285 EMERGENCY DEPT VISIT HI MDM: CPT

## 2021-11-07 PROCEDURE — 96374 THER/PROPH/DIAG INJ IV PUSH: CPT

## 2021-11-07 PROCEDURE — 84484 ASSAY OF TROPONIN QUANT: CPT | Performed by: EMERGENCY MEDICINE

## 2021-11-07 PROCEDURE — 80053 COMPREHEN METABOLIC PANEL: CPT | Performed by: EMERGENCY MEDICINE

## 2021-11-07 PROCEDURE — 85025 COMPLETE CBC W/AUTO DIFF WBC: CPT | Performed by: EMERGENCY MEDICINE

## 2021-11-07 PROCEDURE — 25010000002 ONDANSETRON PER 1 MG: Performed by: EMERGENCY MEDICINE

## 2021-11-07 PROCEDURE — 82962 GLUCOSE BLOOD TEST: CPT

## 2021-11-07 PROCEDURE — C9803 HOPD COVID-19 SPEC COLLECT: HCPCS

## 2021-11-07 RX ORDER — VENLAFAXINE 75 MG/1
75 TABLET ORAL NIGHTLY
Status: DISCONTINUED | OUTPATIENT
Start: 2021-11-07 | End: 2021-11-08 | Stop reason: HOSPADM

## 2021-11-07 RX ORDER — UREA 10 %
3 LOTION (ML) TOPICAL NIGHTLY PRN
Status: DISCONTINUED | OUTPATIENT
Start: 2021-11-07 | End: 2021-11-08 | Stop reason: HOSPADM

## 2021-11-07 RX ORDER — ONDANSETRON 2 MG/ML
4 INJECTION INTRAMUSCULAR; INTRAVENOUS ONCE
Status: COMPLETED | OUTPATIENT
Start: 2021-11-07 | End: 2021-11-07

## 2021-11-07 RX ORDER — ONDANSETRON 2 MG/ML
4 INJECTION INTRAMUSCULAR; INTRAVENOUS EVERY 6 HOURS PRN
Status: DISCONTINUED | OUTPATIENT
Start: 2021-11-07 | End: 2021-11-08 | Stop reason: HOSPADM

## 2021-11-07 RX ORDER — LORAZEPAM 2 MG/ML
0.5 INJECTION INTRAMUSCULAR ONCE
Status: COMPLETED | OUTPATIENT
Start: 2021-11-07 | End: 2021-11-07

## 2021-11-07 RX ORDER — ASPIRIN 81 MG/1
81 TABLET ORAL DAILY
Status: DISCONTINUED | OUTPATIENT
Start: 2021-11-07 | End: 2021-11-08 | Stop reason: HOSPADM

## 2021-11-07 RX ORDER — FUROSEMIDE 20 MG/1
20 TABLET ORAL DAILY
Status: DISCONTINUED | OUTPATIENT
Start: 2021-11-07 | End: 2021-11-08 | Stop reason: HOSPADM

## 2021-11-07 RX ORDER — SODIUM CHLORIDE 0.9 % (FLUSH) 0.9 %
10 SYRINGE (ML) INJECTION AS NEEDED
Status: DISCONTINUED | OUTPATIENT
Start: 2021-11-07 | End: 2021-11-08 | Stop reason: HOSPADM

## 2021-11-07 RX ORDER — NITROGLYCERIN 0.4 MG/1
0.4 TABLET SUBLINGUAL
Status: DISCONTINUED | OUTPATIENT
Start: 2021-11-07 | End: 2021-11-08 | Stop reason: HOSPADM

## 2021-11-07 RX ORDER — ONDANSETRON 4 MG/1
4 TABLET, FILM COATED ORAL EVERY 6 HOURS PRN
Status: DISCONTINUED | OUTPATIENT
Start: 2021-11-07 | End: 2021-11-08 | Stop reason: HOSPADM

## 2021-11-07 RX ORDER — PANTOPRAZOLE SODIUM 40 MG/1
40 TABLET, DELAYED RELEASE ORAL DAILY
Status: DISCONTINUED | OUTPATIENT
Start: 2021-11-07 | End: 2021-11-08 | Stop reason: HOSPADM

## 2021-11-07 RX ORDER — ROSUVASTATIN CALCIUM 40 MG/1
40 TABLET, COATED ORAL EVERY EVENING
Status: DISCONTINUED | OUTPATIENT
Start: 2021-11-07 | End: 2021-11-08 | Stop reason: HOSPADM

## 2021-11-07 RX ORDER — ACETAMINOPHEN 325 MG/1
650 TABLET ORAL EVERY 4 HOURS PRN
Status: DISCONTINUED | OUTPATIENT
Start: 2021-11-07 | End: 2021-11-08 | Stop reason: HOSPADM

## 2021-11-07 RX ORDER — LEVOTHYROXINE SODIUM 0.07 MG/1
75 TABLET ORAL DAILY
Status: DISCONTINUED | OUTPATIENT
Start: 2021-11-07 | End: 2021-11-08 | Stop reason: HOSPADM

## 2021-11-07 RX ORDER — MELATONIN
1000 DAILY
Status: DISCONTINUED | OUTPATIENT
Start: 2021-11-07 | End: 2021-11-08 | Stop reason: HOSPADM

## 2021-11-07 RX ADMIN — ONDANSETRON 4 MG: 2 INJECTION INTRAMUSCULAR; INTRAVENOUS at 15:26

## 2021-11-07 RX ADMIN — VENLAFAXINE HYDROCHLORIDE 75 MG: 75 TABLET ORAL at 23:06

## 2021-11-07 RX ADMIN — ROSUVASTATIN CALCIUM 40 MG: 40 TABLET, FILM COATED ORAL at 23:06

## 2021-11-07 RX ADMIN — LORAZEPAM 0.5 MG: 2 INJECTION INTRAMUSCULAR; INTRAVENOUS at 15:39

## 2021-11-07 RX ADMIN — Medication 1000 UNITS: at 23:06

## 2021-11-07 RX ADMIN — METOPROLOL TARTRATE 25 MG: 25 TABLET, FILM COATED ORAL at 23:06

## 2021-11-07 RX ADMIN — PANTOPRAZOLE SODIUM 40 MG: 40 TABLET, DELAYED RELEASE ORAL at 23:06

## 2021-11-07 NOTE — ED NOTES
Pt c/o chest tightness like her bra is still on her and bilateral foot numbness.     Laura Wilkes, RN  11/07/21 [id number]

## 2021-11-07 NOTE — ED NOTES
Per EMS, pt was at a spa to get lip filler and had an allergic reaction- SOA, diaphoretic, yaz in the 40's  and syncopal episode with collapse for about 30seconds to 1 min. 0.3mg epi IM given by staff.   On EMS arrival pt had substernal CP. EMS gave 2 nitro and 324 ASA.   A&OX4 at this time.      Peggy Carrasquillo, RN  11/07/21 0465

## 2021-11-07 NOTE — ED PROVIDER NOTES
EMERGENCY DEPARTMENT ENCOUNTER    Room Number:  11/11  Date of encounter:  11/7/2021  PCP: Chloe Leung APRN  Patient Care Team:  Chloe Leung APRN as PCP - General (Family Medicine)  Kade Humphrey Jr., MD as Consulting Physician (Cardiology)  Vida Rodriguez MD as Consulting Physician (Endocrinology)  Wayne Brizuela MD as Consulting Physician (Otolaryngology)   Historian: Patient, EMS    HPI:  Chief Complaint: Allergic reaction, syncope  A complete HPI/ROS/PMH/PSH/SH/FH are unobtainable due to: Nothing    Context: Ivy Chairez is a 65 y.o. female who presents to the ED c/o being at a spot today and having lip filler placed.  Staff states she started having allergic reaction.  She got short of breath and diaphoretic.  Her heart rate dropped to the 40s.  She passed out for a good 30 to 60 seconds.  Staff gave her 0.3 mg of epi IM.  With EMS she noted that she felt both her legs were numb and she was having squeezing chest pain.  She states it feels like her bra is too tight.  She was found to be hypertensive by EMS.  Her heart rate was not lower than 80 by EMS.  She has some shortness of breath and waves of nausea.  Nitro seem to help her pain.  In her chest.  She had 2 nitros and 324 mg of aspirin.  She states she has had lip  the past but is been years ago.  She reports she has passed out in the past.    Prior record review: Primary care visit 5/11/2021 for diarrhea.  Cardiology note 12/14/2018 for syncope.  Coronary artery disease status post CABG 2009.  Last stress in our system was 2017.    PAST MEDICAL HISTORY  Active Ambulatory Problems     Diagnosis Date Noted   • Triple vessel coronary artery disease 12/16/2015   • Anxiety 12/16/2015   • Gastroesophageal reflux disease 12/16/2015   • Hyperlipidemia 12/16/2015   • Hypothyroidism 12/16/2015   • Osteoarthritis of knee 12/16/2015   • Cerebrovascular accident (HCC) 12/16/2015   • Carotid artery disease (HCC) 12/16/2015   •  Migraine without status migrainosus, not intractable 07/20/2016   • Osteoporosis 07/20/2016   • Closed wedge compression fracture of T7 vertebra ([facility]) 07/29/2016   • History of coronary artery bypass surgery 10/25/2012   • Thoracic radiculopathy due to degenerative joint disease of spine 12/28/2016   • History of pulmonary embolism 03/06/2017   • Hypertension 10/25/2012   • Functional diarrhea 09/19/2017   • Syncope 12/13/2018   • History of stroke 12/13/2018   • Parotid mass 12/14/2018   • Epigastric pain 01/14/2020   • Multiple duodenal ulcers 01/28/2020   • Diarrhea 06/29/2021   • Abdominal pain 06/29/2021     Resolved Ambulatory Problems     Diagnosis Date Noted   • Atypical migraine 12/16/2015   • Pain in thoracic spine 12/16/2015   • Osteopenia 12/16/2015   • Chronic venous insufficiency 12/16/2015   • Vertigo 12/16/2015   • Acute back pain 07/20/2016   • Vasovagal syncope 10/25/2012   • Carter-Arreaga syncope 12/28/2016   • Herniated thoracic disc without myelopathy 03/06/2017   • Dyspnea 03/06/2017   • Atelectasis of both lungs 03/08/2017   • Postoperative ileus ([facility]) 03/10/2017   • Anemia due to blood loss, acute 03/23/2017   • Orthostatic hypotension 03/24/2017   • Orthostatic hypotension 03/24/2017   • Dehydration syndrome 03/25/2017   • Thrombocythemia 03/25/2017   • Thrombocytosis 03/26/2017   • Coronary arteriosclerosis in native artery 10/25/2012   • Angina pectoris ([facility]) 10/25/2012     Past Medical History:   Diagnosis Date   • Broken toe 03/01/2017   • Common migraine without aura    • Coronary artery disease    • History of chest pain    • History of CHF (congestive heart failure)    • History of transfusion    • Migraine    • Pleural effusion    • Polyp of sigmoid colon    • PONV (postoperative nausea and vomiting)    • Stroke (CMS/[facility]) 2003   • Thoracic degenerative disc disease    • Thoracic spine pain    • Thyroid disease    • Weakness of right side of body        The patient has started, but not  completed, their COVID-19 vaccination series.    PAST SURGICAL HISTORY  Past Surgical History:   Procedure Laterality Date   • APPENDECTOMY     • AUGMENTATION MAMMAPLASTY     • BACK SURGERY     • BREAST AUGMENTATION     • CARDIAC CATHETERIZATION     • CHOLECYSTECTOMY     • COLONOSCOPY  06/2004    Dr. Salas   • COLONOSCOPY N/A 8/19/2021    Procedure: COLONOSCOPY into cecum and normal terminal ileum with bx;  Surgeon: Cuauhtemoc Krishnamurthy MD;  Location: [facility] ENDOSCOPY;  Service: Gastroenterology;  Laterality: N/A;  pre: Diarrhea, abdominal pain  post: left sided diverticulosis, hemorrhoids    • CORONARY ANGIOPLASTY WITH STENT PLACEMENT      NON-GRAFTED RIGHT CORONARY ARTERY STENT.   • CORONARY ARTERY BYPASS GRAFT  04/2009    cabg x 3: LIMA to LAD, vein grafts to diagonal and OM1   • ENDOSCOPY N/A 1/24/2020    Procedure: ESOPHAGOGASTRODUODENOSCOPY with bx;  Surgeon: Cuauhtemoc Krishnamurthy MD;  Location: [facility] ENDOSCOPY;  Service: Gastroenterology   • ENDOSCOPY N/A 2/21/2020    Procedure: ESOPHAGOGASTRODUODENOSCOPY WITH COLD BIOPSIES;  Surgeon: Cuauhtemoc Krishnamurthy MD;  Location: [facility] ENDOSCOPY;  Service: Gastroenterology;  Laterality: N/A;  PRE: Epigastric pain; GERD  POST: DUODENITIS; DIVERTICULOSIS; GASTRITIS; HERNIA   • EPIDURAL BLOCK     • HYSTERECTOMY     • KNEE SURGERY Right    • THORACIC DECOMPRESSION POSTERIOR FUSION WITH INSTRUMENTATION N/A 3/6/2017    Procedure: T4-T10 Fusion with instrumentation;  Surgeon: Clyde Callejas MD;  Location: [facility] OR;  Service:    • TOTAL THYROIDECTOMY           FAMILY HISTORY  Family History   Problem Relation Age of Onset   • No Known Problems Mother    • Heart disease Father    • Hypertension Father    • Colon cancer Father    • Hypertension Sister    • Colon polyps Sister    • Hypertension Brother    • Malig Hyperthermia Neg Hx          SOCIAL HISTORY  Social History     Socioeconomic History   • Marital status:    Tobacco Use   • Smoking status: Former  Smoker     Packs/day: 1.00     Years: 30.00     Pack years: 30.00     Types: Cigarettes     Quit date:      Years since quittin.8   • Smokeless tobacco: Never Used   Vaping Use   • Vaping Use: Never used   Substance and Sexual Activity   • Alcohol use: No   • Drug use: No   • Sexual activity: Defer         ALLERGIES  Metronidazole, Amoxicillin, and Hydrocodone-acetaminophen        REVIEW OF SYSTEMS  Review of Systems   Positive chest pain, positive shortness of breath, positive nausea, positive syncope, negative palpitations, negative abdominal pain, negative nausea, negative vomiting, negative diarrhea  All systems reviewed and negative except for those discussed in HPI.       PHYSICAL EXAM    I have reviewed the triage vital signs and nursing notes.    ED Triage Vitals [21 1456]   Temp Heart Rate Resp BP SpO2   97 °F (36.1 °C) 118 22 163/94 98 %      Temp src Heart Rate Source Patient Position BP Location FiO2 (%)   Tympanic -- -- -- --       Physical Exam  GENERAL: Awake, alert, moderate pain distress  SKIN: Warm, dry  HENT: Normocephalic, atraumatic  EYES: no scleral icterus  CV: regular rhythm, regular rate  RESPIRATORY: normal effort, lungs clear  ABDOMEN: soft, nontender, nondistended  MUSCULOSKELETAL: no deformity, strong pedal pulses.  Normal motor bilaterally.  NEURO: alert, moves all extremities, follows commands          LAB RESULTS  Recent Results (from the past 24 hour(s))   ECG 12 Lead    Collection Time: 21  3:11 PM   Result Value Ref Range    QT Interval 399 ms   Comprehensive Metabolic Panel    Collection Time: 21  3:18 PM    Specimen: Blood   Result Value Ref Range    Glucose 135 (H) 65 - 99 mg/dL    BUN 9 8 - 23 mg/dL    Creatinine 1.05 (H) 0.57 - 1.00 mg/dL    Sodium 141 136 - 145 mmol/L    Potassium 3.5 3.5 - 5.2 mmol/L    Chloride 104 98 - 107 mmol/L    CO2 21.5 (L) 22.0 - 29.0 mmol/L    Calcium 9.0 8.6 - 10.5 mg/dL    Total Protein 6.9 6.0 - 8.5 g/dL    Albumin 4.50  3.50 - 5.20 g/dL    ALT (SGPT) 12 1 - 33 U/L    AST (SGOT) 19 1 - 32 U/L    Alkaline Phosphatase 52 39 - 117 U/L    Total Bilirubin 0.7 0.0 - 1.2 mg/dL    eGFR Non African Amer 52 (L) >60 mL/min/1.73    Globulin 2.4 gm/dL    A/G Ratio 1.9 g/dL    BUN/Creatinine Ratio 8.6 7.0 - 25.0    Anion Gap 15.5 (H) 5.0 - 15.0 mmol/L   BNP    Collection Time: 11/07/21  3:18 PM    Specimen: Blood   Result Value Ref Range    proBNP 319.5 0.0 - 900.0 pg/mL   Troponin    Collection Time: 11/07/21  3:18 PM    Specimen: Blood   Result Value Ref Range    Troponin T <0.010 0.000 - 0.030 ng/mL   CBC Auto Differential    Collection Time: 11/07/21  3:18 PM    Specimen: Blood   Result Value Ref Range    WBC 8.13 3.40 - 10.80 10*3/mm3    RBC 4.35 3.77 - 5.28 10*6/mm3    Hemoglobin 13.4 12.0 - 15.9 g/dL    Hematocrit 38.7 34.0 - 46.6 %    MCV 89.0 79.0 - 97.0 fL    MCH 30.8 26.6 - 33.0 pg    MCHC 34.6 31.5 - 35.7 g/dL    RDW 13.3 12.3 - 15.4 %    RDW-SD 43.0 37.0 - 54.0 fl    MPV 8.8 6.0 - 12.0 fL    Platelets 407 140 - 450 10*3/mm3    Neutrophil % 52.4 42.7 - 76.0 %    Lymphocyte % 34.7 19.6 - 45.3 %    Monocyte % 9.1 5.0 - 12.0 %    Eosinophil % 2.7 0.3 - 6.2 %    Basophil % 0.9 0.0 - 1.5 %    Immature Grans % 0.2 0.0 - 0.5 %    Neutrophils, Absolute 4.26 1.70 - 7.00 10*3/mm3    Lymphocytes, Absolute 2.82 0.70 - 3.10 10*3/mm3    Monocytes, Absolute 0.74 0.10 - 0.90 10*3/mm3    Eosinophils, Absolute 0.22 0.00 - 0.40 10*3/mm3    Basophils, Absolute 0.07 0.00 - 0.20 10*3/mm3    Immature Grans, Absolute 0.02 0.00 - 0.05 10*3/mm3    nRBC 0.0 0.0 - 0.2 /100 WBC   COVID-19, [facility] IN-HOUSE CEPHEID/YANICK NP SWAB IN TRANSPORT MEDIA 8-12 HR TAT - Swab, Nasopharynx    Collection Time: 11/07/21  3:25 PM    Specimen: Nasopharynx; Swab   Result Value Ref Range    COVID19 Not Detected Not Detected - Ref. Range   Troponin    Collection Time: 11/07/21  5:12 PM    Specimen: Blood   Result Value Ref Range    Troponin T <0.010 0.000 - 0.030 ng/mL       Ordered  the above labs and independently reviewed the results.        RADIOLOGY  XR Chest 1 View    Result Date: 11/7/2021  PORTABLE CHEST 11/07/2021 AT 3:30 PM  CLINICAL HISTORY: Chest pain  Compared to the previous chest dated 08/16/2012.  The lungs are well-expanded and appear free of infiltrates. There are no pleural effusions. There is no pneumothorax. The heart is normal in size. Pedicle rods  and screws are in place bilaterally in the thoracic spine. Stigmata of previous coronary artery bypass procedure are also noted.  IMPRESSIONS: No evidence of acute disease within the chest.  This report was finalized on 11/7/2021 4:15 PM by Dr. Abhinav Sands M.D.        I ordered the above noted radiological studies. Reviewed by me and discussed with radiologist.  See dictation for official radiology interpretation.      PROCEDURES    Procedures      MEDICATIONS GIVEN IN ER    Medications   sodium chloride 0.9 % flush 10 mL (has no administration in time range)   ondansetron (ZOFRAN) injection 4 mg (4 mg Intravenous Given 11/7/21 1526)   LORazepam (ATIVAN) injection 0.5 mg (0.5 mg Intravenous Given 11/7/21 1539)         PROGRESS, DATA ANALYSIS, CONSULTS, AND MEDICAL DECISION MAKING    All labs have been independently reviewed by me.  All radiology studies have been reviewed by me and discussed with radiologist dictating the report.   EKG's independently viewed and interpreted by me.  Discussion below represents my analysis of pertinent findings related to patient's condition, differential diagnosis, treatment plan and final disposition.    Differential diagnosis includes but is not limited to STEMI, non-STEMI, aortic dissection, aortic aneurysm, hypertensive emergency, hypertensive urgency, unstable angina, anaphylaxis, syncope.    ED Course as of 11/07/21 1756   Sun Nov 07, 2021   1514 EKG was first performed at 1511 but had so much artifact that it was nondiagnostic.  The tech is retrying. [TR]   1514 Upon arrival she states  that her bra was too tight.  She removed it but it did not seem to improve her symptoms.  I strongly suspect that she is having cardiac ischemia. [TR]   1527 EKG          EKG time: 1525  Rhythm/Rate: Uncertain rhythm, rate 99, regular  P waves and ND: Normal P, normal ND  QRS, axis: Narrow QRS, axis undetermined  ST and T waves: What appears to be ST depression V2, V3, V4 although artifact significantly obscures interpretation    Interpreted Contemporaneously by me, independently viewed  New ST depression compared to 12/14/2018   [TR]   1720 She reports persistent chest soreness.  I reviewed work-up and findings with her.  Plan admission.  She is agreeable. [TR]   1746 I reviewed the work-up and findings with the patient.  Her EKG is abnormal after receiving intramuscular epinephrine.  She has a history of coronary disease.  She has severe chest pain and I am concerned that her symptoms were ischemic in nature.  Additionally she had a severe allergic reaction/anaphylaxis and required epinephrine.  She is at risk for a second episode around 6 to 8 hours.  Plan to admit her to the hospital for observation.  I reviewed this plan with her and her family at the bedside.  They are agreeable. [TR]   1754 Speaking with Dr. Klein.  Will admit. [TR]   1755 Heart Score Calculation:History slightly suspicious: 0History moderately suspicious: +1History highly suspicious: +2EKG normal: 0EKG nonspecific repolarization disturbance: +1EKG significant ST deviation: +2Age less than 45: 0Age 45-64: +1Age greater than 65: +2No known risk factors: 01-2 risk factors: +1Greater than 3 risk factors: +2Initial troponin less than the normal limit: 0Initial troponin I-III times normal limit: +1Initial troponin greater than 3 times normal limit: +2Total score: 7 [TR]      ED Course User Index  [TR] Armand Rachel MD           PPE: The patient wore a mask and I wore a N95 mask throughout the entire patient encounter.       AS OF 17:56 EST  VITALS:    BP - 160/81  HR - 80  TEMP - 97 °F (36.1 °C) (Tympanic)  O2 SATS - 100%        DIAGNOSIS  Final diagnoses:   Anaphylaxis, initial encounter   Chest pain, unspecified type         DISPOSITION  ED Disposition     ED Disposition Condition Comment    Decision to Admit  Level of Care: Telemetry [5]   Diagnosis: Anaphylaxis, initial encounter [424352]   Admitting Physician: LEYLA FRANCIS [[id number]]   Attending Physician: LEYLA FRANCIS [[id number]]                  Armand Rachel MD  11/07/21 8952

## 2021-11-08 ENCOUNTER — READMISSION MANAGEMENT (OUTPATIENT)
Dept: CALL CENTER | Facility: HOSPITAL | Age: 66
End: 2021-11-08

## 2021-11-08 VITALS
WEIGHT: 165 LBS | HEART RATE: 67 BPM | DIASTOLIC BLOOD PRESSURE: 60 MMHG | HEIGHT: 66 IN | BODY MASS INDEX: 26.52 KG/M2 | OXYGEN SATURATION: 97 % | RESPIRATION RATE: 18 BRPM | TEMPERATURE: 98.1 F | SYSTOLIC BLOOD PRESSURE: 119 MMHG

## 2021-11-08 PROBLEM — R07.9 CHEST PAIN: Status: ACTIVE | Noted: 2021-11-08

## 2021-11-08 LAB
ANION GAP SERPL CALCULATED.3IONS-SCNC: 9.5 MMOL/L (ref 5–15)
BUN SERPL-MCNC: 11 MG/DL (ref 8–23)
BUN/CREAT SERPL: 10.6 (ref 7–25)
CALCIUM SPEC-SCNC: 8.5 MG/DL (ref 8.6–10.5)
CHLORIDE SERPL-SCNC: 105 MMOL/L (ref 98–107)
CO2 SERPL-SCNC: 24.5 MMOL/L (ref 22–29)
CREAT SERPL-MCNC: 1.04 MG/DL (ref 0.57–1)
DEPRECATED RDW RBC AUTO: 43.9 FL (ref 37–54)
ERYTHROCYTE [DISTWIDTH] IN BLOOD BY AUTOMATED COUNT: 13.2 % (ref 12.3–15.4)
GFR SERPL CREATININE-BSD FRML MDRD: 53 ML/MIN/1.73
GLUCOSE SERPL-MCNC: 124 MG/DL (ref 65–99)
HCT VFR BLD AUTO: 36 % (ref 34–46.6)
HGB BLD-MCNC: 12.3 G/DL (ref 12–15.9)
MCH RBC QN AUTO: 31.1 PG (ref 26.6–33)
MCHC RBC AUTO-ENTMCNC: 34.2 G/DL (ref 31.5–35.7)
MCV RBC AUTO: 90.9 FL (ref 79–97)
PLATELET # BLD AUTO: 328 10*3/MM3 (ref 140–450)
PMV BLD AUTO: 9 FL (ref 6–12)
POTASSIUM SERPL-SCNC: 4.1 MMOL/L (ref 3.5–5.2)
RBC # BLD AUTO: 3.96 10*6/MM3 (ref 3.77–5.28)
SODIUM SERPL-SCNC: 139 MMOL/L (ref 136–145)
WBC # BLD AUTO: 5.64 10*3/MM3 (ref 3.4–10.8)

## 2021-11-08 PROCEDURE — 25010000002 ENOXAPARIN PER 10 MG: Performed by: INTERNAL MEDICINE

## 2021-11-08 PROCEDURE — 85027 COMPLETE CBC AUTOMATED: CPT | Performed by: INTERNAL MEDICINE

## 2021-11-08 PROCEDURE — G0378 HOSPITAL OBSERVATION PER HR: HCPCS

## 2021-11-08 PROCEDURE — 96372 THER/PROPH/DIAG INJ SC/IM: CPT

## 2021-11-08 PROCEDURE — 80048 BASIC METABOLIC PNL TOTAL CA: CPT | Performed by: INTERNAL MEDICINE

## 2021-11-08 RX ADMIN — LEVOTHYROXINE SODIUM 75 MCG: 0.07 TABLET ORAL at 10:31

## 2021-11-08 RX ADMIN — METOPROLOL TARTRATE 25 MG: 25 TABLET, FILM COATED ORAL at 10:32

## 2021-11-08 RX ADMIN — ASPIRIN 81 MG: 81 TABLET, COATED ORAL at 10:30

## 2021-11-08 RX ADMIN — Medication 1000 UNITS: at 10:31

## 2021-11-08 RX ADMIN — PANTOPRAZOLE SODIUM 40 MG: 40 TABLET, DELAYED RELEASE ORAL at 10:32

## 2021-11-08 RX ADMIN — ENOXAPARIN SODIUM 40 MG: 40 INJECTION SUBCUTANEOUS at 10:32

## 2021-11-08 RX ADMIN — FUROSEMIDE 20 MG: 20 TABLET ORAL at 10:30

## 2021-11-08 NOTE — CONSULTS
Re: chest pain    HPI:  65 yr old  female, followed with [facility] cardiology Dr Bobby last seen  with his np.  She has h/o CAD , prior stents, then had 3V CABG 2009, some chronic stable angina, recently started ranexa and has felt better. She does not smoke, not diabetic, also has prior CVA history.  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  [place] she was at the spa, getting injections for cheek fillers, which she has had before she says, the next thing she knew she was coming to the hospital with EMS. Apparently she had a reaction to the shot, and got bradycardiac 40s, and bp dropped and diaphoretic, lips became blue,  they had to give her epi and was brought in. In the ER she was noted to have some cp,bp was 160/90 then and  bpm, cp improved with nitro, ECG poor quality but normal sinus, no ischemia.     And overnight her troponin has been negative.  Tele sinus with PACs, no recurrent cp, she feels fine now, walked to the bathroom earlier.   She has been doing well otherwise, no cp, or soa, recently,no palpitations, dizziness, syncope.    ROS:  Full 10 point ROS done, pertinent positives and negatives per HPI      PHYSICAL EXAM  Vitals:    11/07/21 2002 11/07/21 2337 11/08/21 0500 11/08/21 0735   BP: 136/70 128/77  119/60   BP Location:    Right arm   Patient Position:    Lying   Pulse: 77 67     Resp: 18 18  18   Temp: 97.9 °F (36.6 °C) 98.2 °F (36.8 °C)  98.1 °F (36.7 °C)   TempSrc:    Oral   SpO2: 98% 97%     Weight:   74.8 kg (165 lb)    Height:         gen awake nad  heent no icterus, supple neck, no swelling  Chest cta  cvs regular no murmurs  Gi soft nt  Ext no edema  msk no scoliosis  Skin no rash  Neuro aao times 3 normal speech  Psych normal mood affect        Past Medical History:   Diagnosis Date   • Angina pectoris (HCC)    • Broken toe 03/01/2017    right 4th toe   • Common migraine without aura     Neurologist Dr Govea   • Coronary artery disease     stent    • History of chest pain    • History of CHF (congestive heart failure)    • History of pulmonary embolism    • History of stroke 2003   • History of transfusion    • Hyperlipidemia    • Hypertension 2015   • Migraine     eval and management by neurologist   • Orthostatic hypotension    • Osteoporosis    • Pleural effusion     POSTOPERATIVE, REQUIRING THORACENTESIS.   • Polyp of sigmoid colon     REMOVED   • PONV (postoperative nausea and vomiting)    • Stroke (HCC) 2003   • Thoracic degenerative disc disease     Received epidural 12/14 [facility] DR Landin   • Thoracic spine pain    • Thyroid disease    • Weakness of right side of body     ARM & FACE     Past Surgical History:   Procedure Laterality Date   • APPENDECTOMY     • AUGMENTATION MAMMAPLASTY     • BACK SURGERY     • BREAST AUGMENTATION     • CARDIAC CATHETERIZATION     • CHOLECYSTECTOMY     • COLONOSCOPY  06/2004    Dr. Salas   • COLONOSCOPY N/A 8/19/2021    Procedure: COLONOSCOPY into cecum and normal terminal ileum with bx;  Surgeon: Cuauhtemoc Krishnamurthy MD;  Location: [facility] ENDOSCOPY;  Service: Gastroenterology;  Laterality: N/A;  pre: Diarrhea, abdominal pain  post: left sided diverticulosis, hemorrhoids    • CORONARY ANGIOPLASTY WITH STENT PLACEMENT      NON-GRAFTED RIGHT CORONARY ARTERY STENT.   • CORONARY ARTERY BYPASS GRAFT  04/2009    cabg x 3: LIMA to LAD, vein grafts to diagonal and OM1   • ENDOSCOPY N/A 1/24/2020    Procedure: ESOPHAGOGASTRODUODENOSCOPY with bx;  Surgeon: Cuauhtemoc Krishnamurthy MD;  Location: [facility] ENDOSCOPY;  Service: Gastroenterology   • ENDOSCOPY N/A 2/21/2020    Procedure: ESOPHAGOGASTRODUODENOSCOPY WITH COLD BIOPSIES;  Surgeon: Cuauhtemoc Krishnamurthy MD;  Location: [facility] ENDOSCOPY;  Service: Gastroenterology;  Laterality: N/A;  PRE: Epigastric pain; GERD  POST: DUODENITIS; DIVERTICULOSIS; GASTRITIS; HERNIA   • EPIDURAL BLOCK     • HYSTERECTOMY     • KNEE SURGERY Right    • THORACIC DECOMPRESSION POSTERIOR  FUSION WITH INSTRUMENTATION N/A 3/6/2017    Procedure: T4-T10 Fusion with instrumentation;  Surgeon: Clyde Callejas MD;  Location: [facility];  Service:    • TOTAL THYROIDECTOMY       Social History     Socioeconomic History   • Marital status:    Tobacco Use   • Smoking status: Former Smoker     Packs/day: 1.00     Years: 30.00     Pack years: 30.00     Types: Cigarettes     Quit date:      Years since quittin.8   • Smokeless tobacco: Never Used   Vaping Use   • Vaping Use: Never used   Substance and Sexual Activity   • Alcohol use: No   • Drug use: No   • Sexual activity: Defer     Family History   Problem Relation Age of Onset   • No Known Problems Mother    • Heart disease Father    • Hypertension Father    • Colon cancer Father    • Hypertension Sister    • Colon polyps Sister    • Hypertension Brother    • Malig Hyperthermia Neg Hx      Allergies   Allergen Reactions   • Metronidazole Diarrhea and Nausea And Vomiting   • Amoxicillin Swelling   • Hydrocodone-Acetaminophen Nausea And Vomiting     No current facility-administered medications on file prior to encounter.     Current Outpatient Medications on File Prior to Encounter   Medication Sig   • aspirin 81 MG tablet Take 81 mg by mouth Daily With Dinner.   • furosemide (LASIX) 20 MG tablet TAKE 1 TABLET BY MOUTH  DAILY   • levothyroxine (SYNTHROID, LEVOTHROID) 75 MCG tablet TAKE 1 TABLET BY MOUTH  DAILY   • meclizine (ANTIVERT) 25 MG tablet TAKE 1 TABLET BY MOUTH AT  NIGHT MAY TAKE AN  ADDITIONAL TABLET DAILY AS  NEEDED (Patient taking differently: Take 25 mg by mouth Daily.)   • meloxicam (MOBIC) 15 MG tablet Take 15 mg by mouth Daily.   • metoprolol tartrate (LOPRESSOR) 25 MG tablet TAKE 1 TABLET BY MOUTH  TWICE DAILY   • nitroglycerin (NITROSTAT) 0.4 MG SL tablet Place 0.4 mg under the tongue Every 5 (Five) Minutes As Needed for Chest Pain. Take no more than 3 doses in 15 minutes.   • pantoprazole (PROTONIX) 40 MG EC tablet Take 1  tablet by mouth Daily.   • rosuvastatin (CRESTOR) 40 MG tablet TAKE 1 TABLET BY MOUTH IN  THE EVENING   • venlafaxine (EFFEXOR) 75 MG tablet Take 1 tablet by mouth every night at bedtime.   • VITAMIN D, CHOLECALCIFEROL, PO Take 1,000 Units by mouth Daily.   • Denosumab (PROLIA SC) Inject  under the skin into the appropriate area as directed Every 6 (Six) Months.   • diphenoxylate-atropine (Lomotil) 2.5-0.025 MG per tablet Take 1 tablet by mouth 4 (Four) Times a Day As Needed for Diarrhea.       ASSESSMENT AND PLAN:  1. Anaphylactic reaction    2. Chest pain, likely with high bp and HR, demand related, after epinephrine injection, resolved, ECG and trop negative, dont suspect ACS, dont anticipate further testing    3. CAD, prior CABG 2009:  LHC in 1/2020 revealed severe native left system disease with patent RCA stent, patent SVG-diagonal, patent SVG to OM with mild to moderate disease and patent but very small LIMA-LAD and medical management was recommended  Intolerant to nitrates hence on ranexa, asa statin bb    4. HTN  5. Dyslipidemia  6. CVA  7. Recent echo [facility]:Echocardiogram revealed normal LV systolic function, EF 59%, basal to mid inferior wall hypokinesis, trace to mild TR and RVSP of 37 mmHg consistent with mild pHTN    Pt stable cardiac wise, ok for dc, continue home cv meds, dont anticipate further cv testing, f/u with Dr Kanu [facility] as scheduled.

## 2021-11-08 NOTE — PLAN OF CARE
Problem: Adult Inpatient Plan of Care  Goal: Plan of Care Review  11/8/2021 1333 by Rani Rodriguez RN  Outcome: Adequate for Care Transition  11/8/2021 1331 by Rani Rodriguez RN  Outcome: Ongoing, Progressing  Flowsheets  Taken 11/8/2021 1331 by Rani Rodriguez RN  Outcome Summary: VSS. No complaints this shift. Pt denies chest pain or SOA. Plan for discharge home. Spouse is at bedside and will provide transportation.  Taken 11/8/2021 0328 by Lata Delgado RN  Plan of Care Reviewed With: patient  Goal: Patient-Specific Goal (Individualized)  11/8/2021 1333 by Rani Rodriguez RN  Outcome: Adequate for Care Transition  11/8/2021 1331 by Rani Rodriguez RN  Outcome: Ongoing, Progressing  Goal: Absence of Hospital-Acquired Illness or Injury  11/8/2021 1333 by Rani Rodriguez RN  Outcome: Adequate for Care Transition  11/8/2021 1331 by Rani Rodriguez RN  Outcome: Ongoing, Progressing  Intervention: Identify and Manage Fall Risk  Recent Flowsheet Documentation  Taken 11/8/2021 1255 by Rani Rodriguez RN  Safety Promotion/Fall Prevention:   safety round/check completed   room organization consistent   nonskid shoes/slippers when out of bed   fall prevention program maintained   clutter free environment maintained   assistive device/personal items within reach  Taken 11/8/2021 1030 by Rani Rodriguez RN  Safety Promotion/Fall Prevention:   safety round/check completed   room organization consistent   nonskid shoes/slippers when out of bed   fall prevention program maintained   clutter free environment maintained   assistive device/personal items within reach  Taken 11/8/2021 0850 by Rani Rodriguez RN  Safety Promotion/Fall Prevention:   safety round/check completed   room organization consistent   nonskid shoes/slippers when out of bed   fall prevention program maintained   clutter free environment maintained   assistive device/personal items within  reach  Intervention: Prevent and Manage VTE (venous thromboembolism) Risk  Recent Flowsheet Documentation  Taken 11/8/2021 1030 by Rani Rodriguez RN  VTE Prevention/Management:   bilateral   dorsiflexion/plantar flexion performed  Intervention: Prevent Infection  Recent Flowsheet Documentation  Taken 11/8/2021 1255 by Rani Rodriguez RN  Infection Prevention: personal protective equipment utilized  Taken 11/8/2021 1030 by Rani Rodriguez RN  Infection Prevention: personal protective equipment utilized  Goal: Optimal Comfort and Wellbeing  11/8/2021 1333 by Rani Rodriguez RN  Outcome: Adequate for Care Transition  11/8/2021 1331 by Rani Rodriguez RN  Outcome: Ongoing, Progressing  Intervention: Provide Person-Centered Care  Recent Flowsheet Documentation  Taken 11/8/2021 1030 by Rani Rodriguez RN  Trust Relationship/Rapport:   care explained   questions answered  Goal: Readiness for Transition of Care  11/8/2021 1333 by Rani Rodriguez RN  Outcome: Adequate for Care Transition  11/8/2021 1331 by Rani Rodriguez RN  Outcome: Ongoing, Progressing     Problem: Chest Pain  Goal: Resolution of Chest Pain Symptoms  11/8/2021 1333 by Rani Rodriguez RN  Outcome: Adequate for Care Transition  11/8/2021 1331 by Rani Rodriguez RN  Outcome: Ongoing, Progressing   Goal Outcome Evaluation:              Outcome Summary: VSS. No complaints this shift. Pt denies chest pain or SOA. Plan for discharge home. Spouse is at bedside and will provide transportation.

## 2021-11-08 NOTE — DISCHARGE SUMMARY
PHYSICIAN DISCHARGE SUMMARY                                                                        [facility]    Patient Identification:  Name: Ivy Chairez  Age: 65 y.o.  Sex: female  :  1955  MRN: [id number]  Primary Care Physician: Chloe Leung APRN    Admit date: 2021  Discharge date and time:2021  Discharged Condition: good    Discharge Diagnoses:  Active Hospital Problems    Diagnosis  POA   • **Chest pain [R07.9]  Unknown   • Anaphylactic syndrome [T78.2XXA]  Yes   • Triple vessel coronary artery disease [I25.10]  Yes   • Anxiety [F41.9]  Yes   • Gastroesophageal reflux disease [K21.9]  Yes   • Hyperlipidemia [E78.5]  Yes   • Hypothyroidism [E03.9]  Yes   • Cerebrovascular accident (HCC) [I63.9]  Yes   • History of coronary artery bypass surgery [Z95.1]  Not Applicable   • Hypertension [I10]  Yes      Resolved Hospital Problems   No resolved problems to display.          PMHX:   Past Medical History:   Diagnosis Date   • Angina pectoris (HCC)    • Broken toe 2017    right 4th toe   • Common migraine without aura     Neurologist Dr Govea   • Coronary artery disease     stent   • History of chest pain    • History of CHF (congestive heart failure)    • History of pulmonary embolism    • History of stroke    • History of transfusion    • Hyperlipidemia    • Hypertension    • Migraine     eval and management by neurologist   • Orthostatic hypotension    • Osteoporosis    • Pleural effusion     POSTOPERATIVE, REQUIRING THORACENTESIS.   • Polyp of sigmoid colon     REMOVED   • PONV (postoperative nausea and vomiting)    • Stroke (HCC)    • Thoracic degenerative disc disease     Received epidural  [facility] DR Landin   • Thoracic spine pain    • Thyroid disease    • Weakness of right side of body     ARM & FACE     PSHX:   Past Surgical History:   Procedure Laterality Date    • APPENDECTOMY     • AUGMENTATION MAMMAPLASTY     • BACK SURGERY     • BREAST AUGMENTATION     • CARDIAC CATHETERIZATION     • CHOLECYSTECTOMY     • COLONOSCOPY  06/2004    Dr. Salas   • COLONOSCOPY N/A 8/19/2021    Procedure: COLONOSCOPY into cecum and normal terminal ileum with bx;  Surgeon: Cuauhtemoc Krishnamurthy MD;  Location: [facility] ENDOSCOPY;  Service: Gastroenterology;  Laterality: N/A;  pre: Diarrhea, abdominal pain  post: left sided diverticulosis, hemorrhoids    • CORONARY ANGIOPLASTY WITH STENT PLACEMENT      NON-GRAFTED RIGHT CORONARY ARTERY STENT.   • CORONARY ARTERY BYPASS GRAFT  04/2009    cabg x 3: LIMA to LAD, vein grafts to diagonal and OM1   • ENDOSCOPY N/A 1/24/2020    Procedure: ESOPHAGOGASTRODUODENOSCOPY with bx;  Surgeon: Cuauhtemoc Krishnamurthy MD;  Location: [facility] ENDOSCOPY;  Service: Gastroenterology   • ENDOSCOPY N/A 2/21/2020    Procedure: ESOPHAGOGASTRODUODENOSCOPY WITH COLD BIOPSIES;  Surgeon: Cuauhtemoc Krishnamurthy MD;  Location: [facility] ENDOSCOPY;  Service: Gastroenterology;  Laterality: N/A;  PRE: Epigastric pain; GERD  POST: DUODENITIS; DIVERTICULOSIS; GASTRITIS; HERNIA   • EPIDURAL BLOCK     • HYSTERECTOMY     • KNEE SURGERY Right    • THORACIC DECOMPRESSION POSTERIOR FUSION WITH INSTRUMENTATION N/A 3/6/2017    Procedure: T4-T10 Fusion with instrumentation;  Surgeon: Clyde Callejas MD;  Location: [facility] OR;  Service:    • TOTAL THYROIDECTOMY         Hospital Course: Ivy Chairez  Is a 65 year old female was at a spa getting an injection and had an allergic reaction to the material; she became bradycardic,diaphoretic and had syncope; she was given epinephrine and then developed chest pain; she also had some shortness of breath and nausea; she has a history of cad and is followed by dr altman      The patient was admitted to the hospital and seen by cardiology.  The patient had negative troponins and cardiology did not recommend any further work-up at this time since  patient had had some recent cardiac work-up.  She was feeling better and had no more chest pain he looked well enough to go home.  She will continue with her usual medicines and follow-up with her primary care in 1 week for continuing care.  She will also follow-up with her cardiologist.      Consults:     Consults     Date and Time Order Name Status Description    11/7/2021  7:07 PM Inpatient Cardiology Consult Completed     11/7/2021  5:45 PM [facility] (on-call MD unless specified) Details          Results from last 7 days   Lab Units 11/08/21  0845   WBC 10*3/mm3 5.64   HEMOGLOBIN g/dL 12.3   HEMATOCRIT % 36.0   PLATELETS 10*3/mm3 328     Results from last 7 days   Lab Units 11/08/21  0845   SODIUM mmol/L 139   POTASSIUM mmol/L 4.1   CHLORIDE mmol/L 105   CO2 mmol/L 24.5   BUN mg/dL 11   CREATININE mg/dL 1.04*   GLUCOSE mg/dL 124*   CALCIUM mg/dL 8.5*     Significant Diagnostic Studies:   WBC   Date Value Ref Range Status   11/08/2021 5.64 3.40 - 10.80 10*3/mm3 Final     Hemoglobin   Date Value Ref Range Status   11/08/2021 12.3 12.0 - 15.9 g/dL Final     Hematocrit   Date Value Ref Range Status   11/08/2021 36.0 34.0 - 46.6 % Final     Platelets   Date Value Ref Range Status   11/08/2021 328 140 - 450 10*3/mm3 Final     Sodium   Date Value Ref Range Status   11/08/2021 139 136 - 145 mmol/L Final     Potassium   Date Value Ref Range Status   11/08/2021 4.1 3.5 - 5.2 mmol/L Final     Chloride   Date Value Ref Range Status   11/08/2021 105 98 - 107 mmol/L Final     CO2   Date Value Ref Range Status   11/08/2021 24.5 22.0 - 29.0 mmol/L Final     BUN   Date Value Ref Range Status   11/08/2021 11 8 - 23 mg/dL Final     Creatinine   Date Value Ref Range Status   11/08/2021 1.04 (H) 0.57 - 1.00 mg/dL Final     Glucose   Date Value Ref Range Status   11/08/2021 124 (H) 65 - 99 mg/dL Final     Calcium   Date Value Ref Range Status   11/08/2021 8.5 (L) 8.6 - 10.5 mg/dL Final     AST (SGOT)   Date Value Ref Range Status    11/07/2021 19 1 - 32 U/L Final     ALT (SGPT)   Date Value Ref Range Status   11/07/2021 12 1 - 33 U/L Final     Alkaline Phosphatase   Date Value Ref Range Status   11/07/2021 52 39 - 117 U/L Final     No results found for: APTT, INR  No results found for: COLORU, CLARITYU, SPECGRAV, PHUR, PROTEINUR, GLUCOSEU, KETONESU, BLOODU, NITRITE, LEUKOCYTESUR, BILIRUBINUR, UROBILINOGEN, RBCUA, WBCUA, BACTERIA, UACOMMENT  Troponin T   Date Value Ref Range Status   11/07/2021 <0.010 0.000 - 0.030 ng/mL Final     No components found for: HGBA1C;2  No components found for: TSH;2  Imaging Results (All)     Procedure Component Value Units Date/Time    XR Chest 1 View [[id number]] Collected: 11/07/21 1614     Updated: 11/07/21 1618    Narrative:      PORTABLE CHEST 11/07/2021 AT 3:30 PM     CLINICAL HISTORY: Chest pain     Compared to the previous chest dated 08/16/2012.     The lungs are well-expanded and appear free of infiltrates. There are no  pleural effusions. There is no pneumothorax. The heart is normal in  size. Pedicle rods  and screws are in place bilaterally in the thoracic  spine. Stigmata of previous coronary artery bypass procedure are also  noted.     IMPRESSIONS: No evidence of acute disease within the chest.     This report was finalized on 11/7/2021 4:15 PM by Dr. Abhinav Sands M.D.           Lab Results (last 7 days)     Procedure Component Value Units Date/Time    Basic Metabolic Panel [[id number]]  (Abnormal) Collected: 11/08/21 0845    Specimen: Blood Updated: 11/08/21 1025     Glucose 124 mg/dL      BUN 11 mg/dL      Creatinine 1.04 mg/dL      Sodium 139 mmol/L      Potassium 4.1 mmol/L      Chloride 105 mmol/L      CO2 24.5 mmol/L      Calcium 8.5 mg/dL      eGFR Non African Amer 53 mL/min/1.73      BUN/Creatinine Ratio 10.6     Anion Gap 9.5 mmol/L     Narrative:      GFR Normal >60  Chronic Kidney Disease <60  Kidney Failure <15      CBC (No Diff) [[id number]]  (Normal) Collected: 11/08/21 0845     Specimen: Blood Updated: 11/08/21 0954     WBC 5.64 10*3/mm3      RBC 3.96 10*6/mm3      Hemoglobin 12.3 g/dL      Hematocrit 36.0 %      MCV 90.9 fL      MCH 31.1 pg      MCHC 34.2 g/dL      RDW 13.2 %      RDW-SD 43.9 fl      MPV 9.0 fL      Platelets 328 10*3/mm3     POC Glucose Once [[id number]]  (Normal) Collected: 11/07/21 2001    Specimen: Blood Updated: 11/07/21 2002     Glucose 93 mg/dL      Comment: Meter: [id number] : [id number] Gabe JOSE       Troponin [[id number]]  (Normal) Collected: 11/07/21 1921    Specimen: Blood Updated: 11/07/21 1953     Troponin T <0.010 ng/mL     Narrative:      Troponin T Reference Range:  <= 0.03 ng/mL-   Negative for AMI  >0.03 ng/mL-     Abnormal for myocardial necrosis.  Clinicians would have to utilize clinical acumen, EKG, Troponin and serial changes to determine if it is an Acute Myocardial Infarction or myocardial injury due to an underlying chronic condition.       Results may be falsely decreased if patient taking Biotin.      Troponin [[id number]]  (Normal) Collected: 11/07/21 1712    Specimen: Blood Updated: 11/07/21 1739     Troponin T <0.010 ng/mL     Narrative:      Troponin T Reference Range:  <= 0.03 ng/mL-   Negative for AMI  >0.03 ng/mL-     Abnormal for myocardial necrosis.  Clinicians would have to utilize clinical acumen, EKG, Troponin and serial changes to determine if it is an Acute Myocardial Infarction or myocardial injury due to an underlying chronic condition.       Results may be falsely decreased if patient taking Biotin.      COVID PRE-OP / PRE-PROCEDURE SCREENING ORDER (NO ISOLATION) - Swab, Nasopharynx [[id number]]  (Normal) Collected: 11/07/21 1525    Specimen: Swab from Nasopharynx Updated: 11/07/21 1604    Narrative:      The following orders were created for panel order COVID PRE-OP / PRE-PROCEDURE SCREENING ORDER (NO ISOLATION) - Swab, Nasopharynx.  Procedure                               Abnormality         Status                      ---------                               -----------         ------                     COVID-19,[facility] IN-HOUSE...[[id number]]  Normal              Final result                 Please view results for these tests on the individual orders.    COVID-19,[facility]-HOU[facility] CEPHEID/YANICK NP SWAB IN TRANSPORT MEDIA 8-12 HR TAT - Swab, Nasopharynx [[id number]]  (Normal) Collected: 11/07/21 1525    Specimen: Swab from Nasopharynx Updated: 11/07/21 1604     COVID19 Not Detected    Narrative:      Fact sheet for providers: https://www.fda.gov/media/345187/download    Fact sheet for patients: https://www.fda.gov/media/948836/download    Test performed by PCR.    Comprehensive Metabolic Panel [[id number]]  (Abnormal) Collected: 11/07/21 1518    Specimen: Blood Updated: 11/07/21 1554     Glucose 135 mg/dL      BUN 9 mg/dL      Creatinine 1.05 mg/dL      Sodium 141 mmol/L      Potassium 3.5 mmol/L      Chloride 104 mmol/L      CO2 21.5 mmol/L      Calcium 9.0 mg/dL      Total Protein 6.9 g/dL      Albumin 4.50 g/dL      ALT (SGPT) 12 U/L      AST (SGOT) 19 U/L      Alkaline Phosphatase 52 U/L      Total Bilirubin 0.7 mg/dL      eGFR Non African Amer 52 mL/min/1.73      Globulin 2.4 gm/dL      A/G Ratio 1.9 g/dL      BUN/Creatinine Ratio 8.6     Anion Gap 15.5 mmol/L     Narrative:      GFR Normal >60  Chronic Kidney Disease <60  Kidney Failure <15      Troponin [[id number]]  (Normal) Collected: 11/07/21 1518    Specimen: Blood Updated: 11/07/21 1554     Troponin T <0.010 ng/mL     Narrative:      Troponin T Reference Range:  <= 0.03 ng/mL-   Negative for AMI  >0.03 ng/mL-     Abnormal for myocardial necrosis.  Clinicians would have to utilize clinical acumen, EKG, Troponin and serial changes to determine if it is an Acute Myocardial Infarction or myocardial injury due to an underlying chronic condition.       Results may be falsely decreased if patient taking Biotin.      BNP [[id number]]  (Normal) Collected: 11/07/21 1518    Specimen:  "Blood Updated: 11/07/21 1552     proBNP 319.5 pg/mL     Narrative:      Among patients with dyspnea, NT-proBNP is highly sensitive for the detection of acute congestive heart failure. In addition NT-proBNP of <300 pg/ml effectively rules out acute congestive heart failure with 99% negative predictive value.    Results may be falsely decreased if patient taking Biotin.      CBC & Differential [[id number]]  (Normal) Collected: 11/07/21 1518    Specimen: Blood Updated: 11/07/21 1529    Narrative:      The following orders were created for panel order CBC & Differential.  Procedure                               Abnormality         Status                     ---------                               -----------         ------                     CBC Auto Differential[[id number]]        Normal              Final result                 Please view results for these tests on the individual orders.    CBC Auto Differential [[id number]]  (Normal) Collected: 11/07/21 1518    Specimen: Blood Updated: 11/07/21 1529     WBC 8.13 10*3/mm3      RBC 4.35 10*6/mm3      Hemoglobin 13.4 g/dL      Hematocrit 38.7 %      MCV 89.0 fL      MCH 30.8 pg      MCHC 34.6 g/dL      RDW 13.3 %      RDW-SD 43.0 fl      MPV 8.8 fL      Platelets 407 10*3/mm3      Neutrophil % 52.4 %      Lymphocyte % 34.7 %      Monocyte % 9.1 %      Eosinophil % 2.7 %      Basophil % 0.9 %      Immature Grans % 0.2 %      Neutrophils, Absolute 4.26 10*3/mm3      Lymphocytes, Absolute 2.82 10*3/mm3      Monocytes, Absolute 0.74 10*3/mm3      Eosinophils, Absolute 0.22 10*3/mm3      Basophils, Absolute 0.07 10*3/mm3      Immature Grans, Absolute 0.02 10*3/mm3      nRBC 0.0 /100 WBC         /60 (BP Location: Right arm, Patient Position: Lying)   Pulse 67   Temp 98.1 °F (36.7 °C) (Oral)   Resp 18   Ht 167.6 cm (66\")   Wt 74.8 kg (165 lb)   SpO2 97%   BMI 26.63 kg/m²     Discharge Exam:  General Appearance:    Alert, cooperative, no distress                     "      Head:    Normocephalic, without obvious abnormality, atraumatic                          Eyes:                            Throat:   Lips, tongue, gums normal                          Neck:   Supple, symmetrical, trachea midline, no JVD                        Lungs:     Clear to auscultation bilaterally, respirations unlabored                Chest Wall:    No tenderness or deformity                        Heart:    Regular rate and rhythm, S1 and S2 normal, no murmur,no  Rub or gallop                  Abdomen:     Soft, non-tender, bowel sounds active, no masses, no organomegaly                  Extremities:   Extremities normal, atraumatic, no cyanosis or edema                             Skin:   Skin is warm and dry,  no rashes or palpable lesions                  Neurologic:   no focal deficits noted     Disposition:  Home    Patient Instructions:      Discharge Medications      Changes to Medications      Instructions Start Date   meclizine 25 MG tablet  Commonly known as: ANTIVERT  What changed: See the new instructions.   TAKE 1 TABLET BY MOUTH AT  NIGHT MAY TAKE AN  ADDITIONAL TABLET DAILY AS  NEEDED         Continue These Medications      Instructions Start Date   aspirin 81 MG tablet   81 mg, Oral, Daily With Dinner      diphenoxylate-atropine 2.5-0.025 MG per tablet  Commonly known as: Lomotil   1 tablet, Oral, 4 Times Daily PRN      furosemide 20 MG tablet  Commonly known as: LASIX   20 mg, Oral, Daily      levothyroxine 75 MCG tablet  Commonly known as: SYNTHROID, LEVOTHROID   75 mcg, Oral, Daily      meloxicam 15 MG tablet  Commonly known as: MOBIC   15 mg, Oral, Daily      metoprolol tartrate 25 MG tablet  Commonly known as: LOPRESSOR   TAKE 1 TABLET BY MOUTH  TWICE DAILY      nitroglycerin 0.4 MG SL tablet  Commonly known as: NITROSTAT   0.4 mg, Sublingual, Every 5 Minutes PRN, Take no more than 3 doses in 15 minutes.      pantoprazole 40 MG EC tablet  Commonly known as: PROTONIX   40 mg, Oral,  Daily      PROLIA SC   Subcutaneous, Every 6 Months      rosuvastatin 40 MG tablet  Commonly known as: CRESTOR   TAKE 1 TABLET BY MOUTH IN  THE EVENING      venlafaxine 75 MG tablet  Commonly known as: EFFEXOR   75 mg, Oral, Every Night at Bedtime      VITAMIN D (CHOLECALCIFEROL) PO   1,000 Units, Oral, Daily           Future Appointments   Date Time Provider Department Center   12/20/2021  3:00 PM REFERRED INJECTION CHAIR [facility]   12/29/2021  8:00 AM [facility]   1/5/2022  3:30 PM Chloe Leung APRN [facility]      Follow-up Information     Chloe Leung APRN Follow up in 1 week(s).    Specialty: Family Medicine  Contact information:  [address] [place] [address]  [address]  [facility] [address]  [phone]             Lebron Bobby MD. Schedule an appointment as soon as possible for a visit.    Specialty: General Surgery  Contact information:  [address] [facility]  [address]  [place] [id number] [phone]                       Discharge Order (From admission, onward)     Start     Ordered    11/08/21 1142  Discharge patient  Once        Expected Discharge Date: 11/08/21    Discharge Disposition: Home or Self Care    Physician of Record for Attribution - Please select from Treatment Team: DESMOND HOPKINS [[id number]]    Review needed by CMO to determine Physician of Record: No       Question Answer Comment   Physician of Record for Attribution - Please select from Treatment Team DESMOND HOPKINS    Review needed by CMO to determine Physician of Record No        11/08/21 1145                Total time spent discharging patient including evaluation,post hospitalization follow up,  medication and post hospitalization instructions and education total time exceeds 30 minutes.    Signed:  Desmond Hopkins MD  11/8/2021  11:46 EST

## 2021-11-08 NOTE — CASE MANAGEMENT/SOCIAL WORK
Case Management Discharge Note      Final Note: DC'd home. Laura Thomas RN         Selected Continued Care - Discharged on 11/8/2021 Admission date: 11/7/2021 - Discharge disposition: Home or Self Care    Destination    No services have been selected for the patient.              Durable Medical Equipment    No services have been selected for the patient.              Dialysis/Infusion    No services have been selected for the patient.              Home Medical Care    No services have been selected for the patient.              Therapy    No services have been selected for the patient.              Community Resources    No services have been selected for the patient.              Community & DME    No services have been selected for the patient.                       Final Discharge Disposition Code: 01 - home or self-care

## 2021-11-08 NOTE — PROGRESS NOTES
"Pharmacy Consult - Lovenox    Ivy Chairez has been consulted for pharmacy to dose enoxaparin for VTE prophylaxis per Dr. Klein's request.       Relevant clinical data and objective history reviewed:  65 y.o. female 167.6 cm (66\") 74.5 kg (164 lb 3.2 oz)    Home Anticoagulation:     Past Medical History:   Diagnosis Date    Angina pectoris (CMS/HCC)     Broken toe 03/01/2017    right 4th toe    Common migraine without aura     Neurologist Dr Govea    Coronary artery disease     stent    History of chest pain     History of CHF (congestive heart failure)     History of pulmonary embolism     History of stroke 2003    History of transfusion     Hyperlipidemia     Hypertension 2015    Migraine     eval and management by neurologist    Orthostatic hypotension     Osteoporosis     Pleural effusion     POSTOPERATIVE, REQUIRING THORACENTESIS.    Polyp of sigmoid colon     REMOVED    PONV (postoperative nausea and vomiting)     Stroke (CMS/[facility]) 2003    Thoracic degenerative disc disease     Received epidural 12/14 [facility] DR Landin    Thoracic spine pain     Thyroid disease     Weakness of right side of body     ARM & FACE     is allergic to metronidazole, amoxicillin, and hydrocodone-acetaminophen.    Lab Results   Component Value Date    WBC 8.13 11/07/2021    HGB 13.4 11/07/2021    HCT 38.7 11/07/2021    MCV 89.0 11/07/2021     11/07/2021     Lab Results   Component Value Date    GLUCOSE 135 (H) 11/07/2021    CALCIUM 9.0 11/07/2021     11/07/2021    K 3.5 11/07/2021    CO2 21.5 (L) 11/07/2021     11/07/2021    BUN 9 11/07/2021    CREATININE 1.05 (H) 11/07/2021    EGFRIFAFRI 60 06/16/2021    EGFRIFNONA 52 (L) 11/07/2021    BCR 8.6 11/07/2021    ANIONGAP 15.5 (H) 11/07/2021       Estimated Creatinine Clearance: 54.4 mL/min (A) (by C-G formula based on SCr of 1.05 mg/dL (H)).    Active Inpatient Anticoagulation Orders:     Assessment/Plan    Will start patient on Lovenox 40 mg " subcutaneous every 24 hours, adjusted for renal function. Labs to be ordered: CBC/BMP in AM. Pharmacy will continue to follow.     Jamie Pederson RP

## 2021-11-08 NOTE — PLAN OF CARE
Goal Outcome Evaluation:            Outcome Summary: VSS. No complaints this shift. Pt denies chest pain or SOA. Plan for discharge home. Spouse is at bedside and will provide transportation.

## 2021-11-08 NOTE — OUTREACH NOTE
Prep Survey      Responses   [facility] patient discharged from? [place]   Is LACE score < 7 ? Yes   Emergency Room discharge w/ pulse ox? No   Eligibility [facility]   Date of Admission 11/07/21   Date of Discharge 11/08/21   Discharge Disposition Home or Self Care   Discharge diagnosis Chest pain   Does the patient have one of the following disease processes/diagnoses(primary or secondary)? Other   Does the patient have Home health ordered? No   Is there a DME ordered? No   Prep survey completed? Yes          Kirstie Ambrosio RN

## 2021-11-08 NOTE — H&P
HISTORY AND PHYSICAL   [facility]        Date of Admission: 2021  Patient Identification:  Name: Ivy Chairez  Age: 65 y.o.  Sex: female  :  1955  MRN: [id number]                     Primary Care Physician: Chloe Leung APRN    Chief Complaint:  65 year old female was at a spa getting an injection and had an allergic reaction to the material; she became bradycardic,diaphoretic and had syncope; she was given epinephrine and then developed chest pain; she also had some shortness of breath and nausea; she has a history of cad and is followed by dr altman    History of Present Illness:   As above    Past Medical History:  Past Medical History:   Diagnosis Date   • Angina pectoris (CMS/HCC)    • Broken toe 2017    right 4th toe   • Common migraine without aura     Neurologist Dr Govea   • Coronary artery disease     stent   • History of chest pain    • History of CHF (congestive heart failure)    • History of pulmonary embolism    • History of stroke    • History of transfusion    • Hyperlipidemia    • Hypertension    • Migraine     eval and management by neurologist   • Orthostatic hypotension    • Osteoporosis    • Pleural effusion     POSTOPERATIVE, REQUIRING THORACENTESIS.   • Polyp of sigmoid colon     REMOVED   • PONV (postoperative nausea and vomiting)    • Stroke (CMS/HCC)    • Thoracic degenerative disc disease     Received epidural  [facility] DR Landin   • Thoracic spine pain    • Thyroid disease    • Weakness of right side of body     ARM & FACE     Past Surgical History:  Past Surgical History:   Procedure Laterality Date   • APPENDECTOMY     • AUGMENTATION MAMMAPLASTY     • BACK SURGERY     • BREAST AUGMENTATION     • CARDIAC CATHETERIZATION     • CHOLECYSTECTOMY     • COLONOSCOPY  2004    Dr. Salas   • COLONOSCOPY N/A 2021    Procedure: COLONOSCOPY into cecum and normal terminal ileum with bx;  Surgeon: Cuauhtemoc Krishnamurthy,  MD;  Location: [facility] ENDOSCOPY;  Service: Gastroenterology;  Laterality: N/A;  pre: Diarrhea, abdominal pain  post: left sided diverticulosis, hemorrhoids    • CORONARY ANGIOPLASTY WITH STENT PLACEMENT      NON-GRAFTED RIGHT CORONARY ARTERY STENT.   • CORONARY ARTERY BYPASS GRAFT  04/2009    cabg x 3: LIMA to LAD, vein grafts to diagonal and OM1   • ENDOSCOPY N/A 1/24/2020    Procedure: ESOPHAGOGASTRODUODENOSCOPY with bx;  Surgeon: Cuauhtemoc Krishnamurthy MD;  Location: [facility] ENDOSCOPY;  Service: Gastroenterology   • ENDOSCOPY N/A 2/21/2020    Procedure: ESOPHAGOGASTRODUODENOSCOPY WITH COLD BIOPSIES;  Surgeon: Cuauhtemoc Krishnamurthy MD;  Location: [facility] ENDOSCOPY;  Service: Gastroenterology;  Laterality: N/A;  PRE: Epigastric pain; GERD  POST: DUODENITIS; DIVERTICULOSIS; GASTRITIS; HERNIA   • EPIDURAL BLOCK     • HYSTERECTOMY     • KNEE SURGERY Right    • THORACIC DECOMPRESSION POSTERIOR FUSION WITH INSTRUMENTATION N/A 3/6/2017    Procedure: T4-T10 Fusion with instrumentation;  Surgeon: Clyde Callejas MD;  Location: [facility] MAIN OR;  Service:    • TOTAL THYROIDECTOMY        Home Meds:  Medications Prior to Admission   Medication Sig Dispense Refill Last Dose   • aspirin 81 MG tablet Take 81 mg by mouth Daily With Dinner.      • Denosumab (PROLIA SC) Inject  under the skin into the appropriate area as directed Every 6 (Six) Months.      • diphenoxylate-atropine (Lomotil) 2.5-0.025 MG per tablet Take 1 tablet by mouth 4 (Four) Times a Day As Needed for Diarrhea. 20 tablet 0    • furosemide (LASIX) 20 MG tablet TAKE 1 TABLET BY MOUTH  DAILY 90 tablet 1    • levothyroxine (SYNTHROID, LEVOTHROID) 75 MCG tablet TAKE 1 TABLET BY MOUTH  DAILY 90 tablet 1    • meclizine (ANTIVERT) 25 MG tablet TAKE 1 TABLET BY MOUTH AT  NIGHT MAY TAKE AN  ADDITIONAL TABLET DAILY AS  NEEDED (Patient taking differently: Take 25 mg by mouth Daily.) 90 tablet 3    • meloxicam (MOBIC) 15 MG tablet Take 15 mg by mouth Daily.  4    • metoprolol  tartrate (LOPRESSOR) 25 MG tablet TAKE 1 TABLET BY MOUTH  TWICE DAILY 180 tablet 3    • nitroglycerin (NITROSTAT) 0.4 MG SL tablet Place 0.4 mg under the tongue Every 5 (Five) Minutes As Needed for Chest Pain. Take no more than 3 doses in 15 minutes.      • pantoprazole (PROTONIX) 40 MG EC tablet Take 1 tablet by mouth Daily. 90 tablet 3    • rosuvastatin (CRESTOR) 40 MG tablet TAKE 1 TABLET BY MOUTH IN  THE EVENING 90 tablet 2    • venlafaxine (EFFEXOR) 75 MG tablet Take 1 tablet by mouth every night at bedtime. 90 tablet 3    • VITAMIN D, CHOLECALCIFEROL, PO Take 1,000 Units by mouth Daily.          Allergies:  Allergies   Allergen Reactions   • Metronidazole Diarrhea and Nausea And Vomiting   • Amoxicillin Swelling   • Hydrocodone-Acetaminophen Nausea And Vomiting     Immunizations:  Immunization History   Administered Date(s) Administered   • COVID-19 (PFIZER) 2021, 2021   • Flu Vaccine Intradermal Quad 18-64YR 2018   • Flu Vaccine Quad PF >18YRS 2017   • Fluad Quad 65+ 2020   • H1N1 Inj 2010   • Influenza TIV (IM) 2013   • Pneumococcal Conjugate 13-Valent (PCV13) 2016   • Pneumococcal Polysaccharide (PPSV23) 2013, 2020   • Td 2003   • Td, Not Adsorbed 2003   • Tdap 2008   • flucelvax quad pfs =>4 YRS 2018     Social History:   Social History     Social History Narrative   • Not on file     Social History     Socioeconomic History   • Marital status:    Tobacco Use   • Smoking status: Former Smoker     Packs/day: 1.00     Years: 30.00     Pack years: 30.00     Types: Cigarettes     Quit date:      Years since quittin.8   • Smokeless tobacco: Never Used   Vaping Use   • Vaping Use: Never used   Substance and Sexual Activity   • Alcohol use: No   • Drug use: No   • Sexual activity: Defer       Family History:  Family History   Problem Relation Age of Onset   • No Known Problems Mother    • Heart disease Father    •  "Hypertension Father    • Colon cancer Father    • Hypertension Sister    • Colon polyps Sister    • Hypertension Brother    • Malig Hyperthermia Neg Hx         Review of Systems  See history of present illness and past medical history.  Patient denies headache, dizziness, syncope, falls, trauma, change in vision, change in hearing, change in taste, changes in weight, changes in appetite, focal weakness, numbness, or paresthesia.  Patient denies orthopnea, PND, cough, sinus pressure, rhinorrhea, epistaxis, hemoptysis, nausea, vomiting,hematemesis, diarrhea, constipation or hematchezia.  Denies cold or heat intolerance, polydipsia, polyuria, polyphagia. Denies hematuria, pyuria, dysuria, hesitancy, frequency or urgency. Denies consumption of raw and under cooked meats foods or change in water source.  Denies fever, chills, sweats, night sweats.  Denies missing any routine medications. Remainder of ROS is negative.    Objective:  T Max 24 hrs: Temp (24hrs), Av.4 °F (36.3 °C), Min:97 °F (36.1 °C), Max:97.8 °F (36.6 °C)    Vitals Ranges:   Temp:  [97 °F (36.1 °C)-97.8 °F (36.6 °C)] 97.8 °F (36.6 °C)  Heart Rate:  [] 77  Resp:  [16-22] 18  BP: (126-166)/(71-96) 144/78      Exam:  /78 (BP Location: Right arm, Patient Position: Lying)   Pulse 77   Temp 97.8 °F (36.6 °C) (Oral)   Resp 18   Ht 167.6 cm (66\")   Wt 74.5 kg (164 lb 3.2 oz)   SpO2 98%   BMI 26.50 kg/m²     General Appearance:    Alert, cooperative, no distress, appears stated age   Head:    Normocephalic, without obvious abnormality, atraumatic   Eyes:    PERRL, conjunctivae/corneas clear, EOM's intact, both eyes   Ears:    Normal external ear canals, both ears   Nose:   Nares normal, septum midline, mucosa normal, no drainage    or sinus tenderness   Throat:   Lips, mucosa, and tongue normal   Neck:   Supple, symmetrical, trachea midline, no adenopathy;     thyroid:  no enlargement/tenderness/nodules; no carotid    bruit or JVD   Back:     " Symmetric, no curvature, ROM normal, no CVA tenderness   Lungs:     Decreased breath sounds bilaterally, respirations unlabored   Chest Wall:    No tenderness or deformity    Heart:    Regular rate and rhythm, S1 and S2 normal, no murmur, rub   or gallop   Abdomen:     Soft, nontender, bowel sounds active all four quadrants,     no masses, no hepatomegaly, no splenomegaly   Extremities:   Extremities normal, atraumatic, no cyanosis or edema   Pulses:   2+ and symmetric all extremities   Skin:   Skin color, texture, turgor normal, no rashes or lesions   Lymph nodes:   Cervical, supraclavicular, and axillary nodes normal   Neurologic:   CNII-XII intact, normal strength, sensation intact throughout      .    Data Review:  Labs in chart were reviewed.  WBC   Date Value Ref Range Status   11/07/2021 8.13 3.40 - 10.80 10*3/mm3 Final     Hemoglobin   Date Value Ref Range Status   11/07/2021 13.4 12.0 - 15.9 g/dL Final     Hematocrit   Date Value Ref Range Status   11/07/2021 38.7 34.0 - 46.6 % Final     Platelets   Date Value Ref Range Status   11/07/2021 407 140 - 450 10*3/mm3 Final     Sodium   Date Value Ref Range Status   11/07/2021 141 136 - 145 mmol/L Final     Potassium   Date Value Ref Range Status   11/07/2021 3.5 3.5 - 5.2 mmol/L Final     Chloride   Date Value Ref Range Status   11/07/2021 104 98 - 107 mmol/L Final     CO2   Date Value Ref Range Status   11/07/2021 21.5 (L) 22.0 - 29.0 mmol/L Final     BUN   Date Value Ref Range Status   11/07/2021 9 8 - 23 mg/dL Final     Creatinine   Date Value Ref Range Status   11/07/2021 1.05 (H) 0.57 - 1.00 mg/dL Final     Glucose   Date Value Ref Range Status   11/07/2021 135 (H) 65 - 99 mg/dL Final     Calcium   Date Value Ref Range Status   11/07/2021 9.0 8.6 - 10.5 mg/dL Final     AST (SGOT)   Date Value Ref Range Status   11/07/2021 19 1 - 32 U/L Final     ALT (SGPT)   Date Value Ref Range Status   11/07/2021 12 1 - 33 U/L Final     Alkaline Phosphatase   Date Value  Ref Range Status   11/07/2021 52 39 - 117 U/L Final                Imaging Results (All)     Procedure Component Value Units Date/Time    XR Chest 1 View [[id number]] Collected: 11/07/21 1614     Updated: 11/07/21 1618    Narrative:      PORTABLE CHEST 11/07/2021 AT 3:30 PM     CLINICAL HISTORY: Chest pain     Compared to the previous chest dated 08/16/2012.     The lungs are well-expanded and appear free of infiltrates. There are no  pleural effusions. There is no pneumothorax. The heart is normal in  size. Pedicle rods  and screws are in place bilaterally in the thoracic  spine. Stigmata of previous coronary artery bypass procedure are also  noted.     IMPRESSIONS: No evidence of acute disease within the chest.     This report was finalized on 11/7/2021 4:15 PM by Dr. Abhinav Sands M.D.           Patient Active Problem List   Diagnosis Code   • Triple vessel coronary artery disease I25.10   • Anxiety F41.9   • Gastroesophageal reflux disease K21.9   • Hyperlipidemia E78.5   • Hypothyroidism E03.9   • Osteoarthritis of knee M17.10   • Cerebrovascular accident (HCC) I63.9   • Carotid artery disease ([facility]) I77.9   • Migraine without status migrainosus, not intractable G43.909   • Osteoporosis M81.0   • Closed wedge compression fracture of T7 vertebra ([facility]) S22.060A   • History of coronary artery bypass surgery Z95.1   • Thoracic radiculopathy due to degenerative joint disease of spine M47.24   • History of pulmonary embolism Z86.711   • Hypertension I10   • Functional diarrhea K59.1   • Syncope R55   • History of stroke Z86.73   • Parotid mass K11.8   • Epigastric pain R10.13   • Multiple duodenal ulcers K26.9   • Diarrhea R19.7   • Abdominal pain R10.9   • Anaphylactic syndrome T78.2XXA       Assessment:    Anaphylactic syndrome  chest pain  Cad  Hypertension  H.o pe    Plan:  Will get a second troponin  Ask cardiology to see her  Monitor on telemetry  Hemodynamically stable  Monitor for other signs of allergic  reaction but presently she is back at her baseline  D.w patient, family and ED provider    Ragini Klein MD  11/7/2021  19:07 EST

## 2021-11-09 ENCOUNTER — TRANSITIONAL CARE MANAGEMENT TELEPHONE ENCOUNTER (OUTPATIENT)
Dept: CALL CENTER | Facility: HOSPITAL | Age: 66
End: 2021-11-09

## 2021-11-09 NOTE — OUTREACH NOTE
Call Center TCM Note      Responses   [facility] patient discharged from? [place]   Does the patient have one of the following disease processes/diagnoses(primary or secondary)? Other   TCM attempt successful? No   Unsuccessful attempts Attempt 1          Marjorie Hanks RN    11/9/2021, 13:05 EST

## 2021-11-09 NOTE — OUTREACH NOTE
Call Center TCM Note      Responses   [facility] patient discharged from? [place]   Does the patient have one of the following disease processes/diagnoses(primary or secondary)? Other   TCM attempt successful? Yes   Call start time 1322   Call end time 1323   Discharge diagnosis Chest pain   Meds reviewed with patient/caregiver? Yes   Is the patient having any side effects they believe may be caused by any medication additions or changes? No   Does the patient have all medications ordered at discharge? N/A   Is the patient taking all medications as directed (includes completed medication regime)? Yes   Comments regarding appointments Cardiologist 11/11/21   Does the patient have a primary care provider?  Yes   Does the patient have an appointment with their PCP within 7 days of discharge? Yes   Comments regarding PCP [facility] on 11/12/21 with PCP    Psychosocial issues? No   Did the patient receive a copy of their discharge instructions? Yes   Nursing interventions Reviewed instructions with patient   What is the patient's perception of their health status since discharge? Improving   Is the patient/caregiver able to teach back signs and symptoms related to disease process for when to call PCP? Yes   Is the patient/caregiver able to teach back signs and symptoms related to disease process for when to call 911? Yes   Is the patient/caregiver able to teach back the hierarchy of who to call/visit for symptoms/problems? PCP, Specialist, Home health nurse, Urgent Care, ED, 911 Yes   If the patient is a current smoker, are they able to teach back resources for cessation? Not a smoker   TCM call completed? Yes          Marjorie Hanks RN    11/9/2021, 13:23 EST

## 2021-11-12 ENCOUNTER — OFFICE VISIT (OUTPATIENT)
Dept: FAMILY MEDICINE CLINIC | Facility: CLINIC | Age: 66
End: 2021-11-12

## 2021-11-12 VITALS
BODY MASS INDEX: 29.53 KG/M2 | OXYGEN SATURATION: 98 % | WEIGHT: 173 LBS | HEIGHT: 64 IN | HEART RATE: 75 BPM | DIASTOLIC BLOOD PRESSURE: 76 MMHG | SYSTOLIC BLOOD PRESSURE: 124 MMHG

## 2021-11-12 DIAGNOSIS — N28.9 RENAL INSUFFICIENCY: Primary | ICD-10-CM

## 2021-11-12 DIAGNOSIS — T78.2XXD ANAPHYLAXIS, SUBSEQUENT ENCOUNTER: ICD-10-CM

## 2021-11-12 PROCEDURE — 99495 TRANSJ CARE MGMT MOD F2F 14D: CPT | Performed by: NURSE PRACTITIONER

## 2021-11-12 PROCEDURE — 1111F DSCHRG MED/CURRENT MED MERGE: CPT | Performed by: NURSE PRACTITIONER

## 2021-11-12 RX ORDER — METOPROLOL SUCCINATE 25 MG/1
37.5 TABLET, EXTENDED RELEASE ORAL DAILY
COMMUNITY
Start: 2021-11-11 | End: 2021-11-24 | Stop reason: SDUPTHER

## 2021-11-12 RX ORDER — RANOLAZINE 500 MG/1
500 TABLET, EXTENDED RELEASE ORAL DAILY
COMMUNITY
Start: 2021-09-27

## 2021-11-12 NOTE — PROGRESS NOTES
Transitional Care Follow Up Visit  Subjective     Ivy Chairez is a 65 y.o. female who presents for a transitional care management visit.    Within 48 business hours after discharge our office contacted her via telephone to coordinate her care and needs.      I reviewed and discussed the details of that call along with the discharge summary, hospital problems, inpatient lab results, inpatient diagnostic studies, and consultation reports with Ivy.     Current outpatient and discharge medications have been reconciled for the patient.  Reviewed by: CARMELITA Lovelace      Date of TCM Phone Call 11/8/2021   [facility]   Date of Admission 11/7/2021   Date of Discharge 11/8/2021   Discharge Disposition Home or Self Care     Risk for Readmission (LACE) Score: 3 (11/8/2021  6:00 AM)      Ms. Chairez presents today for a TCM to follow up on a recent hospitalization after having an anaphylactic reaction to Restylane which is an injectable lip filler. The reaction occurred on 11/7/21 and she was taken by EMS to [facility] ER. She reports that she is doing better but is a little short of breath. She was seen this week by her cardiologist and reports she is scheduled for a nuclear stress test next Thursday, 11/17/21.      Course During Hospital Stay:  24 hour stay     The following portions of the patient's history were reviewed and updated as appropriate: allergies, current medications, past family history, past medical history, past social history, past surgical history and problem list.    Review of Systems   Constitutional: Negative.    HENT: Negative.    Respiratory: Negative.    Cardiovascular: Negative.    Neurological: Negative.    Psychiatric/Behavioral: Negative.        Objective   Physical Exam  Constitutional:       Appearance: Normal appearance.   Cardiovascular:      Rate and Rhythm: Normal rate and regular rhythm.      Pulses: Normal pulses.      Heart sounds: Normal heart sounds.   Pulmonary:       Effort: Pulmonary effort is normal.      Breath sounds: Normal breath sounds.   Skin:     General: Skin is warm and dry.   Neurological:      Mental Status: She is alert and oriented to person, place, and time.   Psychiatric:      Comments: No acute distress         Assessment/Plan   Diagnoses and all orders for this visit:    1. Renal insufficiency (Primary)  -     Basic Metabolic Panel; Future    2. Anaphylaxis, subsequent encounter      Ms. Chairez appears to be doing well today.  I did review her labs and she does have some renal insufficiency going on.   She is to return the week of November 22 to repeat a BMP to follow up on her renal function.   I have reviewed and reconciled her medications and she is to continue all medications as prescribed.

## 2021-11-17 DIAGNOSIS — N28.9 RENAL INSUFFICIENCY: Primary | ICD-10-CM

## 2021-11-22 ENCOUNTER — FLU SHOT (OUTPATIENT)
Dept: FAMILY MEDICINE CLINIC | Facility: CLINIC | Age: 66
End: 2021-11-22

## 2021-11-22 DIAGNOSIS — Z23 NEED FOR INFLUENZA VACCINATION: Primary | ICD-10-CM

## 2021-11-22 PROCEDURE — G0008 ADMIN INFLUENZA VIRUS VAC: HCPCS | Performed by: NURSE PRACTITIONER

## 2021-11-22 PROCEDURE — 90662 IIV NO PRSV INCREASED AG IM: CPT | Performed by: NURSE PRACTITIONER

## 2021-11-23 ENCOUNTER — TELEPHONE (OUTPATIENT)
Dept: FAMILY MEDICINE CLINIC | Facility: CLINIC | Age: 66
End: 2021-11-23

## 2021-11-23 NOTE — TELEPHONE ENCOUNTER
----- Message from CARMELITA Lovelace sent at 11/23/2021  8:55 AM EST -----  Please call her and let her know her kidney function is the same. We will reassess her kidney function in January prior to her next visit.

## 2021-11-24 RX ORDER — METOPROLOL SUCCINATE 25 MG/1
37.5 TABLET, EXTENDED RELEASE ORAL DAILY
Qty: 90 TABLET | Refills: 1 | Status: SHIPPED | OUTPATIENT
Start: 2021-11-24 | End: 2022-02-02

## 2021-12-06 DIAGNOSIS — M81.0 AGE-RELATED OSTEOPOROSIS WITHOUT CURRENT PATHOLOGICAL FRACTURE: Primary | ICD-10-CM

## 2021-12-06 DIAGNOSIS — N28.9 RENAL INSUFFICIENCY: ICD-10-CM

## 2021-12-06 DIAGNOSIS — E03.9 ACQUIRED HYPOTHYROIDISM: ICD-10-CM

## 2021-12-06 DIAGNOSIS — E78.5 HYPERLIPIDEMIA, UNSPECIFIED HYPERLIPIDEMIA TYPE: ICD-10-CM

## 2021-12-06 DIAGNOSIS — I10 ESSENTIAL HYPERTENSION: ICD-10-CM

## 2021-12-13 ENCOUNTER — TELEPHONE (OUTPATIENT)
Dept: FAMILY MEDICINE CLINIC | Facility: CLINIC | Age: 66
End: 2021-12-13

## 2021-12-13 RX ORDER — MECLIZINE HYDROCHLORIDE 25 MG/1
TABLET ORAL
Qty: 180 TABLET | Refills: 1 | Status: SHIPPED | OUTPATIENT
Start: 2021-12-13 | End: 2023-01-09 | Stop reason: SDUPTHER

## 2021-12-13 NOTE — TELEPHONE ENCOUNTER
Caller: Ivy Chairez    Relationship: Self    Best call back number: [phone]     What is the best time to reach you: ANYTIME    Who are you requesting to speak with MA OR ADAN        What was the call regarding: PATIENT STATES SHE IS HAVING LOWER BACK PAIN AND URGENCY TO URINATE BUT NOTHING COMING OUT WANTING TO KNOW IF SHE CAN LEAVE A URINE SAMPLE TO SEE IF SHE HAS UTI.    Do you require a callback: YES

## 2021-12-13 NOTE — TELEPHONE ENCOUNTER
HUB ok to read:    Please schedule her for a same day office visit for UTI symptoms.   We have openings today and also tomorrow.  Chloe prefers to see her patients if they are symptomatic. OK to transfer if she prefers to speak to us.    Thanks,  Gina

## 2021-12-14 ENCOUNTER — OFFICE VISIT (OUTPATIENT)
Dept: FAMILY MEDICINE CLINIC | Facility: CLINIC | Age: 66
End: 2021-12-14

## 2021-12-14 VITALS
BODY MASS INDEX: 29.71 KG/M2 | OXYGEN SATURATION: 96 % | DIASTOLIC BLOOD PRESSURE: 74 MMHG | WEIGHT: 174 LBS | SYSTOLIC BLOOD PRESSURE: 118 MMHG | HEIGHT: 64 IN | HEART RATE: 69 BPM

## 2021-12-14 DIAGNOSIS — R39.9 UTI SYMPTOMS: Primary | ICD-10-CM

## 2021-12-14 LAB
BILIRUB BLD-MCNC: NEGATIVE MG/DL
CLARITY, POC: CLEAR
COLOR UR: YELLOW
EXPIRATION DATE: ABNORMAL
GLUCOSE UR STRIP-MCNC: NEGATIVE MG/DL
KETONES UR QL: NEGATIVE
LEUKOCYTE EST, POC: ABNORMAL
Lab: ABNORMAL
NITRITE UR-MCNC: NEGATIVE MG/ML
PH UR: 6 [PH] (ref 5–8)
PROT UR STRIP-MCNC: NEGATIVE MG/DL
RBC # UR STRIP: NEGATIVE /UL
SP GR UR: 1.01 (ref 1–1.03)
UROBILINOGEN UR QL: NORMAL

## 2021-12-14 PROCEDURE — 99213 OFFICE O/P EST LOW 20 MIN: CPT | Performed by: NURSE PRACTITIONER

## 2021-12-14 PROCEDURE — 81003 URINALYSIS AUTO W/O SCOPE: CPT | Performed by: NURSE PRACTITIONER

## 2021-12-14 NOTE — PROGRESS NOTES
Chief Complaint  Urinary Tract Infection    Subjective          Ivy Chairez presents to [facility]  Ms. Chairez feels like she has to urinate , not emptying her bladder. This started 3 days ago with flank pain, urine dark in color and urgency with every urination. No frequency, no burning, no fever, chills or body aches.     I have reviewed the patient's medical history in detail and updated the computerized patient record.    Current Outpatient Medications:   •  aspirin 81 MG tablet, Take 81 mg by mouth Daily With Dinner., Disp: , Rfl:   •  Denosumab (PROLIA SC), Inject  under the skin into the appropriate area as directed Every 6 (Six) Months., Disp: , Rfl:   •  diphenoxylate-atropine (Lomotil) 2.5-0.025 MG per tablet, Take 1 tablet by mouth 4 (Four) Times a Day As Needed for Diarrhea., Disp: 20 tablet, Rfl: 0  •  furosemide (LASIX) 20 MG tablet, TAKE 1 TABLET BY MOUTH  DAILY, Disp: 90 tablet, Rfl: 1  •  levothyroxine (SYNTHROID, LEVOTHROID) 75 MCG tablet, TAKE 1 TABLET BY MOUTH  DAILY, Disp: 90 tablet, Rfl: 1  •  meclizine (ANTIVERT) 25 MG tablet, TAKE 1 TABLET BY MOUTH AT  NIGHT MAY TAKE AN  ADDITIONAL TABLET DAILY AS  NEEDED, Disp: 180 tablet, Rfl: 1  •  meloxicam (MOBIC) 15 MG tablet, Take 15 mg by mouth Daily., Disp: , Rfl: 4  •  metoprolol succinate XL (TOPROL-XL) 25 MG 24 hr tablet, Take 1.5 tablets by mouth Daily., Disp: 90 tablet, Rfl: 1  •  nitroglycerin (NITROSTAT) 0.4 MG SL tablet, Place 0.4 mg under the tongue Every 5 (Five) Minutes As Needed for Chest Pain. Take no more than 3 doses in 15 minutes., Disp: , Rfl:   •  pantoprazole (PROTONIX) 40 MG EC tablet, Take 1 tablet by mouth Daily., Disp: 90 tablet, Rfl: 3  •  ranolazine (RANEXA) 500 MG 12 hr tablet, Take 500 mg by mouth Daily., Disp: , Rfl:   •  rosuvastatin (CRESTOR) 40 MG tablet, TAKE 1 TABLET BY MOUTH IN  THE EVENING, Disp: 90 tablet, Rfl: 2  •  venlafaxine (EFFEXOR) 75 MG tablet, Take 1 tablet by mouth  "every night at bedtime., Disp: 90 tablet, Rfl: 3  •  VITAMIN D, CHOLECALCIFEROL, PO, Take 1,000 Units by mouth Daily., Disp: , Rfl:   Objective   Vital Signs:   /74 (BP Location: Right arm, Patient Position: Sitting, Cuff Size: Adult)   Pulse 69   Ht 162.6 cm (64\")   Wt 78.9 kg (174 lb)   SpO2 96%   BMI 29.87 kg/m²        Physical Exam  Constitutional:       Appearance: Normal appearance.   Cardiovascular:      Rate and Rhythm: Normal rate and regular rhythm.      Pulses: Normal pulses.      Heart sounds: Normal heart sounds.   Pulmonary:      Effort: Pulmonary effort is normal.      Breath sounds: Normal breath sounds.   Skin:     General: Skin is warm and dry.   Neurological:      Mental Status: She is alert and oriented to person, place, and time.   Psychiatric:      Comments: No acute distress        Result Review :                 Assessment and Plan    Diagnoses and all orders for this visit:    1. UTI symptoms (Primary)  -     Urinalysis With Microscopic - Urine, Clean Catch  -     Urine Culture - Urine, Urine, Clean Catch  -     POC Urinalysis Dipstick, Automated    Ms. Chairez appears to be doing well.  Her POC urine is negative for a UTI.   I will send her urine out for culture and also do a microscopic U/A.     Follow Up   Return if symptoms worsen or fail to improve, for Next scheduled follow up.  Patient was given instructions and counseling regarding her condition or for health maintenance advice. Please see specific information pulled into the AVS if appropriate.       "

## 2021-12-16 LAB
APPEARANCE UR: CLEAR
BACTERIA #/AREA URNS HPF: ABNORMAL /[HPF]
BACTERIA UR CULT: NORMAL
BACTERIA UR CULT: NORMAL
BILIRUB UR QL STRIP: NEGATIVE
CASTS URNS QL MICRO: ABNORMAL /LPF
COLOR UR: YELLOW
EPI CELLS #/AREA URNS HPF: ABNORMAL /HPF (ref 0–10)
GLUCOSE UR QL: NEGATIVE
HGB UR QL STRIP: NEGATIVE
KETONES UR QL STRIP: NEGATIVE
LEUKOCYTE ESTERASE UR QL STRIP: ABNORMAL
MICRO URNS: ABNORMAL
NITRITE UR QL STRIP: NEGATIVE
PH UR STRIP: 5.5 [PH] (ref 5–7.5)
PROT UR QL STRIP: NEGATIVE
RBC #/AREA URNS HPF: ABNORMAL /HPF (ref 0–2)
SP GR UR: 1.01 (ref 1–1.03)
UROBILINOGEN UR STRIP-MCNC: 0.2 MG/DL (ref 0.2–1)
WBC #/AREA URNS HPF: ABNORMAL /HPF (ref 0–5)

## 2021-12-20 ENCOUNTER — INFUSION (OUTPATIENT)
Dept: ONCOLOGY | Facility: HOSPITAL | Age: 66
End: 2021-12-20

## 2021-12-20 VITALS — TEMPERATURE: 98 F | RESPIRATION RATE: 18 BRPM | HEIGHT: 66 IN | BODY MASS INDEX: 28.08 KG/M2

## 2021-12-20 DIAGNOSIS — M81.0 AGE-RELATED OSTEOPOROSIS WITHOUT CURRENT PATHOLOGICAL FRACTURE: Primary | ICD-10-CM

## 2021-12-20 PROCEDURE — 25010000002 DENOSUMAB 60 MG/ML SOLUTION PREFILLED SYRINGE: Performed by: NURSE PRACTITIONER

## 2021-12-20 PROCEDURE — 96372 THER/PROPH/DIAG INJ SC/IM: CPT

## 2021-12-20 RX ADMIN — DENOSUMAB 60 MG: 60 INJECTION SUBCUTANEOUS at 15:17

## 2022-01-12 ENCOUNTER — OFFICE VISIT (OUTPATIENT)
Dept: FAMILY MEDICINE CLINIC | Facility: CLINIC | Age: 67
End: 2022-01-12

## 2022-01-12 VITALS
HEIGHT: 66 IN | OXYGEN SATURATION: 95 % | WEIGHT: 171 LBS | BODY MASS INDEX: 27.48 KG/M2 | DIASTOLIC BLOOD PRESSURE: 70 MMHG | SYSTOLIC BLOOD PRESSURE: 112 MMHG | HEART RATE: 79 BPM

## 2022-01-12 DIAGNOSIS — R39.14 FEELING OF INCOMPLETE BLADDER EMPTYING: ICD-10-CM

## 2022-01-12 DIAGNOSIS — Z00.00 MEDICARE ANNUAL WELLNESS VISIT, SUBSEQUENT: Primary | ICD-10-CM

## 2022-01-12 PROCEDURE — 1159F MED LIST DOCD IN RCRD: CPT | Performed by: NURSE PRACTITIONER

## 2022-01-12 PROCEDURE — 1170F FXNL STATUS ASSESSED: CPT | Performed by: NURSE PRACTITIONER

## 2022-01-12 PROCEDURE — G0439 PPPS, SUBSEQ VISIT: HCPCS | Performed by: NURSE PRACTITIONER

## 2022-01-12 NOTE — PATIENT INSTRUCTIONS
Medicare Wellness  Personal Prevention Plan of Service     Date of Office Visit:  2022  Encounter Provider:  CARMELITA Lovelace  Place of Service:  [facility]  Patient Name: Ivy Chairez  :  1955    As part of the Medicare Wellness portion of your visit today, we are providing you with this personalized preventive plan of services (PPPS). This plan is based upon recommendations of the [place] Preventive Services Task Force (USPSTF) and the Advisory Committee on Immunization Practices (ACIP).    This lists the preventive care services that should be considered, and provides dates of when you are due. Items listed as completed are up-to-date and do not require any further intervention.    Health Maintenance   Topic Date Due   • ZOSTER VACCINE (1 of 2) Never done   • TDAP/TD VACCINES (3 - Td or Tdap) 2018   • GASTROSCOPY (EGD)  2020   • MAMMOGRAM  2021   • COVID-19 Vaccine (3 - Booster for Pfizer series) 2021   • PAP SMEAR  2021   • ANNUAL WELLNESS VISIT  2021   • LIPID PANEL  2022   • DXA SCAN  2023   • COLORECTAL CANCER SCREENING  2031   • HEPATITIS C SCREENING  Completed   • INFLUENZA VACCINE  Completed   • Pneumococcal Vaccine 65+  Completed   • LUNG CANCER SCREENING  Discontinued       No orders of the defined types were placed in this encounter.      No follow-ups on file.

## 2022-01-12 NOTE — PROGRESS NOTES
The ABCs of the Annual Wellness Visit  Subsequent Medicare Wellness Visit    Chief Complaint   Patient presents with   • Medicare Wellness-subsequent      Subjective    History of Present Illness:  Ivy Chairez is a 66 y.o. female who presents for a Subsequent Medicare Wellness Visit.    The following portions of the patient's history were reviewed and   updated as appropriate: allergies, current medications, past family history, past medical history, past social history, past surgical history and problem list.    Compared to one year ago, the patient feels her physical   health is the same.    Compared to one year ago, the patient feels her mental   health is the same.    Recent Hospitalizations:  This patient has had a [facility] admission record on file within the last 365 days.    Current Medical Providers:  Patient Care Team:  Chloe Leung APRN as PCP - General (Family Medicine)  Kade Humphrey Jr., MD as Consulting Physician (Cardiology)  Vida Rodriguez MD as Consulting Physician (Endocrinology)  Wayne Brizuela MD as Consulting Physician (Otolaryngology)  Stefany Candelaria APRN as Consulting Physician (Cardiology)    Outpatient Medications Prior to Visit   Medication Sig Dispense Refill   • aspirin 81 MG tablet Take 81 mg by mouth Daily With Dinner.     • Denosumab (PROLIA SC) Inject  under the skin into the appropriate area as directed Every 6 (Six) Months.     • diphenoxylate-atropine (Lomotil) 2.5-0.025 MG per tablet Take 1 tablet by mouth 4 (Four) Times a Day As Needed for Diarrhea. 20 tablet 0   • furosemide (LASIX) 20 MG tablet TAKE 1 TABLET BY MOUTH  DAILY 90 tablet 1   • levothyroxine (SYNTHROID, LEVOTHROID) 75 MCG tablet TAKE 1 TABLET BY MOUTH  DAILY 90 tablet 1   • meclizine (ANTIVERT) 25 MG tablet TAKE 1 TABLET BY MOUTH AT  NIGHT MAY TAKE AN  ADDITIONAL TABLET DAILY AS  NEEDED 180 tablet 1   • meloxicam (MOBIC) 15 MG tablet Take 15 mg by mouth Daily.  4   • metoprolol  succinate XL (TOPROL-XL) 25 MG 24 hr tablet Take 1.5 tablets by mouth Daily. 90 tablet 1   • nitroglycerin (NITROSTAT) 0.4 MG SL tablet Place 0.4 mg under the tongue Every 5 (Five) Minutes As Needed for Chest Pain. Take no more than 3 doses in 15 minutes.     • pantoprazole (PROTONIX) 40 MG EC tablet Take 1 tablet by mouth Daily. 90 tablet 3   • ranolazine (RANEXA) 500 MG 12 hr tablet Take 500 mg by mouth Daily.     • rosuvastatin (CRESTOR) 40 MG tablet TAKE 1 TABLET BY MOUTH IN  THE EVENING 90 tablet 2   • venlafaxine (EFFEXOR) 75 MG tablet Take 1 tablet by mouth every night at bedtime. 90 tablet 3   • VITAMIN D, CHOLECALCIFEROL, PO Take 1,000 Units by mouth Daily.       No facility-administered medications prior to visit.       No opioid medication identified on active medication list. I have reviewed chart for other potential  high risk medication/s and harmful drug interactions in the elderly.          Aspirin is on active medication list. Aspirin use is indicated based on review of current medical condition/s. Pros and cons of this therapy have been discussed today. Benefits of this medication outweigh potential harm.  Patient has been encouraged to continue taking this medication.  .      Patient Active Problem List   Diagnosis   • Triple vessel coronary artery disease   • Anxiety   • Gastroesophageal reflux disease   • Hyperlipidemia   • Hypothyroidism   • Osteoarthritis of knee   • Cerebrovascular accident (HCC)   • Carotid artery disease (HCC)   • Migraine without status migrainosus, not intractable   • Osteoporosis   • Closed wedge compression fracture of T7 vertebra (HCC)   • History of coronary artery bypass surgery   • Thoracic radiculopathy due to degenerative joint disease of spine   • History of pulmonary embolism   • Hypertension   • Functional diarrhea   • Syncope   • History of stroke   • Parotid mass   • Epigastric pain   • Multiple duodenal ulcers   • Diarrhea   • Abdominal pain   • Anaphylactic  "syndrome   • Chest pain     Advance Care Planning  Advance Directive is on file.  ACP discussion was held with the patient during this visit. Patient has an advance directive in EMR which is still valid.     Review of Systems   Constitutional: Negative.    HENT: Negative.    Eyes: Negative.    Respiratory: Negative.    Cardiovascular: Negative.    Gastrointestinal: Negative.    Endocrine: Positive for polydipsia.   Genitourinary:        Feels like she is not emptying her bladder   Skin: Negative.    Neurological: Negative.    Psychiatric/Behavioral: Negative.         Objective    Vitals:    01/12/22 1513   BP: 112/70   BP Location: Left arm   Patient Position: Sitting   Cuff Size: Adult   Pulse: 79   SpO2: 95%   Weight: 77.6 kg (171 lb)   Height: 167.6 cm (66\")     BMI Readings from Last 1 Encounters:   01/12/22 27.60 kg/m²   BMI is above normal parameters. Recommendations include: .    Does the patient have evidence of cognitive impairment? No    Physical Exam  Vitals reviewed.   Constitutional:       Appearance: Normal appearance.   HENT:      Right Ear: Tympanic membrane normal.      Left Ear: Tympanic membrane normal.   Neck:      Vascular: No carotid bruit.   Cardiovascular:      Rate and Rhythm: Normal rate and regular rhythm.      Pulses: Normal pulses.      Heart sounds: Normal heart sounds.   Pulmonary:      Effort: Pulmonary effort is normal.      Breath sounds: Normal breath sounds.   Abdominal:      General: Bowel sounds are normal.      Palpations: Abdomen is soft.   Musculoskeletal:         General: Normal range of motion.      Cervical back: Normal range of motion and neck supple.   Lymphadenopathy:      Cervical: No cervical adenopathy.   Skin:     General: Skin is warm and dry.   Neurological:      Mental Status: She is alert and oriented to person, place, and time.   Psychiatric:      Comments: No acute distress       Lab Results   Component Value Date    CHLPL 136 12/17/2021    TRIG 62 12/17/2021 "    HDL 70 2021    LDL 53 2021    VLDL 13 2021            HEALTH RISK ASSESSMENT    Smoking Status:  Social History     Tobacco Use   Smoking Status Former Smoker   • Packs/day: 1.00   • Years: 30.00   • Pack years: 30.00   • Types: Cigarettes   • Quit date:    • Years since quittin.0   Smokeless Tobacco Never Used     Alcohol Consumption:  Social History     Substance and Sexual Activity   Alcohol Use No     Fall Risk Screen:    STEADI Fall Risk Assessment was completed, and patient is at LOW risk for falls.Assessment completed on:2022    Depression Screening:  PHQ-2/PHQ-9 Depression Screening 2022   Little interest or pleasure in doing things 0   Feeling down, depressed, or hopeless 0   Total Score 0       Health Habits and Functional and Cognitive Screening:  Functional & Cognitive Status 2022   Do you have difficulty preparing food and eating? No   Do you have difficulty bathing yourself, getting dressed or grooming yourself? No   Do you have difficulty using the toilet? No   Do you have difficulty moving around from place to place? No   Do you have trouble with steps or getting out of a bed or a chair? No   Current Diet Well Balanced Diet   Dental Exam Up to date   Eye Exam Up to date   Exercise (times per week) 3 times per week   Current Exercises Include Walking   Current Exercise Activities Include -   Do you need help using the phone?  No   Are you deaf or do you have serious difficulty hearing?  No   Do you need help with transportation? No   Do you need help shopping? No   Do you need help preparing meals?  No   Do you need help with housework?  No   Do you need help with laundry? No   Do you need help taking your medications? No   Do you need help managing money? No   Do you ever drive or ride in a car without wearing a seat belt? No   Have you felt unusual stress, anger or loneliness in the last month? No   Who do you live with? Spouse   If you need help, do you  have trouble finding someone available to you? No   Have you been bothered in the last four weeks by sexual problems? No   Do you have difficulty concentrating, remembering or making decisions? No       Age-appropriate Screening Schedule:  Refer to the list below for future screening recommendations based on patient's age, sex and/or medical conditions. Orders for these recommended tests are listed in the plan section. The patient has been provided with a written plan.    Health Maintenance   Topic Date Due   • ZOSTER VACCINE (1 of 2) Never done   • TDAP/TD VACCINES (3 - Td or Tdap) 04/01/2018   • MAMMOGRAM  02/01/2021   • PAP SMEAR  08/27/2021   • LIPID PANEL  12/17/2022   • DXA SCAN  05/17/2023   • INFLUENZA VACCINE  Completed              Assessment/Plan   CMS Preventative Services Quick Reference  Risk Factors Identified During Encounter  mammogram  The above risks/problems have been discussed with the patient.  Follow up actions/plans if indicated are seen below in the Assessment/Plan Section.  Pertinent information has been shared with the patient in the After Visit Summary.    Diagnoses and all orders for this visit:    1. Medicare annual wellness visit, subsequent (Primary)        Follow Up:   No follow-ups on file.     An After Visit Summary and PPPS were made available to the patient.

## 2022-01-17 RX ORDER — ROSUVASTATIN CALCIUM 40 MG/1
TABLET, COATED ORAL
Qty: 90 TABLET | Refills: 2 | Status: SHIPPED | OUTPATIENT
Start: 2022-01-17 | End: 2022-10-24

## 2022-01-17 RX ORDER — PANTOPRAZOLE SODIUM 40 MG/1
40 TABLET, DELAYED RELEASE ORAL DAILY
Qty: 90 TABLET | Refills: 2 | Status: SHIPPED | OUTPATIENT
Start: 2022-01-17 | End: 2022-11-17 | Stop reason: SDUPTHER

## 2022-01-31 ENCOUNTER — TELEPHONE (OUTPATIENT)
Dept: FAMILY MEDICINE CLINIC | Facility: CLINIC | Age: 67
End: 2022-01-31

## 2022-01-31 LAB — SARS-COV-2 RNA PNL SPEC NAA+PROBE: DETECTED

## 2022-01-31 NOTE — TELEPHONE ENCOUNTER
S/w patient- gave message below. She voiced understanding. I asked her to call and we can set up a video visit if she worsens.

## 2022-01-31 NOTE — TELEPHONE ENCOUNTER
Caller: Ivy Chairez    Relationship: Self    Best call back number: [phone]    What is the best time to reach you: ANY    Who are you requesting to speak with (clinical staff, provider, specific staff member): CLINICAL     What was the call regarding: PATIENT TESTED POSITIVE FOR COVID ON 1/30/22. WANTS TO KNOW WHAT OVER THE COUNTER MEDICATION THAT SHE CAN TAKE TO HELP WITH SYMPTOMS OF SORE THROAT, HEADACHE, COUGH.     Do you require a callback: YES

## 2022-02-02 RX ORDER — METOPROLOL SUCCINATE 25 MG/1
37.5 TABLET, EXTENDED RELEASE ORAL DAILY
Qty: 135 TABLET | Refills: 3 | Status: SHIPPED | OUTPATIENT
Start: 2022-02-02 | End: 2023-03-20 | Stop reason: SDUPTHER

## 2022-02-04 ENCOUNTER — TELEMEDICINE (OUTPATIENT)
Dept: FAMILY MEDICINE CLINIC | Facility: CLINIC | Age: 67
End: 2022-02-04

## 2022-02-04 DIAGNOSIS — U07.1 COVID: Primary | ICD-10-CM

## 2022-02-04 PROCEDURE — 99213 OFFICE O/P EST LOW 20 MIN: CPT | Performed by: NURSE PRACTITIONER

## 2022-02-04 NOTE — PROGRESS NOTES
Mode of Visit: Video  Location of patient: home  You have chosen to receive care through a telehealth visit.  The patient has signed the video visit consent form.  The visit included audio and video interaction. No technical issues occurred during this visit.     Chief Complaint  Covid-19 Home Monitoring Video Visit    Subjective          Ivy Chairez presents to [facility]  F/u on Covid, diagnosed on 1/27/22. Associated symptoms H/A, body aches, sore throat, URI symptoms, no taste. Did not have a fever. Symptoms are resolving, still has a runny nose, and a slight sore throat.     Objective   Vital Signs:   There were no vitals taken for this visit.    Physical Exam   Constitutional: She appears well-developed and well-nourished.   Neurological: She is alert.   Psychiatric:   No acute distress   Vitals reviewed.    Result Review :                 Assessment and Plan    Diagnoses and all orders for this visit:    1. COVID (Primary)    Ms. Chairez appears to be doing well.  She is needing a note so when she returns to work she can either work from home or in her cubicle.  I have provided her with the note sent to her through my chart. She is to continue to manage her symptoms with OTC medications.     I spent 12 minutes caring for Ivy on this date of service. This time includes time spent by me in the following activities:performing a medically appropriate examination and/or evaluation , counseling and educating the patient/family/caregiver and documenting information in the medical record  Follow Up   Return if symptoms worsen or fail to improve, for Next scheduled follow up.  Patient was given instructions and counseling regarding her condition or for health maintenance advice. Please see specific information pulled into the AVS if appropriate.

## 2022-04-20 ENCOUNTER — OFFICE VISIT (OUTPATIENT)
Dept: FAMILY MEDICINE CLINIC | Facility: CLINIC | Age: 67
End: 2022-04-20

## 2022-04-20 VITALS
WEIGHT: 171 LBS | OXYGEN SATURATION: 98 % | SYSTOLIC BLOOD PRESSURE: 118 MMHG | HEIGHT: 66 IN | BODY MASS INDEX: 27.48 KG/M2 | HEART RATE: 81 BPM | DIASTOLIC BLOOD PRESSURE: 72 MMHG

## 2022-04-20 DIAGNOSIS — R53.83 FATIGUE, UNSPECIFIED TYPE: ICD-10-CM

## 2022-04-20 DIAGNOSIS — E03.9 ACQUIRED HYPOTHYROIDISM: ICD-10-CM

## 2022-04-20 DIAGNOSIS — D50.8 OTHER IRON DEFICIENCY ANEMIA: ICD-10-CM

## 2022-04-20 DIAGNOSIS — E55.9 VITAMIN D DEFICIENCY: Primary | ICD-10-CM

## 2022-04-20 PROCEDURE — 99213 OFFICE O/P EST LOW 20 MIN: CPT | Performed by: NURSE PRACTITIONER

## 2022-04-20 NOTE — PROGRESS NOTES
Chief Complaint  Fatigue (X 2 months)    Subjective          Ivy Chairez presents to [facility]  Ms. Chairez is concerned today because she is extremely fatigued and has no energy. She states she can take a nap after work and is still tired. This has been going on for at least 2 months. She did have COVID at the beginning of this year and is wondering if the fatigue is related to having COVID.     Fatigue  This is a new problem. The current episode started more than 1 month ago. The problem occurs constantly. The problem has been unchanged. Associated symptoms include fatigue. Nothing aggravates the symptoms. She has tried nothing for the symptoms. The treatment provided no relief.     I have reviewed the patient's medical history in detail and updated the computerized patient record.    Current Outpatient Medications:   •  aspirin 81 MG tablet, Take 81 mg by mouth Daily With Dinner., Disp: , Rfl:   •  Denosumab (PROLIA SC), Inject  under the skin into the appropriate area as directed Every 6 (Six) Months., Disp: , Rfl:   •  diphenoxylate-atropine (Lomotil) 2.5-0.025 MG per tablet, Take 1 tablet by mouth 4 (Four) Times a Day As Needed for Diarrhea., Disp: 20 tablet, Rfl: 0  •  furosemide (LASIX) 20 MG tablet, TAKE 1 TABLET BY MOUTH  DAILY, Disp: 90 tablet, Rfl: 1  •  levothyroxine (SYNTHROID, LEVOTHROID) 75 MCG tablet, TAKE 1 TABLET BY MOUTH  DAILY, Disp: 90 tablet, Rfl: 1  •  meclizine (ANTIVERT) 25 MG tablet, TAKE 1 TABLET BY MOUTH AT  NIGHT MAY TAKE AN  ADDITIONAL TABLET DAILY AS  NEEDED, Disp: 180 tablet, Rfl: 1  •  meloxicam (MOBIC) 15 MG tablet, Take 15 mg by mouth Daily., Disp: , Rfl: 4  •  metoprolol succinate XL (TOPROL-XL) 25 MG 24 hr tablet, TAKE 1.5 TABLETS BY MOUTH  DAILY, Disp: 135 tablet, Rfl: 3  •  nitroglycerin (NITROSTAT) 0.4 MG SL tablet, Place 0.4 mg under the tongue Every 5 (Five) Minutes As Needed for Chest Pain. Take no more than 3 doses in 15 minutes.,  "Disp: , Rfl:   •  pantoprazole (PROTONIX) 40 MG EC tablet, TAKE 1 TABLET BY MOUTH  DAILY, Disp: 90 tablet, Rfl: 2  •  ranolazine (RANEXA) 500 MG 12 hr tablet, Take 500 mg by mouth Daily., Disp: , Rfl:   •  rosuvastatin (CRESTOR) 40 MG tablet, TAKE 1 TABLET BY MOUTH IN  THE EVENING, Disp: 90 tablet, Rfl: 2  •  venlafaxine (EFFEXOR) 75 MG tablet, Take 1 tablet by mouth every night at bedtime., Disp: 90 tablet, Rfl: 3  •  VITAMIN D, CHOLECALCIFEROL, PO, Take 1,000 Units by mouth Daily., Disp: , Rfl:      Objective   Vital Signs:   /72 (BP Location: Right arm, Patient Position: Sitting, Cuff Size: Adult)   Pulse 81   Ht 167.6 cm (66\")   Wt 77.6 kg (171 lb)   SpO2 98%   BMI 27.60 kg/m²            Physical Exam  Constitutional:       Appearance: Normal appearance. She is normal weight.   Cardiovascular:      Rate and Rhythm: Normal rate and regular rhythm.      Pulses: Normal pulses.      Heart sounds: Normal heart sounds.   Pulmonary:      Effort: Pulmonary effort is normal.      Breath sounds: Normal breath sounds.   Musculoskeletal:         General: Normal range of motion.      Right lower leg: No edema.      Left lower leg: No edema.   Skin:     General: Skin is warm and dry.   Neurological:      Mental Status: She is alert and oriented to person, place, and time.   Psychiatric:      Comments: No acute distress        Result Review :                 Assessment and Plan    Diagnoses and all orders for this visit:    1. Vitamin D deficiency (Primary)  -     Vitamin D 25 Hydroxy    2. Other iron deficiency anemia  -     Iron Profile  -     Ferritin  -     CBC (No Diff)  -     Vitamin B12  -     Folate    3. Acquired hypothyroidism  -     TSH    4. Fatigue, unspecified type    Ms. Chairez appears to be doing well. She does appear to be tired with dark circles under her eyes.  I will assess her thyroid function, vitamin D level, B12/folate, CBC, iron profile and ferritin.   She is to return tomorrow to have the " labs drawn.   Once I have reviewed the labs I will call her and develop a plan of care.     Follow Up   Return if symptoms worsen or fail to improve, for Next scheduled follow up.  Patient was given instructions and counseling regarding her condition or for health maintenance advice. Please see specific information pulled into the AVS if appropriate.

## 2022-04-22 LAB
25(OH)D3+25(OH)D2 SERPL-MCNC: 42.3 NG/ML (ref 30–100)
ERYTHROCYTE [DISTWIDTH] IN BLOOD BY AUTOMATED COUNT: 12.9 % (ref 11.7–15.4)
FERRITIN SERPL-MCNC: 51 NG/ML (ref 15–150)
FOLATE SERPL-MCNC: >20 NG/ML
HCT VFR BLD AUTO: 38.5 % (ref 34–46.6)
HGB BLD-MCNC: 12.8 G/DL (ref 11.1–15.9)
IRON SATN MFR SERPL: 34 % (ref 15–55)
IRON SERPL-MCNC: 85 UG/DL (ref 27–139)
MCH RBC QN AUTO: 31.1 PG (ref 26.6–33)
MCHC RBC AUTO-ENTMCNC: 33.2 G/DL (ref 31.5–35.7)
MCV RBC AUTO: 94 FL (ref 79–97)
PLATELET # BLD AUTO: 338 X10E3/UL (ref 150–450)
RBC # BLD AUTO: 4.11 X10E6/UL (ref 3.77–5.28)
TIBC SERPL-MCNC: 251 UG/DL (ref 250–450)
TSH SERPL DL<=0.005 MIU/L-ACNC: 1.68 UIU/ML (ref 0.45–4.5)
UIBC SERPL-MCNC: 166 UG/DL (ref 118–369)
VIT B12 SERPL-MCNC: 762 PG/ML (ref 232–1245)
WBC # BLD AUTO: 6.2 X10E3/UL (ref 3.4–10.8)

## 2022-04-26 RX ORDER — FUROSEMIDE 20 MG/1
20 TABLET ORAL DAILY
Qty: 90 TABLET | Refills: 3 | Status: SHIPPED | OUTPATIENT
Start: 2022-04-26 | End: 2023-04-05

## 2022-05-20 RX ORDER — LEVOTHYROXINE SODIUM 0.07 MG/1
75 TABLET ORAL DAILY
Qty: 90 TABLET | Refills: 3 | Status: SHIPPED | OUTPATIENT
Start: 2022-05-20 | End: 2023-01-20 | Stop reason: DRUGHIGH

## 2022-06-20 RX ORDER — VENLAFAXINE 75 MG/1
75 TABLET ORAL
Qty: 90 TABLET | Refills: 3 | Status: SHIPPED | OUTPATIENT
Start: 2022-06-20

## 2022-06-22 ENCOUNTER — APPOINTMENT (OUTPATIENT)
Dept: ONCOLOGY | Facility: HOSPITAL | Age: 67
End: 2022-06-22

## 2022-06-29 DIAGNOSIS — N28.9 RENAL INSUFFICIENCY: Primary | ICD-10-CM

## 2022-07-13 ENCOUNTER — OFFICE VISIT (OUTPATIENT)
Dept: FAMILY MEDICINE CLINIC | Facility: CLINIC | Age: 67
End: 2022-07-13

## 2022-07-13 VITALS
BODY MASS INDEX: 27.48 KG/M2 | WEIGHT: 171 LBS | DIASTOLIC BLOOD PRESSURE: 78 MMHG | HEART RATE: 65 BPM | OXYGEN SATURATION: 96 % | HEIGHT: 66 IN | SYSTOLIC BLOOD PRESSURE: 144 MMHG

## 2022-07-13 DIAGNOSIS — D50.8 OTHER IRON DEFICIENCY ANEMIA: ICD-10-CM

## 2022-07-13 DIAGNOSIS — N28.9 RENAL INSUFFICIENCY: Primary | ICD-10-CM

## 2022-07-13 DIAGNOSIS — E03.9 ACQUIRED HYPOTHYROIDISM: ICD-10-CM

## 2022-07-13 DIAGNOSIS — E78.5 HYPERLIPIDEMIA, UNSPECIFIED HYPERLIPIDEMIA TYPE: ICD-10-CM

## 2022-07-13 DIAGNOSIS — E55.9 VITAMIN D DEFICIENCY: ICD-10-CM

## 2022-07-13 PROCEDURE — 99213 OFFICE O/P EST LOW 20 MIN: CPT | Performed by: NURSE PRACTITIONER

## 2022-07-13 RX ORDER — CHLORHEXIDINE GLUCONATE 0.12 MG/ML
RINSE ORAL
COMMUNITY
Start: 2022-07-08 | End: 2022-12-07

## 2022-07-13 NOTE — PROGRESS NOTES
Chief Complaint  Follow up  (6 Month follow up, Review Labs.)    Subjective        Ivy Chairez presents to [facility]  Ms. Chairez presents today to f/u up on her renal function today. Reports no blood in urine, decreased urination or dysuria. she is positive for back pain at times.     I have reviewed the patient's medical history in detail and updated the computerized patient record.    Current Outpatient Medications:   •  aspirin 81 MG tablet, Take 81 mg by mouth Daily With Dinner., Disp: , Rfl:   •  chlorhexidine (PERIDEX) 0.12 % solution, RINSE MOUTH AS DIRECTED THEN SPIT OUT, Disp: , Rfl:   •  diphenoxylate-atropine (Lomotil) 2.5-0.025 MG per tablet, Take 1 tablet by mouth 4 (Four) Times a Day As Needed for Diarrhea., Disp: 20 tablet, Rfl: 0  •  furosemide (LASIX) 20 MG tablet, TAKE 1 TABLET BY MOUTH  DAILY, Disp: 90 tablet, Rfl: 3  •  levothyroxine (SYNTHROID, LEVOTHROID) 75 MCG tablet, TAKE 1 TABLET BY MOUTH  DAILY, Disp: 90 tablet, Rfl: 3  •  meclizine (ANTIVERT) 25 MG tablet, TAKE 1 TABLET BY MOUTH AT  NIGHT MAY TAKE AN  ADDITIONAL TABLET DAILY AS  NEEDED, Disp: 180 tablet, Rfl: 1  •  meloxicam (MOBIC) 15 MG tablet, Take 15 mg by mouth Daily., Disp: , Rfl: 4  •  metoprolol succinate XL (TOPROL-XL) 25 MG 24 hr tablet, TAKE 1.5 TABLETS BY MOUTH  DAILY, Disp: 135 tablet, Rfl: 3  •  nitroglycerin (NITROSTAT) 0.4 MG SL tablet, Place 0.4 mg under the tongue Every 5 (Five) Minutes As Needed for Chest Pain. Take no more than 3 doses in 15 minutes., Disp: , Rfl:   •  pantoprazole (PROTONIX) 40 MG EC tablet, TAKE 1 TABLET BY MOUTH  DAILY, Disp: 90 tablet, Rfl: 2  •  ranolazine (RANEXA) 500 MG 12 hr tablet, Take 500 mg by mouth Daily., Disp: , Rfl:   •  rosuvastatin (CRESTOR) 40 MG tablet, TAKE 1 TABLET BY MOUTH IN  THE EVENING, Disp: 90 tablet, Rfl: 2  •  venlafaxine (EFFEXOR) 75 MG tablet, TAKE 1 TABLET BY MOUTH  EVERY NIGHT AT BEDTIME, Disp: 90 tablet, Rfl: 3  •  VITAMIN D,  "CHOLECALCIFEROL, PO, Take 1,000 Units by mouth Daily., Disp: , Rfl:   •  Denosumab (PROLIA SC), Inject  under the skin into the appropriate area as directed Every 6 (Six) Months., Disp: , Rfl:      Objective   Vital Signs:  /78 (BP Location: Left arm, Patient Position: Sitting, Cuff Size: Adult) Comment: Patient stated she has stopped her BP med due to fatigue.  Pulse 65   Ht 167.6 cm (66\")   Wt 77.6 kg (171 lb)   SpO2 96%   BMI 27.60 kg/m²   Estimated body mass index is 27.6 kg/m² as calculated from the following:    Height as of this encounter: 167.6 cm (66\").    Weight as of this encounter: 77.6 kg (171 lb).          Physical Exam  Vitals reviewed.   Constitutional:       Appearance: Normal appearance.   Skin:     General: Skin is warm and dry.   Neurological:      Mental Status: She is alert and oriented to person, place, and time.   Psychiatric:         Mood and Affect: Mood normal.         Behavior: Behavior normal.         Thought Content: Thought content normal.         Judgment: Judgment normal.        Result Review :    [facility] 11/22/21 12/17/21 7/6/22   BUN 13 12 12   Creatinine 1.12 (A) 1.08 (A) 1.07 (A)   Sodium 141 140 140   Potassium 4.4 5.1 4.4   Chloride 103 103 103   CO2 23 24 24   Calcium 9.0 9.3 9.2   (A) Abnormal value                      Assessment and Plan   Diagnoses and all orders for this visit:    1. Renal insufficiency (Primary)    Ms. Chairez appears to be doing well today.  I have reviewed her labs with her today.  Her renal function is improving.   We will continue to monitor her in the future.          Follow Up   Return in about 6 months (around 1/13/2023) for Medicare Wellness, Fasting labs 1 week prior to next f/u.  Patient was given instructions and counseling regarding her condition or for health maintenance advice. Please see specific information pulled into the AVS if appropriate.       "

## 2022-09-20 RX ORDER — PANTOPRAZOLE SODIUM 40 MG/1
40 TABLET, DELAYED RELEASE ORAL DAILY
Qty: 90 TABLET | Refills: 3 | OUTPATIENT
Start: 2022-09-20

## 2022-10-24 RX ORDER — ROSUVASTATIN CALCIUM 40 MG/1
TABLET, COATED ORAL
Qty: 90 TABLET | Refills: 2 | Status: SHIPPED | OUTPATIENT
Start: 2022-10-24

## 2022-11-18 RX ORDER — PANTOPRAZOLE SODIUM 40 MG/1
40 TABLET, DELAYED RELEASE ORAL DAILY
Qty: 90 TABLET | Refills: 1 | Status: SHIPPED | OUTPATIENT
Start: 2022-11-18

## 2022-12-07 ENCOUNTER — OFFICE VISIT (OUTPATIENT)
Dept: FAMILY MEDICINE CLINIC | Facility: CLINIC | Age: 67
End: 2022-12-07

## 2022-12-07 VITALS
HEIGHT: 66 IN | RESPIRATION RATE: 20 BRPM | OXYGEN SATURATION: 98 % | WEIGHT: 172.8 LBS | HEART RATE: 63 BPM | SYSTOLIC BLOOD PRESSURE: 120 MMHG | DIASTOLIC BLOOD PRESSURE: 60 MMHG | BODY MASS INDEX: 27.77 KG/M2

## 2022-12-07 DIAGNOSIS — R39.9 UTI SYMPTOMS: Primary | ICD-10-CM

## 2022-12-07 LAB
BILIRUB BLD-MCNC: ABNORMAL MG/DL
CLARITY, POC: CLEAR
COLOR UR: YELLOW
EXPIRATION DATE: ABNORMAL
GLUCOSE UR STRIP-MCNC: NEGATIVE MG/DL
KETONES UR QL: NEGATIVE
LEUKOCYTE EST, POC: NEGATIVE
Lab: ABNORMAL
NITRITE UR-MCNC: NEGATIVE MG/ML
PH UR: 6 [PH] (ref 5–8)
PROT UR STRIP-MCNC: ABNORMAL MG/DL
RBC # UR STRIP: ABNORMAL /UL
SP GR UR: 1.03 (ref 1–1.03)
UROBILINOGEN UR QL: NORMAL

## 2022-12-07 PROCEDURE — 81003 URINALYSIS AUTO W/O SCOPE: CPT | Performed by: NURSE PRACTITIONER

## 2022-12-07 PROCEDURE — 99213 OFFICE O/P EST LOW 20 MIN: CPT | Performed by: NURSE PRACTITIONER

## 2022-12-07 NOTE — PROGRESS NOTES
Chief Complaint  Pain (BACK PAIN, BLADDER PAIN)    Subjective        Ivy Chairez presents to [facility]  History of Present Illness  Ms. Chairez presents with pain in bladder before she voids and right flank pain. This has been going on for the past week. No fever, chills or body aches. She feels like she has to void frequently but then is unable to.     I have reviewed the patient's medical history in detail and updated the computerized patient record.    Current Outpatient Medications:   •  aspirin 81 MG tablet, Take 81 mg by mouth Daily With Dinner., Disp: , Rfl:   •  diphenoxylate-atropine (Lomotil) 2.5-0.025 MG per tablet, Take 1 tablet by mouth 4 (Four) Times a Day As Needed for Diarrhea., Disp: 20 tablet, Rfl: 0  •  furosemide (LASIX) 20 MG tablet, TAKE 1 TABLET BY MOUTH  DAILY, Disp: 90 tablet, Rfl: 3  •  levothyroxine (SYNTHROID, LEVOTHROID) 75 MCG tablet, TAKE 1 TABLET BY MOUTH  DAILY, Disp: 90 tablet, Rfl: 3  •  meclizine (ANTIVERT) 25 MG tablet, TAKE 1 TABLET BY MOUTH AT  NIGHT MAY TAKE AN  ADDITIONAL TABLET DAILY AS  NEEDED, Disp: 180 tablet, Rfl: 1  •  meloxicam (MOBIC) 15 MG tablet, Take 15 mg by mouth Daily., Disp: , Rfl: 4  •  metoprolol succinate XL (TOPROL-XL) 25 MG 24 hr tablet, TAKE 1.5 TABLETS BY MOUTH  DAILY, Disp: 135 tablet, Rfl: 3  •  nitroglycerin (NITROSTAT) 0.4 MG SL tablet, Place 0.4 mg under the tongue Every 5 (Five) Minutes As Needed for Chest Pain. Take no more than 3 doses in 15 minutes., Disp: , Rfl:   •  pantoprazole (PROTONIX) 40 MG EC tablet, Take 1 tablet by mouth Daily., Disp: 90 tablet, Rfl: 1  •  ranolazine (RANEXA) 500 MG 12 hr tablet, Take 500 mg by mouth Daily., Disp: , Rfl:   •  rosuvastatin (CRESTOR) 40 MG tablet, TAKE 1 TABLET BY MOUTH IN  THE EVENING, Disp: 90 tablet, Rfl: 2  •  venlafaxine (EFFEXOR) 75 MG tablet, TAKE 1 TABLET BY MOUTH  EVERY NIGHT AT BEDTIME, Disp: 90 tablet, Rfl: 3  •  VITAMIN D, CHOLECALCIFEROL, PO, Take 1,000  "Units by mouth Daily., Disp: , Rfl:      Objective   Vital Signs:  /60 (BP Location: Right arm, Patient Position: Sitting, Cuff Size: Adult)   Pulse 63   Resp 20   Ht 167.6 cm (65.98\")   Wt 78.4 kg (172 lb 12.8 oz)   SpO2 98%   BMI 27.90 kg/m²   Estimated body mass index is 27.9 kg/m² as calculated from the following:    Height as of this encounter: 167.6 cm (65.98\").    Weight as of this encounter: 78.4 kg (172 lb 12.8 oz).          Physical Exam  Vitals reviewed.   Constitutional:       Appearance: Normal appearance.   Cardiovascular:      Rate and Rhythm: Normal rate and regular rhythm.      Pulses: Normal pulses.      Heart sounds: Normal heart sounds.   Pulmonary:      Effort: Pulmonary effort is normal.      Breath sounds: Normal breath sounds.   Abdominal:      Tenderness: There is no right CVA tenderness or left CVA tenderness.   Skin:     General: Skin is warm and dry.   Neurological:      Mental Status: She is alert and oriented to person, place, and time.   Psychiatric:      Comments: No acute distress        Result Review :                Assessment and Plan   Diagnoses and all orders for this visit:    1. UTI symptoms (Primary)  -     Urine Culture - Urine, Urine, Clean Catch  -     Urinalysis With Microscopic - Urine, Clean Catch  -     POC Urinalysis Dipstick, Automated    Mr. Duran does not appear to be in any acute distress.  Her  POC urine is negative for a UTI. I will send her urine out for culture and also do a microscopic U/A.  She brought with her today paper work that she needs filled out to get her 's licence renewed.   She states she inadvertently answered yes to the question have you passed out within the last 3 years. She had an anaphylactic reaction and has passed out for a short period of time. She has not had any syncopal episodes since.   It is my medical opinion that she is able to drive without any restrictions.           Follow Up   Return if symptoms worsen or " fail to improve, for Next scheduled follow up.  Patient was given instructions and counseling regarding her condition or for health maintenance advice. Please see specific information pulled into the AVS if appropriate.

## 2022-12-09 ENCOUNTER — TELEPHONE (OUTPATIENT)
Dept: FAMILY MEDICINE CLINIC | Facility: CLINIC | Age: 67
End: 2022-12-09

## 2022-12-09 LAB
APPEARANCE UR: CLEAR
BACTERIA #/AREA URNS HPF: ABNORMAL /HPF
BACTERIA UR CULT: NORMAL
BACTERIA UR CULT: NORMAL
BILIRUB UR QL STRIP: NEGATIVE
COLOR UR: ABNORMAL
CRYSTALS URNS MICRO: ABNORMAL
EPI CELLS #/AREA URNS HPF: ABNORMAL /HPF
GLUCOSE UR QL STRIP: NEGATIVE
HGB UR QL STRIP: NEGATIVE
KETONES UR QL STRIP: ABNORMAL
LEUKOCYTE ESTERASE UR QL STRIP: ABNORMAL
NITRITE UR QL STRIP: NEGATIVE
PH UR STRIP: 5.5 [PH] (ref 5–8)
PROT UR QL STRIP: ABNORMAL
RBC #/AREA URNS HPF: ABNORMAL /HPF
SP GR UR STRIP: ABNORMAL (ref 1–1.03)
UROBILINOGEN UR STRIP-MCNC: ABNORMAL MG/DL
WBC #/AREA URNS HPF: ABNORMAL /HPF

## 2022-12-13 ENCOUNTER — TELEPHONE (OUTPATIENT)
Dept: FAMILY MEDICINE CLINIC | Facility: CLINIC | Age: 67
End: 2022-12-13

## 2022-12-14 DIAGNOSIS — R31.9 HEMATURIA, UNSPECIFIED TYPE: Primary | ICD-10-CM

## 2022-12-14 NOTE — TELEPHONE ENCOUNTER
Caller: Ivy Chairez    Relationship: Self    Best call back number: [phone]      What was the call regarding: PATIENT STATED SHE IS OKAY WITH GOING TO [facility] AND IS ASKING TO GO AHEAD AND DO WHATEVER WAS GOING TO BE DONE SO THAT SHE CAN GET IN TO SEE THEM. PLEASE ADVISE.    Do you require a callback: YES

## 2022-12-14 NOTE — TELEPHONE ENCOUNTER
NOTIFIED PATIENT OF MESSAGE AND SHE DOES NOT WANT TO GO TO [facility] SO SHE IS GOING TO CALL AROUND AND FIND SOMEONE ELSE AND LET US KNOW

## 2023-01-03 ENCOUNTER — OFFICE VISIT (OUTPATIENT)
Dept: FAMILY MEDICINE CLINIC | Facility: CLINIC | Age: 68
End: 2023-01-03
Payer: MEDICARE

## 2023-01-03 VITALS
BODY MASS INDEX: 27.45 KG/M2 | HEART RATE: 55 BPM | OXYGEN SATURATION: 99 % | DIASTOLIC BLOOD PRESSURE: 60 MMHG | HEIGHT: 66 IN | SYSTOLIC BLOOD PRESSURE: 95 MMHG | WEIGHT: 170.8 LBS

## 2023-01-03 DIAGNOSIS — J10.1 INFLUENZA A: Primary | ICD-10-CM

## 2023-01-03 DIAGNOSIS — J10.00 PNEUMONIA DUE TO INFLUENZA A VIRUS: ICD-10-CM

## 2023-01-03 PROCEDURE — 1159F MED LIST DOCD IN RCRD: CPT | Performed by: NURSE PRACTITIONER

## 2023-01-03 PROCEDURE — 99213 OFFICE O/P EST LOW 20 MIN: CPT | Performed by: NURSE PRACTITIONER

## 2023-01-03 PROCEDURE — 3074F SYST BP LT 130 MM HG: CPT | Performed by: NURSE PRACTITIONER

## 2023-01-03 PROCEDURE — 3078F DIAST BP <80 MM HG: CPT | Performed by: NURSE PRACTITIONER

## 2023-01-03 PROCEDURE — 1160F RVW MEDS BY RX/DR IN RCRD: CPT | Performed by: NURSE PRACTITIONER

## 2023-01-03 RX ORDER — ONDANSETRON 4 MG/1
TABLET, ORALLY DISINTEGRATING ORAL
COMMUNITY
Start: 2022-12-27 | End: 2023-01-20

## 2023-01-03 RX ORDER — DEXTROMETHORPHAN HYDROBROMIDE AND PROMETHAZINE HYDROCHLORIDE 15; 6.25 MG/5ML; MG/5ML
5 SYRUP ORAL
COMMUNITY
Start: 2022-12-28 | End: 2023-01-04

## 2023-01-03 NOTE — PROGRESS NOTES
Chief Complaint  Cough, Fever (FOLLOW UP INFLUENZA A 12/28/22 ), and Vomiting    Subjective        Ivy Chairez presents to [facility]  History of Present Illness  Dx with pneumonia and influenza A last week.  Went to ER and was prescribed a Z-pack, Zofran, prednisone does pack and promethazine DM for her symptoms. She was starting to somewhat better until today. She started vomiting again. She did take the Zofran this am. She reports her breathing is improving with the exception of when she coughs. Reports her temp has been low grade.     I have reviewed the patient's medical history in detail and updated the computerized patient record.    Current Outpatient Medications:   •  aspirin 81 MG tablet, Take 81 mg by mouth Daily With Dinner., Disp: , Rfl:   •  diphenoxylate-atropine (Lomotil) 2.5-0.025 MG per tablet, Take 1 tablet by mouth 4 (Four) Times a Day As Needed for Diarrhea., Disp: 20 tablet, Rfl: 0  •  furosemide (LASIX) 20 MG tablet, TAKE 1 TABLET BY MOUTH  DAILY, Disp: 90 tablet, Rfl: 3  •  levothyroxine (SYNTHROID, LEVOTHROID) 75 MCG tablet, TAKE 1 TABLET BY MOUTH  DAILY, Disp: 90 tablet, Rfl: 3  •  meclizine (ANTIVERT) 25 MG tablet, TAKE 1 TABLET BY MOUTH AT  NIGHT MAY TAKE AN  ADDITIONAL TABLET DAILY AS  NEEDED, Disp: 180 tablet, Rfl: 1  •  meloxicam (MOBIC) 15 MG tablet, Take 15 mg by mouth Daily., Disp: , Rfl: 4  •  metoprolol succinate XL (TOPROL-XL) 25 MG 24 hr tablet, TAKE 1.5 TABLETS BY MOUTH  DAILY, Disp: 135 tablet, Rfl: 3  •  nitroglycerin (NITROSTAT) 0.4 MG SL tablet, Place 0.4 mg under the tongue Every 5 (Five) Minutes As Needed for Chest Pain. Take no more than 3 doses in 15 minutes., Disp: , Rfl:   •  ondansetron ODT (ZOFRAN-ODT) 4 MG disintegrating tablet, DISSOLVE 1 TABLET ON THE TONGUE EVERY 8 HOURS AS NEEDED FOR NAUSEA OR VOMITING, Disp: , Rfl:   •  pantoprazole (PROTONIX) 40 MG EC tablet, Take 1 tablet by mouth Daily., Disp: 90 tablet, Rfl: 1  •   promethazine-dextromethorphan (PROMETHAZINE-DM) 6.25-15 MG/5ML syrup, 5 mL., Disp: , Rfl:   •  ranolazine (RANEXA) 500 MG 12 hr tablet, Take 500 mg by mouth Daily., Disp: , Rfl:   •  rosuvastatin (CRESTOR) 40 MG tablet, TAKE 1 TABLET BY MOUTH IN  THE EVENING, Disp: 90 tablet, Rfl: 2  •  venlafaxine (EFFEXOR) 75 MG tablet, TAKE 1 TABLET BY MOUTH  EVERY NIGHT AT BEDTIME, Disp: 90 tablet, Rfl: 3  •  VITAMIN D, CHOLECALCIFEROL, PO, Take 1,000 Units by mouth Daily., Disp: , Rfl:   Objective   Vital Signs:  BP 95/60 (BP Location: Right arm, Patient Position: Sitting, Cuff Size: Adult)   Pulse 55   Ht 167.6 cm (65.98\")   Wt 77.5 kg (170 lb 12.8 oz)   SpO2 99%   BMI 27.58 kg/m²   Estimated body mass index is 27.58 kg/m² as calculated from the following:    Height as of this encounter: 167.6 cm (65.98\").    Weight as of this encounter: 77.5 kg (170 lb 12.8 oz).          Physical Exam  Vitals reviewed.   Constitutional:       Appearance: Normal appearance. She is ill-appearing.   Cardiovascular:      Rate and Rhythm: Normal rate and regular rhythm.      Pulses: Normal pulses.      Heart sounds: Normal heart sounds.   Pulmonary:      Effort: Pulmonary effort is normal.      Breath sounds: Normal breath sounds. No wheezing, rhonchi or rales.   Skin:     General: Skin is warm and dry.   Neurological:      Mental Status: She is alert and oriented to person, place, and time.   Psychiatric:      Comments: No acute distress        Result Review :                Assessment and Plan   Diagnoses and all orders for this visit:    1. Influenza A (Primary)    2. Pneumonia due to influenza A virus    Ms. Chairez appears to be ill today.  She is to continue with the medications prescribed for her by the ER provider.  I have provided her with a letter to remain off of work for the rest of the week.          Follow Up   No follow-ups on file.  Patient was given instructions and counseling regarding her condition or for health maintenance  advice. Please see specific information pulled into the AVS if appropriate.

## 2023-01-03 NOTE — LETTER
January 3, 2023     Patient: Ivy Chairez   YOB: 1955   Date of Visit: 1/3/2023       To Whom It May Concern:    It is my medical opinion that Ivy Chairez should remain out of work until 1/9/2023.           Sincerely,        CARMELITA Lovelace    CC:   No Recipients

## 2023-01-09 RX ORDER — MECLIZINE HYDROCHLORIDE 25 MG/1
25 TABLET ORAL NIGHTLY
Qty: 180 TABLET | Refills: 1 | Status: SHIPPED | OUTPATIENT
Start: 2023-01-09

## 2023-01-16 NOTE — PLAN OF CARE
Goal Outcome Evaluation:  Plan of Care Reviewed With: patient        Progress: improving  Outcome Summary: Patient has no c/o chest pain or SOA, VSS up ad steve. WCTM   left ear

## 2023-01-20 ENCOUNTER — OFFICE VISIT (OUTPATIENT)
Dept: FAMILY MEDICINE CLINIC | Facility: CLINIC | Age: 68
End: 2023-01-20
Payer: MEDICARE

## 2023-01-20 VITALS
HEIGHT: 66 IN | BODY MASS INDEX: 27.83 KG/M2 | DIASTOLIC BLOOD PRESSURE: 80 MMHG | HEART RATE: 81 BPM | WEIGHT: 173.2 LBS | SYSTOLIC BLOOD PRESSURE: 150 MMHG | OXYGEN SATURATION: 96 %

## 2023-01-20 DIAGNOSIS — E03.9 ACQUIRED HYPOTHYROIDISM: ICD-10-CM

## 2023-01-20 DIAGNOSIS — Z00.00 MEDICARE ANNUAL WELLNESS VISIT, SUBSEQUENT: Primary | ICD-10-CM

## 2023-01-20 DIAGNOSIS — Z12.31 ENCOUNTER FOR SCREENING MAMMOGRAM FOR BREAST CANCER: ICD-10-CM

## 2023-01-20 PROCEDURE — 1170F FXNL STATUS ASSESSED: CPT | Performed by: NURSE PRACTITIONER

## 2023-01-20 PROCEDURE — 1159F MED LIST DOCD IN RCRD: CPT | Performed by: NURSE PRACTITIONER

## 2023-01-20 PROCEDURE — G0439 PPPS, SUBSEQ VISIT: HCPCS | Performed by: NURSE PRACTITIONER

## 2023-01-20 RX ORDER — LEVOTHYROXINE SODIUM 0.1 MG/1
100 TABLET ORAL DAILY
Qty: 90 TABLET | Refills: 1 | Status: SHIPPED | OUTPATIENT
Start: 2023-01-20

## 2023-01-20 NOTE — PROGRESS NOTES
The ABCs of the Annual Wellness Visit  Subsequent Medicare Wellness Visit    Subjective      Ivy Chairez is a 67 y.o. female who presents for a Subsequent Medicare Wellness Visit.    The following portions of the patient's history were reviewed and   updated as appropriate: allergies, current medications, past family history, past medical history, past social history, past surgical history and problem list.    Compared to one year ago, the patient feels her physical   health is the same.    Compared to one year ago, the patient feels her mental   health is the same.    Recent Hospitalizations:  She was not admitted to the hospital during the last year.       Current Medical Providers:  Patient Care Team:  Chloe Leung APRN as PCP - General (Family Medicine)  Kade Humphrey Jr., MD as Consulting Physician (Cardiology)  Vida Rodriguez MD as Consulting Physician (Endocrinology)  Wayne Brizuela MD as Consulting Physician (Otolaryngology)  Stefany Candelaria APRN as Consulting Physician (Cardiology)    Outpatient Medications Prior to Visit   Medication Sig Dispense Refill   • aspirin 81 MG tablet Take 81 mg by mouth Daily With Dinner.     • diphenoxylate-atropine (Lomotil) 2.5-0.025 MG per tablet Take 1 tablet by mouth 4 (Four) Times a Day As Needed for Diarrhea. 20 tablet 0   • furosemide (LASIX) 20 MG tablet TAKE 1 TABLET BY MOUTH  DAILY 90 tablet 3   • levothyroxine (SYNTHROID, LEVOTHROID) 75 MCG tablet TAKE 1 TABLET BY MOUTH  DAILY 90 tablet 3   • meclizine (ANTIVERT) 25 MG tablet Take 1 tablet by mouth Every Night. 180 tablet 1   • meloxicam (MOBIC) 15 MG tablet Take 15 mg by mouth Daily.  4   • metoprolol succinate XL (TOPROL-XL) 25 MG 24 hr tablet TAKE 1.5 TABLETS BY MOUTH  DAILY 135 tablet 3   • nitroglycerin (NITROSTAT) 0.4 MG SL tablet Place 0.4 mg under the tongue Every 5 (Five) Minutes As Needed for Chest Pain. Take no more than 3 doses in 15 minutes.     • pantoprazole (PROTONIX) 40 MG  EC tablet Take 1 tablet by mouth Daily. 90 tablet 1   • ranolazine (RANEXA) 500 MG 12 hr tablet Take 500 mg by mouth Daily.     • rosuvastatin (CRESTOR) 40 MG tablet TAKE 1 TABLET BY MOUTH IN  THE EVENING 90 tablet 2   • venlafaxine (EFFEXOR) 75 MG tablet TAKE 1 TABLET BY MOUTH  EVERY NIGHT AT BEDTIME 90 tablet 3   • VITAMIN D, CHOLECALCIFEROL, PO Take 1,000 Units by mouth Daily.     • ondansetron ODT (ZOFRAN-ODT) 4 MG disintegrating tablet DISSOLVE 1 TABLET ON THE TONGUE EVERY 8 HOURS AS NEEDED FOR NAUSEA OR VOMITING       No facility-administered medications prior to visit.       No opioid medication identified on active medication list. I have reviewed chart for other potential  high risk medication/s and harmful drug interactions in the elderly.          Aspirin is on active medication list. Aspirin use is indicated based on review of current medical condition/s. Pros and cons of this therapy have been discussed today. Benefits of this medication outweigh potential harm.  Patient has been encouraged to continue taking this medication.  .      Patient Active Problem List   Diagnosis   • Triple vessel coronary artery disease   • Anxiety   • Gastroesophageal reflux disease   • Hyperlipidemia   • Hypothyroidism   • Osteoarthritis of knee   • Cerebrovascular accident (HCC)   • Carotid artery disease (HCC)   • Migraine without status migrainosus, not intractable   • Osteoporosis   • Closed wedge compression fracture of T7 vertebra (HCC)   • History of coronary artery bypass surgery   • Thoracic radiculopathy due to degenerative joint disease of spine   • History of pulmonary embolism   • Hypertension   • Functional diarrhea   • Syncope   • History of stroke   • Parotid mass   • Epigastric pain   • Multiple duodenal ulcers   • Diarrhea   • Abdominal pain   • Anaphylactic syndrome   • Chest pain     Advance Care Planning  Advance Directive is on file.  ACP discussion was held with the patient during this visit. Patient  "has an advance directive in EMR which is still valid.      Objective    Vitals:    23 1451   BP: 150/80   BP Location: Left arm   Patient Position: Sitting   Cuff Size: Adult   Pulse: 81   SpO2: 96%   Weight: 78.6 kg (173 lb 3.2 oz)   Height: 167.6 cm (65.98\")     Estimated body mass index is 27.97 kg/m² as calculated from the following:    Height as of this encounter: 167.6 cm (65.98\").    Weight as of this encounter: 78.6 kg (173 lb 3.2 oz).    BMI is >= 25 and <30. (Overweight) The following options were offered after discussion;: .       Does the patient have evidence of cognitive impairment?   No    Lab Results   Component Value Date    CHLPL 148 2023    TRIG 78 2023    HDL 66 (H) 2023    LDL 67 2023    VLDL 15 2023          HEALTH RISK ASSESSMENT    Smoking Status:  Social History     Tobacco Use   Smoking Status Former   • Packs/day: 1.00   • Years: 30.00   • Pack years: 30.00   • Types: Cigarettes   • Quit date: 2009   • Years since quittin.0   Smokeless Tobacco Never     Alcohol Consumption:  Social History     Substance and Sexual Activity   Alcohol Use No     Fall Risk Screen:    HARPER Fall Risk Assessment was completed, and patient is at LOW risk for falls.Assessment completed on:1/3/2023    Depression Screening:  PHQ-2/PHQ-9 Depression Screening 1/3/2023   Little Interest or Pleasure in Doing Things 0-->not at all   Feeling Down, Depressed or Hopeless 0-->not at all   Trouble Falling or Staying Asleep, or Sleeping Too Much 0-->not at all   Feeling Tired or Having Little Energy 0-->not at all   Poor Appetite or Overeating 0-->not at all   Feeling Bad about Yourself - or that You are a Failure or Have Let Yourself or Your Family Down 0-->not at all   Trouble Concentrating on Things, Such as Reading the Newspaper or Watching Television 0-->not at all   Moving or Speaking So Slowly that Other People Could Have Noticed? Or the Opposite - Being So Fidgety 0-->not " at all   Thoughts that You Would be Better Off Dead or of Hurting Yourself in Some Way 0-->not at all   PHQ-9: Brief Depression Severity Measure Score 0   If You Checked Off Any Problems, How Difficult Have These Problems Made It For You to Do Your Work, Take Care of Things at Home, or Get Along with Other People? not difficult at all       Health Habits and Functional and Cognitive Screening:  Functional & Cognitive Status 1/12/2022   Do you have difficulty preparing food and eating? No   Do you have difficulty bathing yourself, getting dressed or grooming yourself? No   Do you have difficulty using the toilet? No   Do you have difficulty moving around from place to place? No   Do you have trouble with steps or getting out of a bed or a chair? No   Current Diet Well Balanced Diet   Dental Exam Up to date   Eye Exam Up to date   Exercise (times per week) 3 times per week   Current Exercises Include Walking   Current Exercise Activities Include -   Do you need help using the phone?  No   Are you deaf or do you have serious difficulty hearing?  No   Do you need help with transportation? No   Do you need help shopping? No   Do you need help preparing meals?  No   Do you need help with housework?  No   Do you need help with laundry? No   Do you need help taking your medications? No   Do you need help managing money? No   Do you ever drive or ride in a car without wearing a seat belt? No   Have you felt unusual stress, anger or loneliness in the last month? No   Who do you live with? Spouse   If you need help, do you have trouble finding someone available to you? No   Have you been bothered in the last four weeks by sexual problems? No   Do you have difficulty concentrating, remembering or making decisions? No       Age-appropriate Screening Schedule:  Refer to the list below for future screening recommendations based on patient's age, sex and/or medical conditions. Orders for these recommended tests are listed in the  plan section. The patient has been provided with a written plan.    Health Maintenance   Topic Date Due   • TDAP/TD VACCINES (3 - Td or Tdap) 04/01/2018   • MAMMOGRAM  02/01/2021   • INFLUENZA VACCINE  08/01/2022   • DXA SCAN  05/17/2023   • LIPID PANEL  01/12/2024   • PAP SMEAR  Discontinued   • ZOSTER VACCINE  Discontinued                CMS Preventative Services Quick Reference  Risk Factors Identified During Encounter:    needs mammogram    The above risks/problems have been discussed with the patient.  Pertinent information has been shared with the patient in the After Visit Summary.    Diagnoses and all orders for this visit:    1. Medicare annual wellness visit, subsequent (Primary)        Follow Up:   Next Medicare Wellness visit to be scheduled in 1 year.      An After Visit Summary and PPPS were made available to the patient.

## 2023-01-20 NOTE — PATIENT INSTRUCTIONS
Medicare Wellness  Personal Prevention Plan of Service     Date of Office Visit:    Encounter Provider:  CARMELITA Lovleace  Place of Service:  [facility]  Patient Name: Ivy Chairez  :  1955    As part of the Medicare Wellness portion of your visit today, we are providing you with this personalized preventive plan of services (PPPS). This plan is based upon recommendations of the [place] Preventive Services Task Force (USPSTF) and the Advisory Committee on Immunization Practices (ACIP).    This lists the preventive care services that should be considered, and provides dates of when you are due. Items listed as completed are up-to-date and do not require any further intervention.    Health Maintenance   Topic Date Due    TDAP/TD VACCINES (3 - Td or Tdap) 2018    GASTROSCOPY (EGD)  2020    MAMMOGRAM  2021    COVID-19 Vaccine (4 - Booster for Pfizer series) 2022    INFLUENZA VACCINE  2022    ANNUAL WELLNESS VISIT  2023    DXA SCAN  2023    LIPID PANEL  2024    COLORECTAL CANCER SCREENING  2031    HEPATITIS C SCREENING  Completed    Pneumococcal Vaccine 65+  Completed    PAP SMEAR  Discontinued    ZOSTER VACCINE  Discontinued    LUNG CANCER SCREENING  Discontinued       No orders of the defined types were placed in this encounter.      No follow-ups on file.

## 2023-02-02 ENCOUNTER — HOSPITAL ENCOUNTER (OUTPATIENT)
Dept: MAMMOGRAPHY | Facility: HOSPITAL | Age: 68
Discharge: HOME | End: 2023-02-02
Admitting: NURSE PRACTITIONER
Payer: MEDICARE

## 2023-02-02 PROCEDURE — 77063 BREAST TOMOSYNTHESIS BI: CPT

## 2023-02-02 PROCEDURE — 77067 SCR MAMMO BI INCL CAD: CPT

## 2023-03-20 RX ORDER — METOPROLOL SUCCINATE 25 MG/1
37.5 TABLET, EXTENDED RELEASE ORAL DAILY
Qty: 135 TABLET | Refills: 3 | Status: SHIPPED | OUTPATIENT
Start: 2023-03-20

## 2023-03-23 ENCOUNTER — TELEPHONE (OUTPATIENT)
Dept: FAMILY MEDICINE CLINIC | Facility: CLINIC | Age: 68
End: 2023-03-23
Payer: MEDICARE

## 2023-03-23 DIAGNOSIS — E03.9 ACQUIRED HYPOTHYROIDISM: Primary | ICD-10-CM

## 2023-04-05 RX ORDER — FUROSEMIDE 20 MG/1
20 TABLET ORAL DAILY
Qty: 90 TABLET | Refills: 3 | Status: SHIPPED | OUTPATIENT
Start: 2023-04-05

## 2023-08-15 ENCOUNTER — OFFICE VISIT (OUTPATIENT)
Dept: FAMILY MEDICINE CLINIC | Facility: CLINIC | Age: 68
End: 2023-08-15
Payer: MEDICARE

## 2023-08-15 VITALS
WEIGHT: 169.2 LBS | OXYGEN SATURATION: 97 % | HEART RATE: 54 BPM | BODY MASS INDEX: 27.19 KG/M2 | HEIGHT: 66 IN | DIASTOLIC BLOOD PRESSURE: 70 MMHG | SYSTOLIC BLOOD PRESSURE: 138 MMHG

## 2023-08-15 DIAGNOSIS — Z13.1 SCREENING FOR DIABETES MELLITUS (DM): ICD-10-CM

## 2023-08-15 DIAGNOSIS — E55.9 VITAMIN D DEFICIENCY: ICD-10-CM

## 2023-08-15 DIAGNOSIS — R73.9 HYPERGLYCEMIA: ICD-10-CM

## 2023-08-15 DIAGNOSIS — D50.8 OTHER IRON DEFICIENCY ANEMIA: ICD-10-CM

## 2023-08-15 DIAGNOSIS — E03.9 ACQUIRED HYPOTHYROIDISM: Primary | ICD-10-CM

## 2023-08-15 PROCEDURE — 1159F MED LIST DOCD IN RCRD: CPT | Performed by: NURSE PRACTITIONER

## 2023-08-15 PROCEDURE — 3075F SYST BP GE 130 - 139MM HG: CPT | Performed by: NURSE PRACTITIONER

## 2023-08-15 PROCEDURE — 3078F DIAST BP <80 MM HG: CPT | Performed by: NURSE PRACTITIONER

## 2023-08-15 PROCEDURE — 1160F RVW MEDS BY RX/DR IN RCRD: CPT | Performed by: NURSE PRACTITIONER

## 2023-08-15 PROCEDURE — 99214 OFFICE O/P EST MOD 30 MIN: CPT | Performed by: NURSE PRACTITIONER

## 2023-08-15 NOTE — PROGRESS NOTES
Chief Complaint  Fatigue    Subjective        Ivy Chairez presents to [facility]  Fatigue  This is a recurrent problem. The current episode started more than 1 month ago. The problem occurs constantly. The problem has been gradually worsening. Associated symptoms include diaphoresis, fatigue and headaches (not daily but are more frequent). Pertinent negatives include no myalgias, nausea, vertigo, vomiting or weakness. Chest pain: only when walking with the dog.Associated symptoms comments: Shortness of breath with exertion, feels bloated all of the time. Nothing aggravates the symptoms. She has tried nothing for the symptoms. The treatment provided no relief.     I have reviewed the patient's medical history in detail and updated the computerized patient record.       Current Outpatient Medications:     aspirin 81 MG tablet, Take 1 tablet by mouth Daily With Dinner., Disp: , Rfl:     diphenoxylate-atropine (Lomotil) 2.5-0.025 MG per tablet, Take 1 tablet by mouth 4 (Four) Times a Day As Needed for Diarrhea., Disp: 20 tablet, Rfl: 0    furosemide (LASIX) 20 MG tablet, TAKE 1 TABLET BY MOUTH  DAILY, Disp: 90 tablet, Rfl: 3    levothyroxine (SYNTHROID, LEVOTHROID) 100 MCG tablet, TAKE 1 TABLET BY MOUTH DAILY, Disp: 90 tablet, Rfl: 3    meclizine (ANTIVERT) 25 MG tablet, TAKE 1 TABLET BY MOUTH AT NIGHT, Disp: 100 tablet, Rfl: 1    meloxicam (MOBIC) 15 MG tablet, Take 1 tablet by mouth Daily., Disp: , Rfl: 4    metoprolol succinate XL (TOPROL-XL) 25 MG 24 hr tablet, Take 1.5 tablets by mouth Daily., Disp: 135 tablet, Rfl: 3    nitroglycerin (NITROSTAT) 0.4 MG SL tablet, Place 1 tablet under the tongue Every 5 (Five) Minutes As Needed for Chest Pain. Take no more than 3 doses in 15 minutes., Disp: , Rfl:     pantoprazole (PROTONIX) 40 MG EC tablet, Take 1 tablet by mouth Daily., Disp: 90 tablet, Rfl: 1    ranolazine (RANEXA) 500 MG 12 hr tablet, Take 1 tablet by mouth Daily., Disp: ,  "Rfl:     rosuvastatin (CRESTOR) 40 MG tablet, TAKE 1 TABLET BY MOUTH IN  THE EVENING, Disp: 90 tablet, Rfl: 3    venlafaxine (EFFEXOR) 75 MG tablet, TAKE 1 TABLET BY MOUTH  EVERY NIGHT AT BEDTIME, Disp: 90 tablet, Rfl: 3    VITAMIN D, CHOLECALCIFEROL, PO, Take 1,000 Units by mouth Daily., Disp: , Rfl:        Objective   Vital Signs:  /70 (BP Location: Left arm, Patient Position: Sitting, Cuff Size: Adult)   Pulse 54   Ht 167.6 cm (65.98\")   Wt 76.7 kg (169 lb 3.2 oz)   SpO2 97%   BMI 27.33 kg/mý   Estimated body mass index is 27.33 kg/mý as calculated from the following:    Height as of this encounter: 167.6 cm (65.98\").    Weight as of this encounter: 76.7 kg (169 lb 3.2 oz).               Physical Exam  Vitals reviewed.   Constitutional:       Appearance: Normal appearance.   Cardiovascular:      Rate and Rhythm: Normal rate and regular rhythm.      Pulses: Normal pulses.      Heart sounds: Normal heart sounds.   Pulmonary:      Effort: Pulmonary effort is normal.      Breath sounds: Normal breath sounds.   Skin:     General: Skin is warm and dry.   Neurological:      Mental Status: She is alert and oriented to person, place, and time.   Psychiatric:      Comments: No acute distress      Result Review :                   Assessment and Plan   Diagnoses and all orders for this visit:    1. Acquired hypothyroidism (Primary)  -     TSH    2. Vitamin D deficiency  -     Vitamin D,25-Hydroxy    3. Other iron deficiency anemia  -     Ferritin  -     Iron Profile  -     Vitamin B12  -     Folate  -     CBC (No Diff)    4. Screening for diabetes mellitus (DM)    5. Hyperglycemia  -     Hemoglobin A1c  -     Basic Metabolic Panel    Ms. Chairze does not appear to be in any acute distress.  I have ordered the above labs to assess for possible caused of her worsening fatigue.  She does have a cardiac history so I have also recommend that she follow up with her cardiologist.  Once I have reviewed her labs, I will " develop a plan of care.        Follow Up   Return if symptoms worsen or fail to improve, for Next scheduled follow up.  Patient was given instructions and counseling regarding her condition or for health maintenance advice. Please see specific information pulled into the AVS if appropriate.

## 2023-08-16 ENCOUNTER — TELEPHONE (OUTPATIENT)
Dept: FAMILY MEDICINE CLINIC | Facility: CLINIC | Age: 68
End: 2023-08-16
Payer: MEDICARE

## 2023-08-16 DIAGNOSIS — E03.9 ACQUIRED HYPOTHYROIDISM: ICD-10-CM

## 2023-08-16 LAB
25(OH)D3+25(OH)D2 SERPL-MCNC: 45.9 NG/ML (ref 30–100)
BUN SERPL-MCNC: 12 MG/DL (ref 8–27)
BUN/CREAT SERPL: 11 (ref 12–28)
CALCIUM SERPL-MCNC: 9.6 MG/DL (ref 8.7–10.3)
CHLORIDE SERPL-SCNC: 105 MMOL/L (ref 96–106)
CO2 SERPL-SCNC: 26 MMOL/L (ref 20–29)
CREAT SERPL-MCNC: 1.06 MG/DL (ref 0.57–1)
EGFRCR SERPLBLD CKD-EPI 2021: 57 ML/MIN/1.73
ERYTHROCYTE [DISTWIDTH] IN BLOOD BY AUTOMATED COUNT: 13 % (ref 11.7–15.4)
FERRITIN SERPL-MCNC: 60 NG/ML (ref 15–150)
FOLATE SERPL-MCNC: >20 NG/ML
GLUCOSE SERPL-MCNC: 82 MG/DL (ref 70–99)
HBA1C MFR BLD: 5.2 % (ref 4.8–5.6)
HCT VFR BLD AUTO: 38.9 % (ref 34–46.6)
HGB BLD-MCNC: 12.9 G/DL (ref 11.1–15.9)
IRON SATN MFR SERPL: 47 % (ref 15–55)
IRON SERPL-MCNC: 128 UG/DL (ref 27–139)
MCH RBC QN AUTO: 30.6 PG (ref 26.6–33)
MCHC RBC AUTO-ENTMCNC: 33.2 G/DL (ref 31.5–35.7)
MCV RBC AUTO: 92 FL (ref 79–97)
PLATELET # BLD AUTO: 347 X10E3/UL (ref 150–450)
POTASSIUM SERPL-SCNC: 4.8 MMOL/L (ref 3.5–5.2)
RBC # BLD AUTO: 4.22 X10E6/UL (ref 3.77–5.28)
SODIUM SERPL-SCNC: 142 MMOL/L (ref 134–144)
TIBC SERPL-MCNC: 272 UG/DL (ref 250–450)
TSH SERPL DL<=0.005 MIU/L-ACNC: 0.03 UIU/ML (ref 0.45–4.5)
UIBC SERPL-MCNC: 144 UG/DL (ref 118–369)
VIT B12 SERPL-MCNC: 826 PG/ML (ref 232–1245)
WBC # BLD AUTO: 6.1 X10E3/UL (ref 3.4–10.8)

## 2023-08-16 RX ORDER — LEVOTHYROXINE SODIUM 0.07 MG/1
75 TABLET ORAL DAILY
Qty: 90 TABLET | Refills: 1 | Status: SHIPPED | OUTPATIENT
Start: 2023-08-16

## 2023-08-21 RX ORDER — PANTOPRAZOLE SODIUM 40 MG/1
40 TABLET, DELAYED RELEASE ORAL DAILY
Qty: 90 TABLET | Refills: 3 | Status: SHIPPED | OUTPATIENT
Start: 2023-08-21

## 2023-10-09 ENCOUNTER — TELEPHONE (OUTPATIENT)
Dept: GASTROENTEROLOGY | Facility: CLINIC | Age: 68
End: 2023-10-09
Payer: MEDICARE

## 2023-10-09 DIAGNOSIS — K44.9 DIAPHRAGMATIC HERNIA WITHOUT OBSTRUCTION AND WITHOUT GANGRENE: Primary | ICD-10-CM

## 2023-10-09 NOTE — TELEPHONE ENCOUNTER
Hub staff attempted to follow warm transfer process and was unsuccessful     Caller: Ivy Chairez    Relationship to patient: Self    Best call back number: [phone]    Patient is needing: PT IS REQUESTING A CALL BACK TO SEE WHETHER SHE NEEDS AN APPT TO COME AND SEE DR DILL.     PT HAD A PET SCAN AT [facility]"" LAST WEEK AND IT SHOWED THAT SHE HAD A POSTERIOR DIAPHRAGMATIC HERNIC.     PT DIDN'T KNOW IF HE TREATED THAT OR NOT. PLEASE CALL AND ADVISE. IT'S OK TO [facility].

## 2023-10-18 NOTE — PROGRESS NOTES
General Surgery Initial Office Visit    Referring Provider: CARMELITA Lovelace    Chief Complaint:    Diaphragmatic hernia    History of Present Illness:    Ivy Chairez is a 67 y.o. female who presents accompanied by her  to General Surgery clinic for evaluation of a fat-containing right posterior diaphragmatic hernia. She recently saw her primary care provider for evaluation for fatigue.  She underwent lab work and was referred to cardiology given her history of heart disease.  She underwent 2D echo 9/18/23 and cardiac PET/CT on 10/6/23. The latter study demonstrated incidental finding of a fat-containing right posterior diaphragmatic hernia.     With regards to her symptoms, the patient reports shortness of breath since last spring especially with activity.  She has also had epigastric pain and nausea only if she overexerts herself.  She denies any postprandial fullness or vomiting.  She denies any fever, chills, lightheadedness, dizziness, symptoms of chest pain, heartburn, palpitations, wheezing, constipation, diarrhea, or blood in her stool.  She denies any prior abdominal or chest trauma.      She has a history of GERD and diarrhea, underwent EGD 1/24/2020 by Dr. Krishnamurthy which demonstrated normal duodenal biopsies, gastritis and esophagitis; colonoscopy was performed Dr. Krishnamurthy on 8/19/21 and demonstrated normal random colon biopsies and TI biopsies.     She underwent T4-T10 bilateral posterior lateral fusion by Dr. Callejas in 2017. She reports history of 3 cardiac stents and a triple bypass, appendectomy, cholecystectomy, total hysterectomy, cataract surgery, and right knee surgery.    Past Medical History:     Past Medical History:   Diagnosis Date    Allergic 1996    amoxixillian and flagyl, hydrocodone    Angina pectoris     Broken toe 03/01/2017    right 4th toe    CHF (congestive heart failure) 2009    Common migraine without aura     Neurologist Dr Govea    Coronary artery disease      stent    History of chest pain     History of CHF (congestive heart failure)     History of pulmonary embolism     History of stroke 2003    History of transfusion     Hyperlipidemia     Hypertension 2015    Hypothyroidism     Low back pain     Migraine     eval and management by neurologist    Orthostatic hypotension     Osteopenia 2019    Osteoporosis     Pleural effusion     POSTOPERATIVE, REQUIRING THORACENTESIS.    Polyp of sigmoid colon     REMOVED    PONV (postoperative nausea and vomiting)     Pulmonary embolism     Stroke 2003    Thoracic degenerative disc disease     Received epidural 12/14 [facility] DR Landin    Thoracic spine pain     Thyroid disease     Weakness of right side of body     ARM & FACE        Past Surgical History:      Past Surgical History:   Procedure Laterality Date    APPENDECTOMY      AUGMENTATION MAMMAPLASTY      BACK SURGERY      BREAST AUGMENTATION      CARDIAC CATHETERIZATION      CARDIAC SURGERY      CHOLECYSTECTOMY      COLONOSCOPY  06/2004    Dr. Salas    COLONOSCOPY N/A 08/19/2021    Procedure: COLONOSCOPY into cecum and normal terminal ileum with bx;  Surgeon: Cuauhtemoc Krishnamurthy MD;  Location: [facility] ENDOSCOPY;  Service: Gastroenterology;  Laterality: N/A;  pre: Diarrhea, abdominal pain  post: left sided diverticulosis, hemorrhoids     CORONARY ANGIOPLASTY WITH STENT PLACEMENT      NON-GRAFTED RIGHT CORONARY ARTERY STENT.    CORONARY ARTERY BYPASS GRAFT  04/2009    cabg x 3: LIMA to LAD, vein grafts to diagonal and OM1    CORONARY STENT PLACEMENT      ENDOSCOPY N/A 01/24/2020    Procedure: ESOPHAGOGASTRODUODENOSCOPY with bx;  Surgeon: Cuauhtemoc Krishnamurthy MD;  Location: [facility] ENDOSCOPY;  Service: Gastroenterology    ENDOSCOPY N/A 02/21/2020    Procedure: ESOPHAGOGASTRODUODENOSCOPY WITH COLD BIOPSIES;  Surgeon: Cuauhtemoc Krishnamurthy MD;  Location: [facility] ENDOSCOPY;  Service: Gastroenterology;  Laterality: N/A;  PRE: Epigastric pain; GERD  POST: DUODENITIS;  DIVERTICULOSIS; GASTRITIS; HERNIA    EPIDURAL BLOCK      HYSTERECTOMY      KNEE SURGERY Right     THORACIC DECOMPRESSION POSTERIOR FUSION WITH INSTRUMENTATION N/A 2017    Procedure: T4-T10 Fusion with instrumentation;  Surgeon: Clyde Callejas MD;  Location: [facility] OR;  Service:     TOTAL THYROIDECTOMY         Family History:    She denies any past family history  Family History   Problem Relation Age of Onset    Arthritis Mother         osteoarthritis    Hearing loss Mother     Hyperlipidemia Mother     Thyroid disease Mother     Vision loss Mother     Heart disease Father     Hypertension Father     Colon cancer Father     Diabetes Father     Hearing loss Father     Hyperlipidemia Father     Hypertension Sister     Colon polyps Sister     Asthma Sister     COPD Sister     Hypertension Brother     Malig Hyperthermia Neg Hx          Social History:    She smoked a pack of cigarettes per day for 25 years but quit in   She denies any alcohol use  She denies any recreational drug use  She retired last year  She lives at home with her   Social History     Socioeconomic History    Marital status:    Tobacco Use    Smoking status: Former     Packs/day: 1.00     Years: 30.00     Additional pack years: 0.00     Total pack years: 30.00     Types: Cigarettes     Quit date: 2009     Years since quittin.7    Smokeless tobacco: Never   Vaping Use    Vaping Use: Never used   Substance and Sexual Activity    Alcohol use: No    Drug use: No    Sexual activity: Yes     Partners: Male     Birth control/protection: Post-menopausal       Allergies:   Allergies   Allergen Reactions    Other Anaphylaxis     Restylane (lip filler)    Amoxicillin Swelling    Hydrocodone-Acetaminophen Nausea And Vomiting    Metronidazole Diarrhea and Nausea And Vomiting       Medications:   Aspirin 81mg    Lomotil  Lasix  Levothyroxine  Meclizine  Meloxicam  Metoprolol  Nitroglycerin  Pantoprazole  Ranolazine  Rosuvastatin  Venlafaxine  Vitamin D    Review of Systems:   Constitutional: denies fevers, chills  Eyes: denies vision changes or scleral icterus  Resp: denies cough, +shortness of breath  CV: denies chest pain, heart palpitations  GI: + Epigastric abdominal pain, nausea, denies vomiting, diarrhea, constipation, melena, hematochezia  : denies hematuria, dysuria    Physical Exam:   Constitutional: Well-developed, well-nourished, interactive, cooperative, conversant  Respiratory: Normal inspiratory effort on room air, lungs clear to auscultation bilaterally  Cardiovascular: Regular rate and rhythm, no murmur, gallop, or rub, no peripheral edema  Gastrointestinal: Abd soft, nontender, nondistended, no rebound or guarding   Skin:  Warm, dry, no rash on visualized skin surfaces  Musculoskeletal: Symmetric strength, normal gait  Psychiatric: Normal mood and affect    Radiology/Endoscopy:      Cardiac PET CT overread 10/6/23 images and report reviewed - FINDINGS: Helical CT of the heart during quiet respiration demonstrates normal heart size with no pericardial or pleural effusion. Coronary artery calcifications and stents. Aortic   calcifications. The included lower lungs show calcified granulomatous   disease with no consolidation.     A right posterior diaphragmatic hernia contains fat, new from thoracic spine CT on 6/6/2014. Calcified bilateral breast implants. Sternotomy wires and thoracic spine fusion hardware. Small hiatal hernia, better visualized on the second series (series 9). Cholecystectomy.     IMPRESSION:   1. Development of a fat-containing right Bochdalek hernia.   2. Small hiatal hernia.   3. CABG with coronary artery calcifications and stents.     2D Echo 9/18/2023 report reviewed -   Summary: Technically difficult examination. The ejection fraction biplane was calculated at 59%. The left  ventricular chamber size, wall thickness, and systolic function are within normal limits. There are no regional wall motion abnormalities observed. There is normal diastolic function. The right ventricular chamber size and systolic function are within normal limits. Mild pulmonic insufficiency present. Trace-to-mild tricuspid regurgitation. Right ventricular systolic pressure consistent with no pulmonary hypertension. There is no evidence of pericardial effusion.     CT abd/pelvis 12/03/2019 - report and images reviewed, there is apparently a right posterior diaphragmatic hernia containing fat noted on imaging     Labs:    CBC 8/15/2023-WBC 6.1, hemoglobin 12.9, platelets 347  BMP 8/15/2023-glucose 82, BUN 12, creatinine 1.06, sodium 142, potassium 4.8, chloride 105, calcium 9.6, CO2 26  Hemoglobin A1c 8/15/2023-5.2%    Assessment/Plan:  67 year old lady with    CAD s/p cardiac stents and CABG  Acquired coagulopathy with ASA 81mg use  HLD  HTN  Hypothyroidism  GERD  Vitamin D deficiency  History of PE  History of CVA     She is presenting for evaluation of fat-containing right posterior diaphragmatic hernia with dyspnea on exertion and episodes of associated epigastric pain.    I discussed with the patient and her  her incidental PET CT finding and showed them her images and report. Her diaphragmatic hernia was noted incidentally on cardiac PET and I am not convinced that it is the source of her symptoms, given that she has had a hernia there since at least 2019 based on review of her images.  I instructed the patient that I would review her case with my partner and call her back with recommendations.  She and her  are agreeable to the plan.    Sydni Pena M.D.  General, Robotic, and Endoscopic Surgery  [facility]    [address], [address]  [place], [place], [address]  P: [phone]  F: [phone]

## 2023-10-19 ENCOUNTER — OFFICE VISIT (OUTPATIENT)
Dept: SURGERY | Facility: CLINIC | Age: 68
End: 2023-10-19
Payer: MEDICARE

## 2023-10-19 VITALS
DIASTOLIC BLOOD PRESSURE: 80 MMHG | SYSTOLIC BLOOD PRESSURE: 118 MMHG | WEIGHT: 169.4 LBS | HEIGHT: 66 IN | BODY MASS INDEX: 27.23 KG/M2

## 2023-10-19 DIAGNOSIS — K44.9 DIAPHRAGMATIC HERNIA WITHOUT OBSTRUCTION AND WITHOUT GANGRENE: Primary | ICD-10-CM

## 2023-10-19 PROCEDURE — 99214 OFFICE O/P EST MOD 30 MIN: CPT | Performed by: STUDENT IN AN ORGANIZED HEALTH CARE EDUCATION/TRAINING PROGRAM

## 2023-10-19 PROCEDURE — 3079F DIAST BP 80-89 MM HG: CPT | Performed by: STUDENT IN AN ORGANIZED HEALTH CARE EDUCATION/TRAINING PROGRAM

## 2023-10-19 PROCEDURE — 1160F RVW MEDS BY RX/DR IN RCRD: CPT | Performed by: STUDENT IN AN ORGANIZED HEALTH CARE EDUCATION/TRAINING PROGRAM

## 2023-10-19 PROCEDURE — 1159F MED LIST DOCD IN RCRD: CPT | Performed by: STUDENT IN AN ORGANIZED HEALTH CARE EDUCATION/TRAINING PROGRAM

## 2023-10-19 PROCEDURE — 3074F SYST BP LT 130 MM HG: CPT | Performed by: STUDENT IN AN ORGANIZED HEALTH CARE EDUCATION/TRAINING PROGRAM

## 2023-10-19 NOTE — PROGRESS NOTES
"    Name: Ivy Chairez ADMIT: 3/6/2017   : 1955  PCP: Murtaza Berrios Jr., MD    MRN: [id number] LOS: 2 days   AGE/SEX: 61 y.o. female  ROOM: [facility]     Subjective   Nauseated but no emesis.  Breathing better.  Appears less anxious.  Using IS \"when i think about it.\"    Objective   Vital Signs  Temp:  [97.8 °F (36.6 °C)-98.7 °F (37.1 °C)] 97.9 °F (36.6 °C)  Heart Rate:  [] 102  Resp:  [16-18] 16  BP: (100-122)/(48-72) 122/72  SpO2:  [84 %-100 %] 97 %  on  Flow (L/min):  [2] 2;   O2 Device: nasal cannula  Body mass index is 24.61 kg/(m^2).    Objective:  General Appearance:  Uncomfortable and not in pain.    Vital signs: (most recent): Blood pressure 122/72, pulse 102, temperature 97.9 °F (36.6 °C), temperature source Oral, resp. rate 16, height 66\" (167.6 cm), weight 152 lb 8 oz (69.2 kg), SpO2 97 %.    Lungs:  Normal effort.  Breath sounds clear to auscultation.  There are decreased breath sounds.  No wheezes, rales (bases) or rhonchi.    Heart: Normal rate.  Regular rhythm.    Abdomen: Abdomen is soft and non-distended.  Bowel sounds are normal.   There is no abdominal tenderness.     Extremities: There is no dependent edema.    Neurological: Patient is alert and oriented to person, place and time.    Skin:  Warm and dry.        Results Review:       I reviewed the patient's new clinical results.    Results from last 7 days  Lab Units 17  0607 17  0620   WBC 10*3/mm3 7.25 10.86*   HEMOGLOBIN g/dL 8.9* 10.2*   PLATELETS 10*3/mm3 203 239       Results from last 7 days  Lab Units 17  0620   SODIUM mmol/L 131*   POTASSIUM mmol/L 5.4*   CHLORIDE mmol/L 97*   TOTAL CO2 mmol/L 23.3   BUN mg/dL 9   CREATININE mg/dL 0.79   GLUCOSE mg/dL 141*   Estimated Creatinine Clearance: 69.1 mL/min (by C-G formula based on Cr of 0.79).    Results from last 7 days  Lab Units 17  0620   CALCIUM mg/dL 8.0*       No results found for: HGBA1C  GLUCOSE   Date/Time Value Ref Range Status " Well-controlled, continue current medications.   03/06/2017 2248 142 (H) 70 - 130 mg/dL Final       CT ANGIOGRAM CHEST WITH AND WITHOUT CONTRAST  CONCLUSION:  1. No pulmonary embolus demonstrated.  2. Small pleural effusions with bibasilar airspace disease as discussed  above.  3. Hiatal hernia.        atorvastatin 80 mg Oral Daily   cholecalciferol 2,000 Units Oral Daily   levothyroxine 75 mcg Oral QAM   sennosides-docusate sodium 1 tablet Oral BID   sennosides-docusate sodium 2 tablet Oral Nightly   venlafaxine 75 mg Oral Daily   vitamin B-12 1,000 mcg Oral Daily       lactated ringers 9 mL/hr Last Rate: 9 mL/hr (03/06/17 0708)   sodium chloride 100 mL/hr Last Rate: 100 mL/hr (03/07/17 2133)         Assessment/Plan   Assessment:     Active Hospital Problems (** Indicates Principal Problem)    Diagnosis Date Noted   • **Herniated thoracic disc without myelopathy [M51.24] 03/06/2017   • Atelectasis of both lungs [J98.11] 03/08/2017   • History of pulmonary embolism [Z86.711] 03/06/2017   • Dyspnea [R06.00] 03/06/2017   • Osteoporosis [M81.0] 07/20/2016   • Triple vessel coronary artery disease [I25.10] 12/16/2015   • Anxiety [F41.9] 12/16/2015   • History of coronary artery bypass surgery [Z95.1] 10/25/2012      Resolved Hospital Problems    Diagnosis Date Noted Date Resolved   No resolved problems to display.       Plan:   - POD#2 T4-T10 Fusion with instrumentation  - CTA chest is negative for PE.  Probable atelectasis lower lobes.  Reminded on importance of IS use.  I dont think she's been using it as she should.    - Remains normotensive off HCTZ.  Continue to hold.  Monitor BP.  - Remain off PREMARIN for the perioperative setting. Advised that she not take this given hx of CAD/strokes/PE etc.   - Recommend DC dilaudid, IVFs and albert.  - Continue Zofran and Phenergan for nausea.    - SCDs have been ordered for DVT prophylaxis per ortho.  - Patient encouraged to use incentive spirometer as instructed.        Avel Lal MD  [facility]  03/08/17  7:18 AM

## 2023-10-24 ENCOUNTER — TELEPHONE (OUTPATIENT)
Dept: SURGERY | Facility: CLINIC | Age: 68
End: 2023-10-24
Payer: MEDICARE

## 2023-10-24 NOTE — TELEPHONE ENCOUNTER
I spoke with the patient about referral to my partner Dr. Ray for evaluation of right posterior diaphragmatic hernia after we reviewed her case and imaging. Patient agreeable for referral.    Sydni Pena M.D.  General, Robotic, and Endoscopic Surgery  [facility]    [address], [address]  [place], [place], [address]  P: [phone]  F: [phone]

## 2023-11-09 ENCOUNTER — OFFICE VISIT (OUTPATIENT)
Dept: SURGERY | Facility: CLINIC | Age: 68
End: 2023-11-09
Payer: MEDICARE

## 2023-11-09 VITALS
WEIGHT: 167 LBS | BODY MASS INDEX: 26.84 KG/M2 | DIASTOLIC BLOOD PRESSURE: 74 MMHG | HEIGHT: 66 IN | SYSTOLIC BLOOD PRESSURE: 118 MMHG

## 2023-11-09 DIAGNOSIS — Q79.0 BOCHDALEK HERNIA: Primary | ICD-10-CM

## 2023-11-09 PROCEDURE — 99213 OFFICE O/P EST LOW 20 MIN: CPT | Performed by: SURGERY

## 2023-11-09 PROCEDURE — 1159F MED LIST DOCD IN RCRD: CPT | Performed by: SURGERY

## 2023-11-09 PROCEDURE — 3078F DIAST BP <80 MM HG: CPT | Performed by: SURGERY

## 2023-11-09 PROCEDURE — 1160F RVW MEDS BY RX/DR IN RCRD: CPT | Performed by: SURGERY

## 2023-11-09 PROCEDURE — 3074F SYST BP LT 130 MM HG: CPT | Performed by: SURGERY

## 2023-11-09 NOTE — PROGRESS NOTES
CC: Evaluation for second opinion regarding diaphragmatic hernia     HPI: Patient is a very pleasant 67-year-old female that was referred by my partner Dr. Pena for evaluation of right-sided diaphragmatic hernia.  The patient was having shortness of breath worsening on exertion and she underwent PET scan.  PET scan showed a small right-sided Bochdalek hernia.  She was referred for further evaluation.  She reports having right subcostal pain that happens when bending and trying to lift her grandkids.  She reports chronic history of back pain and reports having intermittent episodes of right mid back and flank pain.  Denies any other symptoms.  Denies heartburn or regurgitation.     PMH: CHF, hypothyroidism pulmonary embolisms, postoperative nausea vomiting, colon polyps, pleural effusion, osteopenia, orthostatic hypotension, migraine, back pain, hyperlipidemia, DVT, coronary artery disease, congestive heart failure, stroke, hypertension     PSH:   -Total thyroidectomy  -Right knee surgery  -Hysterectomy  -Eye surgery 2020  -Epidural block  -Coronary artery stent placement and angioplasty  -Laparoscopic cholecystectomy  -Breast augmentation  -Back surgery  -Appendectomy  -Coronary artery bypass graft 2009  -Colonoscopy 2004  -Thoracic decompression posterior fusion with instrumentation 2017  -Upper endoscopy 2020  -Colonoscopy 2021    MEDS: Protonix, Centrum, meclizine, Crestor, Effexor, Lasix, Toprol, Ranexa, Lomotil, meloxicam, aspirin, Nitrostat, vitamin D     ALL: Amoxicillin, Norco, Flagyl    FH and SH:   Father with colon cancer, history of colon polyps in her sister, multiple other medical problems in her family not related to cancer       ROS:   Constitutional: denies any weight changes, fatigue or weakness.  HEENT: Denies hearing loss and rhinorrhea  Cardiovascular: denies chest pain, palpitations, edemas.  Respiratory: denies cough, sputum, reports SOB.  Gastrointestinal: denies N&V, abd pain, diarrhea,  "constipation.  Genitourinary: denies dysuria, frequency.  Endocrine: denies cold intolerance, lethargy and flushing.  Hem: denies excessive bruising and postop bleeding.  Musculoskeletal: denies weakness, joint swelling, pain or stiffness.  Reports chronic back pain  Neuro: denies seizures, CVA, paresthesia, or peripheral neuropathy.   Skin: denies change in nevi, rashes, masses.     PE:   Vitals:    11/09/23 0931   BP: 118/74   BP Location: Left arm   Patient Position: Sitting   Weight: 75.8 kg (167 lb)   Height: 167.6 cm (65.98\")     Body mass index is 26.97 kg/m².   Alert and oriented ×3, no acute distress.  Head is normocephalic and atraumatic.  Neck is supple there is no thyromegaly or lymphadenopathy  Breathing comfortable.  There is tenderness over the right costal margin  Regular rate and rhythm, no murmurs  Abdomen is soft and nondistended, bowel sounds are positive.  There is no rebound or guarding is and there is no peritoneal signs.  There is tenderness on palpation in the right upper quadrant as well as mid right back.  No clubbing cyanosis or edema in lower or upper extremities    Diagnostic studies:   PET scan that was done 10/6/2023 show evidence of the right posterior diaphragmatic hernia containing retroperitoneal fat    CT scan abdomen pelvis 12/3/2019 showing a right diaphragmatic posterior hernia containing retroperitoneal fat similar in size than the CAT scan in 2023    CT angiogram of the chest 3/7/2017 showed a small right posterior diaphragmatic hernia with retroperitoneal fat     Assessment and plan    The patient is a very pleasant 67-year-old female with right sided Bochdalek hernia containing retroperitoneal fat.  Discussed with the patient that I do not think that neither the shortness of breath or her abdominal pain is related to the hernia.  The only symptom that I would expect from this hernia is for her to have back pain.  The confusion factor of her case is that she has chronic " back pain and history of instrumentation at the area where she could potentially have pain.  I gave her the option to proceed with repair versus continued observation with repeat of CAT scan in 6 months to assess for progression.  At this time she decided to proceed with observation and repeat CAT scan in 6 months.  She will follow-up in my office at that time.  I recommend physical therapy for her back.  She knows exercise for her back and will start working on that again.    She will call my office if any issues     Jason Ray MD  General, Minimally Invasive and Endoscopic Surgery  [facility]     [address], [address]  [place], [place], [address]  P: [phone]  F: [phone]

## 2023-12-19 DIAGNOSIS — E03.9 ACQUIRED HYPOTHYROIDISM: ICD-10-CM

## 2023-12-19 RX ORDER — LEVOTHYROXINE SODIUM 0.07 MG/1
75 TABLET ORAL DAILY
Qty: 90 TABLET | Refills: 1 | Status: SHIPPED | OUTPATIENT
Start: 2023-12-19

## 2024-01-08 DIAGNOSIS — R73.9 HYPERGLYCEMIA: ICD-10-CM

## 2024-01-08 DIAGNOSIS — E78.5 HYPERLIPIDEMIA, UNSPECIFIED HYPERLIPIDEMIA TYPE: ICD-10-CM

## 2024-01-08 DIAGNOSIS — E55.9 VITAMIN D DEFICIENCY: ICD-10-CM

## 2024-01-08 DIAGNOSIS — E03.9 ACQUIRED HYPOTHYROIDISM: Primary | ICD-10-CM

## 2024-01-17 DIAGNOSIS — B02.9 HERPES ZOSTER WITHOUT COMPLICATION: Primary | ICD-10-CM

## 2024-01-17 RX ORDER — ACYCLOVIR 800 MG/1
800 TABLET ORAL 4 TIMES DAILY
Qty: 20 TABLET | Refills: 2 | Status: SHIPPED | OUTPATIENT
Start: 2024-01-17

## 2024-01-29 RX ORDER — FUROSEMIDE 20 MG/1
20 TABLET ORAL DAILY
Qty: 100 TABLET | Refills: 2 | Status: SHIPPED | OUTPATIENT
Start: 2024-01-29

## 2024-05-06 RX ORDER — ROSUVASTATIN CALCIUM 40 MG/1
TABLET, COATED ORAL
Qty: 90 TABLET | Refills: 0 | Status: SHIPPED | OUTPATIENT
Start: 2024-05-06

## 2024-07-09 RX ORDER — PANTOPRAZOLE SODIUM 40 MG/1
40 TABLET, DELAYED RELEASE ORAL DAILY
Qty: 100 TABLET | Refills: 0 | Status: SHIPPED | OUTPATIENT
Start: 2024-07-09

## 2024-08-05 RX ORDER — ROSUVASTATIN CALCIUM 40 MG/1
40 TABLET, COATED ORAL EVERY EVENING
Qty: 90 TABLET | Refills: 0 | Status: SHIPPED | OUTPATIENT
Start: 2024-08-05

## 2024-09-10 RX ORDER — VENLAFAXINE 75 MG/1
75 TABLET ORAL
Qty: 90 TABLET | Refills: 3 | OUTPATIENT
Start: 2024-09-10

## 2024-10-28 RX ORDER — PANTOPRAZOLE SODIUM 40 MG/1
40 TABLET, DELAYED RELEASE ORAL DAILY
Qty: 100 TABLET | Refills: 2 | OUTPATIENT
Start: 2024-10-28

## 2024-11-11 RX ORDER — ROSUVASTATIN CALCIUM 40 MG/1
40 TABLET, COATED ORAL EVERY EVENING
Qty: 90 TABLET | Refills: 3 | OUTPATIENT
Start: 2024-11-11

## 2024-11-25 RX ORDER — FUROSEMIDE 20 MG/1
20 TABLET ORAL DAILY
Qty: 100 TABLET | Refills: 2 | OUTPATIENT
Start: 2024-11-25

## 2024-11-25 RX ORDER — ROSUVASTATIN CALCIUM 40 MG/1
40 TABLET, COATED ORAL EVERY EVENING
Qty: 90 TABLET | Refills: 3 | OUTPATIENT
Start: 2024-11-25

## 2024-11-25 RX ORDER — PANTOPRAZOLE SODIUM 40 MG/1
40 TABLET, DELAYED RELEASE ORAL DAILY
Qty: 100 TABLET | Refills: 2 | OUTPATIENT
Start: 2024-11-25

## 2024-12-16 RX ORDER — FUROSEMIDE 20 MG/1
20 TABLET ORAL DAILY
Qty: 100 TABLET | Refills: 2 | OUTPATIENT
Start: 2024-12-16

## 2025-06-05 ENCOUNTER — OFFICE VISIT (OUTPATIENT)
Dept: GASTROENTEROLOGY | Facility: CLINIC | Age: 70
End: 2025-06-05
Payer: MEDICARE

## 2025-06-05 ENCOUNTER — TELEPHONE (OUTPATIENT)
Dept: GASTROENTEROLOGY | Facility: CLINIC | Age: 70
End: 2025-06-05
Payer: MEDICARE

## 2025-06-05 VITALS
TEMPERATURE: 97.7 F | DIASTOLIC BLOOD PRESSURE: 71 MMHG | HEIGHT: 66 IN | HEART RATE: 68 BPM | WEIGHT: 149.7 LBS | BODY MASS INDEX: 24.06 KG/M2 | SYSTOLIC BLOOD PRESSURE: 114 MMHG

## 2025-06-05 DIAGNOSIS — R10.13 EPIGASTRIC ABDOMINAL PAIN: Primary | ICD-10-CM

## 2025-06-05 RX ORDER — ISOSORBIDE MONONITRATE 30 MG/1
30 TABLET, EXTENDED RELEASE ORAL
COMMUNITY
Start: 2025-02-28

## 2025-06-05 NOTE — TELEPHONE ENCOUNTER
YASMIN SEGOVIA IN SCHEDULING PT SCHEDULED 06/13/2025 ARRIVING AT 1130AM EGD PREP INFORMATION SHEET HANDED TO PT OK FOR THE HUB TO RELAY

## 2025-06-05 NOTE — PROGRESS NOTES
"Chief Complaint  Abdominal Pain    Subjective          History Of Present Illness:    Ivy Chairez is a  69 y.o. female patient of Dr. Krishnamurthy who presents as a new over 3-year patient for evaluation of abdominal pain.    Patient reports she has been experincing epigasric abdominal pain. Patient has had some SOB associated with the pain. She did see her cardiologist, and [facility] heart cath which was normal. She denies nausea or vomiting. She has a history of thyroidectomy and has was told she has some scar tissues which has resulted in some chronic issues with swallowing. Patient denies any black or bloody. No intentional weight loss or poor appetite.    No NSAID use with the exception of a baby aspirin.  Patient has been on pantoprazole 40 mg daily.    Additional data reviewed:  Colonoscopy 2021 -hemorrhoids, diverticulosis, stool throughout the colon.  Random colon biopsies were benign.    Negative celiac panel.  7C4 testing negative.    Objective   Vital Signs:   /71 (BP Location: Right arm, Patient Position: Sitting, Cuff Size: Adult)   Pulse 68   Temp 97.7 °F (36.5 °C) (Temporal)   Ht 167.6 cm (65.98\")   Wt 67.9 kg (149 lb 11.2 oz)   BMI 24.18 kg/m²       Physical Exam  Vitals reviewed.   Constitutional:       General: She is not in acute distress.     Appearance: Normal appearance. She is not ill-appearing.   HENT:      Head: Normocephalic and atraumatic.      Nose: Nose normal.      Mouth/Throat:      Pharynx: Oropharynx is clear.   Eyes:      Conjunctiva/sclera: Conjunctivae normal.   Pulmonary:      Effort: Pulmonary effort is normal.   Abdominal:      General: There is no distension.      Palpations: Abdomen is soft. There is no mass.      Tenderness: There is no abdominal tenderness.   Musculoskeletal:         General: No swelling. Normal range of motion.      Cervical back: Normal range of motion.   Skin:     General: Skin is warm and dry.      Findings: No bruising or rash. "   Neurological:      General: No focal deficit present.      Mental Status: She is alert and oriented to person, place, and time.      Motor: No weakness.      Gait: Gait normal.   Psychiatric:         Mood and Affect: Mood normal.          Result Review :   The following data was reviewed by: Rafaela Moreno PA-C on 06/05/2025:    CBC          9/24/2024    19:20   CBC   WBC 7.33       RBC 4.1       Hemoglobin 12.6       Hematocrit 36.8       MCV 89.8       MCH 30.7       MCHC 34.2       RDW 13.4       Platelets 314          Details          This result is from an external source.                   Assessment and Plan    Diagnoses and all orders for this visit:    1. Epigastric abdominal pain (Primary)  -     Case Request; Standing  -     Implement Anesthesia orders day of procedure.; Standing  -     Follow Anesthesia Guidelines / Protocol; Future  -     Obtain Informed Consent; Standing  -     Case Request       I recommend proceeding with upper endoscopy to rule out gastritis, esophagitis, AVM, peptic ulcer disease, Benavides's esophagus, hiatal hernia, H. pylori infection, celiac disease, EOE or mass/malignancy. Patient is aware of the risks (including, but not limited to perforation, bleeding, sedation-related complications, and death), benefits, and alternatives to the procedure. Patient is agreeable to plan of care.   Continue pantoprazole 40 mg daily     Follow Up   No follow-ups on file.    Dragon dictation used throughout this note.            Rafaela Dalal PA-C   [facility]  [address] [place] [address]  Office: [phone]

## 2025-06-13 ENCOUNTER — ANESTHESIA EVENT (OUTPATIENT)
Dept: GASTROENTEROLOGY | Facility: HOSPITAL | Age: 70
End: 2025-06-13
Payer: MEDICARE

## 2025-06-13 ENCOUNTER — ANESTHESIA (OUTPATIENT)
Dept: GASTROENTEROLOGY | Facility: HOSPITAL | Age: 70
End: 2025-06-13
Payer: MEDICARE

## 2025-06-13 ENCOUNTER — HOSPITAL ENCOUNTER (OUTPATIENT)
Facility: HOSPITAL | Age: 70
Setting detail: HOSPITAL OUTPATIENT SURGERY
Discharge: HOME | End: 2025-06-13
Attending: INTERNAL MEDICINE | Admitting: INTERNAL MEDICINE
Payer: MEDICARE

## 2025-06-13 VITALS
WEIGHT: 148 LBS | BODY MASS INDEX: 23.78 KG/M2 | SYSTOLIC BLOOD PRESSURE: 140 MMHG | HEIGHT: 66 IN | RESPIRATION RATE: 16 BRPM | DIASTOLIC BLOOD PRESSURE: 79 MMHG | OXYGEN SATURATION: 98 % | HEART RATE: 60 BPM

## 2025-06-13 DIAGNOSIS — R10.13 EPIGASTRIC ABDOMINAL PAIN: ICD-10-CM

## 2025-06-13 PROCEDURE — 88305 TISSUE EXAM BY PATHOLOGIST: CPT | Performed by: INTERNAL MEDICINE

## 2025-06-13 PROCEDURE — 25010000002 PROPOFOL 1000 MG/100ML EMULSION

## 2025-06-13 PROCEDURE — 25810000003 LACTATED RINGERS PER 1000 ML: Performed by: INTERNAL MEDICINE

## 2025-06-13 PROCEDURE — 43239 EGD BIOPSY SINGLE/MULTIPLE: CPT | Performed by: INTERNAL MEDICINE

## 2025-06-13 PROCEDURE — 25010000002 PROPOFOL 10 MG/ML EMULSION

## 2025-06-13 PROCEDURE — 25010000002 LIDOCAINE 2% SOLUTION

## 2025-06-13 PROCEDURE — S0260 H&P FOR SURGERY: HCPCS | Performed by: INTERNAL MEDICINE

## 2025-06-13 PROCEDURE — 88342 IMHCHEM/IMCYTCHM 1ST ANTB: CPT | Performed by: INTERNAL MEDICINE

## 2025-06-13 RX ORDER — SODIUM CHLORIDE 9 MG/ML
40 INJECTION, SOLUTION INTRAVENOUS AS NEEDED
Status: DISCONTINUED | OUTPATIENT
Start: 2025-06-13 | End: 2025-06-13 | Stop reason: HOSPADM

## 2025-06-13 RX ORDER — SODIUM CHLORIDE 0.9 % (FLUSH) 0.9 %
10 SYRINGE (ML) INJECTION AS NEEDED
Status: DISCONTINUED | OUTPATIENT
Start: 2025-06-13 | End: 2025-06-13 | Stop reason: HOSPADM

## 2025-06-13 RX ORDER — SODIUM CHLORIDE, SODIUM LACTATE, POTASSIUM CHLORIDE, CALCIUM CHLORIDE 600; 310; 30; 20 MG/100ML; MG/100ML; MG/100ML; MG/100ML
30 INJECTION, SOLUTION INTRAVENOUS CONTINUOUS PRN
Status: DISCONTINUED | OUTPATIENT
Start: 2025-06-13 | End: 2025-06-13 | Stop reason: HOSPADM

## 2025-06-13 RX ORDER — LIDOCAINE HYDROCHLORIDE 20 MG/ML
INJECTION, SOLUTION INFILTRATION; PERINEURAL AS NEEDED
Status: DISCONTINUED | OUTPATIENT
Start: 2025-06-13 | End: 2025-06-13 | Stop reason: SURG

## 2025-06-13 RX ORDER — SODIUM CHLORIDE 0.9 % (FLUSH) 0.9 %
10 SYRINGE (ML) INJECTION EVERY 12 HOURS SCHEDULED
Status: DISCONTINUED | OUTPATIENT
Start: 2025-06-13 | End: 2025-06-13 | Stop reason: HOSPADM

## 2025-06-13 RX ORDER — PROPOFOL 10 MG/ML
INJECTION, EMULSION INTRAVENOUS AS NEEDED
Status: DISCONTINUED | OUTPATIENT
Start: 2025-06-13 | End: 2025-06-13 | Stop reason: SURG

## 2025-06-13 RX ADMIN — PROPOFOL INJECTABLE EMULSION 100 MG: 10 INJECTION, EMULSION INTRAVENOUS at 12:38

## 2025-06-13 RX ADMIN — PROPOFOL 180 MCG/KG/MIN: 10 INJECTION, EMULSION INTRAVENOUS at 12:38

## 2025-06-13 RX ADMIN — LIDOCAINE HYDROCHLORIDE 100 MG: 20 INJECTION, SOLUTION INFILTRATION; PERINEURAL at 12:38

## 2025-06-13 RX ADMIN — PROPOFOL INJECTABLE EMULSION 50 MG: 10 INJECTION, EMULSION INTRAVENOUS at 12:42

## 2025-06-13 RX ADMIN — SODIUM CHLORIDE, POTASSIUM CHLORIDE, SODIUM LACTATE AND CALCIUM CHLORIDE 30 ML/HR: 600; 310; 30; 20 INJECTION, SOLUTION INTRAVENOUS at 12:04

## 2025-06-13 NOTE — H&P
[facility]  Pre Procedure History & Physical    Chief Complaint:   Epigastric pain    Subjective     HPI:   This 69-year-old female presents to the endoscopy suite for upper endoscopic evaluation.  She had presented in the past with epigastric pain and underwent cardiac evaluation with negative findings.  She does have a history of peptic ulcer disease.  Her last upper endoscopy was performed in 2020.    Past Medical History:   Past Medical History:   Diagnosis Date    Allergic 1996    amoxixillian and flagyl, hydrocodone    Angina pectoris 2015    Broken toe 03/01/2017    right 4th toe    CHF (congestive heart failure) 2009    Cholelithiasis 40 yrs ago    Claustrophobia     Common migraine without aura     Neurologist Dr Govea    Coronary artery disease 2009    stent    CTS (carpal tunnel syndrome)     Years ago    Deep vein thrombosis 2010    History of chest pain     History of CHF (congestive heart failure)     History of pulmonary embolism     History of stroke 2003    History of transfusion 2010    Hyperlipidemia     Hypertension 2015    Hypothyroidism 1974    Low back pain     Migraine     eval and management by neurologist    Orthostatic hypotension     Osteoarthritis     Knees    Osteopenia 2019    Osteoporosis     Pleural effusion     POSTOPERATIVE, REQUIRING THORACENTESIS.    Polyp of sigmoid colon     REMOVED    PONV (postoperative nausea and vomiting) Always    Pulmonary embolism 2009    after By pass    Stroke 2003    Thoracic degenerative disc disease     Received epidural 12/14 [facility] DR Landin    Thoracic spine pain     Thyroid disease     Weakness of right side of body     ARM & FACE       Past Surgical History:  Past Surgical History:   Procedure Laterality Date    APPENDECTOMY  40 yrs agp    AUGMENTATION MAMMAPLASTY      BACK SURGERY  03/06/2017    BREAST AUGMENTATION  1997    BREAST BIOPSY      CARDIAC CATHETERIZATION  01/2020    CARDIAC SURGERY       CARPAL TUNNEL RELEASE  2006    Right    CHOLECYSTECTOMY      COLONOSCOPY  06/2004    Dr. Salas    COLONOSCOPY N/A 08/19/2021    Procedure: COLONOSCOPY into cecum and normal terminal ileum with bx;  Surgeon: Cuauhtemoc Krishnamurthy MD;  [facility];  Service: Gastroenterology;  Laterality: N/A;  pre: Diarrhea, abdominal pain  post: left sided diverticulosis, hemorrhoids     CORONARY ANGIOPLASTY WITH STENT PLACEMENT      NON-GRAFTED RIGHT CORONARY ARTERY STENT.    CORONARY ARTERY BYPASS GRAFT  04/2009    cabg x 3: LIMA to LAD, vein grafts to diagonal and OM1    CORONARY STENT PLACEMENT  2009    ENDOSCOPY N/A 01/24/2020    Procedure: ESOPHAGOGASTRODUODENOSCOPY with bx;  Surgeon: Cuauhtemoc Krishnamurthy MD;  Location[facility];  Service: Gastroenterology    ENDOSCOPY N/A 02/21/2020    Procedure: ESOPHAGOGASTRODUODENOSCOPY WITH COLD BIOPSIES;  Surgeon: Cuauhtemoc Krishnamurthy MD;  Location: [facility] ENDOSCOPY;  Service: Gastroenterology;  Laterality: N/A;  PRE: Epigastric pain; GERD  POST: DUODENITIS; DIVERTICULOSIS; GASTRITIS; HERNIA    EPIDURAL BLOCK      EYE SURGERY  2020    [place]    HUMERUS FRACTURE SURGERY  6/14/18    HYSTERECTOMY      KNEE ARTHROSCOPY  1998    Right    KNEE SURGERY Right 1975,    Right    SPINAL FUSION  3/2017    THORACIC DECOMPRESSION POSTERIOR FUSION WITH INSTRUMENTATION N/A 03/06/2017    Procedure: T4-T10 Fusion with instrumentation;  Surgeon: Clyde Callejas MD;  Location: [facility] OR;  Service:     TOTAL THYROIDECTOMY      UPPER GASTROINTESTINAL ENDOSCOPY  2023       Family History:  Family History   Problem Relation Age of Onset    Arthritis Mother         osteoarthritis    Hearing loss Mother     Hyperlipidemia Mother     Thyroid disease Mother     Vision loss Mother     Osteoporosis Mother     Heart disease Father     Hypertension Father     Colon cancer Father     Diabetes Father     Hearing loss Father     Hyperlipidemia Father     Hypertension Sister     Colon  polyps Sister     Asthma Sister     COPD Sister     Hypertension Brother     Asthma Brother     COPD Brother     Stroke Maternal Grandmother     Asthma Sister     COPD Sister     Asthma Brother     COPD Brother     Heart disease Maternal Aunt     Colon polyps Sister     Asthma Sister     COPD Sister     Hypertension Sister     Asthma Brother     COPD Brother     Heart disease Maternal Aunt     Vision loss Maternal Aunt     Vision loss Maternal Aunt     Malig Hyperthermia Neg Hx        Social History:   reports that she quit smoking about 46 years ago. Her smoking use included cigarettes. She has a 30 pack-year smoking history. She has never used smokeless tobacco. She reports that she does not drink alcohol and does not use drugs.    Medications:   No medications prior to admission.       Allergies:  Other, Amoxicillin, Hydrocodone-acetaminophen, and Metronidazole    ROS:    Pertinent items are noted in HPI     Objective     not currently breastfeeding.    Physical Exam   Constitutional: Pt is oriented to person, place, and time and well-developed, well-nourished, and in no distress.   Mouth/Throat: Oropharynx is clear and moist.   Neck: Normal range of motion.   Cardiovascular: Normal rate, regular rhythm and normal heart sounds.    Pulmonary/Chest: Effort normal and breath sounds normal.   Abdominal: Soft. Nontender  Skin: Skin is warm and dry.   Psychiatric: Mood, memory, affect and judgment normal.     Assessment & Plan     Diagnosis:  Epigastric pain  History of peptic ulcer disease    Anticipated Surgical Procedure:  EGD    The risks, benefits, and alternatives of this procedure have been discussed with the patient or the responsible party- the patient understands and agrees to proceed.

## 2025-06-13 NOTE — ANESTHESIA POSTPROCEDURE EVALUATION
Patient: Ivy Chairez    Procedure Summary       Date: 06/13/25 Room / Location:  [facility] /  [facility]    Anesthesia Start: 1234 Anesthesia Stop: 1256    Procedure: ESOPHAGOGASTRODUODENOSCOPY WITH BIOPSIES (Esophagus) Diagnosis:       Hiatal hernia      Gastritis      Duodenogastric bile reflux      (Epigastric abdominal pain [R10.13])    Surgeons: Cuauhtemoc Krishnamurthy MD Provider: Joan Ghotra MD    Anesthesia Type: MAC ASA Status: 3            Anesthesia Type: MAC    Vitals  Vitals Value Taken Time   /79 06/13/25 13:18   Temp     Pulse 67 06/13/25 13:20   Resp 16 06/13/25 13:18   SpO2 91 % 06/13/25 13:19   Vitals shown include unfiled device data.        Post Anesthesia Care and Evaluation    Anesthetic complications: No anesthetic complications

## 2025-06-13 NOTE — DISCHARGE INSTRUCTIONS
For the next 24 hours patient needs to be with a responsible adult.    For 24 hours DO NOT drive, operate machinery, appliances, drink alcohol, make important decisions or sign legal documents.    Start with a light or bland diet if you are feeling sick to your stomach otherwise advance to regular diet as tolerated.    Follow recommendations on procedure report if provided by your doctor.    Call Dr Krishnamurthy for problems [phone]    Problems may include but not limited to: large amounts of bleeding, trouble breathing, repeated vomiting, severe unrelieved pain, fever or chills.

## 2025-06-13 NOTE — ANESTHESIA PREPROCEDURE EVALUATION
Anesthesia Evaluation     history of anesthetic complications:  PONV               Airway   Mallampati: II  TM distance: >3 FB  Neck ROM: full  Dental      Comment: Bridge across top front    Pulmonary    (+) pleural effusion, pulmonary embolism, a smoker Former,  (-) shortness of breath, recent URI, sleep apnea  Cardiovascular     (+) hypertension, CAD, CABG, angina, CHF , DVT, hyperlipidemia,  carotid artery disease      Neuro/Psych  (+) CVA, headaches, syncope  GI/Hepatic/Renal/Endo    (+) GERD, PUD    Musculoskeletal     Abdominal    Substance History      OB/GYN          Other                    Anesthesia Plan    ASA 3     MAC     intravenous induction     Anesthetic plan, risks, benefits, and alternatives have been provided, discussed and informed consent has been obtained with: patient.    CODE STATUS:

## 2025-06-17 LAB
CYTO UR: NORMAL
DX PRELIMINARY: NORMAL
LAB AP CASE REPORT: NORMAL
LAB AP SPECIAL STAINS: NORMAL
PATH REPORT.FINAL DX SPEC: NORMAL
PATH REPORT.GROSS SPEC: NORMAL

## (undated) DEVICE — STPLR SKIN VISISTAT WD 35CT

## (undated) DEVICE — ADAPT CLN BIOGUARD AIR/H2O DISP

## (undated) DEVICE — CANN O2 ETCO2 FITS ALL CONN CO2 SMPL A/ 7IN DISP LF

## (undated) DEVICE — FRCP BIOP RADLJAW4 HOT 2.2X240 BX40

## (undated) DEVICE — FLOSEAL MATRIX IS INDICATED IN SURGICAL PROCEDURES (OTHER THAN IN OPHTHALMIC) AS AN ADJUNCT TO HEMOSTASIS WHEN CONTROL OF BLEEDING BY LIGATURE OR CONVENTIONAL PROCEDURES IS INEFFECTIVE OR IMPRACTICAL.: Brand: FLOSEAL HEMOSTATIC MATRIX

## (undated) DEVICE — BLOOD TRANSFUSION FILTER: Brand: HAEMONETICS

## (undated) DEVICE — TUBING, SUCTION, 1/4" X 10', STRAIGHT: Brand: MEDLINE

## (undated) DEVICE — BLCK/BITE BLOX W/DENTL/RIM W/STRAP 54F

## (undated) DEVICE — BITEBLOCK OMNI BLOC

## (undated) DEVICE — SOL NACL 0.9PCT 1000ML

## (undated) DEVICE — PK NEURO SPINE 40

## (undated) DEVICE — LN SMPL CO2 SHTRM SD STREAM W/M LUER

## (undated) DEVICE — SINGLE-USE BIOPSY FORCEPS: Brand: RADIAL JAW 4

## (undated) DEVICE — ADHS SKIN DERMABOND TOP ADVANCED

## (undated) DEVICE — KT ORCA ORCAPOD DISP STRL

## (undated) DEVICE — MSK PROC CURAPLEX O2 2/ADAPT 7FT

## (undated) DEVICE — OIL CARTRIDGE: Brand: CORE, MAESTRO

## (undated) DEVICE — FRCP BX RADJAW4 NDL 2.8 240CM LG OG BX40

## (undated) DEVICE — 6.0MM PRECISION ROUND

## (undated) DEVICE — 3M™ IOBAN™ 2 ANTIMICROBIAL INCISE DRAPE 6650EZ: Brand: IOBAN™ 2

## (undated) DEVICE — MEDI-VAC YANKAUER SUCTION HANDLE W/BULBOUS TIP: Brand: CARDINAL HEALTH

## (undated) DEVICE — OCCLUSIVE GAUZE STRIP,3% BISMUTH TRIBROMOPHENATE IN PETROLATUM BLEND: Brand: XEROFORM

## (undated) DEVICE — TOTAL TRAY, 16FR 10ML SIL FOLEY, URN: Brand: MEDLINE

## (undated) DEVICE — SENSR O2 OXIMAX FNGR A/ 18IN NONSTR

## (undated) DEVICE — HANDPIECE SET WITH COAXIAL HIGH FLOW TIP AND SUCTION TUBE: Brand: INTERPULSE

## (undated) DEVICE — BONE MARROW ASPIRATION NEEDLES ASPIRATOR KIT 3-HOLE 4 INCHES NDLE

## (undated) DEVICE — PK ATS CUST W CARDIOTOMY RESEVOIR

## (undated) DEVICE — ANTIBACTERIAL UNDYED BRAIDED (POLYGLACTIN 910), SYNTHETIC ABSORBABLE SUTURE: Brand: COATED VICRYL

## (undated) DEVICE — SUT VIC 1 OS8 27IN J699H

## (undated) DEVICE — TOWEL,OR,DSP,ST,BLUE,STD,4/PK,20PK/CS: Brand: MEDLINE

## (undated) DEVICE — APPL CHLORAPREP W/TINT 26ML ORNG

## (undated) DEVICE — DISPOSABLE BIPOLAR CABLE 12FT. (3.6M): Brand: KIRWAN

## (undated) DEVICE — ENCORE® LATEX ORTHO SIZE 8, STERILE LATEX POWDER-FREE SURGICAL GLOVE: Brand: ENCORE

## (undated) DEVICE — SUT MNCRYL PLS ANTIB UD 4/0 PS2 18IN

## (undated) DEVICE — PAD,ABDOMINAL,8"X10",ST,LF: Brand: MEDLINE

## (undated) DEVICE — SPNG GZ WOVN 4X4IN 12PLY 10/BX STRL

## (undated) DEVICE — SPONGE,LAP,12"X12",XR,ST,5/PK,40PK/CS: Brand: MEDLINE

## (undated) DEVICE — DIFFUSER: Brand: CORE, MAESTRO